# Patient Record
Sex: FEMALE | Race: BLACK OR AFRICAN AMERICAN | NOT HISPANIC OR LATINO | Employment: OTHER | URBAN - METROPOLITAN AREA
[De-identification: names, ages, dates, MRNs, and addresses within clinical notes are randomized per-mention and may not be internally consistent; named-entity substitution may affect disease eponyms.]

---

## 2017-01-23 ENCOUNTER — OFFICE VISIT (OUTPATIENT)
Dept: FAMILY MEDICINE | Facility: CLINIC | Age: 72
End: 2017-01-23
Payer: MEDICARE

## 2017-01-23 VITALS
HEIGHT: 66 IN | BODY MASS INDEX: 28.33 KG/M2 | TEMPERATURE: 98 F | RESPIRATION RATE: 19 BRPM | HEART RATE: 85 BPM | WEIGHT: 176.25 LBS | SYSTOLIC BLOOD PRESSURE: 142 MMHG | DIASTOLIC BLOOD PRESSURE: 80 MMHG | OXYGEN SATURATION: 97 %

## 2017-01-23 DIAGNOSIS — M54.50 CHRONIC MIDLINE LOW BACK PAIN WITHOUT SCIATICA: ICD-10-CM

## 2017-01-23 DIAGNOSIS — E78.5 HYPERLIPIDEMIA, UNSPECIFIED HYPERLIPIDEMIA TYPE: ICD-10-CM

## 2017-01-23 DIAGNOSIS — G89.29 CHRONIC MIDLINE LOW BACK PAIN WITHOUT SCIATICA: ICD-10-CM

## 2017-01-23 DIAGNOSIS — Z00.00 ENCOUNTER FOR PREVENTIVE HEALTH EXAMINATION: Primary | ICD-10-CM

## 2017-01-23 DIAGNOSIS — M47.812 SPONDYLOSIS OF CERVICAL REGION WITHOUT MYELOPATHY OR RADICULOPATHY: ICD-10-CM

## 2017-01-23 DIAGNOSIS — M48.02 CERVICAL STENOSIS OF SPINAL CANAL: ICD-10-CM

## 2017-01-23 DIAGNOSIS — I10 ESSENTIAL HYPERTENSION: ICD-10-CM

## 2017-01-23 DIAGNOSIS — J84.10 CALCIFIED GRANULOMA OF LUNG: ICD-10-CM

## 2017-01-23 PROCEDURE — 99499 UNLISTED E&M SERVICE: CPT | Mod: S$GLB,,, | Performed by: NURSE PRACTITIONER

## 2017-01-23 PROCEDURE — 3077F SYST BP >= 140 MM HG: CPT | Mod: S$GLB,,, | Performed by: NURSE PRACTITIONER

## 2017-01-23 PROCEDURE — G0439 PPPS, SUBSEQ VISIT: HCPCS | Mod: S$GLB,,, | Performed by: NURSE PRACTITIONER

## 2017-01-23 PROCEDURE — 3079F DIAST BP 80-89 MM HG: CPT | Mod: S$GLB,,, | Performed by: NURSE PRACTITIONER

## 2017-01-23 PROCEDURE — 99999 PR PBB SHADOW E&M-EST. PATIENT-LVL IV: CPT | Mod: PBBFAC,,, | Performed by: NURSE PRACTITIONER

## 2017-01-23 NOTE — PATIENT INSTRUCTIONS
Counseling and Referral of Other Preventative  (Italic type indicates deductible and co-insurance are waived)    Patient Name: Nuha Bernstein  Today's Date: 1/23/2017      SERVICE LIMITATIONS RECOMMENDATION    Vaccines    · Pneumococcal (once after 65)    · Influenza (annually)    · Hepatitis B (if medium/high risk)    · Prevnar 13      Hepatitis B medium/high risk factors:       - End-stage renal disease       - Hemophiliacs who received Factor VII or         IX concentrates       - Clients of institutions for the mentally             retarded       - Persons who live in the same house as          a HepB carrier       - Homosexual men       - Illicit injectable drug abusers     Pneumococcal: Recommended to patient, declined     Influenza: Recommended to patient, declined     Hepatitis B: N/A no risk factors      Prevnar 13: Recommended to patient, declined    Mammogram (biennial age 50-74)  Annually (age 40 or over)  Last done April 2016, recommend to repeat every 2  years    Pap (up to age 70 and after 70 if unknown history or abnormal study last 10 years)    N/A patient is 70 y/o     patient is 70 y/o    Colorectal cancer screening (to age 75)    · Fecal occult blood test (annual)  · Flexible sigmoidoscopy (5y)  · Screening colonoscopy (10y)  · Barium enema   Last done Sept 2015, recommend to repeat every 10  years    Diabetes self-management training (no USPSTF recommendations)  Requires referral by treating physician for patient with diabetes or renal disease. 10 hours of initial DSMT sessions of no less than 30 minutes each in a continuous 12-month period. 2 hours of follow-up DSMT in subsequent years.  N/A non-diabetic patient     Bone mass measurements (age 65 & older, biennial)  Requires diagnosis related to osteoporosis or estrogen deficiency. Biennial benefit unless patient has history of long-term glucocorticoid  Last done August 2016, recommend to repeat every 5  years    Glaucoma screening (no USPSTF  recommendation)  Diabetes mellitus, family history   , age 50 or over    American, age 65 or over  Done this year, repeat every year    Medical nutrition therapy for diabetes or renal disease (no recommended schedule)  Requires referral by treating physician for patient with diabetes or renal disease or kidney transplant within the past 3 years.  Can be provided in same year as diabetes self-management training (DSMT), and CMS recommends medical nutrition therapy take place after DSMT. Up to 3 hours for initial year and 2 hours in subsequent years.  N/A     Cardiovascular screening blood tests (every 5 years)  · Fasting lipid panel  Order as a panel if possible  Last done July 2016, recommend to repeat every 1  years    Diabetes screening tests (at least every 3 years, Medicare covers annually or at 6-month intervals for prediabetic patients)  · Fasting blood sugar (FBS) or glucose tolerance test (GTT)  Patient must be diagnosed with one of the following:       - Hypertension       - Dyslipidemia       - Obesity (BMI 30kg/m2)       - Previous elevated impaired FBS or GTT       ... or any two of the following:       - Overweight (BMI 25 but <30)       - Family history of diabetes       - Age 65 or older       - History of gestational diabetes or birth of baby weighing more than 9 pounds  Last done July 2016, recommend to repeat every per MD discretion       Abdominal aortic aneurysm screening (once)  · Sonogram   Limited to patients who meet one of the following criteria:       - Men who are 65-75 years old and have smoked more than 100 cigarette in their lifetime       - Anyone with a family history of abdominal aortic aneurysm       - Anyone recommended for screening by the USPSTF      HIV screening (annually for increased risk patients)  · HIV-1 and HIV-2 by EIA, or CHERYL, rapid antibody test or oral mucosa transudate  Patients must be at increased risk for HIV infection per USPSTF  guidelines or pregnant. Tests covered annually for patient at increased risk or as requested by the patient. Pregnant patients may receive up to 3 tests during pregnancy.  Risks discussed, screening is not recommended    Smoking cessation counseling (up to 8 sessions per year)  Patients must be asymptomatic of tobacco-related conditions to receive as a preventative service.  N/A    Subsequent annual wellness visit  At least 12 months since last AWV  Return in one year     The following information is provided to all patients.  This information is to help you find resources for any of the problems found today that may be affecting your health:                Living healthy guide: www.[facility].[place].[facility]      Understanding Diabetes: www.diabetes.org      Eating healthy: www.cdc.gov/healthyweight      CDC home safety checklist: www.cdc.gov/steadi/patient.html      Agency on Aging: www.goea.[place].[facility]      Alcoholics anonymous (AA): www.aa.org      Physical Activity: www.berkley.nih.gov/qk3xntu      Tobacco use: www.quitwithusla.org     Counseling and Referral of Other Preventative  (Italic type indicates deductible and co-insurance are waived)    Patient Name: Nuha Bernstein  Today's Date: 1/24/2017      SERVICE LIMITATIONS RECOMMENDATION    Vaccines    · Pneumococcal (once after 65)    · Influenza (annually)    · Hepatitis B (if medium/high risk)    · Prevnar 13      Hepatitis B medium/high risk factors:       - End-stage renal disease       - Hemophiliacs who received Factor VII or         IX concentrates       - Clients of institutions for the mentally             retarded       - Persons who live in the same house as          a HepB carrier       - Homosexual men       - Illicit injectable drug abusers     Pneumococcal: Recommended to patient, declined     Influenza: Recommended to patient, declined     Hepatitis B: N/A N/A no risk factors      Prevnar 13: Recommended to patient, declined    Mammogram (biennial age  50-74)  Annually (age 40 or over)  Last done April 2016, recommend to repeat every 2  years    Pap (up to age 70 and after 70 if unknown history or abnormal study last 10 years)    N/A Patient has no risk factors      N/A no risk factors     Colorectal cancer screening (to age 75)    · Fecal occult blood test (annual)  · Flexible sigmoidoscopy (5y)  · Screening colonoscopy (10y)  · Barium enema   Last done Sept 2015, recommend to repeat every 10  years    Diabetes self-management training (no USPSTF recommendations)  Requires referral by treating physician for patient with diabetes or renal disease. 10 hours of initial DSMT sessions of no less than 30 minutes each in a continuous 12-month period. 2 hours of follow-up DSMT in subsequent years.  N/A non-diabetic     Bone mass measurements (age 65 & older, biennial)  Requires diagnosis related to osteoporosis or estrogen deficiency. Biennial benefit unless patient has history of long-term glucocorticoid  Last done August 2016, recommend to repeat every 5  years    Glaucoma screening (no USPSTF recommendation)  Diabetes mellitus, family history   , age 50 or over    American, age 65 or over  N/A completed at [facility] 2012, has personal optometrist     Medical nutrition therapy for diabetes or renal disease (no recommended schedule)  Requires referral by treating physician for patient with diabetes or renal disease or kidney transplant within the past 3 years.  Can be provided in same year as diabetes self-management training (DSMT), and CMS recommends medical nutrition therapy take place after DSMT. Up to 3 hours for initial year and 2 hours in subsequent years.  N/A non-diabetic     Cardiovascular screening blood tests (every 5 years)  · Fasting lipid panel  Order as a panel if possible  Last done July 2016, recommend to repeat every 1  years    Diabetes screening tests (at least every 3 years, Medicare covers annually or at 6-month intervals for  prediabetic patients)  · Fasting blood sugar (FBS) or glucose tolerance test (GTT)  Patient must be diagnosed with one of the following:       - Hypertension       - Dyslipidemia       - Obesity (BMI 30kg/m2)       - Previous elevated impaired FBS or GTT       ... or any two of the following:       - Overweight (BMI 25 but <30)       - Family history of diabetes       - Age 65 or older       - History of gestational diabetes or birth of baby weighing more than 9 pounds  Last done July 2016 - Ha1c 6.2, recommend to repeat every at physician's   discretion     Abdominal aortic aneurysm screening (once)  · Sonogram   Limited to patients who meet one of the following criteria:       - Men who are 65-75 years old and have smoked more than 100 cigarette in their lifetime       - Anyone with a family history of abdominal aortic aneurysm       - Anyone recommended for screening by the USPSTF  N/A N/A    HIV screening (annually for increased risk patients)  · HIV-1 and HIV-2 by EIA, or CHERYL, rapid antibody test or oral mucosa transudate  Patients must be at increased risk for HIV infection per USPSTF guidelines or pregnant. Tests covered annually for patient at increased risk or as requested by the patient. Pregnant patients may receive up to 3 tests during pregnancy.  Risks discussed, screening is not recommended    Smoking cessation counseling (up to 8 sessions per year)  Patients must be asymptomatic of tobacco-related conditions to receive as a preventative service.  NOT A TOBACCO USER     Subsequent annual wellness visit  At least 12 months since last AWV  Return in one year     The following information is provided to all patients.  This information is to help you find resources for any of the problems found today that may be affecting your health:                Living healthy guide: www.[facility].[place].gov      Understanding Diabetes: www.diabetes.org      Eating healthy: www.cdc.gov/healthyweight      [facility] home safety  checklist: www.cdc.gov/steadi/patient.html      Agency on Aging: www.goea.[place].gov      Alcoholics anonymous (AA): www.aa.org      Physical Activity: www.berkley.nih.gov/vx0uear      Tobacco use: www.quitwithusla.org

## 2017-01-24 NOTE — PROGRESS NOTES
"Nuha Bernstein presented for a  Medicare AWV and comprehensive Health Risk Assessment today. The following components were reviewed and updated:    · Medical history  · Family History  · Social history  · Allergies and Current Medications  · Health Risk Assessment  · Health Maintenance  · Care Team     ** See Completed Assessments for Annual Wellness Visit within the encounter summary.**       The following assessments were completed:  · Living Situation  · CAGE  · Depression Screening  · Timed Get Up and Go  · Whisper Test  · Cognitive Function Screening  · Nutrition Screening  · ADL Screening  · PAQ Screening    Vitals:    01/23/17 1513   BP: (!) 142/80   BP Location: Left arm   Patient Position: Sitting   BP Method: Manual   Pulse: 85   Resp: 19   Temp: 98.3 °F (36.8 °C)   TempSrc: Oral   SpO2: 97%   Weight: 79.9 kg (176 lb 4.1 oz)   Height: 5' 6" (1.676 m)     Body mass index is 28.45 kg/(m^2).  Physical Exam   Constitutional: She is oriented to person, place, and time.   Cardiovascular: Normal rate, regular rhythm and normal heart sounds.    Pulmonary/Chest: Effort normal and breath sounds normal.   Musculoskeletal: Normal range of motion.   Neurological: She is alert and oriented to person, place, and time.   Skin: Skin is warm.   Psychiatric: She has a normal mood and affect. Her behavior is normal. Thought content normal.   Vitals reviewed.        Diagnoses and health risks identified today and associated recommendations/orders:    1. Encounter for preventive health examination  Education provided about prevent health examinations and procedures; discussed patient's health concerns, holistically addressed patient's health plan.     2. Calcified granuloma of lung  Stable, asymptomatic; monitor    3. Essential hypertension  The current medical regimen is effective;  continue present plan and medications.  Reviewed medications, educated about diet, activities, and ways to avoid sedentary lifestyle.     4. " Chronic midline low back pain without sciatica  Stable, followed by [facility] physical rehabilitation, taking tramadol as directed, denies worsening symptoms; continue as directed.     5. Cervical stenosis of spinal canal  Stable, followed by [facility] physical rehabilitation, taking tramadol as directed, denies worsening symptoms; continue as directed.     6. Spondylosis of cervical region without myelopathy or radiculopathy  Stable, followed by [facility] physical rehabilitation, taking tramadol as directed, denies worsening symptoms; continue as directed.     7. Hyperlipidemia, unspecified hyperlipidemia type  The current medical regimen is effective;  continue present plan and medications.  Reminded patient of [employer]'s Silver Sneakers benefits with access to fitness centers and classes.     Reviewed health maintenance with patient, educated about recommended examinations, procedures (labs & images), and immunizations.  Patient elected to deferred vaccinations.       No Follow-up on file.    Chico Berg Jr, NP

## 2017-02-22 DIAGNOSIS — G89.29 CHRONIC NECK PAIN: ICD-10-CM

## 2017-02-22 DIAGNOSIS — M54.50 CHRONIC LOW BACK PAIN: ICD-10-CM

## 2017-02-22 DIAGNOSIS — M54.2 CHRONIC NECK PAIN: ICD-10-CM

## 2017-02-22 DIAGNOSIS — G89.29 CHRONIC LOW BACK PAIN: ICD-10-CM

## 2017-02-23 RX ORDER — MELOXICAM 15 MG/1
TABLET ORAL
Qty: 90 TABLET | Refills: 1 | Status: SHIPPED | OUTPATIENT
Start: 2017-02-23 | End: 2017-07-24 | Stop reason: SDUPTHER

## 2017-05-04 ENCOUNTER — TELEPHONE (OUTPATIENT)
Dept: FAMILY MEDICINE | Facility: CLINIC | Age: 72
End: 2017-05-04

## 2017-05-04 DIAGNOSIS — Z12.31 ENCOUNTER FOR SCREENING MAMMOGRAM FOR BREAST CANCER: Primary | ICD-10-CM

## 2017-05-04 NOTE — TELEPHONE ENCOUNTER
----- Message from [facility] BERNADETTE Vann sent at 5/4/2017 12:50 PM CDT -----  Contact: self   Request mammogram order. Please contact the pt at [phone]. Thanks!

## 2017-05-15 ENCOUNTER — HOSPITAL ENCOUNTER (OUTPATIENT)
Dept: RADIOLOGY | Facility: HOSPITAL | Age: 72
Discharge: HOME | End: 2017-05-15
Attending: FAMILY MEDICINE
Payer: MEDICARE

## 2017-05-15 DIAGNOSIS — Z12.31 ENCOUNTER FOR SCREENING MAMMOGRAM FOR BREAST CANCER: ICD-10-CM

## 2017-05-15 PROCEDURE — 77067 SCR MAMMO BI INCL CAD: CPT | Mod: 26,,, | Performed by: RADIOLOGY

## 2017-05-15 PROCEDURE — 77067 SCR MAMMO BI INCL CAD: CPT | Mod: TC

## 2017-05-15 PROCEDURE — 77063 BREAST TOMOSYNTHESIS BI: CPT | Mod: 26,,, | Performed by: RADIOLOGY

## 2017-07-11 DIAGNOSIS — E78.5 HYPERLIPIDEMIA, UNSPECIFIED HYPERLIPIDEMIA TYPE: ICD-10-CM

## 2017-07-11 DIAGNOSIS — I10 ESSENTIAL HYPERTENSION: ICD-10-CM

## 2017-07-11 RX ORDER — LISINOPRIL 40 MG/1
TABLET ORAL
Qty: 30 TABLET | Refills: 0 | Status: SHIPPED | OUTPATIENT
Start: 2017-07-11 | End: 2017-11-02 | Stop reason: SDUPTHER

## 2017-07-11 RX ORDER — TRIAMTERENE AND HYDROCHLOROTHIAZIDE 37.5; 25 MG/1; MG/1
1 CAPSULE ORAL DAILY
Qty: 30 CAPSULE | Refills: 0 | Status: SHIPPED | OUTPATIENT
Start: 2017-07-11 | End: 2018-02-02 | Stop reason: SDUPTHER

## 2017-07-11 RX ORDER — ATORVASTATIN CALCIUM 20 MG/1
TABLET, FILM COATED ORAL
Qty: 30 TABLET | Refills: 0 | Status: SHIPPED | OUTPATIENT
Start: 2017-07-11 | End: 2017-09-13 | Stop reason: SDUPTHER

## 2017-07-11 RX ORDER — ATORVASTATIN CALCIUM 20 MG/1
TABLET, FILM COATED ORAL
Qty: 90 TABLET | Refills: 2 | OUTPATIENT
Start: 2017-07-11

## 2017-07-11 RX ORDER — LISINOPRIL 40 MG/1
TABLET ORAL
Qty: 90 TABLET | Refills: 2 | OUTPATIENT
Start: 2017-07-11

## 2017-07-24 DIAGNOSIS — G89.29 CHRONIC NECK PAIN: ICD-10-CM

## 2017-07-24 DIAGNOSIS — G89.29 CHRONIC LOW BACK PAIN: ICD-10-CM

## 2017-07-24 DIAGNOSIS — M54.2 CHRONIC NECK PAIN: ICD-10-CM

## 2017-07-24 DIAGNOSIS — M54.50 CHRONIC LOW BACK PAIN: ICD-10-CM

## 2017-07-24 RX ORDER — MELOXICAM 15 MG/1
TABLET ORAL
Qty: 90 TABLET | Refills: 1 | Status: SHIPPED | OUTPATIENT
Start: 2017-07-24 | End: 2018-01-16 | Stop reason: SDUPTHER

## 2017-08-12 DIAGNOSIS — E78.5 HYPERLIPIDEMIA, UNSPECIFIED HYPERLIPIDEMIA TYPE: ICD-10-CM

## 2017-08-12 DIAGNOSIS — I10 ESSENTIAL HYPERTENSION: ICD-10-CM

## 2017-08-14 RX ORDER — TRIAMTERENE AND HYDROCHLOROTHIAZIDE 37.5; 25 MG/1; MG/1
CAPSULE ORAL
Qty: 30 CAPSULE | Refills: 0 | OUTPATIENT
Start: 2017-08-14

## 2017-08-14 RX ORDER — LISINOPRIL 40 MG/1
TABLET ORAL
Qty: 30 TABLET | Refills: 0 | OUTPATIENT
Start: 2017-08-14

## 2017-08-14 RX ORDER — ATORVASTATIN CALCIUM 20 MG/1
TABLET, FILM COATED ORAL
Qty: 30 TABLET | Refills: 0 | OUTPATIENT
Start: 2017-08-14

## 2017-09-01 ENCOUNTER — OFFICE VISIT (OUTPATIENT)
Dept: FAMILY MEDICINE | Facility: CLINIC | Age: 72
End: 2017-09-01
Payer: MEDICARE

## 2017-09-01 VITALS
HEIGHT: 66 IN | WEIGHT: 181 LBS | TEMPERATURE: 98 F | DIASTOLIC BLOOD PRESSURE: 80 MMHG | OXYGEN SATURATION: 97 % | HEART RATE: 79 BPM | BODY MASS INDEX: 29.09 KG/M2 | SYSTOLIC BLOOD PRESSURE: 132 MMHG

## 2017-09-01 DIAGNOSIS — M54.2 CHRONIC NECK PAIN: ICD-10-CM

## 2017-09-01 DIAGNOSIS — J84.10 CALCIFIED GRANULOMA OF LUNG: ICD-10-CM

## 2017-09-01 DIAGNOSIS — G89.29 CHRONIC NECK PAIN: ICD-10-CM

## 2017-09-01 DIAGNOSIS — E78.00 PURE HYPERCHOLESTEROLEMIA: Primary | ICD-10-CM

## 2017-09-01 DIAGNOSIS — R73.03 PREDIABETES: ICD-10-CM

## 2017-09-01 DIAGNOSIS — I10 ESSENTIAL HYPERTENSION: ICD-10-CM

## 2017-09-01 PROCEDURE — 1159F MED LIST DOCD IN RCRD: CPT | Mod: S$GLB,,, | Performed by: FAMILY MEDICINE

## 2017-09-01 PROCEDURE — 3008F BODY MASS INDEX DOCD: CPT | Mod: S$GLB,,, | Performed by: FAMILY MEDICINE

## 2017-09-01 PROCEDURE — 99499 UNLISTED E&M SERVICE: CPT | Mod: S$GLB,,, | Performed by: FAMILY MEDICINE

## 2017-09-01 PROCEDURE — 1157F ADVNC CARE PLAN IN RCRD: CPT | Mod: S$GLB,,, | Performed by: FAMILY MEDICINE

## 2017-09-01 PROCEDURE — 3075F SYST BP GE 130 - 139MM HG: CPT | Mod: S$GLB,,, | Performed by: FAMILY MEDICINE

## 2017-09-01 PROCEDURE — 99214 OFFICE O/P EST MOD 30 MIN: CPT | Mod: S$GLB,,, | Performed by: FAMILY MEDICINE

## 2017-09-01 PROCEDURE — 99999 PR PBB SHADOW E&M-EST. PATIENT-LVL III: CPT | Mod: PBBFAC,,, | Performed by: FAMILY MEDICINE

## 2017-09-01 PROCEDURE — 3079F DIAST BP 80-89 MM HG: CPT | Mod: S$GLB,,, | Performed by: FAMILY MEDICINE

## 2017-09-01 PROCEDURE — 1126F AMNT PAIN NOTED NONE PRSNT: CPT | Mod: S$GLB,,, | Performed by: FAMILY MEDICINE

## 2017-09-01 RX ORDER — TRAMADOL HYDROCHLORIDE 50 MG/1
50 TABLET ORAL 3 TIMES DAILY PRN
Qty: 270 TABLET | Refills: 0 | Status: SHIPPED | OUTPATIENT
Start: 2017-09-01 | End: 2017-09-01 | Stop reason: SDUPTHER

## 2017-09-01 RX ORDER — TRAMADOL HYDROCHLORIDE 50 MG/1
50 TABLET ORAL 3 TIMES DAILY PRN
Qty: 270 TABLET | Refills: 0 | Status: CANCELLED | OUTPATIENT
Start: 2017-09-01 | End: 2017-11-30

## 2017-09-01 NOTE — PROGRESS NOTES
Office Visit    Patient Name: Nuha Bernstein    : 1945  MRN: [id number]    Subjective:  Nuha is a 72 y.o. female who presents today for:    Medication Refill and Hypertension      This patient has multiple medical diagnoses as noted below.  This patient is known to me and to this clinic. She does report that she does have increased cramping at night in her hips. It will occur when she lays down.  Patient denies any new symptoms including chest pain, SOB, blurry vision, N/V, diarrhea.  She does require refills on her medications.  WE discussed her last mammogram.   She reports uncomfortable pain in her chest that feels like indigestion.  She has used Tums to address this concerns.      Patient Active Problem List   Diagnosis    Hyperlipidemia    Cervical stenosis of spinal canal    Osteoarthritis of both hips (moderate)    DJD (degenerative joint disease) of cervical spine    Chronic low back pain    Chronic neck pain    Arthropathy of cervical facet joint    Essential hypertension    Calcified granuloma of lung    Prediabetes       Past Surgical History:   Procedure Laterality Date    Right Thumb         Family History   Problem Relation Age of Onset    Hypertension Mother     Cataracts Mother     Heart disease Mother     Hypertension Father     Cataracts Father     Amblyopia Neg Hx     Blindness Neg Hx     Cancer Neg Hx     Diabetes Neg Hx     Glaucoma Neg Hx     Macular degeneration Neg Hx     Retinal detachment Neg Hx     Strabismus Neg Hx     Stroke Neg Hx     Thyroid disease Neg Hx        Social History     Social History    Marital status:      Spouse name: N/A    Number of children: N/A    Years of education: N/A     Occupational History    Not on file.     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     Drug use: No    Sexual activity: No     Other  Topics Concern    Not on file     Social History Narrative    No narrative on file       Current Medications  Medications reviewed and updated.     Allergies   Review of patient's allergies indicates:  No Known Allergies      Labs  Lab Results   Component Value Date    HGBA1C 6.2 07/07/2016     Lab Results   Component Value Date     07/07/2016    K 3.9 07/07/2016     07/07/2016    CO2 24 07/07/2016    BUN 27 (H) 07/07/2016    CREATININE 1.1 07/07/2016    CALCIUM 9.6 07/07/2016    ANIONGAP 12 07/07/2016    ESTGFRAFRICA 58.4 (A) 07/07/2016    EGFRNONAA 50.6 (A) 07/07/2016     Lab Results   Component Value Date    CHOL 179 07/07/2016    CHOL 182 05/15/2015    CHOL 234 (H) 09/27/2014    CHOL 234 (H) 09/27/2014     Lab Results   Component Value Date    HDL 49 07/07/2016    HDL 46 05/15/2015    HDL 53 09/27/2014    HDL 53 09/27/2014     Lab Results   Component Value Date    LDLCALC 108.8 07/07/2016    LDLCALC 113.8 05/15/2015    LDLCALC 162.4 (H) 09/27/2014    LDLCALC 162.4 (H) 09/27/2014     Lab Results   Component Value Date    TRIG 106 07/07/2016    TRIG 111 05/15/2015    TRIG 93 09/27/2014    TRIG 93 09/27/2014     Lab Results   Component Value Date    CHOLHDL 27.4 07/07/2016    CHOLHDL 25.3 05/15/2015    CHOLHDL 22.6 09/27/2014    CHOLHDL 22.6 09/27/2014     Last set of blood work has been reviewed as noted above.    Review of Systems   Constitutional: Negative for activity change, appetite change, fatigue, fever and unexpected weight change.   HENT: Negative.  Negative for ear discharge, ear pain, rhinorrhea and sore throat.    Eyes: Negative.    Respiratory: Negative for apnea, cough, chest tightness, shortness of breath and wheezing.    Cardiovascular: Negative for chest pain, palpitations and leg swelling.   Gastrointestinal: Negative for abdominal distention, abdominal pain, constipation, diarrhea and vomiting.   Endocrine: Negative for cold intolerance, heat intolerance, polydipsia and polyuria.  "  Genitourinary: Negative for decreased urine volume, menstrual problem, urgency, vaginal bleeding, vaginal discharge and vaginal pain.   Musculoskeletal: Negative.    Skin: Negative for rash.   Neurological: Negative for dizziness and headaches.   Hematological: Does not bruise/bleed easily.   Psychiatric/Behavioral: Negative for agitation, sleep disturbance and suicidal ideas.       /80 (BP Location: Left arm, Patient Position: Sitting, BP Method: Large (Manual))   Pulse 79   Temp 98 °F (36.7 °C) (Oral)   Ht 5' 6" (1.676 m)   Wt 82.1 kg (181 lb)   SpO2 97%   BMI 29.21 kg/m²      Physical Exam   Constitutional: She is oriented to person, place, and time. She appears well-developed and well-nourished.   HENT:   Head: Normocephalic.   Right Ear: External ear normal.   Left Ear: External ear normal.   Nose: Nose normal.   Mouth/Throat: Oropharynx is clear and moist.   Eyes: Conjunctivae and EOM are normal. Pupils are equal, round, and reactive to light.   Cardiovascular: Normal rate, regular rhythm and normal heart sounds.    Pulmonary/Chest: Effort normal and breath sounds normal.   Neurological: She is alert and oriented to person, place, and time.   Skin: Skin is warm and dry.   Vitals reviewed.      Health Maintenance  Health Maintenance       Date Due Completion Date    Pneumococcal (65+) (2 of 2 - PCV13) 05/03/2011 5/3/2010    Lipid Panel 07/07/2017 7/7/2016    Influenza Vaccine 08/01/2017 1/23/2017 (Declined)    Override on 1/23/2017: Declined    Override on 11/17/2013: Done (pt had it done at [facility])    Override on 10/29/2012: Done    Mammogram 05/15/2019 5/15/2017    DEXA SCAN 08/11/2019 8/11/2016    TETANUS VACCINE 04/27/2023 4/27/2013    Colonoscopy 09/14/2025 9/14/2015          Assessment/Plan:  Nuha Bernstein is a 72 y.o. female who presents today for :    1. Pure hypercholesterolemia    2. Chronic neck pain    3. Essential hypertension    4. Calcified granuloma of lung    5. Prediabetes  " "      Problem List Items Addressed This Visit        Unprioritized    Calcified granuloma of lung    Overview     "Heart size is normal.  Few calcified granulomas noted.  The right hemidiaphragm is elevated and subsegmental atelectatic changes seen at the right lung base.  Otherwise the lungs are clear" - Xray Chest 2-    Patient is stable.  Assess and addressed all modifiable risk factors.  Continue with appropriate management to prevent complications.           Chronic neck pain    Relevant Medications    tramadol (ULTRAM) 50 mg tablet  -   One time fill       Essential hypertension    Relevant Orders    CBC auto differential    Comprehensive metabolic panel    Lipid panel    Hemoglobin A1c    TSH  -  Pt is currently stable on medication regimen.  Continue current therapy as scheduled.  Contact office with any questions about adjustments on medications.       Hyperlipidemia - Primary    Relevant Orders    CBC auto differential    Comprehensive metabolic panel    Lipid panel    Hemoglobin A1c    TSH  -  Pt is currently stable on medication regimen.  Continue current therapy as scheduled.  Contact office with any questions about adjustments on medications.       Prediabetes    Relevant Orders    Lipid panel    Hemoglobin A1c    TSH  -  Pt is currently stable on medication regimen.  Continue current therapy as scheduled.  Contact office with any questions about adjustments on medications.         Other Visit Diagnoses    None.         No Follow-up on file.       "

## 2017-09-06 ENCOUNTER — LAB VISIT (OUTPATIENT)
Dept: LAB | Facility: HOSPITAL | Age: 72
End: 2017-09-06
Attending: FAMILY MEDICINE
Payer: MEDICARE

## 2017-09-06 DIAGNOSIS — R73.03 PREDIABETES: ICD-10-CM

## 2017-09-06 DIAGNOSIS — E78.00 PURE HYPERCHOLESTEROLEMIA: ICD-10-CM

## 2017-09-06 DIAGNOSIS — I10 ESSENTIAL HYPERTENSION: ICD-10-CM

## 2017-09-06 LAB
ALBUMIN SERPL BCP-MCNC: 3.6 G/DL
ALP SERPL-CCNC: 75 U/L
ALT SERPL W/O P-5'-P-CCNC: 31 U/L
ANION GAP SERPL CALC-SCNC: 10 MMOL/L
AST SERPL-CCNC: 31 U/L
BASOPHILS # BLD AUTO: 0.02 K/UL
BASOPHILS NFR BLD: 0.4 %
BILIRUB SERPL-MCNC: 0.5 MG/DL
BUN SERPL-MCNC: 32 MG/DL
CALCIUM SERPL-MCNC: 9.7 MG/DL
CHLORIDE SERPL-SCNC: 106 MMOL/L
CHOLEST SERPL-MCNC: 218 MG/DL
CHOLEST/HDLC SERPL: 4.1 {RATIO}
CO2 SERPL-SCNC: 25 MMOL/L
CREAT SERPL-MCNC: 1.3 MG/DL
DIFFERENTIAL METHOD: ABNORMAL
EOSINOPHIL # BLD AUTO: 0.4 K/UL
EOSINOPHIL NFR BLD: 7.1 %
ERYTHROCYTE [DISTWIDTH] IN BLOOD BY AUTOMATED COUNT: 14.5 %
EST. GFR  (AFRICAN AMERICAN): 47.4 ML/MIN/1.73 M^2
EST. GFR  (NON AFRICAN AMERICAN): 41.1 ML/MIN/1.73 M^2
GLUCOSE SERPL-MCNC: 100 MG/DL
HCT VFR BLD AUTO: 35.2 %
HDLC SERPL-MCNC: 53 MG/DL
HDLC SERPL: 24.3 %
HGB BLD-MCNC: 11.6 G/DL
LDLC SERPL CALC-MCNC: 144.6 MG/DL
LYMPHOCYTES # BLD AUTO: 2 K/UL
LYMPHOCYTES NFR BLD: 37.7 %
MCH RBC QN AUTO: 28.8 PG
MCHC RBC AUTO-ENTMCNC: 33 G/DL
MCV RBC AUTO: 87 FL
MONOCYTES # BLD AUTO: 0.5 K/UL
MONOCYTES NFR BLD: 9.3 %
NEUTROPHILS # BLD AUTO: 2.5 K/UL
NEUTROPHILS NFR BLD: 45.3 %
NONHDLC SERPL-MCNC: 165 MG/DL
PLATELET # BLD AUTO: 260 K/UL
PMV BLD AUTO: 10.8 FL
POTASSIUM SERPL-SCNC: 4.5 MMOL/L
PROT SERPL-MCNC: 7.4 G/DL
RBC # BLD AUTO: 4.03 M/UL
SODIUM SERPL-SCNC: 141 MMOL/L
TRIGL SERPL-MCNC: 102 MG/DL
TSH SERPL DL<=0.005 MIU/L-ACNC: 1.19 UIU/ML
WBC # BLD AUTO: 5.39 K/UL

## 2017-09-06 PROCEDURE — 84443 ASSAY THYROID STIM HORMONE: CPT

## 2017-09-06 PROCEDURE — 80061 LIPID PANEL: CPT

## 2017-09-06 PROCEDURE — 80053 COMPREHEN METABOLIC PANEL: CPT

## 2017-09-06 PROCEDURE — 85025 COMPLETE CBC W/AUTO DIFF WBC: CPT

## 2017-09-06 PROCEDURE — 36415 COLL VENOUS BLD VENIPUNCTURE: CPT | Mod: PO

## 2017-09-06 PROCEDURE — 83036 HEMOGLOBIN GLYCOSYLATED A1C: CPT

## 2017-09-07 LAB
ESTIMATED AVG GLUCOSE: 123 MG/DL
HBA1C MFR BLD HPLC: 5.9 %

## 2017-09-13 ENCOUNTER — TELEPHONE (OUTPATIENT)
Dept: FAMILY MEDICINE | Facility: CLINIC | Age: 72
End: 2017-09-13

## 2017-09-13 DIAGNOSIS — E78.5 HYPERLIPIDEMIA, UNSPECIFIED HYPERLIPIDEMIA TYPE: ICD-10-CM

## 2017-09-13 RX ORDER — ATORVASTATIN CALCIUM 40 MG/1
TABLET, FILM COATED ORAL
Qty: 90 TABLET | Refills: 0 | Status: SHIPPED | OUTPATIENT
Start: 2017-09-13 | End: 2018-07-05

## 2017-09-13 NOTE — TELEPHONE ENCOUNTER
----- Message from Maryjo Gaffney MD sent at 9/13/2017  1:48 PM CDT -----  All of your blood work is stable.  We may have to increase your cholesterol medication.  I have sent in a higher dosage to the pharmacy.

## 2017-10-31 ENCOUNTER — OFFICE VISIT (OUTPATIENT)
Dept: OPTOMETRY | Facility: CLINIC | Age: 72
End: 2017-10-31
Payer: MEDICARE

## 2017-10-31 DIAGNOSIS — H25.13 NUCLEAR SCLEROSIS OF BOTH EYES: Primary | ICD-10-CM

## 2017-10-31 DIAGNOSIS — H52.7 REFRACTIVE ERROR: ICD-10-CM

## 2017-10-31 DIAGNOSIS — Z13.5 SCREENING FOR GLAUCOMA: ICD-10-CM

## 2017-10-31 DIAGNOSIS — I10 ESSENTIAL HYPERTENSION: ICD-10-CM

## 2017-10-31 PROCEDURE — 92004 COMPRE OPH EXAM NEW PT 1/>: CPT | Mod: S$GLB,,, | Performed by: OPTOMETRIST

## 2017-10-31 PROCEDURE — 99999 PR PBB SHADOW E&M-EST. PATIENT-LVL II: CPT | Mod: PBBFAC,,, | Performed by: OPTOMETRIST

## 2017-10-31 PROCEDURE — 92015 DETERMINE REFRACTIVE STATE: CPT | Mod: S$GLB,,, | Performed by: OPTOMETRIST

## 2017-10-31 PROCEDURE — 99499 UNLISTED E&M SERVICE: CPT | Mod: S$GLB,,, | Performed by: OPTOMETRIST

## 2017-10-31 NOTE — PROGRESS NOTES
"Subjective:       Patient ID: Nuha Bernstein is a 72 y.o. female      Chief Complaint   Patient presents with    Concerns About Ocular Health     History of Present Illness  Dls: x 2 yrs ?      Pt c/o blurry vision at near ou. Pt wears bifocal glasses. Pt states no   tearing no itching no burning no pain no ha's no floaters.     Eye meds:   None     Assessment/Plan:     1. Nuclear sclerosis of both eyes  Educated patient on the presence of cataracts and effects on vision, including clouded, blurred or dim vision, increasing difficulty with vision at night, sensitivity to light and glare, need for brighter light for reading and other activities, and seeing "halos" around lights. No surgery at this time. Recheck in one year.    2. Essential hypertension  No hypertensive retinopathy. Continue BP control. RTC 1 year for DFE.    3. Screening for glaucoma  No changes related to glaucoma. Monitor yearly.    4. Refractive error  Educated patient on refractive error and discussed lens options. Dispensed updated spectacle Rx. Educated about adaptation period to new specs.    Eyeglass Final Rx     Eyeglass Final Rx       Sphere Cylinder Axis Add    Right +1.50 +2.50 180 +2.75    Left +0.75 +1.75 180 +2.75    Type:  Bifocal    Expiration Date:  11/1/2018                  Return in about 1 year (around 10/31/2018) for Annual eye exam.       "

## 2017-11-02 DIAGNOSIS — E78.5 HYPERLIPIDEMIA, UNSPECIFIED HYPERLIPIDEMIA TYPE: ICD-10-CM

## 2017-11-02 DIAGNOSIS — I10 ESSENTIAL HYPERTENSION: ICD-10-CM

## 2017-11-03 RX ORDER — ATORVASTATIN CALCIUM 20 MG/1
TABLET, FILM COATED ORAL
Qty: 90 TABLET | Refills: 0 | Status: SHIPPED | OUTPATIENT
Start: 2017-11-03 | End: 2018-01-16 | Stop reason: SDUPTHER

## 2017-11-03 RX ORDER — LISINOPRIL 40 MG/1
TABLET ORAL
Qty: 90 TABLET | Refills: 0 | Status: SHIPPED | OUTPATIENT
Start: 2017-11-03 | End: 2018-01-16 | Stop reason: SDUPTHER

## 2018-01-03 RX ORDER — TRAMADOL HYDROCHLORIDE 50 MG/1
TABLET ORAL
Qty: 270 TABLET | Refills: 0 | Status: SHIPPED | OUTPATIENT
Start: 2018-01-03 | End: 2018-06-09 | Stop reason: SDUPTHER

## 2018-01-16 DIAGNOSIS — E78.5 HYPERLIPIDEMIA, UNSPECIFIED HYPERLIPIDEMIA TYPE: ICD-10-CM

## 2018-01-16 DIAGNOSIS — I10 ESSENTIAL HYPERTENSION: ICD-10-CM

## 2018-01-16 DIAGNOSIS — G89.29 CHRONIC LOW BACK PAIN: ICD-10-CM

## 2018-01-16 DIAGNOSIS — M54.50 CHRONIC LOW BACK PAIN: ICD-10-CM

## 2018-01-16 DIAGNOSIS — M54.2 CHRONIC NECK PAIN: ICD-10-CM

## 2018-01-16 DIAGNOSIS — G89.29 CHRONIC NECK PAIN: ICD-10-CM

## 2018-01-17 RX ORDER — LISINOPRIL 40 MG/1
TABLET ORAL
Qty: 90 TABLET | Refills: 0 | Status: SHIPPED | OUTPATIENT
Start: 2018-01-17 | End: 2018-06-12 | Stop reason: SDUPTHER

## 2018-01-17 RX ORDER — ATORVASTATIN CALCIUM 20 MG/1
TABLET, FILM COATED ORAL
Qty: 90 TABLET | Refills: 0 | Status: SHIPPED | OUTPATIENT
Start: 2018-01-17 | End: 2018-07-05

## 2018-01-17 RX ORDER — MELOXICAM 15 MG/1
TABLET ORAL
Qty: 90 TABLET | Refills: 1 | Status: SHIPPED | OUTPATIENT
Start: 2018-01-17 | End: 2019-06-18 | Stop reason: SDUPTHER

## 2018-01-18 ENCOUNTER — PES CALL (OUTPATIENT)
Dept: ADMINISTRATIVE | Facility: CLINIC | Age: 73
End: 2018-01-18

## 2018-02-02 DIAGNOSIS — I10 ESSENTIAL HYPERTENSION: ICD-10-CM

## 2018-02-05 DIAGNOSIS — I10 ESSENTIAL HYPERTENSION: ICD-10-CM

## 2018-02-05 RX ORDER — TRIAMTERENE AND HYDROCHLOROTHIAZIDE 37.5; 25 MG/1; MG/1
1 CAPSULE ORAL DAILY
Qty: 90 CAPSULE | Refills: 3 | Status: SHIPPED | OUTPATIENT
Start: 2018-02-05 | End: 2019-04-24 | Stop reason: SDUPTHER

## 2018-02-05 RX ORDER — TRIAMTERENE AND HYDROCHLOROTHIAZIDE 37.5; 25 MG/1; MG/1
CAPSULE ORAL
Qty: 30 CAPSULE | Refills: 1 | Status: SHIPPED | OUTPATIENT
Start: 2018-02-05 | End: 2018-02-05 | Stop reason: SDUPTHER

## 2018-02-05 NOTE — TELEPHONE ENCOUNTER
----- Message from Ana M Mejia sent at 2/5/2018 10:43 AM CST -----  Contact: self  Refill : triamterene-hydrochlorothiazide 37.5-25 mg (DYAZIDE) 37.5-25 mg per capsule        Pharmacy     [facility] - [facility] [address]

## 2018-04-16 ENCOUNTER — OFFICE VISIT (OUTPATIENT)
Dept: FAMILY MEDICINE | Facility: CLINIC | Age: 73
End: 2018-04-16
Payer: MEDICARE

## 2018-04-16 VITALS
BODY MASS INDEX: 27.81 KG/M2 | TEMPERATURE: 98 F | WEIGHT: 173.06 LBS | HEART RATE: 79 BPM | OXYGEN SATURATION: 97 % | HEIGHT: 66 IN | SYSTOLIC BLOOD PRESSURE: 116 MMHG | DIASTOLIC BLOOD PRESSURE: 70 MMHG

## 2018-04-16 DIAGNOSIS — M47.812 ARTHROPATHY OF CERVICAL FACET JOINT: ICD-10-CM

## 2018-04-16 DIAGNOSIS — Z00.00 ENCOUNTER FOR PREVENTIVE HEALTH EXAMINATION: Primary | ICD-10-CM

## 2018-04-16 DIAGNOSIS — I10 ESSENTIAL HYPERTENSION: ICD-10-CM

## 2018-04-16 DIAGNOSIS — J84.10 CALCIFIED GRANULOMA OF LUNG: ICD-10-CM

## 2018-04-16 PROCEDURE — G0439 PPPS, SUBSEQ VISIT: HCPCS | Mod: S$GLB,,, | Performed by: NURSE PRACTITIONER

## 2018-04-16 PROCEDURE — 3078F DIAST BP <80 MM HG: CPT | Mod: CPTII,S$GLB,, | Performed by: NURSE PRACTITIONER

## 2018-04-16 PROCEDURE — 99999 PR PBB SHADOW E&M-EST. PATIENT-LVL IV: CPT | Mod: PBBFAC,,, | Performed by: NURSE PRACTITIONER

## 2018-04-16 PROCEDURE — 99499 UNLISTED E&M SERVICE: CPT | Mod: S$PBB,,, | Performed by: NURSE PRACTITIONER

## 2018-04-16 PROCEDURE — 3074F SYST BP LT 130 MM HG: CPT | Mod: CPTII,S$GLB,, | Performed by: NURSE PRACTITIONER

## 2018-04-16 NOTE — PROGRESS NOTES
"Nuha Bernstein presented for a  Medicare AWV and comprehensive Health Risk Assessment today. The following components were reviewed and updated:    · Medical history  · Family History  · Social history  · Allergies and Current Medications  · Health Risk Assessment  · Health Maintenance  · Care Team     ** See Completed Assessments for Annual Wellness Visit within the encounter summary.**       The following assessments were completed:  · Living Situation  · CAGE  · Depression Screening  · Timed Get Up and Go  · Whisper Test  · Cognitive Function Screening  · Nutrition Screening  · ADL Screening  · PAQ Screening    Vitals:    04/16/18 1511   BP: 116/70   BP Location: Right arm   Patient Position: Sitting   BP Method: Medium (Manual)   Pulse: 79   Temp: 98.3 °F (36.8 °C)   TempSrc: Oral   SpO2: 97%   Weight: 78.5 kg (173 lb 1 oz)   Height: 5' 6" (1.676 m)     Body mass index is 27.93 kg/m².  Physical Exam   Constitutional: She is oriented to person, place, and time. She appears well-developed and well-nourished.   HENT:   Head: Normocephalic and atraumatic.   Cardiovascular: Normal rate, regular rhythm and normal heart sounds.    Pulmonary/Chest: Effort normal and breath sounds normal. No respiratory distress. She has no decreased breath sounds. She has no wheezes. She has no rhonchi. She has no rales.   Neurological: She is alert and oriented to person, place, and time.   Skin: She is not diaphoretic. No pallor.   Psychiatric: She has a normal mood and affect. Her speech is normal and behavior is normal.             Diagnoses and health risks identified today and associated recommendations/orders:    1. Encounter for preventive health examination    2. Essential hypertension    3. Calcified granuloma of lung    4. Arthropathy of cervical facet joint      Problem List Items Addressed This Visit     Essential hypertension    Current Assessment & Plan     Stable and controlled. Continue current treatment plan as " "previously prescribed with your PCP.   The patient is asked to make an attempt to improve diet and exercise patterns to aid in medical management of this problem.             Calcified granuloma of lung    Overview     "Heart size is normal.  Few calcified granulomas noted.  The right hemidiaphragm is elevated and subsegmental atelectatic changes seen at the right lung base.  Otherwise the lungs are clear" - Xray Chest 2-         Current Assessment & Plan     Stable. Continue to monitor         Arthropathy of cervical facet joint    Current Assessment & Plan     Stable. Continue to monitor.           Other Visit Diagnoses     Encounter for preventive health examination    -  Primary            Provided Nuha with a 5-10 year written screening schedule and personal prevention plan. Recommendations were developed using the USPSTF age appropriate recommendations. Education, counseling, and referrals were provided as needed. After Visit Summary printed and given to patient which includes a list of additional screenings\tests needed.    Follow-up in about 1 year (around 4/16/2019).    Kike Vega, FNP-C    I offered to discuss end of life issues, including information on how to make advance directives that the patient could use to name someone who would make medical decisions on their behalf if they became too ill to make themselves.    ___Patient declined  _X_Patient is interested, I provided paper work and offered to discuss.  "

## 2018-04-16 NOTE — PATIENT INSTRUCTIONS
Counseling and Referral of Other Preventative  (Italic type indicates deductible and co-insurance are waived)    Patient Name: Nuha Bernstein  Today's Date: 4/16/2018    Health Maintenance       Date Due Completion Date    Pneumococcal (65+) (2 of 2 - PCV13) 05/03/2011 5/3/2010    Lipid Panel 09/06/2018 9/6/2017    Mammogram 05/15/2019 5/15/2017    DEXA SCAN 08/11/2019 8/11/2016    TETANUS VACCINE 04/27/2023 4/27/2013    Colonoscopy 09/14/2025 9/14/2015        No orders of the defined types were placed in this encounter.    The following information is provided to all patients.  This information is to help you find resources for any of the problems found today that may be affecting your health:                Living healthy guide: www.[facility].[place].gov      Understanding Diabetes: www.diabetes.org      Eating healthy: www.cdc.gov/healthyweight      CDC home safety checklist: www.cdc.gov/steadi/patient.html      Agency on Aging: www.goea.[place].[facility]      Alcoholics anonymous (AA): www.aa.org      Physical Activity: www.berkley.nih.gov/cc4xzul      Tobacco use: www.quitwithusla.org

## 2018-04-17 ENCOUNTER — TELEPHONE (OUTPATIENT)
Dept: FAMILY MEDICINE | Facility: CLINIC | Age: 73
End: 2018-04-17

## 2018-04-17 NOTE — TELEPHONE ENCOUNTER
Please inform patient she is suppose to be taking Lipitor 60 mg.  would like her to take the 40 mg along with the 20 mg.

## 2018-06-11 ENCOUNTER — TELEPHONE (OUTPATIENT)
Dept: FAMILY MEDICINE | Facility: CLINIC | Age: 73
End: 2018-06-11

## 2018-06-11 DIAGNOSIS — Z12.31 ENCOUNTER FOR SCREENING MAMMOGRAM FOR BREAST CANCER: Primary | ICD-10-CM

## 2018-06-11 RX ORDER — TRAMADOL HYDROCHLORIDE 50 MG/1
TABLET ORAL
Qty: 270 TABLET | Refills: 0 | Status: SHIPPED | OUTPATIENT
Start: 2018-06-11 | End: 2018-10-23 | Stop reason: SDUPTHER

## 2018-06-12 DIAGNOSIS — I10 ESSENTIAL HYPERTENSION: ICD-10-CM

## 2018-06-12 RX ORDER — LISINOPRIL 40 MG/1
40 TABLET ORAL DAILY
Qty: 90 TABLET | Refills: 0 | Status: SHIPPED | OUTPATIENT
Start: 2018-06-12 | End: 2018-06-22 | Stop reason: SDUPTHER

## 2018-06-20 ENCOUNTER — TELEPHONE (OUTPATIENT)
Dept: FAMILY MEDICINE | Facility: CLINIC | Age: 73
End: 2018-06-20

## 2018-06-20 NOTE — TELEPHONE ENCOUNTER
----- Message from Sujey Segovia sent at 6/20/2018 10:56 AM CDT -----  Contact: Self  Pt is calling to speak with Staff regarding a prescription refill of lisinopril (PRINIVIL,ZESTRIL) 40 MG tablet.    She can be reached at [phone].    Thank you.

## 2018-06-20 NOTE — TELEPHONE ENCOUNTER
Prescription called in to [facility] @ [facility].  Marsha @ [employer] contacted to cancel but states that the prescription has already been deactivated.

## 2018-06-22 ENCOUNTER — HOSPITAL ENCOUNTER (OUTPATIENT)
Dept: RADIOLOGY | Facility: HOSPITAL | Age: 73
Discharge: HOME | End: 2018-06-22
Attending: FAMILY MEDICINE
Payer: MEDICARE

## 2018-06-22 DIAGNOSIS — I10 ESSENTIAL HYPERTENSION: ICD-10-CM

## 2018-06-22 DIAGNOSIS — Z12.31 ENCOUNTER FOR SCREENING MAMMOGRAM FOR BREAST CANCER: ICD-10-CM

## 2018-06-22 PROCEDURE — 77067 SCR MAMMO BI INCL CAD: CPT | Mod: TC,PO

## 2018-06-22 PROCEDURE — 77063 BREAST TOMOSYNTHESIS BI: CPT | Mod: 26,,, | Performed by: RADIOLOGY

## 2018-06-22 PROCEDURE — 77067 SCR MAMMO BI INCL CAD: CPT | Mod: 26,,, | Performed by: RADIOLOGY

## 2018-06-22 RX ORDER — LISINOPRIL 40 MG/1
40 TABLET ORAL DAILY
Qty: 90 TABLET | Refills: 0 | Status: SHIPPED | OUTPATIENT
Start: 2018-06-22 | End: 2018-08-03

## 2018-06-22 NOTE — TELEPHONE ENCOUNTER
----- Message from Mckay Tobias sent at 6/22/2018  1:53 PM CDT -----  Contact: Jessie   REFILL: lisinopril (PRINIVIL,ZESTRIL) 40 MG tablet    PHARMACY: [facility]

## 2018-07-05 ENCOUNTER — OFFICE VISIT (OUTPATIENT)
Dept: FAMILY MEDICINE | Facility: CLINIC | Age: 73
End: 2018-07-05
Payer: MEDICARE

## 2018-07-05 VITALS
SYSTOLIC BLOOD PRESSURE: 134 MMHG | TEMPERATURE: 98 F | DIASTOLIC BLOOD PRESSURE: 80 MMHG | WEIGHT: 174.94 LBS | BODY MASS INDEX: 28.11 KG/M2 | HEIGHT: 66 IN | HEART RATE: 82 BPM | OXYGEN SATURATION: 97 %

## 2018-07-05 DIAGNOSIS — T23.221A PARTIAL THICKNESS BURN OF FINGER OF RIGHT HAND, INITIAL ENCOUNTER: Primary | ICD-10-CM

## 2018-07-05 DIAGNOSIS — I10 ESSENTIAL HYPERTENSION: ICD-10-CM

## 2018-07-05 DIAGNOSIS — E78.00 PURE HYPERCHOLESTEROLEMIA: ICD-10-CM

## 2018-07-05 PROCEDURE — 3075F SYST BP GE 130 - 139MM HG: CPT | Mod: CPTII,S$GLB,, | Performed by: NURSE PRACTITIONER

## 2018-07-05 PROCEDURE — 99214 OFFICE O/P EST MOD 30 MIN: CPT | Mod: S$GLB,,, | Performed by: NURSE PRACTITIONER

## 2018-07-05 PROCEDURE — 3079F DIAST BP 80-89 MM HG: CPT | Mod: CPTII,S$GLB,, | Performed by: NURSE PRACTITIONER

## 2018-07-05 PROCEDURE — 99999 PR PBB SHADOW E&M-EST. PATIENT-LVL IV: CPT | Mod: PBBFAC,,, | Performed by: NURSE PRACTITIONER

## 2018-07-05 RX ORDER — SILVER SULFADIAZINE 10 G/1000G
CREAM TOPICAL DAILY
Qty: 25 G | Refills: 0 | Status: SHIPPED | OUTPATIENT
Start: 2018-07-05 | End: 2019-06-18

## 2018-07-05 NOTE — PROGRESS NOTES
Subjective:       Patient ID: Nuha Bernstein is a 73 y.o. female.    Chief Complaint: Burn (Right index  5 days)    73 year old female presents to the clinic with complaint of a burn to right index finger. She says she burnt it on steam while cooking 5 days ago and it does not seem to be healing. She denies any other complaints. She is UTD on her tetanus shot.       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 She has never used smokeless tobacco. She reports that she does not drink alcohol or use drugs.  Review of Systems   Respiratory: Negative for cough, shortness of breath and wheezing.    Cardiovascular: Negative for chest pain, palpitations and leg swelling.   Gastrointestinal: Negative for abdominal pain, blood in stool, constipation, diarrhea, nausea and vomiting.   Musculoskeletal: Negative for gait problem.   Skin: Negative for rash.        Burn right index finger    Neurological: Negative for dizziness, light-headedness and headaches.       Objective:      Physical Exam   Constitutional: She is oriented to person, place, and time. She appears well-developed and well-nourished. No distress.   Eyes: Conjunctivae and EOM are normal. Pupils are equal, round, and reactive to light. Right eye exhibits no discharge. Left eye exhibits no discharge. No scleral icterus.   Neck: Normal range of motion. Neck supple. No JVD present.   Cardiovascular: Normal rate, regular rhythm and normal heart sounds.    No murmur heard.  Pulmonary/Chest: Effort normal and breath sounds normal. No respiratory distress. She has no wheezes. She has no rales.   Abdominal: Soft. Bowel sounds are normal. There is no tenderness.   Musculoskeletal: Normal range of motion. She exhibits no edema.    Neurological: She is alert and oriented to person, place, and time.   Skin: Skin is warm and dry. She is not diaphoretic.   Burn noted to right index finger see photo on drainage noted no surrounding redness    Psychiatric: She has a normal mood and affect.           Assessment:       1. Partial thickness burn of finger of right hand, initial encounter    2. Essential hypertension    3. Pure hypercholesterolemia        Plan:         Partial thickness burn of finger of right hand, initial encounter  -     silver sulfADIAZINE 1% (SILVADENE) 1 % cream; Apply topically once daily.  Dispense: 25 g; Refill: 0    Essential hypertension  - The current medical regimen is effective;  continue present plan and medications.    Pure hypercholesterolemia  - The current medical regimen is effective;  continue present plan and medications.

## 2018-08-03 ENCOUNTER — OFFICE VISIT (OUTPATIENT)
Dept: FAMILY MEDICINE | Facility: CLINIC | Age: 73
End: 2018-08-03
Payer: MEDICARE

## 2018-08-03 VITALS
SYSTOLIC BLOOD PRESSURE: 136 MMHG | DIASTOLIC BLOOD PRESSURE: 84 MMHG | WEIGHT: 172.75 LBS | OXYGEN SATURATION: 95 % | TEMPERATURE: 99 F | BODY MASS INDEX: 27.76 KG/M2 | HEART RATE: 75 BPM | HEIGHT: 66 IN

## 2018-08-03 DIAGNOSIS — T78.3XXA ANGIOEDEMA OF LIPS, INITIAL ENCOUNTER: Primary | ICD-10-CM

## 2018-08-03 DIAGNOSIS — I10 ESSENTIAL HYPERTENSION: ICD-10-CM

## 2018-08-03 PROCEDURE — 99999 PR PBB SHADOW E&M-EST. PATIENT-LVL IV: CPT | Mod: PBBFAC,,, | Performed by: NURSE PRACTITIONER

## 2018-08-03 PROCEDURE — 96372 THER/PROPH/DIAG INJ SC/IM: CPT | Mod: S$GLB,,, | Performed by: NURSE PRACTITIONER

## 2018-08-03 PROCEDURE — 99214 OFFICE O/P EST MOD 30 MIN: CPT | Mod: 25,S$GLB,, | Performed by: NURSE PRACTITIONER

## 2018-08-03 PROCEDURE — 3075F SYST BP GE 130 - 139MM HG: CPT | Mod: CPTII,S$GLB,, | Performed by: NURSE PRACTITIONER

## 2018-08-03 PROCEDURE — 3079F DIAST BP 80-89 MM HG: CPT | Mod: CPTII,S$GLB,, | Performed by: NURSE PRACTITIONER

## 2018-08-03 RX ORDER — LOSARTAN POTASSIUM 100 MG/1
100 TABLET ORAL DAILY
Qty: 90 TABLET | Refills: 0 | Status: SHIPPED | OUTPATIENT
Start: 2018-08-03 | End: 2018-08-07

## 2018-08-03 RX ORDER — DEXAMETHASONE SODIUM PHOSPHATE 4 MG/ML
8 INJECTION, SOLUTION INTRA-ARTICULAR; INTRALESIONAL; INTRAMUSCULAR; INTRAVENOUS; SOFT TISSUE
Status: COMPLETED | OUTPATIENT
Start: 2018-08-03 | End: 2018-08-03

## 2018-08-03 RX ADMIN — DEXAMETHASONE SODIUM PHOSPHATE 8 MG: 4 INJECTION, SOLUTION INTRA-ARTICULAR; INTRALESIONAL; INTRAMUSCULAR; INTRAVENOUS; SOFT TISSUE at 12:08

## 2018-08-03 NOTE — PROGRESS NOTES
"Subjective:       Patient ID: Nuha Bernstein is a 73 y.o. female.    Chief Complaint: Oral Swelling    Oral Pain    This is a new problem. The current episode started in the past 7 days (two days ago). The problem occurs constantly. The problem has been gradually worsening. The patient is experiencing no pain. Pertinent negatives include no difficulty swallowing, facial pain or oral bleeding. Treatments tried: salt and water. The treatment provided mild relief.       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     Drug use: No    Sexual activity: No     Other Topics Concern    Not on file     Social History Narrative    No narrative on file       Past Surgical History:   Procedure Laterality Date    Right Thumb         Review of Systems   Constitutional: Negative for appetite change.   HENT: Positive for mouth sores.        Objective:   /84 (BP Location: Left arm, Patient Position: Sitting, BP Method: Medium (Manual))   Pulse 75   Temp 98.5 °F (36.9 °C) (Oral)   Ht 5' 6" (1.676 m)   Wt 78.4 kg (172 lb 11.7 oz)   SpO2 95%   BMI 27.88 kg/m²      Physical Exam   Constitutional: She is oriented to person, place, and time. She appears well-developed and well-nourished.   HENT:   Head: Normocephalic and atraumatic.   Mouth/Throat: Uvula is midline, oropharynx is clear and moist and mucous membranes are normal.       Neck: Full passive range of motion without pain.   Cardiovascular: Normal rate, regular rhythm, normal heart sounds and " normal pulses.    Pulmonary/Chest: Effort normal and breath sounds normal. No respiratory distress. She has no decreased breath sounds. She has no wheezes. She has no rhonchi. She has no rales.   Musculoskeletal: Normal range of motion.   Neurological: She is alert and oriented to person, place, and time. She has normal strength.   Skin: She is not diaphoretic. No pallor.   Psychiatric: She has a normal mood and affect. Her speech is normal and behavior is normal.             Assessment:       1. Angioedema of lips, initial encounter    2. Essential hypertension        Plan:       Nuha was seen today for oral swelling.    Diagnoses and all orders for this visit:    Angioedema of lips, initial encounter  -     dexamethasone injection 8 mg; Inject 2 mLs (8 mg total) into the muscle one time.  Angioedema can be triggered by exposure to certain substances. Medical conditions involving the immune systems and certain infections may cause it. In rare cases, angioedema can be hereditary. Sometimes the cause may be very clear. However, it is often hard to find a cause. The most common causes include:  · Foods, such as shrimp, shellfish, peanuts, milk products, gluten, and eggs; also colorings, flavorings, and additives  · Insect bites or stings, from bees, mosquitos, fleas, or ticks  · Medicines, such as ACE inhibitors, penicillin, sulfa drugs, amoxicillin, aspirin, and ibuprofen  · Latex, which may be in gloves, clothes, toys, balloons, and some kinds of tape. People who are allergic to latex may have problems with foods such as bananas, avocados, kiwi, papaya, or chestnuts.  · Stress  · Heat, cold, or sunlight  · Try to find out what cause your reaction. Make sure to remove the allergen. Future reactions may be worse.   · If you have a serious allergy, wear a medical alert bracelet that notes this allergy.  · If the healthcare provider prescribed an epinephrine auto injector kit, keep it with you at all times.   · Tell  all care providers about your allergy. Ask them h ow to use any prescribed medicines.  · Keep a record of allergies and symptoms, and when they occurred. This will help your provider treat you over time.     Problem List Items Addressed This Visit     Essential hypertension    Current Assessment & Plan     Will switch to Losartan due to angioedema  She will get steroid injection in clinic today and is to start benadryl as well.            Other Visit Diagnoses     Angioedema of lips, initial encounter    -  Primary          Follow-up if symptoms worsen or fail to improve.

## 2018-08-03 NOTE — ASSESSMENT & PLAN NOTE
Will switch to Losartan due to angioedema  She will get steroid injection in clinic today and is to start benadryl as well.

## 2018-08-03 NOTE — PATIENT INSTRUCTIONS
Angioedema  Angioedema (CU-qyn-lr-eh-RAMA-muh) is a sudden appearance of swollen patches (edema) on the skin or mucous membranes. It most often involves the face, lips, mouth, tongue, back of throat, or vocal cords. It may also occur in other places, such as the arms or legs. A rash may also appear during the first 4 days of this illness.  There are different types of angioedema. Your symptoms will depend on what type of angioedema you have. Swelling and redness may be the main symptoms. Like allergic reactions, angioedema may include:  · Rash, hives, redness, welts, blisters  · Itching, burning, stinging, pain  · Dry, flaky, cracking, or scaly skin  · Swelling of the face, lips, tongue, or other parts of the body  More severe symptoms may include:  · Trouble swallowing, or feeling like your throat is closing  · Trouble breathing or wheezing  · Hoarse voice or trouble speaking  · Nausea, vomiting, diarrhea, or stomach cramps  · Feeling faint or lightheaded, rapid heart rate, or low blood pressure  Angioedema can be triggered by exposure to certain substances. Medical conditions involving the immune systems and certain infections may cause it. In rare cases, angioedema can be hereditary. Sometimes the cause may be very clear. However, it is often hard to find a cause. The most common causes include:  · Foods, such as shrimp, shellfish, peanuts, milk products, gluten, and eggs; also colorings, flavorings, and additives  · Insect bites or stings, from bees, mosquitos, fleas, or ticks  · Medicines, such as ACE inhibitors, penicillin, sulfa drugs, amoxicillin, aspirin, and ibuprofen  · Latex, which may be in gloves, clothes, toys, balloons, and some kinds of tape. People who are allergic to latex may have problems with foods such as bananas, avocados, kiwi, papaya, or chestnuts.  · Stress  · Heat, cold, or sunlight  The most common cause of angioedema is a reaction to a class of medicines called ACE inhibitors. These  are used to treat high blood pressure. ACE inhibitors include captopril, enalapril, and lisinopril. Angiodema can happen even after you have been taking the medicine for some time. Tell your doctor if you have angioedema symptoms and are taking any of these medicines. Angioedema may recur. It is important to watch for the earliest signs of this condition (see the list below). Contact your healthcare provider right away if swelling involves the face, mouth, or throat.  Home care  Rest quietly today. Avoid vigorous physical activity.  Medicines: The healthcare provider may prescribe medicines for itching, swelling, or pain. Follow the healthcare providers instructions when taking these medicines.  · Oral diphenhydramine is an antihistamine available without a prescription. Unless a prescription antihistamine was given, diphenhydramine may be used to reduce widespread itching. It may make you sleepy, so be careful using it when going to school, working, or driving. (Note: Do not use diphenhydramine if you have glaucoma or if you are a man who has trouble urinating due to an enlarged prostate.) Loratadine is an antihistamine that may cause less drowsiness.  · Do not use diphenhydramine cream on your skin. Some people can have an allergic reaction to this.  · Calamine lotion or oatmeal baths sometimes help with itching.  · You may use acetaminophen or ibuprofen for pain, unless another pain medicine was prescribed.  · If you were told that your angioedema was caused by a medicine you are taking, you must stop taking it. Ask your healthcare provider for a different one. In the future, advise medical staff that you are allergic to this medicine.  · If medicine was prescribed, such as steroids or antihistamines, be sure you understand what the medicine is and how to take it.   General care  · Make sure you do not scratch areas of the body that had a reaction. This will help prevent infection.   · Stay away from air  pollution, tobacco, and wood smoke. Also stay away from cold temperatures. These things can make allergy symptoms worse.  · Try to find out what cause your reaction. Make sure to remove the allergen. Future reactions may be worse.   · If you have a serious allergy, wear a medical alert bracelet that notes this allergy.  · If the healthcare provider prescribed an epinephrine auto injector kit, keep it with you at all times.   · Tell all care providers about your allergy. Ask them h ow to use any prescribed medicines.  · Keep a record of allergies and symptoms, and when they occurred. This will help your provider treat you over time.   Follow-up care  Follow up with your healthcare provider, or as advised. You may need to see an allergist. An allergist can help find the cause of an allergic reaction and give recommendations on how to prevent future reactions.  Call 911  Contact emergency services right away if any of these occur:  · Trouble breathing or swallowing, or wheezing  · Hoarse voice or trouble speaking, or drooling  · Chest pain or tightness  · Confusion, lightheadedness, or dizziness  · Extreme drowsiness or trouble awakening  · Fainting or loss of consciousness  · Rapid heart rate  · Vomiting blood, or large amounts of blood in stool  · Seizure  · Nausea, vomiting, diarrhea, abdominal pain, or stomach cramps  When to seek medical attention  Call your healthcare provider right away if any of the following occur:  · Symptoms don't go away  · Symptoms come back  · Symptoms get worse or new symptoms develop  · Hives feel uncomfortable  · Fever of 100.4°F (38°C), or as directed by your healthcare professional  Date Last Reviewed: 5/1/2017  © 9120-1282 [employer]. [address] [place] [address], [place] [address]. All rights reserved. This information is not intended as a substitute for professional medical care. Always follow your healthcare professional's instructions.

## 2018-08-06 ENCOUNTER — TELEPHONE (OUTPATIENT)
Dept: FAMILY MEDICINE | Facility: CLINIC | Age: 73
End: 2018-08-06

## 2018-08-06 NOTE — TELEPHONE ENCOUNTER
----- Message from Airam Parekh sent at 8/6/2018 11:17 AM CDT -----  Contact: self  Pt has an darian tomorrow and is requesting blood work to be done,please call at [phone]

## 2018-08-06 NOTE — TELEPHONE ENCOUNTER
Patient notified lab has to be done 3 days previously to her appt.  Patient verbalized her understanding.

## 2018-08-07 ENCOUNTER — OFFICE VISIT (OUTPATIENT)
Dept: FAMILY MEDICINE | Facility: CLINIC | Age: 73
End: 2018-08-07
Payer: MEDICARE

## 2018-08-07 VITALS
BODY MASS INDEX: 27.42 KG/M2 | HEIGHT: 66 IN | WEIGHT: 170.63 LBS | TEMPERATURE: 99 F | SYSTOLIC BLOOD PRESSURE: 120 MMHG | OXYGEN SATURATION: 99 % | DIASTOLIC BLOOD PRESSURE: 80 MMHG | HEART RATE: 95 BPM

## 2018-08-07 DIAGNOSIS — R73.03 PREDIABETES: ICD-10-CM

## 2018-08-07 DIAGNOSIS — I10 ESSENTIAL HYPERTENSION: Primary | ICD-10-CM

## 2018-08-07 DIAGNOSIS — E78.00 PURE HYPERCHOLESTEROLEMIA: ICD-10-CM

## 2018-08-07 PROCEDURE — 99214 OFFICE O/P EST MOD 30 MIN: CPT | Mod: S$GLB,,, | Performed by: FAMILY MEDICINE

## 2018-08-07 PROCEDURE — 3074F SYST BP LT 130 MM HG: CPT | Mod: CPTII,S$GLB,, | Performed by: FAMILY MEDICINE

## 2018-08-07 PROCEDURE — 99999 PR PBB SHADOW E&M-EST. PATIENT-LVL III: CPT | Mod: PBBFAC,,, | Performed by: FAMILY MEDICINE

## 2018-08-07 PROCEDURE — 3079F DIAST BP 80-89 MM HG: CPT | Mod: CPTII,S$GLB,, | Performed by: FAMILY MEDICINE

## 2018-08-07 RX ORDER — LISINOPRIL 40 MG/1
40 TABLET ORAL DAILY
Qty: 90 TABLET | Refills: 3 | Status: SHIPPED | OUTPATIENT
Start: 2018-08-07 | End: 2019-12-17 | Stop reason: SDUPTHER

## 2018-08-07 RX ORDER — DOCOSANOL 100 MG/G
CREAM TOPICAL
COMMUNITY
End: 2019-06-18

## 2018-08-07 NOTE — ASSESSMENT & PLAN NOTE
She has continued with lisinopril.  She was stung by a wasp which usually causes swelling in her lips

## 2018-08-07 NOTE — PROGRESS NOTES
Assessment & Plan  Problem List Items Addressed This Visit        Unprioritized    Essential hypertension - Primary    Current Assessment & Plan     She has continued with lisinopril.  She was stung by a wasp which usually causes swelling in her lips          Relevant Medications    lisinopril (PRINIVIL,ZESTRIL) 40 MG tablet    Other Relevant Orders    CBC auto differential    Comprehensive metabolic panel    Lipid panel    Hemoglobin A1c    TSH    Hyperlipidemia    Relevant Orders    CBC auto differential    Comprehensive metabolic panel    Lipid panel    Hemoglobin A1c    TSH    Prediabetes    Relevant Orders    CBC auto differential    Comprehensive metabolic panel    Lipid panel    Hemoglobin A1c    TSH            Health Maintenance reviewed    Follow-up: Follow-up in about 3 months (around 11/7/2018).    ______________________________________________________________________    Chief Complaint  Chief Complaint   Patient presents with    recheck mouth    discuss BP medication       HPI  Nuha Bernstein is a 73 y.o. female with multiple medical diagnoses as listed in the medical history and problem list that presents for angioedema.  Pt is known to me with last appointment 9/1/2017.      Patient denies any new symptoms including chest pain, SOB, blurry vision, N/V, diarrhea.  Patient reports that she was previous stung by a wasp.  She did not mention this to NP when she was evaluated.  She recalls that the swelling in her mouth occurred after this wasp sting.  She continued with her lisinopril and did not have any occurrence of swelling in her lip.  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     Drug use: No    Sexual activity: No     Other Topics Concern    Not on file     Social History Narrative    No narrative on file       FAMILY HISTORY:  Family History   Problem Relation Age of Onset    Hypertension Mother     Cataracts Mother     Heart disease Mother     Hypertension Father     Cataracts Father     No Known Problems Sister     No Known Problems Brother     No Known Problems Brother     No Known Problems Maternal Aunt     No Known Problems Maternal Uncle     No Known Problems Paternal Aunt     No Known Problems Paternal Uncle     No Known Problems Maternal Grandmother     No Known Problems Maternal Grandfather     No Known Problems Paternal Grandmother     No Known Problems Paternal Grandfather     Amblyopia Neg Hx     Blindness Neg Hx     Cancer Neg Hx     Diabetes Neg Hx     Glaucoma Neg Hx     Macular degeneration Neg Hx     Retinal detachment Neg Hx     Strabismus Neg Hx     Stroke Neg Hx     Thyroid disease Neg Hx        ALLERGIES AND MEDICATIONS: updated and reviewed.  Review of patient's allergies indicates:  No Known Allergies  Current Outpatient Prescriptions   Medication Sig Dispense Refill    aspirin 81 mg Tab Take by mouth as needed. 1 Tablet Oral Every day      docosanol (ABREVA) 10 % Crea Apply topically.      meloxicam (MOBIC) 15 MG tablet TAKE 1 TABLET EVERY DAY 90 tablet 1    MULTIVITAMIN W-MINERALS/LUTEIN (CENTRUM SILVER ORAL) Take by mouth.      OMEGA 3,6,9 COMBINATION NO.7 (OMEGA DHA ORAL) Take 830 mg by mouth once daily.      POLICOSANOL ORAL Take by mouth.      silver  "sulfADIAZINE 1% (SILVADENE) 1 % cream Apply topically once daily. 25 g 0    traMADol (ULTRAM) 50 mg tablet TAKE 1 TABLET BY MOUTH 3 TIMES A DAY AS NEEDED FOR PAIN 270 tablet 0    lisinopril (PRINIVIL,ZESTRIL) 40 MG tablet Take 1 tablet (40 mg total) by mouth once daily. 90 tablet 3    triamterene-hydrochlorothiazide 37.5-25 mg (DYAZIDE) 37.5-25 mg per capsule Take 1 capsule by mouth once daily. 90 capsule 3     No current facility-administered medications for this visit.          ROS  Review of Systems   Constitutional: Negative for activity change, appetite change, fatigue, fever and unexpected weight change.   HENT: Negative.  Negative for ear discharge, ear pain, rhinorrhea and sore throat.    Eyes: Negative.    Respiratory: Negative for apnea, cough, chest tightness, shortness of breath and wheezing.    Cardiovascular: Negative for chest pain, palpitations and leg swelling.   Gastrointestinal: Negative for abdominal distention, abdominal pain, constipation, diarrhea and vomiting.   Endocrine: Negative for cold intolerance, heat intolerance, polydipsia and polyuria.   Genitourinary: Negative for decreased urine volume, menstrual problem, urgency, vaginal bleeding, vaginal discharge and vaginal pain.   Musculoskeletal: Negative.    Skin: Negative for rash.   Neurological: Negative for dizziness and headaches.   Hematological: Does not bruise/bleed easily.   Psychiatric/Behavioral: Negative for agitation, sleep disturbance and suicidal ideas.           Physical Exam  Vitals:    08/07/18 1109   BP: 120/80   BP Location: Left arm   Patient Position: Sitting   BP Method: Large (Manual)   Pulse: 95   Temp: 98.6 °F (37 °C)   TempSrc: Oral   SpO2: 99%   Weight: 77.4 kg (170 lb 10.2 oz)   Height: 5' 6" (1.676 m)    Body mass index is 27.54 kg/m².  Weight: 77.4 kg (170 lb 10.2 oz)   Height: 5' 6" (167.6 cm)   Physical Exam   Constitutional: She is oriented to person, place, and time. She appears well-developed and " well-nourished.   HENT:   Head: Normocephalic.       Right Ear: External ear normal.   Left Ear: External ear normal.   Nose: Nose normal.   Mouth/Throat: Oropharynx is clear and moist.   Eyes: Conjunctivae and EOM are normal. Pupils are equal, round, and reactive to light.   Cardiovascular: Normal rate, regular rhythm and normal heart sounds.    Pulmonary/Chest: Effort normal and breath sounds normal.   Neurological: She is alert and oriented to person, place, and time.   Skin: Skin is warm and dry.   Vitals reviewed.      Health Maintenance       Date Due Completion Date    Pneumococcal (65+) (2 of 2 - PCV13) 05/03/2011 5/3/2010    Influenza Vaccine 08/01/2018 1/31/2018    Override on 1/23/2017: Declined    Override on 11/17/2013: Done (pt had it done at [facility])    Override on 10/29/2012: Done    Lipid Panel 09/06/2018 9/6/2017    DEXA SCAN 08/11/2019 8/11/2016    Mammogram 06/22/2020 6/22/2018    TETANUS VACCINE 04/27/2023 4/27/2013    Colonoscopy 09/14/2025 9/14/2015

## 2018-08-13 ENCOUNTER — LAB VISIT (OUTPATIENT)
Dept: LAB | Facility: HOSPITAL | Age: 73
End: 2018-08-13
Attending: FAMILY MEDICINE
Payer: MEDICARE

## 2018-08-13 DIAGNOSIS — E78.00 PURE HYPERCHOLESTEROLEMIA: ICD-10-CM

## 2018-08-13 DIAGNOSIS — R73.03 PREDIABETES: ICD-10-CM

## 2018-08-13 DIAGNOSIS — I10 ESSENTIAL HYPERTENSION: ICD-10-CM

## 2018-08-13 LAB
ALBUMIN SERPL BCP-MCNC: 3.6 G/DL
ALP SERPL-CCNC: 72 U/L
ALT SERPL W/O P-5'-P-CCNC: 25 U/L
ANION GAP SERPL CALC-SCNC: 7 MMOL/L
AST SERPL-CCNC: 27 U/L
BASOPHILS # BLD AUTO: 0.03 K/UL
BASOPHILS NFR BLD: 0.4 %
BILIRUB SERPL-MCNC: 0.4 MG/DL
BUN SERPL-MCNC: 31 MG/DL
CALCIUM SERPL-MCNC: 9.7 MG/DL
CHLORIDE SERPL-SCNC: 105 MMOL/L
CHOLEST SERPL-MCNC: 305 MG/DL
CHOLEST/HDLC SERPL: 5.4 {RATIO}
CO2 SERPL-SCNC: 29 MMOL/L
CREAT SERPL-MCNC: 1.2 MG/DL
DIFFERENTIAL METHOD: ABNORMAL
EOSINOPHIL # BLD AUTO: 0.3 K/UL
EOSINOPHIL NFR BLD: 4.8 %
ERYTHROCYTE [DISTWIDTH] IN BLOOD BY AUTOMATED COUNT: 14.1 %
EST. GFR  (AFRICAN AMERICAN): 51.8 ML/MIN/1.73 M^2
EST. GFR  (NON AFRICAN AMERICAN): 44.9 ML/MIN/1.73 M^2
ESTIMATED AVG GLUCOSE: 128 MG/DL
GLUCOSE SERPL-MCNC: 104 MG/DL
HBA1C MFR BLD HPLC: 6.1 %
HCT VFR BLD AUTO: 36.2 %
HDLC SERPL-MCNC: 56 MG/DL
HDLC SERPL: 18.4 %
HGB BLD-MCNC: 11.8 G/DL
IMM GRANULOCYTES # BLD AUTO: 0.02 K/UL
IMM GRANULOCYTES NFR BLD AUTO: 0.3 %
LDLC SERPL CALC-MCNC: 228.6 MG/DL
LYMPHOCYTES # BLD AUTO: 2.3 K/UL
LYMPHOCYTES NFR BLD: 34.9 %
MCH RBC QN AUTO: 29.2 PG
MCHC RBC AUTO-ENTMCNC: 32.6 G/DL
MCV RBC AUTO: 90 FL
MONOCYTES # BLD AUTO: 0.8 K/UL
MONOCYTES NFR BLD: 12.1 %
NEUTROPHILS # BLD AUTO: 3.2 K/UL
NEUTROPHILS NFR BLD: 47.5 %
NONHDLC SERPL-MCNC: 249 MG/DL
NRBC BLD-RTO: 0 /100 WBC
PLATELET # BLD AUTO: 334 K/UL
PMV BLD AUTO: 10.9 FL
POTASSIUM SERPL-SCNC: 4.5 MMOL/L
PROT SERPL-MCNC: 7.4 G/DL
RBC # BLD AUTO: 4.04 M/UL
SODIUM SERPL-SCNC: 141 MMOL/L
TRIGL SERPL-MCNC: 102 MG/DL
TSH SERPL DL<=0.005 MIU/L-ACNC: 0.97 UIU/ML
WBC # BLD AUTO: 6.71 K/UL

## 2018-08-13 PROCEDURE — 80053 COMPREHEN METABOLIC PANEL: CPT

## 2018-08-13 PROCEDURE — 83036 HEMOGLOBIN GLYCOSYLATED A1C: CPT

## 2018-08-13 PROCEDURE — 36415 COLL VENOUS BLD VENIPUNCTURE: CPT | Mod: PO

## 2018-08-13 PROCEDURE — 84443 ASSAY THYROID STIM HORMONE: CPT

## 2018-08-13 PROCEDURE — 80061 LIPID PANEL: CPT

## 2018-08-13 PROCEDURE — 85025 COMPLETE CBC W/AUTO DIFF WBC: CPT

## 2018-08-15 NOTE — PROGRESS NOTES
Your cholesterol is elevated.  We will need to recheck this blood work in about 8 weeks. Your cholesterol was elevated. I recommend that you follow a low fat diet and exercise regularly.

## 2018-10-01 ENCOUNTER — OFFICE VISIT (OUTPATIENT)
Dept: FAMILY MEDICINE | Facility: CLINIC | Age: 73
End: 2018-10-01
Payer: MEDICARE

## 2018-10-01 VITALS
DIASTOLIC BLOOD PRESSURE: 82 MMHG | BODY MASS INDEX: 27.7 KG/M2 | HEIGHT: 66 IN | WEIGHT: 172.38 LBS | TEMPERATURE: 98 F | HEART RATE: 83 BPM | SYSTOLIC BLOOD PRESSURE: 138 MMHG | OXYGEN SATURATION: 95 %

## 2018-10-01 DIAGNOSIS — M79.605 LEFT LEG PAIN: ICD-10-CM

## 2018-10-01 DIAGNOSIS — M25.552 LEFT HIP PAIN: Primary | ICD-10-CM

## 2018-10-01 PROCEDURE — 99214 OFFICE O/P EST MOD 30 MIN: CPT | Mod: PBBFAC,PO | Performed by: NURSE PRACTITIONER

## 2018-10-01 PROCEDURE — 99999 PR PBB SHADOW E&M-EST. PATIENT-LVL IV: CPT | Mod: PBBFAC,,, | Performed by: NURSE PRACTITIONER

## 2018-10-01 PROCEDURE — 3079F DIAST BP 80-89 MM HG: CPT | Mod: CPTII,,, | Performed by: NURSE PRACTITIONER

## 2018-10-01 PROCEDURE — 1101F PT FALLS ASSESS-DOCD LE1/YR: CPT | Mod: CPTII,,, | Performed by: NURSE PRACTITIONER

## 2018-10-01 PROCEDURE — 3075F SYST BP GE 130 - 139MM HG: CPT | Mod: CPTII,,, | Performed by: NURSE PRACTITIONER

## 2018-10-01 PROCEDURE — 99214 OFFICE O/P EST MOD 30 MIN: CPT | Mod: S$PBB,,, | Performed by: NURSE PRACTITIONER

## 2018-10-01 RX ORDER — METHYLPREDNISOLONE 4 MG/1
TABLET ORAL
Qty: 1 PACKAGE | Refills: 0 | Status: SHIPPED | OUTPATIENT
Start: 2018-10-01 | End: 2019-06-18 | Stop reason: ALTCHOICE

## 2018-10-01 NOTE — PATIENT INSTRUCTIONS
Arthralgia    Arthralgia is the term for pain in or around the joint. It is a symptom, not a disease. This pain may involve one or more joints. In some cases, the pain moves from joint to joint.  There are many causes for joint pain. These include:  · Injury  · Osteoarthritis (wearing out of the joint surface)  · Gout (inflammation of the joint due to crystals in the joint fluid)  · Infection inside the joint    · Bursitis (inflammation of the fluid-filled sacs around the joint)  · Autoimmune disorders such as rheumatoid arthritis or lupus  · Tendonitis (inflammation of chords that attach muscle to bone)  Home care  · Rest the involved joint(s) until your symptoms improve.   · You may be prescribed pain medicine. If none is prescribed, you may use acetaminophen or ibuprofen to control pain and inflammation.  Follow-up care  Follow up with your healthcare provider or as advised.  When to seek medical advice  Contact your healthcare provider right away if any of the following occurs:  · Pain, swelling, or redness of joint increases  · Pain worsens or recurs after a period of improvement  · Pain moves to other joints  · You cannot bear weight on the affected joint   · You cannot move the affected joint  · Joint appears deformed  · New rash appears  · Fever of 100.4ºF (38ºC) or higher, or as directed by your healthcare provider  Date Last Reviewed: 3/1/2017  © 5336-4330 [employer]. [address] [place], [place] [address]. All rights reserved. This information is not intended as a substitute for professional medical care. Always follow your healthcare professional's instructions.        How Your Hip Works  The hip joint is one of the bodys largest weight-bearing joints. Its a ball-and-socket joint. This helps the hip remain stable even during twisting and extreme ranges of motion. A healthy hip joint allows you to walk, squat, and turn without pain.     Side view of the right hip   A healthy hip  The  hip joint is formed where the rounded head of the thighbone (femur) joins the pelvic bone. The joint is covered with tissue and powered by large muscles. When all of the parts listed below are healthy, a hip should move easily.     Front view of the right hip   · Cartilage is a layer of smooth tissue. It covers the ball of the thighbone, and lines the socket of the pelvic bone. Healthy cartilage absorbs stress and allows the ball to glide easily in the socket.  · Muscles power the hip and leg for movement.  · Tendons attach the muscles to the bones.  Date Last Reviewed: 8/28/2015  © 3613-7729 [employer]. [address] [place] [address], [place] [address]. All rights reserved. This information is not intended as a substitute for professional medical care. Always follow your healthcare professional's instructions.

## 2018-10-01 NOTE — PROGRESS NOTES
Subjective:       Patient ID: Nuha Bernstein is a 73 y.o. female.    Chief Complaint: Hip Pain and Leg Pain    Hip Pain    Incident onset: about a year, but getting worse. There was no injury mechanism. The pain is present in the left leg and left hip. The pain is at a severity of 8/10. The pain is mild. The pain has been fluctuating since onset. Pertinent negatives include no inability to bear weight, numbness or tingling. Treatments tried: tramadol and vicks  The treatment provided mild relief.   Leg Pain    Pertinent negatives include no inability to bear weight, numbness or tingling.       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       Social History     Socioeconomic History    Marital status:      Spouse name: Not on file    Number of children: Not on file    Years of education: Not on file    Highest education level: Not on file   Social Needs    Financial resource strain: Not on file    Food insecurity - worry: Not on file    Food insecurity - inability: Not on file    Transportation needs - medical: Not on file    Transportation needs - non-medical: Not on file   Occupational History    Not on file   Tobacco Use    Smoking status: Never Smoker    Smokeless tobacco: Never Used   Substance and Sexual Activity    Alcohol use: No     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     Drug use: No    Sexual activity: No   Other Topics Concern    Not on file   Social History Narrative    Not on file       Past Surgical History:   Procedure Laterality Date    COLONOSCOPY N/A 9/14/2015    Performed by Alexy Ansari MD at [facility] ([facility])    Right Thumb         Review of Systems   Musculoskeletal: Positive for arthralgias and gait problem (pain after walking a distance). Negative for joint swelling and myalgias.   Neurological: Negative for tingling, weakness  "and numbness.       Objective:   /82 (BP Location: Right arm, Patient Position: Sitting, BP Method: Medium (Manual))   Pulse 83   Temp 98.4 °F (36.9 °C) (Oral)   Ht 5' 6" (1.676 m)   Wt 78.2 kg (172 lb 6.4 oz)   SpO2 95%   BMI 27.83 kg/m²      Physical Exam   Constitutional: She is oriented to person, place, and time. She appears well-developed and well-nourished.   HENT:   Head: Normocephalic and atraumatic.   Cardiovascular: Normal rate, regular rhythm and normal heart sounds.   Pulmonary/Chest: Effort normal and breath sounds normal. No stridor. No respiratory distress. She has no decreased breath sounds. She has no wheezes. She has no rhonchi. She has no rales.   Musculoskeletal:        Left hip: She exhibits decreased range of motion. She exhibits normal strength, no swelling and no crepitus.        Left lower leg: She exhibits no tenderness and no swelling.   Neurological: She is alert and oriented to person, place, and time.   Skin: She is not diaphoretic. No pallor.   Psychiatric: She has a normal mood and affect. Her speech is normal and behavior is normal.       Assessment:       1. Left hip pain    2. Left leg pain        Plan:       Nuha was seen today for hip pain and leg pain.    Diagnoses and all orders for this visit:    Left hip pain  -     methylPREDNISolone (MEDROL DOSEPACK) 4 mg tablet; use as directed    Left leg pain  -     methylPREDNISolone (MEDROL DOSEPACK) 4 mg tablet; use as directed    She will continue with tramadol and Mobic. Will send a steroid pack.   Follow-up if symptoms worsen or fail to improve.    "

## 2018-10-23 RX ORDER — TRAMADOL HYDROCHLORIDE 50 MG/1
TABLET ORAL
Qty: 270 TABLET | Refills: 0 | Status: SHIPPED | OUTPATIENT
Start: 2018-10-23 | End: 2019-03-12 | Stop reason: SDUPTHER

## 2018-11-01 ENCOUNTER — TELEPHONE (OUTPATIENT)
Dept: FAMILY MEDICINE | Facility: CLINIC | Age: 73
End: 2018-11-01

## 2018-11-01 NOTE — TELEPHONE ENCOUNTER
----- Message from Lul Zamudio sent at 11/1/2018  9:31 AM CDT -----  Contact: self  Refill: triamterene-hydrochlorothiazide 37.5-25 mg (DYAZIDE) 37.5-25 mg per capsule. [facility] [phone]. Pt [phone]

## 2019-02-12 ENCOUNTER — PES CALL (OUTPATIENT)
Dept: ADMINISTRATIVE | Facility: CLINIC | Age: 74
End: 2019-02-12

## 2019-03-12 RX ORDER — TRAMADOL HYDROCHLORIDE 50 MG/1
TABLET ORAL
Qty: 270 TABLET | Refills: 0 | Status: SHIPPED | OUTPATIENT
Start: 2019-03-12 | End: 2019-07-22 | Stop reason: SDUPTHER

## 2019-03-13 ENCOUNTER — TELEPHONE (OUTPATIENT)
Dept: SURGERY | Facility: CLINIC | Age: 74
End: 2019-03-13

## 2019-03-13 NOTE — TELEPHONE ENCOUNTER
----- Message from Dulce Woodruff sent at 3/13/2019  3:02 PM CDT -----  Contact: Self/  [phone]  Refill:  traMADol (ULTRAM) 50 mg tablet  [id number] [address]  Thank you

## 2019-04-24 DIAGNOSIS — I10 ESSENTIAL HYPERTENSION: ICD-10-CM

## 2019-04-24 RX ORDER — TRIAMTERENE AND HYDROCHLOROTHIAZIDE 37.5; 25 MG/1; MG/1
CAPSULE ORAL
Qty: 90 CAPSULE | Refills: 1 | Status: SHIPPED | OUTPATIENT
Start: 2019-04-24 | End: 2019-10-22 | Stop reason: SDUPTHER

## 2019-05-22 ENCOUNTER — OFFICE VISIT (OUTPATIENT)
Dept: FAMILY MEDICINE | Facility: CLINIC | Age: 74
End: 2019-05-22
Payer: MEDICARE

## 2019-05-22 VITALS
SYSTOLIC BLOOD PRESSURE: 150 MMHG | BODY MASS INDEX: 28.09 KG/M2 | HEIGHT: 66 IN | DIASTOLIC BLOOD PRESSURE: 86 MMHG | HEART RATE: 74 BPM | WEIGHT: 174.81 LBS | OXYGEN SATURATION: 97 %

## 2019-05-22 DIAGNOSIS — J84.10 CALCIFIED GRANULOMA OF LUNG: ICD-10-CM

## 2019-05-22 DIAGNOSIS — M16.0 OSTEOARTHRITIS OF BOTH HIPS, UNSPECIFIED OSTEOARTHRITIS TYPE: ICD-10-CM

## 2019-05-22 DIAGNOSIS — Z00.00 ENCOUNTER FOR PREVENTIVE HEALTH EXAMINATION: Primary | ICD-10-CM

## 2019-05-22 DIAGNOSIS — F41.9 MODERATE ANXIETY: ICD-10-CM

## 2019-05-22 DIAGNOSIS — I10 ESSENTIAL HYPERTENSION: ICD-10-CM

## 2019-05-22 PROCEDURE — G0439 PR MEDICARE ANNUAL WELLNESS SUBSEQUENT VISIT: ICD-10-PCS | Mod: HCNC,S$GLB,, | Performed by: NURSE PRACTITIONER

## 2019-05-22 PROCEDURE — 3077F PR MOST RECENT SYSTOLIC BLOOD PRESSURE >= 140 MM HG: ICD-10-PCS | Mod: HCNC,CPTII,S$GLB, | Performed by: NURSE PRACTITIONER

## 2019-05-22 PROCEDURE — 99999 PR PBB SHADOW E&M-EST. PATIENT-LVL IV: CPT | Mod: PBBFAC,HCNC,, | Performed by: NURSE PRACTITIONER

## 2019-05-22 PROCEDURE — 3079F DIAST BP 80-89 MM HG: CPT | Mod: HCNC,CPTII,S$GLB, | Performed by: NURSE PRACTITIONER

## 2019-05-22 PROCEDURE — 3077F SYST BP >= 140 MM HG: CPT | Mod: HCNC,CPTII,S$GLB, | Performed by: NURSE PRACTITIONER

## 2019-05-22 PROCEDURE — G0439 PPPS, SUBSEQ VISIT: HCPCS | Mod: HCNC,S$GLB,, | Performed by: NURSE PRACTITIONER

## 2019-05-22 PROCEDURE — 3079F PR MOST RECENT DIASTOLIC BLOOD PRESSURE 80-89 MM HG: ICD-10-PCS | Mod: HCNC,CPTII,S$GLB, | Performed by: NURSE PRACTITIONER

## 2019-05-22 PROCEDURE — 99999 PR PBB SHADOW E&M-EST. PATIENT-LVL IV: ICD-10-PCS | Mod: PBBFAC,HCNC,, | Performed by: NURSE PRACTITIONER

## 2019-05-22 NOTE — PATIENT INSTRUCTIONS
Counseling and Referral of Other Preventative  (Italic type indicates deductible and co-insurance are waived)    Patient Name: Nuha Bernstein  Today's Date: 5/22/2019    Health Maintenance       Date Due Completion Date    Shingles Vaccine (1 of 2) 02/17/1995 ---    Pneumococcal Vaccine (65+ Low/Medium Risk) (2 of 2 - PCV13) 05/03/2011 5/3/2010    Influenza Vaccine 08/01/2019 1/31/2018    Override on 1/23/2017: Declined    Override on 11/17/2013: Done (pt had it done at [facility])    Override on 10/29/2012: Done    DEXA SCAN 08/11/2019 8/11/2016    Lipid Panel 08/13/2019 8/13/2018    Mammogram 06/22/2020 6/22/2018    TETANUS VACCINE 04/27/2023 4/27/2013    Colonoscopy 09/14/2025 9/14/2015        No orders of the defined types were placed in this encounter.    The following information is provided to all patients.  This information is to help you find resources for any of the problems found today that may be affecting your health:                Living healthy guide: www.[facility].[place].gov      Understanding Diabetes: www.diabetes.org      Eating healthy: www.cdc.gov/healthyweight      CDC home safety checklist: www.cdc.gov/steadi/patient.html      Agency on Aging: www.goea.[place].gov      Alcoholics anonymous (AA): www.aa.org      Physical Activity: www.berkley.nih.gov/zk4yymu      Tobacco use: www.quitwithusla.org

## 2019-05-25 RX ORDER — HYDROXYZINE HYDROCHLORIDE 25 MG/1
25 TABLET, FILM COATED ORAL EVERY 12 HOURS PRN
Qty: 60 TABLET | Refills: 0 | Status: SHIPPED | OUTPATIENT
Start: 2019-05-25 | End: 2019-07-30

## 2019-05-25 NOTE — PROGRESS NOTES
"Nuha Bernstein presented for a  Medicare AWV and comprehensive Health Risk Assessment today. The following components were reviewed and updated:    · Medical history  · Family History  · Social history  · Allergies and Current Medications  · Health Risk Assessment  · Health Maintenance  · Care Team     ** See Completed Assessments for Annual Wellness Visit within the encounter summary.**       The following assessments were completed:  · Living Situation  · CAGE  · Depression Screening  · Timed Get Up and Go  · Whisper Test  · Cognitive Function Screening  ·   ·   · Nutrition Screening  · ADL Screening  · PAQ Screening    Vitals:    05/22/19 1337   BP: (!) 150/86   BP Location: Right arm   Patient Position: Sitting   BP Method: Large (Manual)   Pulse: 74   SpO2: 97%   Weight: 79.3 kg (174 lb 13.2 oz)   Height: 5' 6" (1.676 m)     Body mass index is 28.22 kg/m².  Physical Exam   Constitutional: She is oriented to person, place, and time.   Cardiovascular: Normal rate.   Pulmonary/Chest: Effort normal.   Musculoskeletal: Normal range of motion.   Neurological: She is alert and oriented to person, place, and time.   Skin: Skin is warm. Capillary refill takes less than 2 seconds.   Psychiatric: She has a normal mood and affect. Her behavior is normal. Judgment and thought content normal.   Vitals reviewed.        Diagnoses and health risks identified today and associated recommendations/orders:    1. Encounter for preventive health examination  Education provided about preventive health examinations and procedures; addressed and discussed patient's health concerns. Additionally, reviewed medical record for risk factors and documented the results during this encounter.    2. Calcified granuloma of lung  Stable and monitored; continue current treatment plan as previously prescribed.     3. Essential hypertension  Presently not at goal. We discussed health risks associated with this issue.  Patient aware of dietary and " lifestyle modifications and medicinal interventions.   Requested patient to follow up with PCP for evaluation and also return for nurse blood pressure measurement.  Patient verbalized an understanding stating she will follow up with PCP.  She believes the increase in blood pressure is influenced by the caring for her ailing spouse who has progressing dementia.      4. Osteoarthritis of both hips, unspecified osteoarthritis type  Reminded patient to review medical insurance benefits which may include access to fitness centers and health classes.   We discussed diet and exercise for weight loss.  Educated about diet, activities, and ways to avoid sedentary lifestyle.   We discussed aquatics, low impact exercising for stability, stamina, and strength.     5. Moderate anxiety   Denies homicidal or suicidal ideation, we discussed family and social dynamics, stress relieving activities. Reports increasing anxiety with irritability due to ailing spouse.  We discussed resources to assist with care; family is now involved.   - hydrOXYzine HCl (ATARAX) 25 MG tablet; Take 1 tablet (25 mg total) by mouth every 12 (twelve) hours as needed for Anxiety.  Dispense: 60 tablet; Refill: 0    Patient aware of recommendation for pneumococcal and shingles vaccinations.     Provided Nuha with a 5-10 year written screening schedule and personal prevention plan. Recommendations were developed using the USPSTF age appropriate recommendations. Education, counseling, and referrals were provided as needed. After Visit Summary printed and given to patient which includes a list of additional screenings\tests needed.    Follow up in about 1 year (around 5/22/2020) for assessment .    Chico Berg Jr, NP

## 2019-06-17 ENCOUNTER — TELEPHONE (OUTPATIENT)
Dept: FAMILY MEDICINE | Facility: CLINIC | Age: 74
End: 2019-06-17

## 2019-06-17 NOTE — TELEPHONE ENCOUNTER
Spoke with patient she is scheduled to see NP tomorrow. Aware that Dr Gaffney was not in clinic today

## 2019-06-17 NOTE — TELEPHONE ENCOUNTER
----- Message from Cynthia Valencia sent at 6/17/2019 12:48 PM CDT -----  Contact: Patient  Type:  Same Day Appointment Request    Caller is requesting a same day appointment.  Caller declined first available appointment listed below.      Name of Caller:  Patient  When is the first available appointment?  6/18  Symptoms:  Problem with urine  Best Call Back Number:  [phone] (home)    Additional Information:   na

## 2019-06-18 ENCOUNTER — OFFICE VISIT (OUTPATIENT)
Dept: FAMILY MEDICINE | Facility: CLINIC | Age: 74
End: 2019-06-18
Payer: MEDICARE

## 2019-06-18 VITALS
DIASTOLIC BLOOD PRESSURE: 84 MMHG | HEART RATE: 64 BPM | TEMPERATURE: 99 F | HEIGHT: 66 IN | BODY MASS INDEX: 28.03 KG/M2 | WEIGHT: 174.38 LBS | SYSTOLIC BLOOD PRESSURE: 138 MMHG | OXYGEN SATURATION: 95 %

## 2019-06-18 DIAGNOSIS — N81.10 BLADDER PROLAPSE, FEMALE, ACQUIRED: Primary | ICD-10-CM

## 2019-06-18 DIAGNOSIS — I10 ESSENTIAL HYPERTENSION: ICD-10-CM

## 2019-06-18 DIAGNOSIS — G89.29 CHRONIC MIDLINE LOW BACK PAIN WITHOUT SCIATICA: ICD-10-CM

## 2019-06-18 DIAGNOSIS — G89.29 CHRONIC NECK PAIN: ICD-10-CM

## 2019-06-18 DIAGNOSIS — M54.2 CHRONIC NECK PAIN: ICD-10-CM

## 2019-06-18 DIAGNOSIS — E78.00 PURE HYPERCHOLESTEROLEMIA: ICD-10-CM

## 2019-06-18 DIAGNOSIS — M54.50 CHRONIC MIDLINE LOW BACK PAIN WITHOUT SCIATICA: ICD-10-CM

## 2019-06-18 DIAGNOSIS — M46.92 INFLAMMATORY SPONDYLOPATHY OF CERVICAL REGION: ICD-10-CM

## 2019-06-18 DIAGNOSIS — J84.10 CALCIFIED GRANULOMA OF LUNG: ICD-10-CM

## 2019-06-18 DIAGNOSIS — M54.50 CHRONIC LOW BACK PAIN: ICD-10-CM

## 2019-06-18 DIAGNOSIS — R33.9 URINE RETENTION: ICD-10-CM

## 2019-06-18 DIAGNOSIS — G89.29 CHRONIC LOW BACK PAIN: ICD-10-CM

## 2019-06-18 PROCEDURE — 99214 PR OFFICE/OUTPT VISIT, EST, LEVL IV, 30-39 MIN: ICD-10-PCS | Mod: HCNC,S$GLB,, | Performed by: NURSE PRACTITIONER

## 2019-06-18 PROCEDURE — 99214 OFFICE O/P EST MOD 30 MIN: CPT | Mod: HCNC,S$GLB,, | Performed by: NURSE PRACTITIONER

## 2019-06-18 PROCEDURE — 3075F SYST BP GE 130 - 139MM HG: CPT | Mod: HCNC,CPTII,S$GLB, | Performed by: NURSE PRACTITIONER

## 2019-06-18 PROCEDURE — 1101F PT FALLS ASSESS-DOCD LE1/YR: CPT | Mod: HCNC,CPTII,S$GLB, | Performed by: NURSE PRACTITIONER

## 2019-06-18 PROCEDURE — 3079F PR MOST RECENT DIASTOLIC BLOOD PRESSURE 80-89 MM HG: ICD-10-PCS | Mod: HCNC,CPTII,S$GLB, | Performed by: NURSE PRACTITIONER

## 2019-06-18 PROCEDURE — 99999 PR PBB SHADOW E&M-EST. PATIENT-LVL IV: ICD-10-PCS | Mod: PBBFAC,HCNC,, | Performed by: NURSE PRACTITIONER

## 2019-06-18 PROCEDURE — 99999 PR PBB SHADOW E&M-EST. PATIENT-LVL IV: CPT | Mod: PBBFAC,HCNC,, | Performed by: NURSE PRACTITIONER

## 2019-06-18 PROCEDURE — 1101F PR PT FALLS ASSESS DOC 0-1 FALLS W/OUT INJ PAST YR: ICD-10-PCS | Mod: HCNC,CPTII,S$GLB, | Performed by: NURSE PRACTITIONER

## 2019-06-18 PROCEDURE — 3075F PR MOST RECENT SYSTOLIC BLOOD PRESS GE 130-139MM HG: ICD-10-PCS | Mod: HCNC,CPTII,S$GLB, | Performed by: NURSE PRACTITIONER

## 2019-06-18 PROCEDURE — 3079F DIAST BP 80-89 MM HG: CPT | Mod: HCNC,CPTII,S$GLB, | Performed by: NURSE PRACTITIONER

## 2019-06-18 RX ORDER — MELOXICAM 15 MG/1
15 TABLET ORAL DAILY
Qty: 90 TABLET | Refills: 1 | Status: SHIPPED | OUTPATIENT
Start: 2019-06-18 | End: 2019-12-09 | Stop reason: SDUPTHER

## 2019-06-18 NOTE — PROGRESS NOTES
Subjective:       Patient ID: Nuha Bernstein is a 74 y.o. female.    Chief Complaint: Urinary Retention (4 months) and Vaginal Prolapse (2 days)    74-year-old female presents to the clinic today with trouble emptying her bladder completely for the past 4 months.  She states she either has a bladder or uterus prolapse that she noticed 2 that she is days ago.  She states she was able to push it back in.  She is unsure whether it is the bladder or her uterus.  She did have 4 children.  She denies any urinary frequency, dysuria, hematuria, or flank pain. She denies any cardiac chest pain, heart palpitations, shortness breath, or swelling to lower extremities.  She denies any headaches, dizziness, or blurred vision.    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 She has never used smokeless tobacco. She reports that she does not drink alcohol or use drugs.  Review of Systems   Constitutional: Negative for activity change.   Respiratory: Negative for cough, chest tightness, shortness of breath and wheezing.    Cardiovascular: Negative for chest pain, palpitations and leg swelling.   Gastrointestinal: Negative for abdominal pain, constipation, diarrhea, nausea and vomiting.   Genitourinary: Positive for difficulty urinating. Negative for dysuria, flank pain, frequency and hematuria.        Bladder or uterus prolapse    Musculoskeletal: Negative for gait problem.   Skin: Negative for color change.   Neurological: Negative for dizziness, syncope and light-headedness.       Objective:      Physical Exam   Constitutional: She is oriented to person, place, and time. She appears well-developed and  well-nourished. No distress.   Eyes: Pupils are equal, round, and reactive to light. Conjunctivae and EOM are normal. Right eye exhibits no discharge. Left eye exhibits no discharge. No scleral icterus.   Neck: Normal range of motion. Neck supple. No JVD present.   Cardiovascular: Normal rate, regular rhythm and normal heart sounds.   No murmur heard.  Pulmonary/Chest: Effort normal and breath sounds normal. No respiratory distress. She has no wheezes. She has no rales.   Abdominal: Soft. Bowel sounds are normal. There is no tenderness.   Genitourinary:   Genitourinary Comments: Bimanual no evidence or prolapsed bladder or uterus at this time    Musculoskeletal: Normal range of motion. She exhibits no edema.   Neurological: She is alert and oriented to person, place, and time.   Skin: Skin is warm and dry. She is not diaphoretic.   Psychiatric: She has a normal mood and affect.       Assessment:       1. Bladder prolapse, female, acquired    2. Inflammatory spondylopathy of cervical region    3. Calcified granuloma of lung    4. Essential hypertension    5. Pure hypercholesterolemia    6. Chronic midline low back pain without sciatica    7. Chronic neck pain    8. Chronic low back pain    9. Urine retention        Plan:         Bladder prolapse, female, acquired  -     Ambulatory referral to Urogynecology    Inflammatory spondylopathy of cervical region  - The current medical regimen is effective;  continue present plan and medications.    Calcified granuloma of lung  - noted on chest x-ray 2/17/2014    Essential hypertension  - The current medical regimen is effective;  continue present plan and medications.    Pure hypercholesterolemia  - on Omega DHA oral     Chronic midline low back pain without sciatica  - The current medical regimen is effective;  continue present plan and medications.    Chronic neck pain  -     meloxicam (MOBIC) 15 MG tablet; Take 1 tablet (15 mg total) by mouth once daily.  Dispense: 90  tablet; Refill: 1  - The current medical regimen is effective;  continue present plan and medications.    Chronic low back pain  -     meloxicam (MOBIC) 15 MG tablet; Take 1 tablet (15 mg total) by mouth once daily.  Dispense: 90 tablet; Refill: 1  - The current medical regimen is effective;  continue present plan and medications.    Urine retention  -     POCT urinalysis, dipstick or tablet reag    - UA normal

## 2019-06-20 ENCOUNTER — TELEPHONE (OUTPATIENT)
Dept: UROGYNECOLOGY | Facility: CLINIC | Age: 74
End: 2019-06-20

## 2019-06-20 NOTE — TELEPHONE ENCOUNTER
Te Funez,    Can you please contact pt and help her schedule consult appointment with Sherrill Albarado or Renee for Bladder prolapse.    Thank you,  Wander

## 2019-06-20 NOTE — TELEPHONE ENCOUNTER
Te Viramontes,  Appointment scheduled with Dr. Mccartney at [facility] for 7/30/19 at 11:00am.      Thank you,  Dee Dee

## 2019-06-20 NOTE — TELEPHONE ENCOUNTER
----- Message from Wander Damon MA sent at 6/19/2019  2:52 PM CDT -----  Pt has a referral to urogynecology. Please call to schedule. Thanks.

## 2019-06-25 NOTE — ASSESSMENT & PLAN NOTE
Stable and controlled. Continue current treatment plan as previously prescribed with your PCP.   The patient is asked to make an attempt to improve diet and exercise patterns to aid in medical management of this problem.      
Stable. Continue to monitor  
Stable. Continue to monitor.  
Class 3 severe obesity due to excess calories with serious comorbidity and body mass index (BMI) of 40.0 to 44.9 in adult

## 2019-07-23 ENCOUNTER — PATIENT MESSAGE (OUTPATIENT)
Dept: FAMILY MEDICINE | Facility: CLINIC | Age: 74
End: 2019-07-23

## 2019-07-23 RX ORDER — TRAMADOL HYDROCHLORIDE 50 MG/1
TABLET ORAL
Qty: 90 TABLET | Refills: 2 | OUTPATIENT
Start: 2019-07-23

## 2019-07-23 RX ORDER — TRAMADOL HYDROCHLORIDE 50 MG/1
TABLET ORAL
Qty: 90 TABLET | Refills: 2 | Status: SHIPPED | OUTPATIENT
Start: 2019-07-23 | End: 2019-12-10 | Stop reason: SDUPTHER

## 2019-07-25 ENCOUNTER — TELEPHONE (OUTPATIENT)
Dept: FAMILY MEDICINE | Facility: CLINIC | Age: 74
End: 2019-07-25

## 2019-07-25 NOTE — TELEPHONE ENCOUNTER
Notified patient Rx was sent to the pharmacy on 7/23, pt verbalized understanding. Says the pharmacy told her they will not fill it because of her blood pressure and to check with her provider.       Thanks,  Patria

## 2019-07-25 NOTE — TELEPHONE ENCOUNTER
She has to bring in the paper script.  This cannot be faxed nor sent in.  She needs to bring in the paper script to the pharmacy. I spoke with the pharmacist this morning.  She has to  paper script and bring to pharmacy

## 2019-07-25 NOTE — TELEPHONE ENCOUNTER
----- Message from Dulce Woodruff sent at 7/25/2019  9:25 AM CDT -----  Contact: Self/  [phone]  Type: RX Refill Request    Who Called:   Patient    Refill or New Rx:  Refill    RX Name and Strength:  traMADol (ULTRAM) 50 mg tablet    How is the patient currently taking it? (ex. 1XDay): 3X Day    Is this a 30 day or 90 day RX:  90 day    Preferred Pharmacy with phone number:  [facility]/PH[facility]ACY #[id number], [place] - [address]    Local or Mail Order:  Local    Ordering Provider:  Gulshan    Would the patient rather a call back or a response via [facility]?   Call back    Best Call Back Number: [phone]

## 2019-07-30 ENCOUNTER — OFFICE VISIT (OUTPATIENT)
Dept: UROGYNECOLOGY | Facility: CLINIC | Age: 74
End: 2019-07-30
Payer: MEDICARE

## 2019-07-30 VITALS — BODY MASS INDEX: 27.64 KG/M2 | HEIGHT: 66 IN | WEIGHT: 172 LBS

## 2019-07-30 DIAGNOSIS — R33.9 INCOMPLETE BLADDER EMPTYING: Primary | ICD-10-CM

## 2019-07-30 DIAGNOSIS — N81.6 RECTOCELE, FEMALE: ICD-10-CM

## 2019-07-30 DIAGNOSIS — N81.11 CYSTOCELE, MIDLINE: ICD-10-CM

## 2019-07-30 DIAGNOSIS — N81.4 UTEROVAGINAL PROLAPSE: ICD-10-CM

## 2019-07-30 DIAGNOSIS — Z12.31 ENCOUNTER FOR SCREENING MAMMOGRAM FOR BREAST CANCER: ICD-10-CM

## 2019-07-30 PROCEDURE — 87086 URINE CULTURE/COLONY COUNT: CPT | Mod: HCNC

## 2019-07-30 PROCEDURE — 99999 PR PBB SHADOW E&M-EST. PATIENT-LVL III: CPT | Mod: PBBFAC,HCNC,, | Performed by: OBSTETRICS & GYNECOLOGY

## 2019-07-30 PROCEDURE — 99204 PR OFFICE/OUTPT VISIT, NEW, LEVL IV, 45-59 MIN: ICD-10-PCS | Mod: HCNC,25,S$GLB, | Performed by: OBSTETRICS & GYNECOLOGY

## 2019-07-30 PROCEDURE — 99204 OFFICE O/P NEW MOD 45 MIN: CPT | Mod: HCNC,25,S$GLB, | Performed by: OBSTETRICS & GYNECOLOGY

## 2019-07-30 PROCEDURE — 99999 PR PBB SHADOW E&M-EST. PATIENT-LVL III: ICD-10-PCS | Mod: PBBFAC,HCNC,, | Performed by: OBSTETRICS & GYNECOLOGY

## 2019-07-30 PROCEDURE — 1101F PT FALLS ASSESS-DOCD LE1/YR: CPT | Mod: HCNC,CPTII,S$GLB, | Performed by: OBSTETRICS & GYNECOLOGY

## 2019-07-30 PROCEDURE — 57160 PR FIT/INSERT INTRAVAG SUPPORT DEVICE: ICD-10-PCS | Mod: HCNC,S$GLB,, | Performed by: OBSTETRICS & GYNECOLOGY

## 2019-07-30 PROCEDURE — 57160 INSERT PESSARY/OTHER DEVICE: CPT | Mod: HCNC,S$GLB,, | Performed by: OBSTETRICS & GYNECOLOGY

## 2019-07-30 PROCEDURE — 1101F PR PT FALLS ASSESS DOC 0-1 FALLS W/OUT INJ PAST YR: ICD-10-PCS | Mod: HCNC,CPTII,S$GLB, | Performed by: OBSTETRICS & GYNECOLOGY

## 2019-07-30 NOTE — LETTER
August 3, 2019      Ti Watkins, FNP-C  [address]  [place] [address]           [facility] Gynecology  [address]  [place] [address]  Phone: [phone]          Patient: Nuha Bernstein   MR Number: [id number]   YOB: 1945   Date of Visit: 7/30/2019       Dear Ti Watkins:    Thank you for referring Nuha Bernstein to me for evaluation. Attached you will find relevant portions of my assessment and plan of care.    If you have questions, please do not hesitate to call me. I look forward to following Nuha Bernstein along with you.    Sincerely,    Bernadine Mccartney MD    Enclosure  CC:  No Recipients    If you would like to receive this communication electronically, please contact [email or web address] or [phone] to request more information on [facility] Link access.    For providers and/or their staff who would like to refer a patient to [facility], please contact us through our one-stop-shop provider referral line, [facility] , at [phone].    If you feel you have received this communication in error or would no longer like to receive these types of communications, please e-mail [email or web address]

## 2019-07-30 NOTE — PROGRESS NOTES
[facility] - GYNECOLOGY  [address]  [place] [address]    Nuha Bernstein  [id number]  1945  August 3, 2019    Consulting Physician: Ti Watkins FN*   GYN: none  Primary M.D.: Maryjo Gaffney MD    Chief Complaint   Patient presents with    Consult     cystocele       HPI:     1)  UI:  (--) TIFFANIE.  (--) UUI .  No h/o UI. Daytime frequency: Q1- 2 hours, worsening.  Nocturia: Yes: 1/night.   (--) dysuria,  (--) hematuria,  (--) frequent UTIs.  (+) complete bladder emptying.    2)  POP:  Present. To introitus.  Not sure--noticed when trying to douche.  Symptoms:(+)  Discomfort--urinary urgency/pressure.  (--) vaginal bleeding. (--) vaginal discharge. (+) sexually active.   has dementia so not much.  (--) dyspareunia. (--)  Vaginal dryness.  (--) vaginal estrogen use.     3)  BM:  (--) constipation/straining. Has BM QAM.  (--) chronic diarrhea. (--) hematochezia.  (--) fecal incontinence.  (--) fecal smearing/urgency.  (+) complete evacuation.     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      Past Surgical History  Past Surgical History:   Procedure Laterality Date    COLONOSCOPY N/A 2015    Performed by Alexy Ansari MD at [facility] (4TH FLR)    Right Thumb        Hysterectomy: No    Past Ob History     x 4.  C/s x 0.    Largest infant weight: 7#.  unknown FAVD. yes episiotomy.      Gynecologic History  LMP: No LMP recorded. Patient is postmenopausal.  Age of menarche: 13 yo  Age of menopause: late 50s  Menstrual history: h/o normal  Pap test: , normal (legacy).  History of abnormal paps: No.  History of STIs:  No  Mammogram: Date of last: .  Result: Normal  Colonoscopy: Date of last: 9/15.  Result:  Polyp, benign.  Repeat due:  Per PCP.    DEXA:  Date of last: .  Elevated BMD of the lumbar spine.  No  significant change since prior study. Elevated BMI associated with elevated BMD.  Recommendations:  1) Adequate calcium and Vitamin D therapy  2) Appropriate exercise  3) Consider repeat BMD in 4 years (2020).      Family History  Family History   Problem Relation Age of Onset    Hypertension Mother     Cataracts Mother     Heart disease Mother     Hypertension Father     Cataracts Father     No Known Problems Sister     No Known Problems Brother     No Known Problems Brother     No Known Problems Maternal Aunt     No Known Problems Maternal Uncle     No Known Problems Paternal Aunt     No Known Problems Paternal Uncle     No Known Problems Maternal Grandmother     No Known Problems Maternal Grandfather     No Known Problems Paternal Grandmother     No Known Problems Paternal Grandfather     Amblyopia Neg Hx     Blindness Neg Hx     Cancer Neg Hx     Diabetes Neg Hx     Glaucoma Neg Hx     Macular degeneration Neg Hx     Retinal detachment Neg Hx     Strabismus Neg Hx     Stroke Neg Hx     Thyroid disease Neg Hx       Colon CA: No  Breast CA: No  GYN CA: No   CA: No    Social History  Social History     Tobacco Use   Smoking Status Never Smoker   Smokeless Tobacco Never Used     Social History     Substance and Sexual Activity   Alcohol Use No    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    .    Social History     Substance and Sexual Activity   Drug Use No     The patient is .  Resides in [place] [address].  Employment status: retired + childcare.     Allergies  Review of patient's allergies indicates:  No Known Allergies    Medications  Current Outpatient Medications on File Prior to Visit   Medication Sig Dispense Refill    aspirin 81 mg Tab Take by mouth as needed. 2-3 times weekly      lisinopril (PRINIVIL,ZESTRIL) 40 MG tablet Take 1 tablet (40 mg total) by mouth once daily. 90 tablet 3    meloxicam (MOBIC) 15 MG tablet Take 1 tablet (15 mg total) by mouth once daily. 90  "tablet 1    MULTIVITAMIN W-MINERALS/LUTEIN (CENTRUM SILVER ORAL) Take by mouth.      OMEGA 3,6,9 COMBINATION NO.7 (OMEGA DHA ORAL) Take 830 mg by mouth once daily.      POLICOSANOL ORAL Take 1 tablet by mouth once daily.      traMADol (ULTRAM) 50 mg tablet TAKE 1 TABLET BY MOUTH 3 TIMES A DAY AS NEEDED FOR PAIN 90 tablet 2    triamterene-hydrochlorothiazide 37.5-25 mg (DYAZIDE) 37.5-25 mg per capsule TAKE ONE CAPSULE BY MOUTH EVERY DAY 90 capsule 1     No current facility-administered medications on file prior to visit.        Review of Systems A 14 point ROS was reviewed with pertinent positives as noted above in the history of present illness.      Constitutional: negative  Eyes: negative  Endocrine: negative  Gastrointestinal: negative  Cardiovascular: negative  Respiratory: negative  Allergic/Immunologic: negative  Integumentary: negative  Psychiatric: negative  Musculoskeletal: negative   Ear/Nose/Throat: negative  Neurologic: negative  Genitourinary: SEE HPI  Hematologic/Lymphatic: negative   Breast: negative    Urogynecologic Exam  Ht 5' 6" (1.676 m)   Wt 78 kg (172 lb)   BMI 27.76 kg/m²     GENERAL APPEARANCE:  The patient is well-developed, well-nourished.  Neck:  Supple with no thyromegaly, no carotid bruits.  Heart:  Regular rate and rhythm, no murmurs, rubs or gallops.  Lungs:  Clear.  No CVA tenderness.  Abdomen:  Soft, nontender, nondistended, no hepatosplenomegaly.  Incisions:  none    PELVIC:    External genitalia:  Normal Bartholins, Skenes and labia bilaterally.    Urethra:  No caruncle, diverticulum or masses.  (+) hypermobility.    Vagina:  Atrophy (+) mild , no bladder masses or tender, no discharge.    Cervix:  normal appearance  Uterus: normal size, contour, position, consistency, mobility, non-tender  Adnexa: Not palpable.    POP-Q:  Aa +3; Ba +5; C -1; Ap 0; Bp 0; D -5.  Genital hiatus 3, perineal body 2, total vaginal length 10-11.    NEUROLOGIC:  Cranial nerves 2 through 12 intact.  " Strength 5/5.  DTRs 2+ lower extremities.  S2 through 4 normal.  Sacral reflexes intact.    EXT: LIPSCOMB, 2+ pulses bilaterally, no C/C/E    COUGH STRESS TEST:  negative  KEGEL: 1 /5    RECTAL:    External:  Normal, (--) hemorrhoids, (--) dovetailing.   Internal:  deferred    PVR: 80 mL    The patient was fit with #5 ring + support pessary.  She was able to tolerate the device comfortabley with bending, squatting, valsalva.  She was not able to demonstrate independent removal and placement.  She tolerated the procedure well.      Impression    1. Incomplete bladder emptying    2. Encounter for screening mammogram for breast cancer    3. Cystocele, midline    4. Uterovaginal prolapse    5. Rectocele, female        Initial Plan  The patient was counseled regarding these issues. The patient was given a summary sheet containing each of these issues with possible options for evaluation and management. When appropriate, we also reviewed computer-generated diagrams specific to their diagnoses..  All questions were addressed to the patient's satisfaction.    1)  Stage 3 cystocele, stage 2 uterine prolapse/rectocele:  --discussed pathophysiology  --observation/PT vs pessary vs surgery (?TVH/USS--has large cystocele though)  --trial of pessary: ordered, will call you when arrives so can RTC for fitting/teaching of independent use with NP     2)  Schedule mammogram. Overdue.     3)  Elevated PVR:  --urine C&S  --recheck on RTC with pessary in place  --consider further testing if PVR still elevated    4)  Will call you once pessary received so can schedule visit for placement/teaching with NP.  Recheck PVR with pessary at that visit.     Patient's daughter was present for entire discussion.   Approximately 60 min were spent in consult, 75 % in discussion.     Thank you for requesting consultation of your patient.  I look forward to participating in their care.    Bernadine Mccartney  Female Pelvic Medicine and Reconstructive  Surgery  [facility] [place], [place]

## 2019-07-31 LAB — BACTERIA UR CULT: NO GROWTH

## 2019-08-03 PROBLEM — N81.6 RECTOCELE, FEMALE: Status: ACTIVE | Noted: 2019-08-03

## 2019-08-03 PROBLEM — R33.9 INCOMPLETE BLADDER EMPTYING: Status: ACTIVE | Noted: 2019-08-03

## 2019-08-03 PROBLEM — N81.11 CYSTOCELE, MIDLINE: Status: ACTIVE | Noted: 2019-08-03

## 2019-08-03 PROBLEM — N81.4 UTEROVAGINAL PROLAPSE: Status: ACTIVE | Noted: 2019-08-03

## 2019-08-05 ENCOUNTER — TELEPHONE (OUTPATIENT)
Dept: UROGYNECOLOGY | Facility: CLINIC | Age: 74
End: 2019-08-05

## 2019-08-05 NOTE — TELEPHONE ENCOUNTER
----- Message from Bernadine Mccartney MD sent at 8/3/2019  1:35 PM CDT -----  Regarding: can you please call patient once pessary received to make appt with yarely?  Fit patient with #5 ring + support pessary on 7/30/19 visit at [facility].  She was supposed to have ordered it, then we were supposed to call her to put it in/reheck PVR once it arrives.     Can you please call patient or her daughter and make sure they ordered pessary?  Then, let them know that we will call them to set up appt once we have received it?     Thanks!

## 2019-08-05 NOTE — TELEPHONE ENCOUNTER
Pessary order form was faxed 7/30/2019. It was not received in the office. Once received, pt will be notified and scheduled.

## 2019-08-09 ENCOUNTER — HOSPITAL ENCOUNTER (OUTPATIENT)
Dept: RADIOLOGY | Facility: HOSPITAL | Age: 74
Discharge: HOME | End: 2019-08-09
Attending: OBSTETRICS & GYNECOLOGY
Payer: MEDICARE

## 2019-08-09 VITALS — WEIGHT: 170 LBS | BODY MASS INDEX: 27.32 KG/M2 | HEIGHT: 66 IN

## 2019-08-09 DIAGNOSIS — Z12.31 ENCOUNTER FOR SCREENING MAMMOGRAM FOR BREAST CANCER: ICD-10-CM

## 2019-08-09 PROCEDURE — 77067 MAMMO DIGITAL SCREENING BILAT WITH TOMOSYNTHESIS_CAD: ICD-10-PCS | Mod: 26,HCNC,, | Performed by: RADIOLOGY

## 2019-08-09 PROCEDURE — 77063 BREAST TOMOSYNTHESIS BI: CPT | Mod: 26,HCNC,, | Performed by: RADIOLOGY

## 2019-08-09 PROCEDURE — 77067 SCR MAMMO BI INCL CAD: CPT | Mod: 26,HCNC,, | Performed by: RADIOLOGY

## 2019-08-09 PROCEDURE — 77067 SCR MAMMO BI INCL CAD: CPT | Mod: TC,HCNC,PO

## 2019-08-09 PROCEDURE — 77063 MAMMO DIGITAL SCREENING BILAT WITH TOMOSYNTHESIS_CAD: ICD-10-PCS | Mod: 26,HCNC,, | Performed by: RADIOLOGY

## 2019-08-14 ENCOUNTER — TELEPHONE (OUTPATIENT)
Dept: UROGYNECOLOGY | Facility: CLINIC | Age: 74
End: 2019-08-14

## 2019-08-14 NOTE — TELEPHONE ENCOUNTER
Called pt no answer, Left voice message for pt to give the office a call back at [phone] to confirm 1 pm apt instead of 4:30 pm per NP Marchand.

## 2019-08-20 ENCOUNTER — PATIENT OUTREACH (OUTPATIENT)
Dept: ADMINISTRATIVE | Facility: OTHER | Age: 74
End: 2019-08-20

## 2019-08-21 ENCOUNTER — OFFICE VISIT (OUTPATIENT)
Dept: OPTOMETRY | Facility: CLINIC | Age: 74
End: 2019-08-21
Payer: MEDICARE

## 2019-08-21 DIAGNOSIS — H25.13 NUCLEAR SCLEROSIS OF BOTH EYES: ICD-10-CM

## 2019-08-21 DIAGNOSIS — Z01.00 ROUTINE EYE EXAM: Primary | ICD-10-CM

## 2019-08-21 DIAGNOSIS — H02.823 CYST OF RIGHT EYELID: ICD-10-CM

## 2019-08-21 DIAGNOSIS — H52.7 REFRACTIVE ERROR: ICD-10-CM

## 2019-08-21 PROCEDURE — 92015 DETERMINE REFRACTIVE STATE: CPT | Mod: HCNC,S$GLB,, | Performed by: OPTOMETRIST

## 2019-08-21 PROCEDURE — 92014 COMPRE OPH EXAM EST PT 1/>: CPT | Mod: HCNC,S$GLB,, | Performed by: OPTOMETRIST

## 2019-08-21 PROCEDURE — 92014 PR EYE EXAM, EST PATIENT,COMPREHESV: ICD-10-PCS | Mod: HCNC,S$GLB,, | Performed by: OPTOMETRIST

## 2019-08-21 PROCEDURE — 92015 PR REFRACTION: ICD-10-PCS | Mod: HCNC,S$GLB,, | Performed by: OPTOMETRIST

## 2019-08-21 PROCEDURE — 99999 PR PBB SHADOW E&M-EST. PATIENT-LVL I: CPT | Mod: PBBFAC,HCNC,, | Performed by: OPTOMETRIST

## 2019-08-21 PROCEDURE — 99999 PR PBB SHADOW E&M-EST. PATIENT-LVL I: ICD-10-PCS | Mod: PBBFAC,HCNC,, | Performed by: OPTOMETRIST

## 2019-08-21 NOTE — PROGRESS NOTES
Subjective:       Patient ID: Nuha Bernstein is a 74 y.o. female      Chief Complaint   Patient presents with    Concerns About Ocular Health     History of Present Illness  Dls: 10/31/17 Dr. Levya     73 y/o female presents today with c/o blurry vision at near ou. Pt wears bifocal glasses.   Pt c/o bump nasal corner od x 3 months.     No tearing  No itching  No burning  No pain  No ha's  No floaters  No flashes     eye meds  None      Assessment/Plan:     1. Routine eye exam  Eyemed vision    2. Nuclear sclerosis of both eyes  Educated pt on presence of cataracts and effects on vision. No surgery at this time. Recheck in one year.    3. Cyst of right eyelid  Pt does not want removal at this time. Monitor.    4. Refractive error  Educated patient on refractive error and discussed lens options. Dispensed updated spectacle Rx. Educated about adaptation period to new specs.    Eyeglass Final Rx     Eyeglass Final Rx       Sphere Cylinder Axis Add    Right +1.25 +2.50 175 +2.75    Left +1.00 +1.75 180 +2.75    Expiration Date:  8/21/2020                  Follow up in about 1 year (around 8/21/2020) for Annual eye exam.

## 2019-08-26 ENCOUNTER — TELEPHONE (OUTPATIENT)
Dept: UROGYNECOLOGY | Facility: CLINIC | Age: 74
End: 2019-08-26

## 2019-08-26 NOTE — TELEPHONE ENCOUNTER
----- Message from Celio Singh sent at 8/26/2019 12:52 PM CDT -----  Contact: pt  Name of Who is Calling: pt    What is the request in detail: pt states she is confirming her 1pm appointment scheduled for 08/27/19. Pt states she is returning a call toTinisha. Pt is asking for a call back to confirm that she can come in for 1pm. Please contact to further discuss and advise      Can the clinic reply by [facility]: no    What Number to Call Back [facility] not [facility]: [phone]

## 2019-08-26 NOTE — TELEPHONE ENCOUNTER
Spoke with pt and informed her the 1 pm apt wasn't no longer available but I was able to reschedule her same day at 10 am. Pt voiced understanding and call ended.

## 2019-08-27 ENCOUNTER — OFFICE VISIT (OUTPATIENT)
Dept: UROGYNECOLOGY | Facility: CLINIC | Age: 74
End: 2019-08-27
Payer: MEDICARE

## 2019-08-27 VITALS
WEIGHT: 174.63 LBS | BODY MASS INDEX: 28.06 KG/M2 | HEIGHT: 66 IN | SYSTOLIC BLOOD PRESSURE: 122 MMHG | DIASTOLIC BLOOD PRESSURE: 80 MMHG

## 2019-08-27 DIAGNOSIS — R33.9 INCOMPLETE BLADDER EMPTYING: Primary | ICD-10-CM

## 2019-08-27 DIAGNOSIS — N81.11 CYSTOCELE, MIDLINE: ICD-10-CM

## 2019-08-27 DIAGNOSIS — N81.6 RECTOCELE, FEMALE: ICD-10-CM

## 2019-08-27 DIAGNOSIS — N81.4 UTEROVAGINAL PROLAPSE: ICD-10-CM

## 2019-08-27 PROCEDURE — 99499 UNLISTED E&M SERVICE: CPT | Mod: HCNC,S$GLB,, | Performed by: NURSE PRACTITIONER

## 2019-08-27 PROCEDURE — 99999 PR PBB SHADOW E&M-EST. PATIENT-LVL III: ICD-10-PCS | Mod: PBBFAC,HCNC,, | Performed by: NURSE PRACTITIONER

## 2019-08-27 PROCEDURE — 99999 PR PBB SHADOW E&M-EST. PATIENT-LVL III: CPT | Mod: PBBFAC,HCNC,, | Performed by: NURSE PRACTITIONER

## 2019-08-27 PROCEDURE — 99499 NO LOS: ICD-10-PCS | Mod: HCNC,S$GLB,, | Performed by: NURSE PRACTITIONER

## 2019-08-27 NOTE — PROGRESS NOTES
Urogyn follow up  08/27/2019  .  [facility]  [address]  [facility] [address]    Nuha Bernstein  [id number]  1945      Nuha Bernstein is a 74 y.o.  here for a urogyn pessary fitting.    Last HPI from 07/30/2019  1)  UI:  (--) TIFFANIE.  (--) UUI .  No h/o UI. Daytime frequency: Q1- 2 hours, worsening.  Nocturia: Yes: 1/night.   (--) dysuria,  (--) hematuria,  (--) frequent UTIs.  (+) complete bladder emptying.     2)  POP:  Present. To introitus.  Not sure--noticed when trying to douche.  Symptoms:(+)  Discomfort--urinary urgency/pressure.  (--) vaginal bleeding. (--) vaginal discharge. (+) sexually active.   has dementia so not much.  (--) dyspareunia. (--)  Vaginal dryness.  (--) vaginal estrogen use.      3)  BM:  (--) constipation/straining. Has BM QAM.  (--) chronic diarrhea. (--) hematochezia.  (--) fecal incontinence.  (--) fecal smearing/urgency.  (+) complete evacuation.           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       Past Surgical History:   Procedure Laterality Date    COLONOSCOPY N/A 9/14/2015    Performed by [facility] at [facility] ([facility])    Right Thumb         Current Outpatient Medications   Medication Sig    aspirin 81 mg Tab Take by mouth as needed. 2-3 times weekly    lisinopril (PRINIVIL,ZESTRIL) 40 MG tablet Take 1 tablet (40 mg total) by mouth once daily.    meloxicam (MOBIC) 15 MG tablet Take 1 tablet (15 mg total) by mouth once daily.    MULTIVITAMIN W-MINERALS/LUTEIN (CENTRUM SILVER ORAL) Take by mouth.    OMEGA 3,6,9 COMBINATION NO.7 (OMEGA DHA ORAL) Take 830 mg by mouth once daily.    POLICOSANOL ORAL Take 1 tablet by mouth once daily.    traMADol (ULTRAM) 50 mg tablet TAKE 1 TABLET BY MOUTH 3 TIMES A DAY AS NEEDED FOR PAIN    triamterene-hydrochlorothiazide 37.5-25 mg (DYAZIDE) 37.5-25 mg per capsule  "TAKE ONE CAPSULE BY MOUTH EVERY DAY     No current facility-administered medications for this visit.        Well woman:  Pap test: 2012, normal (legacy).  History of abnormal paps: No.  History of STIs:  No  Mammogram: Date of last:08/2019.  Result: Normal  Colonoscopy: Date of last: 9/15.  Result:  Polyp, benign.  Repeat due:  Per PCP.    DEXA:  Date of last: 8/16.  Elevated BMD of the lumbar spine.  No significant change since prior study. Elevated BMI associated with elevated BMD.  Recommendations:  1) Adequate calcium and Vitamin D therapy  2) Appropriate exercise  3) Consider repeat BMD in 4 years (2020).      ROS:  As per HPI.      Exam  /80 (BP Location: Right arm, Patient Position: Sitting, BP Method: Medium (Manual))   Ht 5' 6" (1.676 m)   Wt 79.2 kg (174 lb 9.7 oz)   BMI 28.18 kg/m²   General: alert and oriented, no acute distress  Respiratory: normal respiratory effort  Abd: soft, non-tender, non-distended    Pelvic  Ext. Genitalia: normal external genitalia. Normal bartholin's and skeens glands  Vagina: + atrophy. Normal vaginal mucosa without lesions. No discharge noted.   Non-tender bladder base without palpable mass.  Cervix: no lesions  Uterus:  uterus is normal size, shape, consistency and nontender   Urethra: no masses or tenderness  Urethral meatus: no lesions, caruncle or prolapse.    The patient was fit with #5 ring with support pessary.  She was able to tolerate the device comfortabley with bending, squatting, valsalva.  She was able to demonstrate independent removal and placement.  She tolerated the procedure well.      Impression  1. Incomplete bladder emptying     2. Cystocele, midline     3. Uterovaginal prolapse     4. Rectocele, female       We reviewed the above issues and discussed options for short-term versus long-term management of her problems.   Plan:   1)  Stage 3 cystocele, stage 2 uterine prolapse/rectocele:  --discussed pathophysiology  --observation/PT vs pessary vs " surgery (?TVH/USS--has large cystocele though)  --trial of pessary:#5 ring with support pessary      2)  Elevated PVR:  --recheck on RTC with pessary in place  --consider further testing if PVR still elevated     3)Vaginal atrophy (dryness):   Use REPLENS or REFRESH OTC: 1/2 applicator full in vagina twice a week.      4)  RTC 2 weeks for pc and pvr     30 minutes were spent in face to face time with this patient  90 % of this time was spent in counseling and/or coordination of care      KRISTIE Lemon-BC [facility]  Division of Female Pelvic Medicine and Reconstructive Surgery  Department of Obstetrics & Gynecology

## 2019-08-27 NOTE — PATIENT INSTRUCTIONS
1)  Stage 3 cystocele, stage 2 uterine prolapse/rectocele:  --discussed pathophysiology  --observation/PT vs pessary vs surgery (?TVH/USS--has large cystocele though)  --trial of pessary:#5 ring with support pessary      2)  Elevated PVR:  --recheck on RTC with pessary in place  --consider further testing if PVR still elevated     3)Vaginal atrophy (dryness) Use REPLENS or REFRESH OTC: 1/2 applicator full in vagina twice a week.      4)  RTC 2 weeks for pc and pvr

## 2019-09-04 ENCOUNTER — OFFICE VISIT (OUTPATIENT)
Dept: FAMILY MEDICINE | Facility: CLINIC | Age: 74
End: 2019-09-04
Payer: MEDICARE

## 2019-09-04 VITALS
HEART RATE: 85 BPM | HEIGHT: 66 IN | BODY MASS INDEX: 28.23 KG/M2 | OXYGEN SATURATION: 97 % | TEMPERATURE: 99 F | WEIGHT: 175.69 LBS | DIASTOLIC BLOOD PRESSURE: 80 MMHG | SYSTOLIC BLOOD PRESSURE: 120 MMHG

## 2019-09-04 DIAGNOSIS — Z00.00 ANNUAL PHYSICAL EXAM: Primary | ICD-10-CM

## 2019-09-04 DIAGNOSIS — M94.9 DISORDER OF BONE AND CARTILAGE: ICD-10-CM

## 2019-09-04 DIAGNOSIS — E78.00 PURE HYPERCHOLESTEROLEMIA: ICD-10-CM

## 2019-09-04 DIAGNOSIS — M89.9 DISORDER OF BONE AND CARTILAGE: ICD-10-CM

## 2019-09-04 DIAGNOSIS — R73.03 PREDIABETES: ICD-10-CM

## 2019-09-04 DIAGNOSIS — I10 ESSENTIAL HYPERTENSION: ICD-10-CM

## 2019-09-04 PROCEDURE — 3074F SYST BP LT 130 MM HG: CPT | Mod: HCNC,CPTII,S$GLB, | Performed by: FAMILY MEDICINE

## 2019-09-04 PROCEDURE — 99397 PR PREVENTIVE VISIT,EST,65 & OVER: ICD-10-PCS | Mod: HCNC,S$GLB,, | Performed by: FAMILY MEDICINE

## 2019-09-04 PROCEDURE — 3079F DIAST BP 80-89 MM HG: CPT | Mod: HCNC,CPTII,S$GLB, | Performed by: FAMILY MEDICINE

## 2019-09-04 PROCEDURE — 99397 PER PM REEVAL EST PAT 65+ YR: CPT | Mod: HCNC,S$GLB,, | Performed by: FAMILY MEDICINE

## 2019-09-04 PROCEDURE — 3079F PR MOST RECENT DIASTOLIC BLOOD PRESSURE 80-89 MM HG: ICD-10-PCS | Mod: HCNC,CPTII,S$GLB, | Performed by: FAMILY MEDICINE

## 2019-09-04 PROCEDURE — 99999 PR PBB SHADOW E&M-EST. PATIENT-LVL III: ICD-10-PCS | Mod: PBBFAC,HCNC,, | Performed by: FAMILY MEDICINE

## 2019-09-04 PROCEDURE — 3074F PR MOST RECENT SYSTOLIC BLOOD PRESSURE < 130 MM HG: ICD-10-PCS | Mod: HCNC,CPTII,S$GLB, | Performed by: FAMILY MEDICINE

## 2019-09-04 PROCEDURE — 99999 PR PBB SHADOW E&M-EST. PATIENT-LVL III: CPT | Mod: PBBFAC,HCNC,, | Performed by: FAMILY MEDICINE

## 2019-09-04 NOTE — PROGRESS NOTES
Assessment & Plan  Problem List Items Addressed This Visit        Cardiac/Vascular    Hyperlipidemia    Relevant Orders    Comprehensive metabolic panel    CBC auto differential    Hemoglobin A1c    Lipid panel    TSH    Essential hypertension    Relevant Orders    Comprehensive metabolic panel    CBC auto differential    Hemoglobin A1c    Lipid panel    TSH       Endocrine    Prediabetes    Relevant Orders    Comprehensive metabolic panel    CBC auto differential    Hemoglobin A1c    Lipid panel    TSH      Other Visit Diagnoses     Annual physical exam    -  Primary    Relevant Orders    Comprehensive metabolic panel    CBC auto differential    Hemoglobin A1c    Lipid panel    TSH    Disorder of bone and cartilage        Relevant Orders    DXA Bone Density Spine And Hip        I addressed all major concerns as it related to health maintenance.  All were ordered and scheduled based on the patients wishes.  Any additional health maintenance will be readdressed at the next physical if declined or deferred by the patient.      Health Maintenance reviewed    Follow-up: Follow up in about 1 year (around 9/4/2020) for annual exam.    ______________________________________________________________________    Chief Complaint  Chief Complaint   Patient presents with    Annual Exam       HPI  Nuha Bernstein is a 74 y.o. female with multiple medical diagnoses as listed in the medical history and problem list that presents for annual exam.  Pt is known to me with last appointment 8/7/2018.    She describes a stinging pain that can occur in the legs and the hips.  She has noted with sitting as well as walking.        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    Prolapse of uterus        PAST SURGICAL HISTORY:  Past Surgical History:   Procedure Laterality Date     COLONOSCOPY N/A 9/14/2015    Performed by Alexy Ansari MD at [facility] (4TH FLR)    Right Thumb      vaginal dilator         SOCIAL HISTORY:  Social History     Socioeconomic History    Marital status:      Spouse name: Not on file    Number of children: Not on file    Years of education: Not on file    Highest education level: Not on file   Occupational History    Not on file   Social Needs    Financial resource strain: Not on file    Food insecurity:     Worry: Not on file     Inability: Not on file    Transportation needs:     Medical: Not on file     Non-medical: Not on file   Tobacco Use    Smoking status: Never Smoker    Smokeless tobacco: Never Used   Substance and Sexual Activity    Alcohol use: No     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     Drug use: No    Sexual activity: Not Currently   Lifestyle    Physical activity:     Days per week: Not on file     Minutes per session: Not on file    Stress: Not on file   Relationships    Social connections:     Talks on phone: Not on file     Gets together: Not on file     Attends Mosque service: Not on file     Active member of club or organization: Not on file     Attends meetings of clubs or organizations: Not on file     Relationship status: Not on file   Other Topics Concern    Not on file   Social History Narrative    Not on file       FAMILY HISTORY:  Family History   Problem Relation Age of Onset    Hypertension Mother     Cataracts Mother     Heart disease Mother     Hypertension Father     Cataracts Father     No Known Problems Sister     No Known Problems Brother     No Known Problems Brother     No Known Problems Maternal Aunt     No Known Problems Maternal Uncle     No Known Problems Paternal Aunt     No Known Problems Paternal Uncle     No Known Problems Maternal Grandmother     No Known Problems Maternal Grandfather     No Known Problems Paternal Grandmother     No Known Problems Paternal  Grandfather     Amblyopia Neg Hx     Blindness Neg Hx     Cancer Neg Hx     Diabetes Neg Hx     Glaucoma Neg Hx     Macular degeneration Neg Hx     Retinal detachment Neg Hx     Strabismus Neg Hx     Stroke Neg Hx     Thyroid disease Neg Hx        ALLERGIES AND MEDICATIONS: updated and reviewed.  Review of patient's allergies indicates:  No Known Allergies  Current Outpatient Medications   Medication Sig Dispense Refill    aspirin 81 mg Tab Take by mouth as needed. 2-3 times weekly      lisinopril (PRINIVIL,ZESTRIL) 40 MG tablet Take 1 tablet (40 mg total) by mouth once daily. 90 tablet 3    meloxicam (MOBIC) 15 MG tablet Take 1 tablet (15 mg total) by mouth once daily. 90 tablet 1    OMEGA 3,6,9 COMBINATION NO.7 (OMEGA DHA ORAL) Take 830 mg by mouth once daily.      POLICOSANOL ORAL Take 1 tablet by mouth once daily.      traMADol (ULTRAM) 50 mg tablet TAKE 1 TABLET BY MOUTH 3 TIMES A DAY AS NEEDED FOR PAIN 90 tablet 2    triamterene-hydrochlorothiazide 37.5-25 mg (DYAZIDE) 37.5-25 mg per capsule TAKE ONE CAPSULE BY MOUTH EVERY DAY 90 capsule 1     No current facility-administered medications for this visit.          ROS  Review of Systems   Constitutional: Negative for activity change, appetite change, fatigue, fever and unexpected weight change.   HENT: Negative.  Negative for ear discharge, ear pain, rhinorrhea and sore throat.    Eyes: Negative.    Respiratory: Negative for apnea, cough, chest tightness, shortness of breath and wheezing.    Cardiovascular: Negative for chest pain, palpitations and leg swelling.   Gastrointestinal: Negative for abdominal distention, abdominal pain, constipation, diarrhea and vomiting.   Endocrine: Negative for cold intolerance, heat intolerance, polydipsia and polyuria.   Genitourinary: Negative for decreased urine volume and urgency.   Musculoskeletal: Negative.    Skin: Negative for rash.   Neurological: Negative for dizziness and headaches.   Hematological:  "Does not bruise/bleed easily.   Psychiatric/Behavioral: Negative for agitation, sleep disturbance and suicidal ideas.         Physical Exam  Vitals:    09/04/19 1331   BP: 120/80   BP Location: Left arm   Patient Position: Sitting   BP Method: Large (Manual)   Pulse: 85   Temp: 98.6 °F (37 °C)   TempSrc: Oral   SpO2: 97%   Weight: 79.7 kg (175 lb 11.3 oz)   Height: 5' 6" (1.676 m)    Body mass index is 28.36 kg/m².  Weight: 79.7 kg (175 lb 11.3 oz)   Height: 5' 6" (167.6 cm)   Physical Exam   Constitutional: She is oriented to person, place, and time. She appears well-developed and well-nourished.   HENT:   Head: Normocephalic and atraumatic.   Right Ear: External ear normal.   Left Ear: External ear normal.   Nose: Nose normal.   Mouth/Throat: Oropharynx is clear and moist.   Eyes: Pupils are equal, round, and reactive to light. Conjunctivae and EOM are normal.   Neck: Normal range of motion. No JVD present. No thyromegaly present.   Cardiovascular: Normal rate, regular rhythm and normal heart sounds.   Pulmonary/Chest: Effort normal and breath sounds normal. She has no wheezes.   Abdominal: Soft. Bowel sounds are normal. She exhibits no distension. There is no tenderness.   Musculoskeletal: Normal range of motion.   Lymphadenopathy:     She has no cervical adenopathy.   Neurological: She is alert and oriented to person, place, and time. She has normal reflexes.   Skin: Skin is warm and dry.   Psychiatric: She has a normal mood and affect. Her behavior is normal. Judgment and thought content normal.   Vitals reviewed.        Health Maintenance       Date Due Completion Date    Shingles Vaccine (1 of 2) 02/17/1995 ---    Pneumococcal Vaccine (65+ Low/Medium Risk) (2 of 2 - PCV13) 05/03/2011 5/3/2010    Lipid Panel 08/13/2019 8/13/2018    Influenza Vaccine (1) 09/01/2019 10/17/2018    DEXA SCAN 08/11/2019 8/11/2016    Mammogram 08/09/2021 8/9/2019    TETANUS VACCINE 04/27/2023 4/27/2013    Colonoscopy 09/14/2025 " 9/14/2015              Patient note was created using [employer].  Any errors in syntax or even information may not have been identified and edited on initial review prior to signing this note.

## 2019-09-09 ENCOUNTER — LAB VISIT (OUTPATIENT)
Dept: LAB | Facility: HOSPITAL | Age: 74
End: 2019-09-09
Attending: FAMILY MEDICINE
Payer: MEDICARE

## 2019-09-09 DIAGNOSIS — R73.03 PREDIABETES: ICD-10-CM

## 2019-09-09 DIAGNOSIS — E78.00 PURE HYPERCHOLESTEROLEMIA: ICD-10-CM

## 2019-09-09 DIAGNOSIS — Z00.00 ANNUAL PHYSICAL EXAM: ICD-10-CM

## 2019-09-09 DIAGNOSIS — I10 ESSENTIAL HYPERTENSION: ICD-10-CM

## 2019-09-09 LAB
ALBUMIN SERPL BCP-MCNC: 3.8 G/DL (ref 3.5–5.2)
ALP SERPL-CCNC: 70 U/L (ref 55–135)
ALT SERPL W/O P-5'-P-CCNC: 23 U/L (ref 10–44)
ANION GAP SERPL CALC-SCNC: 11 MMOL/L (ref 8–16)
AST SERPL-CCNC: 28 U/L (ref 10–40)
BASOPHILS # BLD AUTO: 0.04 K/UL (ref 0–0.2)
BASOPHILS NFR BLD: 0.7 % (ref 0–1.9)
BILIRUB SERPL-MCNC: 0.4 MG/DL (ref 0.1–1)
BUN SERPL-MCNC: 32 MG/DL (ref 8–23)
CALCIUM SERPL-MCNC: 9.6 MG/DL (ref 8.7–10.5)
CHLORIDE SERPL-SCNC: 106 MMOL/L (ref 95–110)
CHOLEST SERPL-MCNC: 313 MG/DL (ref 120–199)
CHOLEST/HDLC SERPL: 5.2 {RATIO} (ref 2–5)
CO2 SERPL-SCNC: 26 MMOL/L (ref 23–29)
CREAT SERPL-MCNC: 1.3 MG/DL (ref 0.5–1.4)
DIFFERENTIAL METHOD: ABNORMAL
EOSINOPHIL # BLD AUTO: 0.4 K/UL (ref 0–0.5)
EOSINOPHIL NFR BLD: 7 % (ref 0–8)
ERYTHROCYTE [DISTWIDTH] IN BLOOD BY AUTOMATED COUNT: 14.6 % (ref 11.5–14.5)
EST. GFR  (AFRICAN AMERICAN): 46.7 ML/MIN/1.73 M^2
EST. GFR  (NON AFRICAN AMERICAN): 40.5 ML/MIN/1.73 M^2
ESTIMATED AVG GLUCOSE: 131 MG/DL (ref 68–131)
GLUCOSE SERPL-MCNC: 96 MG/DL (ref 70–110)
HBA1C MFR BLD HPLC: 6.2 % (ref 4–5.6)
HCT VFR BLD AUTO: 36.9 % (ref 37–48.5)
HDLC SERPL-MCNC: 60 MG/DL (ref 40–75)
HDLC SERPL: 19.2 % (ref 20–50)
HGB BLD-MCNC: 11.4 G/DL (ref 12–16)
IMM GRANULOCYTES # BLD AUTO: 0.01 K/UL (ref 0–0.04)
IMM GRANULOCYTES NFR BLD AUTO: 0.2 % (ref 0–0.5)
LDLC SERPL CALC-MCNC: 229.8 MG/DL (ref 63–159)
LYMPHOCYTES # BLD AUTO: 1.8 K/UL (ref 1–4.8)
LYMPHOCYTES NFR BLD: 30.8 % (ref 18–48)
MCH RBC QN AUTO: 28.4 PG (ref 27–31)
MCHC RBC AUTO-ENTMCNC: 30.9 G/DL (ref 32–36)
MCV RBC AUTO: 92 FL (ref 82–98)
MONOCYTES # BLD AUTO: 0.6 K/UL (ref 0.3–1)
MONOCYTES NFR BLD: 9.9 % (ref 4–15)
NEUTROPHILS # BLD AUTO: 3 K/UL (ref 1.8–7.7)
NEUTROPHILS NFR BLD: 51.4 % (ref 38–73)
NONHDLC SERPL-MCNC: 253 MG/DL
NRBC BLD-RTO: 0 /100 WBC
PLATELET # BLD AUTO: 259 K/UL (ref 150–350)
PMV BLD AUTO: 11 FL (ref 9.2–12.9)
POTASSIUM SERPL-SCNC: 4.3 MMOL/L (ref 3.5–5.1)
PROT SERPL-MCNC: 7.3 G/DL (ref 6–8.4)
RBC # BLD AUTO: 4.02 M/UL (ref 4–5.4)
SODIUM SERPL-SCNC: 143 MMOL/L (ref 136–145)
TRIGL SERPL-MCNC: 116 MG/DL (ref 30–150)
TSH SERPL DL<=0.005 MIU/L-ACNC: 1.13 UIU/ML (ref 0.4–4)
WBC # BLD AUTO: 5.85 K/UL (ref 3.9–12.7)

## 2019-09-09 PROCEDURE — 80053 COMPREHEN METABOLIC PANEL: CPT | Mod: HCNC

## 2019-09-09 PROCEDURE — 84443 ASSAY THYROID STIM HORMONE: CPT | Mod: HCNC

## 2019-09-09 PROCEDURE — 80061 LIPID PANEL: CPT | Mod: HCNC

## 2019-09-09 PROCEDURE — 36415 COLL VENOUS BLD VENIPUNCTURE: CPT | Mod: HCNC,PO

## 2019-09-09 PROCEDURE — 85025 COMPLETE CBC W/AUTO DIFF WBC: CPT | Mod: HCNC

## 2019-09-09 PROCEDURE — 83036 HEMOGLOBIN GLYCOSYLATED A1C: CPT | Mod: HCNC

## 2019-09-13 ENCOUNTER — TELEPHONE (OUTPATIENT)
Dept: FAMILY MEDICINE | Facility: CLINIC | Age: 74
End: 2019-09-13

## 2019-09-13 ENCOUNTER — PATIENT MESSAGE (OUTPATIENT)
Dept: FAMILY MEDICINE | Facility: CLINIC | Age: 74
End: 2019-09-13

## 2019-09-13 DIAGNOSIS — E78.00 PURE HYPERCHOLESTEROLEMIA: Primary | ICD-10-CM

## 2019-09-13 RX ORDER — ATORVASTATIN CALCIUM 40 MG/1
40 TABLET, FILM COATED ORAL DAILY
Qty: 90 TABLET | Refills: 3 | Status: SHIPPED | OUTPATIENT
Start: 2019-09-13 | End: 2019-09-17 | Stop reason: SDUPTHER

## 2019-09-13 NOTE — PROGRESS NOTES
Please contact the patient.  Please notify her that she needs to restart her Lipitor.  Her cholesterol is dangerously high.  She is at high risk of having a heart attack as well as stroke.  She needs to begin her cholesterol medication again.  Please notify her that I have sent the medication into the pharmacy.  She needs to begin this medication immediately.

## 2019-09-13 NOTE — TELEPHONE ENCOUNTER
----- Message from Maryjo Gaffney MD sent at 9/13/2019  7:12 AM CDT -----  Please contact the patient.  Please notify her that she needs to restart her Lipitor.  Her cholesterol is dangerously high.  She is at high risk of having a heart attack as well as stroke.  She needs to begin her cholesterol medication again.  Please notify her that I have sent the medication into the pharmacy.  She needs to begin this medication immediately.

## 2019-09-16 ENCOUNTER — PATIENT MESSAGE (OUTPATIENT)
Dept: FAMILY MEDICINE | Facility: CLINIC | Age: 74
End: 2019-09-16

## 2019-09-16 DIAGNOSIS — E78.00 PURE HYPERCHOLESTEROLEMIA: ICD-10-CM

## 2019-09-17 RX ORDER — ATORVASTATIN CALCIUM 40 MG/1
40 TABLET, FILM COATED ORAL DAILY
Qty: 90 TABLET | Refills: 3 | Status: SHIPPED | OUTPATIENT
Start: 2019-09-17 | End: 2020-12-22

## 2019-09-23 ENCOUNTER — HOSPITAL ENCOUNTER (OUTPATIENT)
Dept: RADIOLOGY | Facility: CLINIC | Age: 74
Discharge: HOME | End: 2019-09-23
Attending: FAMILY MEDICINE
Payer: MEDICARE

## 2019-09-23 DIAGNOSIS — M94.9 DISORDER OF BONE AND CARTILAGE: ICD-10-CM

## 2019-09-23 DIAGNOSIS — M89.9 DISORDER OF BONE AND CARTILAGE: ICD-10-CM

## 2019-09-23 PROCEDURE — 77080 DXA BONE DENSITY AXIAL: CPT | Mod: 26,HCNC,, | Performed by: INTERNAL MEDICINE

## 2019-09-23 PROCEDURE — 77080 DEXA BONE DENSITY SPINE HIP: ICD-10-PCS | Mod: 26,HCNC,, | Performed by: INTERNAL MEDICINE

## 2019-09-23 PROCEDURE — 77080 DXA BONE DENSITY AXIAL: CPT | Mod: TC,HCNC,PO

## 2019-09-30 ENCOUNTER — TELEPHONE (OUTPATIENT)
Dept: UROGYNECOLOGY | Facility: CLINIC | Age: 74
End: 2019-09-30

## 2019-09-30 NOTE — TELEPHONE ENCOUNTER
Spoke to pt, appointment for PC and PVR scheduled 10/29/2019 at  location. Pt is aware and verbalizes understanding.

## 2019-09-30 NOTE — TELEPHONE ENCOUNTER
----- Message from Ximena Cabezas sent at 9/30/2019  4:05 PM CDT -----  Contact: IMTIAZ VACA [[id number]]  Name of Who is Calling: IMTIAZ VACA [[id number]]      What is the request in detail: Patient is calling to schedule an appointment for her Pessary follow up .      Can the clinic reply by [facility]:N      What Number to Call Back if not in [facility]: [phone]

## 2019-10-22 DIAGNOSIS — I10 ESSENTIAL HYPERTENSION: ICD-10-CM

## 2019-10-22 RX ORDER — TRIAMTERENE AND HYDROCHLOROTHIAZIDE 37.5; 25 MG/1; MG/1
CAPSULE ORAL
Qty: 90 CAPSULE | Refills: 1 | Status: SHIPPED | OUTPATIENT
Start: 2019-10-22 | End: 2019-12-17 | Stop reason: SDUPTHER

## 2019-10-26 ENCOUNTER — PATIENT OUTREACH (OUTPATIENT)
Dept: ADMINISTRATIVE | Facility: OTHER | Age: 74
End: 2019-10-26

## 2019-10-29 ENCOUNTER — OFFICE VISIT (OUTPATIENT)
Dept: UROGYNECOLOGY | Facility: CLINIC | Age: 74
End: 2019-10-29
Payer: MEDICARE

## 2019-10-29 VITALS
DIASTOLIC BLOOD PRESSURE: 80 MMHG | WEIGHT: 175.25 LBS | SYSTOLIC BLOOD PRESSURE: 140 MMHG | HEIGHT: 66 IN | BODY MASS INDEX: 28.16 KG/M2

## 2019-10-29 DIAGNOSIS — N95.2 VAGINAL ATROPHY: ICD-10-CM

## 2019-10-29 DIAGNOSIS — Z46.89 PESSARY MAINTENANCE: ICD-10-CM

## 2019-10-29 DIAGNOSIS — N81.6 RECTOCELE, FEMALE: ICD-10-CM

## 2019-10-29 DIAGNOSIS — R33.9 INCOMPLETE BLADDER EMPTYING: ICD-10-CM

## 2019-10-29 DIAGNOSIS — N81.4 UTEROVAGINAL PROLAPSE: ICD-10-CM

## 2019-10-29 DIAGNOSIS — N81.11 CYSTOCELE, MIDLINE: Primary | ICD-10-CM

## 2019-10-29 PROCEDURE — 99213 PR OFFICE/OUTPT VISIT, EST, LEVL III, 20-29 MIN: ICD-10-PCS | Mod: HCNC,S$GLB,, | Performed by: NURSE PRACTITIONER

## 2019-10-29 PROCEDURE — 3077F PR MOST RECENT SYSTOLIC BLOOD PRESSURE >= 140 MM HG: ICD-10-PCS | Mod: HCNC,CPTII,S$GLB, | Performed by: NURSE PRACTITIONER

## 2019-10-29 PROCEDURE — 3079F PR MOST RECENT DIASTOLIC BLOOD PRESSURE 80-89 MM HG: ICD-10-PCS | Mod: HCNC,CPTII,S$GLB, | Performed by: NURSE PRACTITIONER

## 2019-10-29 PROCEDURE — 99999 PR PBB SHADOW E&M-EST. PATIENT-LVL III: CPT | Mod: PBBFAC,HCNC,, | Performed by: NURSE PRACTITIONER

## 2019-10-29 PROCEDURE — 3077F SYST BP >= 140 MM HG: CPT | Mod: HCNC,CPTII,S$GLB, | Performed by: NURSE PRACTITIONER

## 2019-10-29 PROCEDURE — 1101F PR PT FALLS ASSESS DOC 0-1 FALLS W/OUT INJ PAST YR: ICD-10-PCS | Mod: HCNC,CPTII,S$GLB, | Performed by: NURSE PRACTITIONER

## 2019-10-29 PROCEDURE — 1101F PT FALLS ASSESS-DOCD LE1/YR: CPT | Mod: HCNC,CPTII,S$GLB, | Performed by: NURSE PRACTITIONER

## 2019-10-29 PROCEDURE — 99213 OFFICE O/P EST LOW 20 MIN: CPT | Mod: HCNC,S$GLB,, | Performed by: NURSE PRACTITIONER

## 2019-10-29 PROCEDURE — 99999 PR PBB SHADOW E&M-EST. PATIENT-LVL III: ICD-10-PCS | Mod: PBBFAC,HCNC,, | Performed by: NURSE PRACTITIONER

## 2019-10-29 PROCEDURE — 3079F DIAST BP 80-89 MM HG: CPT | Mod: HCNC,CPTII,S$GLB, | Performed by: NURSE PRACTITIONER

## 2019-12-06 ENCOUNTER — OFFICE VISIT (OUTPATIENT)
Dept: FAMILY MEDICINE | Facility: CLINIC | Age: 74
End: 2019-12-06
Payer: MEDICARE

## 2019-12-06 VITALS
HEIGHT: 66 IN | HEART RATE: 92 BPM | DIASTOLIC BLOOD PRESSURE: 76 MMHG | BODY MASS INDEX: 28.34 KG/M2 | TEMPERATURE: 99 F | WEIGHT: 176.38 LBS | OXYGEN SATURATION: 96 % | SYSTOLIC BLOOD PRESSURE: 138 MMHG

## 2019-12-06 DIAGNOSIS — H02.823 CYST OF RIGHT EYELID, UNSPECIFIED EYELID: ICD-10-CM

## 2019-12-06 DIAGNOSIS — L84 CORNS AND CALLUS: Primary | ICD-10-CM

## 2019-12-06 PROCEDURE — 3075F PR MOST RECENT SYSTOLIC BLOOD PRESS GE 130-139MM HG: ICD-10-PCS | Mod: HCNC,CPTII,S$GLB, | Performed by: NURSE PRACTITIONER

## 2019-12-06 PROCEDURE — 1159F PR MEDICATION LIST DOCUMENTED IN MEDICAL RECORD: ICD-10-PCS | Mod: HCNC,S$GLB,, | Performed by: NURSE PRACTITIONER

## 2019-12-06 PROCEDURE — 1101F PT FALLS ASSESS-DOCD LE1/YR: CPT | Mod: HCNC,CPTII,S$GLB, | Performed by: NURSE PRACTITIONER

## 2019-12-06 PROCEDURE — 99999 PR PBB SHADOW E&M-EST. PATIENT-LVL V: CPT | Mod: PBBFAC,HCNC,, | Performed by: NURSE PRACTITIONER

## 2019-12-06 PROCEDURE — 1125F PR PAIN SEVERITY QUANTIFIED, PAIN PRESENT: ICD-10-PCS | Mod: HCNC,S$GLB,, | Performed by: NURSE PRACTITIONER

## 2019-12-06 PROCEDURE — 3078F PR MOST RECENT DIASTOLIC BLOOD PRESSURE < 80 MM HG: ICD-10-PCS | Mod: HCNC,CPTII,S$GLB, | Performed by: NURSE PRACTITIONER

## 2019-12-06 PROCEDURE — 3075F SYST BP GE 130 - 139MM HG: CPT | Mod: HCNC,CPTII,S$GLB, | Performed by: NURSE PRACTITIONER

## 2019-12-06 PROCEDURE — 1159F MED LIST DOCD IN RCRD: CPT | Mod: HCNC,S$GLB,, | Performed by: NURSE PRACTITIONER

## 2019-12-06 PROCEDURE — 99214 PR OFFICE/OUTPT VISIT, EST, LEVL IV, 30-39 MIN: ICD-10-PCS | Mod: HCNC,S$GLB,, | Performed by: NURSE PRACTITIONER

## 2019-12-06 PROCEDURE — 99999 PR PBB SHADOW E&M-EST. PATIENT-LVL V: ICD-10-PCS | Mod: PBBFAC,HCNC,, | Performed by: NURSE PRACTITIONER

## 2019-12-06 PROCEDURE — 99214 OFFICE O/P EST MOD 30 MIN: CPT | Mod: HCNC,S$GLB,, | Performed by: NURSE PRACTITIONER

## 2019-12-06 PROCEDURE — 3078F DIAST BP <80 MM HG: CPT | Mod: HCNC,CPTII,S$GLB, | Performed by: NURSE PRACTITIONER

## 2019-12-06 PROCEDURE — 1101F PR PT FALLS ASSESS DOC 0-1 FALLS W/OUT INJ PAST YR: ICD-10-PCS | Mod: HCNC,CPTII,S$GLB, | Performed by: NURSE PRACTITIONER

## 2019-12-06 PROCEDURE — 1125F AMNT PAIN NOTED PAIN PRSNT: CPT | Mod: HCNC,S$GLB,, | Performed by: NURSE PRACTITIONER

## 2019-12-06 NOTE — PROGRESS NOTES
Subjective:       Patient ID: Nuha Bernstein is a 74 y.o. female.    Chief Complaint: Eye Pain (left eye pain ) and Foot Pain (patient states left leg and foot pain )    Eye Pain    The right eye is affected. This is a new problem. The current episode started more than 1 month ago. The problem has been gradually worsening (cyst getting bigger). There was no injury mechanism. The patient is experiencing no pain. Pertinent negatives include no blurred vision, eye discharge, double vision or tingling.   Toe Pain    There was no injury mechanism. The pain is present in the right toes (corn on the pinky toe). The pain has been constant since onset. Pertinent negatives include no numbness or tingling. She reports no foreign bodies present. The symptoms are aggravated by movement (and depending on the shoes).       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    Prolapse of uterus        Social History     Socioeconomic History    Marital status:      Spouse name: Not on file    Number of children: Not on file    Years of education: Not on file    Highest education level: Not on file   Occupational History    Not on file   Social Needs    Financial resource strain: Not on file    Food insecurity:     Worry: Not on file     Inability: Not on file    Transportation needs:     Medical: Not on file     Non-medical: Not on file   Tobacco Use    Smoking status: Never Smoker    Smokeless tobacco: Never Used   Substance and Sexual Activity    Alcohol use: No     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     Drug use: No    Sexual activity: Not Currently   Lifestyle    Physical activity:     Days per week: Not on file     Minutes per session: Not on file    Stress: Not on file   Relationships    Social connections:     Talks on phone: Not on file     Gets together: Not on file      "Attends Sabianist service: Not on file     Active member of club or organization: Not on file     Attends meetings of clubs or organizations: Not on file     Relationship status: Not on file   Other Topics Concern    Not on file   Social History Narrative    Not on file       Past Surgical History:   Procedure Laterality Date    Right Thumb      vaginal dilator         Review of Systems   Eyes: Positive for pain. Negative for blurred vision, double vision and discharge.   Neurological: Negative for tingling and numbness.       Objective:   /76 (BP Location: Left arm, Patient Position: Sitting, BP Method: Large (Manual))   Pulse 92   Temp 99.2 °F (37.3 °C) (Oral)   Ht 5' 6" (1.676 m)   Wt 80 kg (176 lb 5.9 oz)   SpO2 96%   BMI 28.47 kg/m²      Physical Exam   Constitutional: She appears well-developed and well-nourished.   HENT:   Head: Normocephalic and atraumatic.   Eyes:   +cyst noted to right eyelid   Feet:   Right Foot:   Skin Integrity: Positive for callus (corn noted to the outer aspect of the right 5th toe).                 Assessment:       1. Corns and callus    2. Cyst of right eyelid, unspecified eyelid        Plan:       Nuha was seen today for eye pain and foot pain.    Diagnoses and all orders for this visit:    Corns and callus  -     Ambulatory referral to Podiatry    Cyst of right eyelid, unspecified eyelid  -     Ambulatory referral to Optometry    She was seen by Optometry for cyst. She will follow up with them. Will refer to podiatry for corn and callus.  Follow up if symptoms worsen or fail to improve.    "

## 2019-12-09 DIAGNOSIS — M54.2 CHRONIC NECK PAIN: ICD-10-CM

## 2019-12-09 DIAGNOSIS — M54.50 CHRONIC LOW BACK PAIN: ICD-10-CM

## 2019-12-09 DIAGNOSIS — G89.29 CHRONIC LOW BACK PAIN: ICD-10-CM

## 2019-12-09 DIAGNOSIS — G89.29 CHRONIC NECK PAIN: ICD-10-CM

## 2019-12-09 RX ORDER — MELOXICAM 15 MG/1
TABLET ORAL
Qty: 90 TABLET | Refills: 0 | Status: SHIPPED | OUTPATIENT
Start: 2019-12-09 | End: 2020-06-23 | Stop reason: SDUPTHER

## 2019-12-09 RX ORDER — TRAMADOL HYDROCHLORIDE 50 MG/1
TABLET ORAL
Qty: 90 TABLET | Refills: 2 | OUTPATIENT
Start: 2019-12-09

## 2019-12-09 RX ORDER — TRAMADOL HYDROCHLORIDE 50 MG/1
50 TABLET ORAL 3 TIMES DAILY
Qty: 90 TABLET | Refills: 2 | OUTPATIENT
Start: 2019-12-09

## 2019-12-09 NOTE — TELEPHONE ENCOUNTER
----- Message from Petrona Chaudhry sent at 12/9/2019  3:44 PM CST -----  Contact: Self  Type: RX Refill Request    Who Called: self    Have you contacted your pharmacy:no     Refill or New Rx: refill     RX Name and Strength: traMADol (ULTRAM) 50 mg tablet      Preferred Pharmacy with phone number: [facility]/pharmacy #[id number] [address] [phone] (Phone)  [phone] (Fax)        Local or Mail Order: local    Ordering Provider: Gulshan    Would the patient rather a call back or a response via [facility]? Call   Best Call Back Number: [phone]

## 2019-12-10 ENCOUNTER — PATIENT MESSAGE (OUTPATIENT)
Dept: FAMILY MEDICINE | Facility: CLINIC | Age: 74
End: 2019-12-10

## 2019-12-10 ENCOUNTER — TELEPHONE (OUTPATIENT)
Dept: FAMILY MEDICINE | Facility: CLINIC | Age: 74
End: 2019-12-10

## 2019-12-10 RX ORDER — TRAMADOL HYDROCHLORIDE 50 MG/1
TABLET ORAL
Qty: 90 TABLET | Refills: 2 | OUTPATIENT
Start: 2019-12-10

## 2019-12-10 RX ORDER — TRAMADOL HYDROCHLORIDE 50 MG/1
50 TABLET ORAL 3 TIMES DAILY
Qty: 18 TABLET | Refills: 0 | Status: SHIPPED | OUTPATIENT
Start: 2019-12-10 | End: 2019-12-17 | Stop reason: SDUPTHER

## 2019-12-10 NOTE — TELEPHONE ENCOUNTER
She is taking the medication every day now.  She has to sign a pain contract.  She is taking a narcotic and this is required by the state and federal drug enforcement agencies.  Okay to schedule in an urgent spot.

## 2019-12-10 NOTE — TELEPHONE ENCOUNTER
Spoke with patient and she stated that she just saw you a couple of months a ago, I told her that she needed to see you every three months for her refill, she said she saw your NP, and would like a refill. Please advise. Your next available is 1/07/20.

## 2019-12-10 NOTE — TELEPHONE ENCOUNTER
----- Message from Leanne Marshall sent at 12/10/2019  1:19 PM CST -----  Contact: Self  Type: Patient Call Back    Who called:Nuha    What is the request in detail:Patient called to ask if tramadol Rx was ready for . Please call to advise    Can the clinic reply by [facility]?    Would the patient rather a call back or a response via My [facility]? Call    Best call back number:[phone]

## 2019-12-10 NOTE — TELEPHONE ENCOUNTER
Patient informed of message below.  Verbalized understanding,patient requesting medication until her appointment on the 17 of December please advise

## 2019-12-11 ENCOUNTER — PATIENT MESSAGE (OUTPATIENT)
Dept: OPTOMETRY | Facility: CLINIC | Age: 74
End: 2019-12-11

## 2019-12-12 ENCOUNTER — TELEPHONE (OUTPATIENT)
Dept: OPHTHALMOLOGY | Facility: CLINIC | Age: 74
End: 2019-12-12

## 2019-12-12 NOTE — TELEPHONE ENCOUNTER
----- Message from DARION Hyman sent at 12/12/2019  4:56 PM CST -----  Contact: Shayy Crowder afternoon, Dr. Montez will like for pt to see Dr. Bledsoe for Cyst removal of right eyelid.     Thank you   Shayy

## 2019-12-13 ENCOUNTER — PATIENT MESSAGE (OUTPATIENT)
Dept: FAMILY MEDICINE | Facility: CLINIC | Age: 74
End: 2019-12-13

## 2019-12-17 ENCOUNTER — OFFICE VISIT (OUTPATIENT)
Dept: FAMILY MEDICINE | Facility: CLINIC | Age: 74
End: 2019-12-17
Payer: MEDICARE

## 2019-12-17 VITALS
BODY MASS INDEX: 28.27 KG/M2 | HEIGHT: 66 IN | TEMPERATURE: 99 F | SYSTOLIC BLOOD PRESSURE: 122 MMHG | DIASTOLIC BLOOD PRESSURE: 76 MMHG | WEIGHT: 175.94 LBS | OXYGEN SATURATION: 95 % | HEART RATE: 86 BPM

## 2019-12-17 DIAGNOSIS — E78.49 OTHER HYPERLIPIDEMIA: ICD-10-CM

## 2019-12-17 DIAGNOSIS — G89.29 CHRONIC NECK PAIN: ICD-10-CM

## 2019-12-17 DIAGNOSIS — I10 ESSENTIAL HYPERTENSION: ICD-10-CM

## 2019-12-17 DIAGNOSIS — G89.29 CHRONIC MIDLINE LOW BACK PAIN WITHOUT SCIATICA: Primary | ICD-10-CM

## 2019-12-17 DIAGNOSIS — M54.50 CHRONIC MIDLINE LOW BACK PAIN WITHOUT SCIATICA: Primary | ICD-10-CM

## 2019-12-17 DIAGNOSIS — M54.2 CHRONIC NECK PAIN: ICD-10-CM

## 2019-12-17 PROCEDURE — 99215 OFFICE O/P EST HI 40 MIN: CPT | Mod: HCNC,S$GLB,, | Performed by: FAMILY MEDICINE

## 2019-12-17 PROCEDURE — 99999 PR PBB SHADOW E&M-EST. PATIENT-LVL III: ICD-10-PCS | Mod: PBBFAC,HCNC,, | Performed by: FAMILY MEDICINE

## 2019-12-17 PROCEDURE — 99215 PR OFFICE/OUTPT VISIT, EST, LEVL V, 40-54 MIN: ICD-10-PCS | Mod: HCNC,S$GLB,, | Performed by: FAMILY MEDICINE

## 2019-12-17 PROCEDURE — 99999 PR PBB SHADOW E&M-EST. PATIENT-LVL III: CPT | Mod: PBBFAC,HCNC,, | Performed by: FAMILY MEDICINE

## 2019-12-17 RX ORDER — TRIAMTERENE AND HYDROCHLOROTHIAZIDE 37.5; 25 MG/1; MG/1
1 CAPSULE ORAL DAILY
Qty: 90 CAPSULE | Refills: 2 | Status: SHIPPED | OUTPATIENT
Start: 2019-12-17 | End: 2020-03-17 | Stop reason: SDUPTHER

## 2019-12-17 RX ORDER — LISINOPRIL 40 MG/1
40 TABLET ORAL DAILY
Qty: 90 TABLET | Refills: 3 | Status: SHIPPED | OUTPATIENT
Start: 2019-12-17 | End: 2020-12-21

## 2019-12-17 RX ORDER — TRAMADOL HYDROCHLORIDE 50 MG/1
50 TABLET ORAL 3 TIMES DAILY
Qty: 90 TABLET | Refills: 0 | Status: SHIPPED | OUTPATIENT
Start: 2020-02-15 | End: 2020-03-17 | Stop reason: SDUPTHER

## 2019-12-17 RX ORDER — TRAMADOL HYDROCHLORIDE 50 MG/1
50 TABLET ORAL 3 TIMES DAILY
Qty: 90 TABLET | Refills: 0 | Status: SHIPPED | OUTPATIENT
Start: 2020-01-16 | End: 2020-02-15

## 2019-12-17 RX ORDER — TRAMADOL HYDROCHLORIDE 50 MG/1
50 TABLET ORAL 3 TIMES DAILY
Qty: 90 TABLET | Refills: 0 | Status: SHIPPED | OUTPATIENT
Start: 2019-12-19 | End: 2020-01-18

## 2019-12-17 NOTE — PROGRESS NOTES
Assessment & Plan  Problem List Items Addressed This Visit        Cardiac/Vascular    Hyperlipidemia    Relevant Orders    Lipid panel    Essential hypertension    Relevant Medications    lisinopril (PRINIVIL,ZESTRIL) 40 MG tablet    triamterene-hydrochlorothiazide 37.5-25 mg (DYAZIDE) 37.5-25 mg per capsule       Orthopedic    Chronic neck pain    Relevant Medications    traMADol (ULTRAM) 50 mg tablet (Start on 12/19/2019)    traMADol (ULTRAM) 50 mg tablet (Start on 1/16/2020)    traMADol (ULTRAM) 50 mg tablet (Start on 2/15/2020)    Chronic low back pain - Primary    Relevant Medications    traMADol (ULTRAM) 50 mg tablet (Start on 12/19/2019)    traMADol (ULTRAM) 50 mg tablet (Start on 1/16/2020)    traMADol (ULTRAM) 50 mg tablet (Start on 2/15/2020)        Greater than 45 minutes was spent with this patient with greater than 50% spent with face-to-face counseling on above listed conditions.      Health Maintenance reviewed    Follow-up: Follow up in about 3 months (around 3/17/2020).    ______________________________________________________________________    Chief Complaint  Chief Complaint   Patient presents with    Medication Refill       HPI  Nuha Bernstein is a 74 y.o. female with multiple medical diagnoses as listed in the medical history and problem list that presents for medication.  Pt is known to me with last appointment 9/4/2019.    Patient reports that she has been taking her pain medication on a daily basis.  Patient reports that this pain does help with her increasing pain.  Patient does have a history of chronic back and neck pain. I notified the patient today in the office that she needs to be seen every 3 months in order to continue with this medication.  She was in agreement with this.  She was notified that she needs to fill this medication at the same Pharmacy.    Hyperlipidemia:  Patient has been taking her cholesterol medication.  We discussed rechecking this blood work within the next  few days.  Patient is in agreement.  She has been monitoring her diet.      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    Prolapse of uterus        PAST SURGICAL HISTORY:  Past Surgical History:   Procedure Laterality Date    Right Thumb      vaginal dilator         SOCIAL HISTORY:  Social History     Socioeconomic History    Marital status:      Spouse name: Not on file    Number of children: Not on file    Years of education: Not on file    Highest education level: Not on file   Occupational History    Not on file   Social Needs    Financial resource strain: Not on file    Food insecurity:     Worry: Not on file     Inability: Not on file    Transportation needs:     Medical: Not on file     Non-medical: Not on file   Tobacco Use    Smoking status: Never Smoker    Smokeless tobacco: Never Used   Substance and Sexual Activity    Alcohol use: No     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     Drug use: No    Sexual activity: Not Currently   Lifestyle    Physical activity:     Days per week: Not on file     Minutes per session: Not on file    Stress: Not on file   Relationships    Social connections:     Talks on phone: Not on file     Gets together: Not on file     Attends Baptism service: Not on file     Active member of club or organization: Not on file     Attends meetings of clubs or organizations: Not on file     Relationship status: Not on file   Other Topics Concern    Not on file   Social History Narrative    Not on file       FAMILY HISTORY:  Family History   Problem Relation Age of Onset    Hypertension Mother     Cataracts Mother     Heart disease Mother     Hypertension Father     Cataracts Father     No Known Problems Sister     No Known Problems Brother     No Known Problems Brother     No Known Problems Maternal  Aunt     No Known Problems Maternal Uncle     No Known Problems Paternal Aunt     No Known Problems Paternal Uncle     No Known Problems Maternal Grandmother     No Known Problems Maternal Grandfather     No Known Problems Paternal Grandmother     No Known Problems Paternal Grandfather     Amblyopia Neg Hx     Blindness Neg Hx     Cancer Neg Hx     Diabetes Neg Hx     Glaucoma Neg Hx     Macular degeneration Neg Hx     Retinal detachment Neg Hx     Strabismus Neg Hx     Stroke Neg Hx     Thyroid disease Neg Hx        ALLERGIES AND MEDICATIONS: updated and reviewed.  Review of patient's allergies indicates:  No Known Allergies  Current Outpatient Medications   Medication Sig Dispense Refill    aspirin 81 mg Tab Take by mouth as needed. 2-3 times weekly      atorvastatin (LIPITOR) 40 MG tablet Take 1 tablet (40 mg total) by mouth once daily. 90 tablet 3    meloxicam (MOBIC) 15 MG tablet TAKE 1 TABLET BY MOUTH EVERY DAY 90 tablet 0    OMEGA 3,6,9 COMBINATION NO.7 (OMEGA DHA ORAL) Take 830 mg by mouth once daily.      POLICOSANOL ORAL Take 1 tablet by mouth once daily.      triamterene-hydrochlorothiazide 37.5-25 mg (DYAZIDE) 37.5-25 mg per capsule Take 1 capsule by mouth once daily. 90 capsule 2    lisinopril (PRINIVIL,ZESTRIL) 40 MG tablet Take 1 tablet (40 mg total) by mouth once daily. 90 tablet 3    [START ON 12/19/2019] traMADol (ULTRAM) 50 mg tablet Take 1 tablet (50 mg total) by mouth 3 (three) times daily. 90 tablet 0    [START ON 1/16/2020] traMADol (ULTRAM) 50 mg tablet Take 1 tablet (50 mg total) by mouth 3 (three) times daily. 90 tablet 0    [START ON 2/15/2020] traMADol (ULTRAM) 50 mg tablet Take 1 tablet (50 mg total) by mouth 3 (three) times daily. 90 tablet 0     No current facility-administered medications for this visit.          ROS  Review of Systems   Constitutional: Negative for activity change, appetite change, fatigue, fever and unexpected weight change.   HENT:  "Negative.  Negative for ear discharge, ear pain, rhinorrhea and sore throat.    Eyes: Negative.    Respiratory: Negative for apnea, cough, chest tightness, shortness of breath and wheezing.    Cardiovascular: Negative for chest pain, palpitations and leg swelling.   Gastrointestinal: Negative for abdominal distention, abdominal pain, constipation, diarrhea and vomiting.   Endocrine: Negative for cold intolerance, heat intolerance, polydipsia and polyuria.   Genitourinary: Negative for decreased urine volume and urgency.   Musculoskeletal: Negative.    Skin: Negative for rash.   Neurological: Negative for dizziness and headaches.   Hematological: Does not bruise/bleed easily.   Psychiatric/Behavioral: Negative for agitation, sleep disturbance and suicidal ideas.           Physical Exam  Vitals:    12/17/19 1525 12/17/19 1641   BP: (!) 140/80 122/76   BP Location: Left arm    Patient Position: Sitting    BP Method: Large (Manual)    Pulse: 86    Temp: 98.7 °F (37.1 °C)    TempSrc: Oral    SpO2: 95%    Weight: 79.8 kg (175 lb 14.8 oz)    Height: 5' 6" (1.676 m)     Body mass index is 28.4 kg/m².  Weight: 79.8 kg (175 lb 14.8 oz)   Height: 5' 6" (167.6 cm)   Physical Exam   Constitutional: She is oriented to person, place, and time. She appears well-developed and well-nourished.   HENT:   Head: Normocephalic and atraumatic.   Right Ear: External ear normal.   Left Ear: External ear normal.   Nose: Nose normal.   Mouth/Throat: Oropharynx is clear and moist.   Eyes: Pupils are equal, round, and reactive to light. Conjunctivae and EOM are normal.   Cardiovascular: Normal rate, regular rhythm and normal heart sounds.   Pulmonary/Chest: Effort normal and breath sounds normal.   Neurological: She is alert and oriented to person, place, and time.   Skin: Skin is warm and dry.   Vitals reviewed.      Health Maintenance       Date Due Completion Date    Shingles Vaccine (1 of 2) 02/17/1995 ---    Pneumococcal Vaccine (65+ " Low/Medium Risk) (2 of 2 - PCV13) 05/03/2011 5/3/2010    Lipid Panel 09/09/2020 9/9/2019    Mammogram 08/09/2021 8/9/2019    DEXA SCAN 09/23/2022 9/23/2019    TETANUS VACCINE 04/27/2023 4/27/2013    Colonoscopy 09/14/2025 9/14/2015              Patient note was created using [employer].  Any errors in syntax or even information may not have been identified and edited on initial review prior to signing this note.

## 2019-12-23 ENCOUNTER — LAB VISIT (OUTPATIENT)
Dept: LAB | Facility: HOSPITAL | Age: 74
End: 2019-12-23
Attending: FAMILY MEDICINE
Payer: MEDICARE

## 2019-12-23 ENCOUNTER — PATIENT OUTREACH (OUTPATIENT)
Dept: ADMINISTRATIVE | Facility: OTHER | Age: 74
End: 2019-12-23

## 2019-12-23 DIAGNOSIS — E78.49 OTHER HYPERLIPIDEMIA: ICD-10-CM

## 2019-12-23 LAB
CHOLEST SERPL-MCNC: 159 MG/DL (ref 120–199)
CHOLEST/HDLC SERPL: 2.9 {RATIO} (ref 2–5)
HDLC SERPL-MCNC: 54 MG/DL (ref 40–75)
HDLC SERPL: 34 % (ref 20–50)
LDLC SERPL CALC-MCNC: 89.4 MG/DL (ref 63–159)
NONHDLC SERPL-MCNC: 105 MG/DL
TRIGL SERPL-MCNC: 78 MG/DL (ref 30–150)

## 2019-12-23 PROCEDURE — 80061 LIPID PANEL: CPT | Mod: HCNC

## 2019-12-23 PROCEDURE — 36415 COLL VENOUS BLD VENIPUNCTURE: CPT | Mod: HCNC,PO

## 2019-12-24 ENCOUNTER — OFFICE VISIT (OUTPATIENT)
Dept: PODIATRY | Facility: CLINIC | Age: 74
End: 2019-12-24
Payer: MEDICARE

## 2019-12-24 VITALS
BODY MASS INDEX: 28.27 KG/M2 | SYSTOLIC BLOOD PRESSURE: 158 MMHG | WEIGHT: 175.94 LBS | HEIGHT: 66 IN | HEART RATE: 72 BPM | DIASTOLIC BLOOD PRESSURE: 81 MMHG

## 2019-12-24 DIAGNOSIS — M79.671 RIGHT FOOT PAIN: Primary | ICD-10-CM

## 2019-12-24 DIAGNOSIS — L84 CORN OR CALLUS: ICD-10-CM

## 2019-12-24 PROCEDURE — 1159F PR MEDICATION LIST DOCUMENTED IN MEDICAL RECORD: ICD-10-PCS | Mod: HCNC,S$GLB,, | Performed by: PODIATRIST

## 2019-12-24 PROCEDURE — 99203 OFFICE O/P NEW LOW 30 MIN: CPT | Mod: HCNC,S$GLB,, | Performed by: PODIATRIST

## 2019-12-24 PROCEDURE — 3079F DIAST BP 80-89 MM HG: CPT | Mod: HCNC,CPTII,S$GLB, | Performed by: PODIATRIST

## 2019-12-24 PROCEDURE — 99999 PR PBB SHADOW E&M-EST. PATIENT-LVL III: ICD-10-PCS | Mod: PBBFAC,HCNC,, | Performed by: PODIATRIST

## 2019-12-24 PROCEDURE — 3079F PR MOST RECENT DIASTOLIC BLOOD PRESSURE 80-89 MM HG: ICD-10-PCS | Mod: HCNC,CPTII,S$GLB, | Performed by: PODIATRIST

## 2019-12-24 PROCEDURE — 3077F SYST BP >= 140 MM HG: CPT | Mod: HCNC,CPTII,S$GLB, | Performed by: PODIATRIST

## 2019-12-24 PROCEDURE — 1125F AMNT PAIN NOTED PAIN PRSNT: CPT | Mod: HCNC,S$GLB,, | Performed by: PODIATRIST

## 2019-12-24 PROCEDURE — 1101F PR PT FALLS ASSESS DOC 0-1 FALLS W/OUT INJ PAST YR: ICD-10-PCS | Mod: HCNC,CPTII,S$GLB, | Performed by: PODIATRIST

## 2019-12-24 PROCEDURE — 1159F MED LIST DOCD IN RCRD: CPT | Mod: HCNC,S$GLB,, | Performed by: PODIATRIST

## 2019-12-24 PROCEDURE — 1101F PT FALLS ASSESS-DOCD LE1/YR: CPT | Mod: HCNC,CPTII,S$GLB, | Performed by: PODIATRIST

## 2019-12-24 PROCEDURE — 99999 PR PBB SHADOW E&M-EST. PATIENT-LVL III: CPT | Mod: PBBFAC,HCNC,, | Performed by: PODIATRIST

## 2019-12-24 PROCEDURE — 3077F PR MOST RECENT SYSTOLIC BLOOD PRESSURE >= 140 MM HG: ICD-10-PCS | Mod: HCNC,CPTII,S$GLB, | Performed by: PODIATRIST

## 2019-12-24 PROCEDURE — 1125F PR PAIN SEVERITY QUANTIFIED, PAIN PRESENT: ICD-10-PCS | Mod: HCNC,S$GLB,, | Performed by: PODIATRIST

## 2019-12-24 PROCEDURE — 99203 PR OFFICE/OUTPT VISIT, NEW, LEVL III, 30-44 MIN: ICD-10-PCS | Mod: HCNC,S$GLB,, | Performed by: PODIATRIST

## 2019-12-24 NOTE — LETTER
December 29, 2019      Kike Vega, FNP-C  [address]  [place] [address]           [facility]  [address]  [place] [address]  Phone: [phone]          Patient: Nuha Bernstein   MR Number: [id number]   YOB: 1945   Date of Visit: 12/24/2019       Dear Kike Vega:    Thank you for referring Nuha Bernstein to me for evaluation. Attached you will find relevant portions of my assessment and plan of care.    If you have questions, please do not hesitate to call me. I look forward to following Nuha Bernstein along with you.    Sincerely,    Vangie Mckeon, DPJOSELIN    Enclosure  CC:  No Recipients    If you would like to receive this communication electronically, please contact [email or web address] or [phone] to request more information on [employer] access.    For providers and/or their staff who would like to refer a patient to [facility], please contact us through our one-stop-shop provider referral line, [facility] , at [phone].    If you feel you have received this communication in error or would no longer like to receive these types of communications, please e-mail [email or web address]

## 2019-12-30 NOTE — PROGRESS NOTES
Subjective:      Patient ID: Nuha Bernstein is a 74 y.o. female.    Chief Complaint: Foot Pain (ov 12/17/19 [facility] pcp- pain to right pinky toe when wearing shoes) and Callouses (right pinky toe)    Nuha is a 74 y.o. female who presents to the podiatry clinic  with complaint of  right foot pain. Onset of the symptoms was several weeks ago. Precipitating event: none known. Current symptoms include: ability to bear weight, but with some pain. Aggravating factors: any weight bearing. Symptoms have progressed to a point and plateaued. Patient has had no prior foot problems. Evaluation to date: none. Treatment to date: none. Patients rates pain 4/10 on pain scale. Reports painful callus right 5th toe.         Review of Systems   Constitution: Negative for chills, diaphoresis and fever.   Cardiovascular: Negative for claudication, cyanosis, leg swelling and syncope.   Respiratory: Negative for cough and shortness of breath.    Skin: Positive for color change, nail changes and suspicious lesions.   Musculoskeletal: Negative for falls, joint pain, muscle cramps and muscle weakness.   Gastrointestinal: Negative for diarrhea, nausea and vomiting.   Neurological: Negative for disturbances in coordination, numbness, paresthesias, sensory change, tremors and weakness.   Psychiatric/Behavioral: Negative for altered mental status.           Objective:      Physical Exam   Constitutional: She appears well-developed. She is cooperative.   Oriented to time, place, and person.   Cardiovascular:   DP and PT pulses are palpable bilaterally. 3 sec capillary refill time and toes and feet are warm to touch proximally .  There is  hair growth on the feet and toes b/l. There is no edema b/l. No spider veins or varicosities present b/l.      Musculoskeletal:   Equinus noted b/l ankles with < 10 deg DF noted. MMT 5/5 in DF/PF/Inv/Ev resistance with no reproduction of pain in any direction. Passive range of motion of ankle and pedal joints  is painless b/l.     Feet:   Right Foot:   Skin Integrity: Negative for callus or dry skin.   Left Foot:   Skin Integrity: Negative for callus or dry skin.   Lymphadenopathy:   Negative lymphadenopathy bilateral popliteal fossa and tarsal tunnel.   Neurological: She is alert.   Light touch, proprioception, and sharp/dull sensation are all intact bilaterally. Protective threshold with the [place]-Wienstein monofilament is intact bilaterally.    Skin:   No open lesions, lacerations or wounds noted.Interdigital spaces clean, dry and intact b/l. No erythema noted to b/l foot.    Focal hyperkeratotic lesion consisting entirely of hyperkeratotic tissue without open skin, drainage, pus, fluctuance, malodor, or signs of infection: right 5th toe.        Psychiatric: She has a normal mood and affect.             Assessment:       Encounter Diagnoses   Name Primary?    Right foot pain Yes    Corn or callus          Plan:       Nuha was seen today for foot pain and callouses.    Diagnoses and all orders for this visit:    Right foot pain  -     CV Ankle Brachial Indices WO Stress W Toe Tracings; Future    Corn or callus      I counseled the patient on her conditions, their implications and medical management.    INÉS ordered    Discussed conservative treatment with shoes of adequate dimensions, material, and style to alleviate symptoms and delay or prevent surgical intervention.    With the patient's verbal consent a sterile #15 scalpel was used to trim the hyperkeratotic lesion described above. She tolerated the procedure well without complication.     RTC PRN

## 2020-03-03 ENCOUNTER — PATIENT OUTREACH (OUTPATIENT)
Dept: ADMINISTRATIVE | Facility: HOSPITAL | Age: 75
End: 2020-03-03

## 2020-03-16 ENCOUNTER — TELEPHONE (OUTPATIENT)
Dept: FAMILY MEDICINE | Facility: CLINIC | Age: 75
End: 2020-03-16

## 2020-03-16 NOTE — TELEPHONE ENCOUNTER
----- Message from Nelida Shannon sent at 3/16/2020 10:12 AM CDT -----  Contact: IMTIAZ VACA [[id number]]   Name of Who is Calling:     What is the request in detail:  Patient request call back in reference to want to know if can refill medication traMADol (ULTRAM) 50 mg tablet without her coming in  And reschedule appointment    Please contact to further discuss and advise      Can the clinic reply by [facility]: no     What Number to [facility] Back [facility]:  [phone]///[phone]

## 2020-03-17 ENCOUNTER — OFFICE VISIT (OUTPATIENT)
Dept: FAMILY MEDICINE | Facility: CLINIC | Age: 75
End: 2020-03-17
Payer: MEDICARE

## 2020-03-17 VITALS
SYSTOLIC BLOOD PRESSURE: 120 MMHG | OXYGEN SATURATION: 96 % | HEART RATE: 85 BPM | TEMPERATURE: 98 F | DIASTOLIC BLOOD PRESSURE: 80 MMHG | HEIGHT: 66 IN | WEIGHT: 175.06 LBS | BODY MASS INDEX: 28.14 KG/M2

## 2020-03-17 DIAGNOSIS — M54.2 CHRONIC NECK PAIN: ICD-10-CM

## 2020-03-17 DIAGNOSIS — G89.29 CHRONIC NECK PAIN: ICD-10-CM

## 2020-03-17 DIAGNOSIS — I10 ESSENTIAL HYPERTENSION: Primary | ICD-10-CM

## 2020-03-17 DIAGNOSIS — J84.10 CALCIFIED GRANULOMA OF LUNG: ICD-10-CM

## 2020-03-17 DIAGNOSIS — G89.29 CHRONIC MIDLINE LOW BACK PAIN WITHOUT SCIATICA: ICD-10-CM

## 2020-03-17 DIAGNOSIS — M47.812 SPONDYLOSIS OF CERVICAL REGION WITHOUT MYELOPATHY OR RADICULOPATHY: ICD-10-CM

## 2020-03-17 DIAGNOSIS — M54.50 CHRONIC MIDLINE LOW BACK PAIN WITHOUT SCIATICA: ICD-10-CM

## 2020-03-17 DIAGNOSIS — R73.03 PREDIABETES: ICD-10-CM

## 2020-03-17 PROBLEM — M46.92 INFLAMMATORY SPONDYLOPATHY OF CERVICAL REGION: Status: RESOLVED | Noted: 2019-06-18 | Resolved: 2020-03-17

## 2020-03-17 PROCEDURE — 99499 UNLISTED E&M SERVICE: CPT | Mod: HCNC,S$GLB,, | Performed by: FAMILY MEDICINE

## 2020-03-17 PROCEDURE — 3074F SYST BP LT 130 MM HG: CPT | Mod: HCNC,CPTII,S$GLB, | Performed by: FAMILY MEDICINE

## 2020-03-17 PROCEDURE — 99999 PR PBB SHADOW E&M-EST. PATIENT-LVL III: CPT | Mod: PBBFAC,HCNC,, | Performed by: FAMILY MEDICINE

## 2020-03-17 PROCEDURE — 1159F MED LIST DOCD IN RCRD: CPT | Mod: HCNC,S$GLB,, | Performed by: FAMILY MEDICINE

## 2020-03-17 PROCEDURE — 99499 RISK ADDL DX/OHS AUDIT: ICD-10-PCS | Mod: HCNC,S$GLB,, | Performed by: FAMILY MEDICINE

## 2020-03-17 PROCEDURE — 1101F PR PT FALLS ASSESS DOC 0-1 FALLS W/OUT INJ PAST YR: ICD-10-PCS | Mod: HCNC,CPTII,S$GLB, | Performed by: FAMILY MEDICINE

## 2020-03-17 PROCEDURE — 99214 OFFICE O/P EST MOD 30 MIN: CPT | Mod: HCNC,S$GLB,, | Performed by: FAMILY MEDICINE

## 2020-03-17 PROCEDURE — 1101F PT FALLS ASSESS-DOCD LE1/YR: CPT | Mod: HCNC,CPTII,S$GLB, | Performed by: FAMILY MEDICINE

## 2020-03-17 PROCEDURE — 3074F PR MOST RECENT SYSTOLIC BLOOD PRESSURE < 130 MM HG: ICD-10-PCS | Mod: HCNC,CPTII,S$GLB, | Performed by: FAMILY MEDICINE

## 2020-03-17 PROCEDURE — 3079F DIAST BP 80-89 MM HG: CPT | Mod: HCNC,CPTII,S$GLB, | Performed by: FAMILY MEDICINE

## 2020-03-17 PROCEDURE — 99214 PR OFFICE/OUTPT VISIT, EST, LEVL IV, 30-39 MIN: ICD-10-PCS | Mod: HCNC,S$GLB,, | Performed by: FAMILY MEDICINE

## 2020-03-17 PROCEDURE — 1126F AMNT PAIN NOTED NONE PRSNT: CPT | Mod: HCNC,S$GLB,, | Performed by: FAMILY MEDICINE

## 2020-03-17 PROCEDURE — 3079F PR MOST RECENT DIASTOLIC BLOOD PRESSURE 80-89 MM HG: ICD-10-PCS | Mod: HCNC,CPTII,S$GLB, | Performed by: FAMILY MEDICINE

## 2020-03-17 PROCEDURE — 1126F PR PAIN SEVERITY QUANTIFIED, NO PAIN PRESENT: ICD-10-PCS | Mod: HCNC,S$GLB,, | Performed by: FAMILY MEDICINE

## 2020-03-17 PROCEDURE — 1159F PR MEDICATION LIST DOCUMENTED IN MEDICAL RECORD: ICD-10-PCS | Mod: HCNC,S$GLB,, | Performed by: FAMILY MEDICINE

## 2020-03-17 PROCEDURE — 99999 PR PBB SHADOW E&M-EST. PATIENT-LVL III: ICD-10-PCS | Mod: PBBFAC,HCNC,, | Performed by: FAMILY MEDICINE

## 2020-03-17 RX ORDER — TRAMADOL HYDROCHLORIDE 50 MG/1
50 TABLET ORAL 3 TIMES DAILY
Qty: 90 TABLET | Refills: 0 | Status: SHIPPED | OUTPATIENT
Start: 2020-03-17 | End: 2020-04-16

## 2020-03-17 RX ORDER — TRAMADOL HYDROCHLORIDE 50 MG/1
50 TABLET ORAL 3 TIMES DAILY
Qty: 90 TABLET | Refills: 0 | Status: SHIPPED | OUTPATIENT
Start: 2020-05-16 | End: 2020-06-23 | Stop reason: SDUPTHER

## 2020-03-17 RX ORDER — TRIAMTERENE AND HYDROCHLOROTHIAZIDE 37.5; 25 MG/1; MG/1
1 CAPSULE ORAL DAILY
Qty: 90 CAPSULE | Refills: 2 | Status: SHIPPED | OUTPATIENT
Start: 2020-03-17 | End: 2020-11-16

## 2020-03-17 RX ORDER — TRAMADOL HYDROCHLORIDE 50 MG/1
50 TABLET ORAL 3 TIMES DAILY
Qty: 90 TABLET | Refills: 0 | Status: SHIPPED | OUTPATIENT
Start: 2020-04-16 | End: 2020-05-16

## 2020-03-17 NOTE — PROGRESS NOTES
"  Assessment & Plan  Problem List Items Addressed This Visit        Neuro    DJD (degenerative joint disease) of cervical spine       Pulmonary    Calcified granuloma of lung    Overview     "Heart size is normal.  Few calcified granulomas noted.  The right hemidiaphragm is elevated and subsegmental atelectatic changes seen at the right lung base.  Otherwise the lungs are clear" - Xray Chest 2-            Cardiac/Vascular    Essential hypertension - Primary    Relevant Medications    triamterene-hydrochlorothiazide 37.5-25 mg (DYAZIDE) 37.5-25 mg per capsule       Endocrine    Prediabetes    Current Assessment & Plan     The patient is asked to make an attempt to improve diet and exercise patterns to aid in medical management of this problem.              Orthopedic    Chronic neck pain    Relevant Medications    traMADoL (ULTRAM) 50 mg tablet    traMADoL (ULTRAM) 50 mg tablet (Start on 4/16/2020)    traMADoL (ULTRAM) 50 mg tablet (Start on 5/16/2020)    Chronic low back pain    Relevant Medications    traMADoL (ULTRAM) 50 mg tablet    traMADoL (ULTRAM) 50 mg tablet (Start on 4/16/2020)    traMADoL (ULTRAM) 50 mg tablet (Start on 5/16/2020)      Pt is currently stable on medication regimen.  Continue current therapy as scheduled.  Contact office with any questions about adjustments on medications.   No recent changes in her pain medication.      Health Maintenance reviewed.    Follow-up: Follow up in about 3 months (around 6/17/2020) for chronic pain management.    ______________________________________________________________________    Chief Complaint  Chief Complaint   Patient presents with    Medication Refill       HPI  Nuha Bernstein is a 75 y.o. female with multiple medical diagnoses as listed in the medical history and problem list that presents for medication refill.  Pt is known to me with last appointment 12/17/2019.    Patient denies any new symptoms including chest pain, SOB, blurry vision, " N/V, diarrhea.    Patient is doing well.  She presents in the office today in order to get refills on her pain medication.  She continues to have back pain.  This does interfere with her ambulation.  She is currently caring for her .  She reports that her 's dementia is worsening.  Patient reports that she is coping at this time.  She does have a good support system with her daughter who is currently assisting her.    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    Prolapse of uterus        PAST SURGICAL HISTORY:  Past Surgical History:   Procedure Laterality Date    Right Thumb      vaginal dilator         SOCIAL HISTORY:  Social History     Socioeconomic History    Marital status:      Spouse name: Not on file    Number of children: Not on file    Years of education: Not on file    Highest education level: Not on file   Occupational History    Not on file   Social Needs    Financial resource strain: Not on file    Food insecurity:     Worry: Not on file     Inability: Not on file    Transportation needs:     Medical: Not on file     Non-medical: Not on file   Tobacco Use    Smoking status: Never Smoker    Smokeless tobacco: Never Used   Substance and Sexual Activity    Alcohol use: No     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     Drug use: No    Sexual activity: Not Currently   Lifestyle    Physical activity:     Days per week: Not on file     Minutes per session: Not on file    Stress: Not on file   Relationships    Social connections:     Talks on phone: Not on file     Gets together: Not on file     Attends Roman Catholic service: Not on file     Active member of club or organization: Not on file     Attends meetings of clubs or organizations: Not on file     Relationship status: Not on file   Other Topics Concern    Not on  file   Social History Narrative    Not on file       FAMILY HISTORY:  Family History   Problem Relation Age of Onset    Hypertension Mother     Cataracts Mother     Heart disease Mother     Hypertension Father     Cataracts Father     No Known Problems Sister     No Known Problems Brother     No Known Problems Brother     No Known Problems Maternal Aunt     No Known Problems Maternal Uncle     No Known Problems Paternal Aunt     No Known Problems Paternal Uncle     No Known Problems Maternal Grandmother     No Known Problems Maternal Grandfather     No Known Problems Paternal Grandmother     No Known Problems Paternal Grandfather     Amblyopia Neg Hx     Blindness Neg Hx     Cancer Neg Hx     Diabetes Neg Hx     Glaucoma Neg Hx     Macular degeneration Neg Hx     Retinal detachment Neg Hx     Strabismus Neg Hx     Stroke Neg Hx     Thyroid disease Neg Hx        ALLERGIES AND MEDICATIONS: updated and reviewed.  Review of patient's allergies indicates:  No Known Allergies  Current Outpatient Medications   Medication Sig Dispense Refill    aspirin 81 mg Tab Take by mouth as needed. 2-3 times weekly      atorvastatin (LIPITOR) 40 MG tablet Take 1 tablet (40 mg total) by mouth once daily. 90 tablet 3    lisinopril (PRINIVIL,ZESTRIL) 40 MG tablet Take 1 tablet (40 mg total) by mouth once daily. 90 tablet 3    meloxicam (MOBIC) 15 MG tablet TAKE 1 TABLET BY MOUTH EVERY DAY 90 tablet 0    OMEGA 3,6,9 COMBINATION NO.7 (OMEGA DHA ORAL) Take 830 mg by mouth once daily.      traMADoL (ULTRAM) 50 mg tablet Take 1 tablet (50 mg total) by mouth 3 (three) times daily. 90 tablet 0    [START ON 4/16/2020] traMADoL (ULTRAM) 50 mg tablet Take 1 tablet (50 mg total) by mouth 3 (three) times daily. 90 tablet 0    [START ON 5/16/2020] traMADoL (ULTRAM) 50 mg tablet Take 1 tablet (50 mg total) by mouth 3 (three) times daily. 90 tablet 0    triamterene-hydrochlorothiazide 37.5-25 mg (DYAZIDE) 37.5-25 mg  "per capsule Take 1 capsule by mouth once daily. 90 capsule 2     No current facility-administered medications for this visit.          ROS  Review of Systems   Constitutional: Negative for activity change, appetite change, fatigue, fever and unexpected weight change.   HENT: Negative.  Negative for ear discharge, ear pain, rhinorrhea and sore throat.    Eyes: Negative.    Respiratory: Negative for apnea, cough, chest tightness, shortness of breath and wheezing.    Cardiovascular: Negative for chest pain, palpitations and leg swelling.   Gastrointestinal: Negative for abdominal distention, abdominal pain, constipation, diarrhea and vomiting.   Endocrine: Negative for cold intolerance, heat intolerance, polydipsia and polyuria.   Genitourinary: Negative for decreased urine volume and urgency.   Musculoskeletal: Positive for arthralgias and back pain.   Skin: Negative for rash.   Neurological: Negative for dizziness and headaches.   Hematological: Does not bruise/bleed easily.   Psychiatric/Behavioral: Negative for agitation, sleep disturbance and suicidal ideas.           Physical Exam  Vitals:    03/17/20 0835   BP: 120/80   BP Location: Left arm   Patient Position: Sitting   BP Method: Large (Manual)   Pulse: 85   Temp: 98.4 °F (36.9 °C)   TempSrc: Oral   SpO2: 96%   Weight: 79.4 kg (175 lb 0.7 oz)   Height: 5' 6" (1.676 m)    Body mass index is 28.25 kg/m².  Weight: 79.4 kg (175 lb 0.7 oz)   Height: 5' 6" (167.6 cm)   Physical Exam   Constitutional: She is oriented to person, place, and time. She appears well-developed and well-nourished.   HENT:   Head: Normocephalic and atraumatic.   Right Ear: External ear normal.   Left Ear: External ear normal.   Nose: Nose normal.   Mouth/Throat: Oropharynx is clear and moist.   Eyes: Pupils are equal, round, and reactive to light. Conjunctivae and EOM are normal.   Cardiovascular: Normal rate, regular rhythm and normal heart sounds.   Pulmonary/Chest: Effort normal and " breath sounds normal.   Neurological: She is alert and oriented to person, place, and time.   Skin: Skin is warm and dry.   Vitals reviewed.        Health Maintenance       Date Due Completion Date    Sign Pain Contract 02/17/1963 ---    Shingles Vaccine (1 of 2) 02/17/1995 ---    Pneumococcal Vaccine (65+ Low/Medium Risk) (2 of 2 - PCV13) 05/03/2011 5/3/2010    Colonoscopy 09/14/2019 9/14/2015    Lipid Panel 12/23/2020 12/23/2019    DEXA SCAN 09/23/2022 9/23/2019    TETANUS VACCINE 04/27/2023 4/27/2013              Patient note was created using [employer].  Any errors in syntax or even information may not have been identified and edited on initial review prior to signing this note.

## 2020-03-20 ENCOUNTER — OFFICE VISIT (OUTPATIENT)
Dept: FAMILY MEDICINE | Facility: CLINIC | Age: 75
End: 2020-03-20
Payer: MEDICARE

## 2020-03-20 ENCOUNTER — TELEPHONE (OUTPATIENT)
Dept: FAMILY MEDICINE | Facility: CLINIC | Age: 75
End: 2020-03-20

## 2020-03-20 VITALS
HEART RATE: 88 BPM | BODY MASS INDEX: 27.85 KG/M2 | TEMPERATURE: 99 F | DIASTOLIC BLOOD PRESSURE: 64 MMHG | WEIGHT: 173.31 LBS | OXYGEN SATURATION: 96 % | SYSTOLIC BLOOD PRESSURE: 110 MMHG | HEIGHT: 66 IN

## 2020-03-20 DIAGNOSIS — A08.4 VIRAL ENTERITIS: Primary | ICD-10-CM

## 2020-03-20 PROCEDURE — 1101F PR PT FALLS ASSESS DOC 0-1 FALLS W/OUT INJ PAST YR: ICD-10-PCS | Mod: HCNC,CPTII,S$GLB, | Performed by: PHYSICIAN ASSISTANT

## 2020-03-20 PROCEDURE — 1101F PT FALLS ASSESS-DOCD LE1/YR: CPT | Mod: HCNC,CPTII,S$GLB, | Performed by: PHYSICIAN ASSISTANT

## 2020-03-20 PROCEDURE — 3078F DIAST BP <80 MM HG: CPT | Mod: HCNC,CPTII,S$GLB, | Performed by: PHYSICIAN ASSISTANT

## 2020-03-20 PROCEDURE — 3074F SYST BP LT 130 MM HG: CPT | Mod: HCNC,CPTII,S$GLB, | Performed by: PHYSICIAN ASSISTANT

## 2020-03-20 PROCEDURE — 99214 OFFICE O/P EST MOD 30 MIN: CPT | Mod: HCNC,S$GLB,, | Performed by: PHYSICIAN ASSISTANT

## 2020-03-20 PROCEDURE — 99999 PR PBB SHADOW E&M-EST. PATIENT-LVL IV: ICD-10-PCS | Mod: PBBFAC,HCNC,, | Performed by: PHYSICIAN ASSISTANT

## 2020-03-20 PROCEDURE — 3074F PR MOST RECENT SYSTOLIC BLOOD PRESSURE < 130 MM HG: ICD-10-PCS | Mod: HCNC,CPTII,S$GLB, | Performed by: PHYSICIAN ASSISTANT

## 2020-03-20 PROCEDURE — 1126F AMNT PAIN NOTED NONE PRSNT: CPT | Mod: HCNC,S$GLB,, | Performed by: PHYSICIAN ASSISTANT

## 2020-03-20 PROCEDURE — 1126F PR PAIN SEVERITY QUANTIFIED, NO PAIN PRESENT: ICD-10-PCS | Mod: HCNC,S$GLB,, | Performed by: PHYSICIAN ASSISTANT

## 2020-03-20 PROCEDURE — 3078F PR MOST RECENT DIASTOLIC BLOOD PRESSURE < 80 MM HG: ICD-10-PCS | Mod: HCNC,CPTII,S$GLB, | Performed by: PHYSICIAN ASSISTANT

## 2020-03-20 PROCEDURE — 99999 PR PBB SHADOW E&M-EST. PATIENT-LVL IV: CPT | Mod: PBBFAC,HCNC,, | Performed by: PHYSICIAN ASSISTANT

## 2020-03-20 PROCEDURE — 1159F MED LIST DOCD IN RCRD: CPT | Mod: HCNC,S$GLB,, | Performed by: PHYSICIAN ASSISTANT

## 2020-03-20 PROCEDURE — 99214 PR OFFICE/OUTPT VISIT, EST, LEVL IV, 30-39 MIN: ICD-10-PCS | Mod: HCNC,S$GLB,, | Performed by: PHYSICIAN ASSISTANT

## 2020-03-20 PROCEDURE — 1159F PR MEDICATION LIST DOCUMENTED IN MEDICAL RECORD: ICD-10-PCS | Mod: HCNC,S$GLB,, | Performed by: PHYSICIAN ASSISTANT

## 2020-03-20 NOTE — TELEPHONE ENCOUNTER
----- Message from Manasa Green sent at 3/20/2020  3:29 PM CDT -----  Contact: Self [phone]  Type: Patient Call Back    Who called:Self    What is the request in detail: pt is calling because she has been running a fever since yesterday and have some diarrhea and she is wondering what should she do    Can the clinic reply by [facility]? Call back    Would the patient rather a call back or a response via [facility]? Call back    Best call back number:[phone]

## 2020-03-20 NOTE — PROGRESS NOTES
Patient Name: Nuha Bernstein    : 1945  MRN: [id number]    Subjective:  Nuha is a 75 y.o. female who presents today for:    Chief Complaint   Patient presents with    Diarrhea    Fever    Abdominal Pain       HPI  Patient has multiple medical diagnoses as listed below in the history. Patient is new to me, but known to the clinic. She complains of abdominal pain and loose stools for 2 days. She denies bloody or watery stools. Pain is generalized and more associated to bloating. She denies nausea or vomiting. She has not eaten out or does not recall eating anything unusual. No known sick contacts. No new medications. She reports low grade fever of 99.1 this morning. She denies respiratory symptoms. She reports mild body aches and fatigue. She has been eating chicken soap only. She has not taken anything for relief. She reports feeling a little better this evening.       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    Prolapse of uterus        Past Surgical History  Past Surgical History:   Procedure Laterality Date    Right Thumb      vaginal dilator         Family History  Family History   Problem Relation Age of Onset    Hypertension Mother     Cataracts Mother     Heart disease Mother     Hypertension Father     Cataracts Father     No Known Problems Sister     No Known Problems Brother     No Known Problems Brother     No Known Problems Maternal Aunt     No Known Problems Maternal Uncle     No Known Problems Paternal Aunt     No Known Problems Paternal Uncle     No Known Problems Maternal Grandmother     No Known Problems Maternal Grandfather     No Known Problems Paternal Grandmother     No Known Problems Paternal Grandfather     Amblyopia Neg Hx     Blindness Neg Hx     Cancer Neg Hx     Diabetes Neg Hx     Glaucoma Neg Hx      Macular degeneration Neg Hx     Retinal detachment Neg Hx     Strabismus Neg Hx     Stroke Neg Hx     Thyroid disease Neg Hx        Social History  Social History     Socioeconomic History    Marital status:      Spouse name: Not on file    Number of children: Not on file    Years of education: Not on file    Highest education level: Not on file   Occupational History    Not on file   Social Needs    Financial resource strain: Not on file    Food insecurity:     Worry: Not on file     Inability: Not on file    Transportation needs:     Medical: Not on file     Non-medical: Not on file   Tobacco Use    Smoking status: Never Smoker    Smokeless tobacco: Never Used   Substance and Sexual Activity    Alcohol use: No     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     Drug use: No    Sexual activity: Not Currently   Lifestyle    Physical activity:     Days per week: Not on file     Minutes per session: Not on file    Stress: Not on file   Relationships    Social connections:     Talks on phone: Not on file     Gets together: Not on file     Attends Anglican service: Not on file     Active member of club or organization: Not on file     Attends meetings of clubs or organizations: Not on file     Relationship status: Not on file   Other Topics Concern    Not on file   Social History Narrative    Not on file       Current Medications  Current Outpatient Medications on File Prior to Visit   Medication Sig Dispense Refill    aspirin 81 mg Tab Take by mouth as needed. 2-3 times weekly      atorvastatin (LIPITOR) 40 MG tablet Take 1 tablet (40 mg total) by mouth once daily. 90 tablet 3    lisinopril (PRINIVIL,ZESTRIL) 40 MG tablet Take 1 tablet (40 mg total) by mouth once daily. 90 tablet 3    meloxicam (MOBIC) 15 MG tablet TAKE 1 TABLET BY MOUTH EVERY DAY 90 tablet 0    OMEGA 3,6,9 COMBINATION NO.7 (OMEGA DHA ORAL) Take 830 mg by mouth once daily.      traMADoL (ULTRAM) 50 mg tablet Take 1  "tablet (50 mg total) by mouth 3 (three) times daily. 90 tablet 0    [START ON 4/16/2020] traMADoL (ULTRAM) 50 mg tablet Take 1 tablet (50 mg total) by mouth 3 (three) times daily. 90 tablet 0    [START ON 5/16/2020] traMADoL (ULTRAM) 50 mg tablet Take 1 tablet (50 mg total) by mouth 3 (three) times daily. 90 tablet 0    triamterene-hydrochlorothiazide 37.5-25 mg (DYAZIDE) 37.5-25 mg per capsule Take 1 capsule by mouth once daily. 90 capsule 2     No current facility-administered medications on file prior to visit.        Allergies   Review of patient's allergies indicates:  No Known Allergies      ROS  Review of Systems   Constitutional: Positive for fever. Negative for appetite change, fatigue and unexpected weight change.   Respiratory: Negative for cough and shortness of breath.    Cardiovascular: Negative for chest pain, palpitations and leg swelling.   Gastrointestinal: Positive for abdominal pain and diarrhea. Negative for abdominal distention, blood in stool, constipation, nausea and vomiting.   Genitourinary: Negative for dysuria, flank pain, frequency, hematuria and urgency.   Musculoskeletal: Positive for myalgias. Negative for back pain.   Skin: Negative for rash.   Neurological: Negative for light-headedness and headaches.   Hematological: Negative for adenopathy.   Psychiatric/Behavioral: Negative for sleep disturbance. The patient is not nervous/anxious.          Objective:    /64   Pulse 88   Temp 99.3 °F (37.4 °C) (Oral)   Ht 5' 6" (1.676 m)   Wt 78.6 kg (173 lb 4.5 oz)   SpO2 96%   BMI 27.97 kg/m²     Physical Exam   Constitutional: Vital signs are normal.   HENT:   Head: Normocephalic.   Eyes: Pupils are equal, round, and reactive to light. EOM are normal.   Neck: Carotid bruit is not present.   Cardiovascular: Normal rate, regular rhythm, S1 normal and S2 normal.   Pulmonary/Chest: Effort normal and breath sounds normal. She has no wheezes.   Abdominal: Soft. Normal appearance and " bowel sounds are normal. There is no hepatosplenomegaly. There is no tenderness. There is no rigidity, no guarding, no CVA tenderness, no tenderness at McBurney's point and negative [place]'s sign.   Lymphadenopathy:     She has no cervical adenopathy.        Right: No supraclavicular adenopathy present.        Left: No supraclavicular adenopathy present.   Neurological: She is alert.   Skin: Skin is warm, dry and intact. No rash noted.   Psychiatric: She has a normal mood and affect. Judgment normal.       Assessment/Plan:  Nuha Bernstein is a 75 y.o. female who presents today for :    Nuha was seen today for diarrhea and fever.    Diagnoses and all orders for this visit:    Viral enteritis  No acute abdomen on exam. Patient is tolerating PO fluids. No red flags. Discussed diet modification and importance of increased fluids/water.   Pepto bismol or imodium PRN for no more than 2-3 days. Acetaminophen 500-1000 mg three times daily maximum for pain and fever.     Counseled patient on the clinical course of condition including symptomatology, treatment and prevention.  Counseled regarding risks, benefits, and limitations of medications.  Advised patient to seek urgent/emergent care if symptoms intensify.  Sent patient with informational material about diagnosis.  Patient gave verbal understanding and agreement of plan.          I spent >50% of this 25 minute encounter counseling the patient on diagnoses, risk factors, and treatments.         Encouraged to call/return to clinic if symptoms persist or worsen    Gayathri Mendez PA-C  [facility]

## 2020-03-20 NOTE — TELEPHONE ENCOUNTER
Spoke with patient she stated that she has a fever on and off, the last temp was 100, she's been taking tylenol, she also has diarrhea.  No body aches, no shortness of breath, no cough and she did not come in contact with anyone who has the covid 19 and she did not travel out of town.  Please advise.

## 2020-03-20 NOTE — PATIENT INSTRUCTIONS

## 2020-03-23 ENCOUNTER — PATIENT MESSAGE (OUTPATIENT)
Dept: FAMILY MEDICINE | Facility: CLINIC | Age: 75
End: 2020-03-23

## 2020-03-23 ENCOUNTER — TELEPHONE (OUTPATIENT)
Dept: FAMILY MEDICINE | Facility: CLINIC | Age: 75
End: 2020-03-23

## 2020-03-23 NOTE — TELEPHONE ENCOUNTER
Please advise patient to stay well hydrated. Use tylenol for the fever. If her diarrhea is persistent she is at risk for dehydration. She should go to the ED for severe symptoms or watery diarrhea for more than 7 days.    She should follow the CDC guidelines for home treatment/isolation for COVID-19, while waiting for results.     If you develop emergency warning signs for COVID-19 get medical attention immediately. Emergency warning signs include*:    -Trouble breathing  -Persistent pain or pressure in the chest  -New confusion or inability to arouse  -Bluish lips or face        Thank you!

## 2020-03-23 NOTE — TELEPHONE ENCOUNTER
spoke with pt states she is still having fever, and diarrhea, has taken pepto bismol twice today and also went to [facility] and was tested for covid-19

## 2020-03-23 NOTE — TELEPHONE ENCOUNTER
----- Message from Awilda Huang sent at 3/23/2020 10:46 AM CDT -----  Contact: Nuha [phone]  Type: Patient Call Back    Who called:Nuha     What is the request in detail: The patient is requesting a call back from the staff. The patient stated that she came in on Friday for diarrhea. However there has been no improvement. She would like advice on what she should do.    Can the clinic reply by [facility]?no    Would the patient rather a call back or a response via [facility]? Call back     Best call back number:[phone]

## 2020-03-23 NOTE — TELEPHONE ENCOUNTER
Please inform patient that I have replied to her [employer] message if she would like to continue communicating through the portal. Otherwise, please ask her more information about her symptoms.        Are you having fever or any new symptoms? Is your stool still loose are is it now watery? Nausea or vomiting?      Have you been taking Pepto Bismol or imodium?     Thank you!

## 2020-03-30 ENCOUNTER — PATIENT MESSAGE (OUTPATIENT)
Dept: FAMILY MEDICINE | Facility: CLINIC | Age: 75
End: 2020-03-30

## 2020-04-20 ENCOUNTER — PATIENT OUTREACH (OUTPATIENT)
Dept: ADMINISTRATIVE | Facility: OTHER | Age: 75
End: 2020-04-20

## 2020-04-21 ENCOUNTER — OFFICE VISIT (OUTPATIENT)
Dept: OPHTHALMOLOGY | Facility: CLINIC | Age: 75
End: 2020-04-21
Payer: MEDICARE

## 2020-04-21 DIAGNOSIS — H02.9 LESION OF RIGHT EYELID: Primary | ICD-10-CM

## 2020-04-21 PROCEDURE — 99211 PR OFFICE/OUTPT VISIT, EST, LEVL I: ICD-10-PCS | Mod: HCNC,95,, | Performed by: OPHTHALMOLOGY

## 2020-04-21 PROCEDURE — 99211 OFF/OP EST MAY X REQ PHY/QHP: CPT | Mod: HCNC,95,, | Performed by: OPHTHALMOLOGY

## 2020-04-21 NOTE — LETTER
May 9, 2020      Olivier Montez MD  [address]  [place] [address]           [facility]  [address]  [place] [address]  Phone: [phone]  Fax: [phone]          Patient: Nuha Bernstein   MR Number: [id number]   YOB: 1945   Date of Visit: 4/21/2020       Dear Dr. Olivier Montez:    Thank you for referring Nuha Bernstein to me for evaluation. Attached you will find relevant portions of my assessment and plan of care.    If you have questions, please do not hesitate to call me. I look forward to following Nuha Bernstein along with you.    Sincerely,    Marianne Bledsoe MD    Enclosure  CC:  No Recipients    If you would like to receive this communication electronically, please contact [email or web address] or [phone] to request more information on [employer] Link access.    For providers and/or their staff who would like to refer a patient to [facility], please contact us through our one-stop-shop provider referral line, [facility], at [phone].    If you feel you have received this communication in error or would no longer like to receive these types of communications, please e-mail [email or web address]

## 2020-04-21 NOTE — Clinical Note
Te Santoyo, Please contact this patient to set up eyelid excision removal, [id number], next available. Thanks

## 2020-04-21 NOTE — PROGRESS NOTES
Consult Start Time: 04/21/2020 10:24  Consult End Time: 04/21/2020 10:30        The patient location is: home  The chief complaint leading to consultation is: eyelid lesion (right)  Visit type: audiovisual  Total time spent with patient: 6 min  Each patient to whom he or she provides medical services by telemedicine is:  (1) informed of the relationship between the physician and patient and the respective role of any other health care provider with respect to management of the patient; and (2) notified that he or she may decline to receive medical services by telemedicine and may withdraw from such care at any time.    Notes:   3-4 months of lesion on the right sided medial canthal area  No symptoms  Usually a white color with no changes noted by patient  Recent change in size noted by patient    Assessment /Plan     For exam results, see Encounter Report.    Lesion of right eyelid      Right medial canthal lesion, likely sebaceous cyst or oncocytoma, difficult to visualize on video  Counseled to RTC in 2-3m on procedure day for non-urgent removal  Call back if symptoms/lesion worsens  Discussed procedure for removal with patient  Hold anticoagulants prior to removal

## 2020-04-30 DIAGNOSIS — G89.29 CHRONIC NECK PAIN: ICD-10-CM

## 2020-04-30 DIAGNOSIS — M54.2 CHRONIC NECK PAIN: ICD-10-CM

## 2020-04-30 DIAGNOSIS — M54.50 CHRONIC MIDLINE LOW BACK PAIN WITHOUT SCIATICA: ICD-10-CM

## 2020-04-30 DIAGNOSIS — G89.29 CHRONIC MIDLINE LOW BACK PAIN WITHOUT SCIATICA: ICD-10-CM

## 2020-05-01 RX ORDER — TRAMADOL HYDROCHLORIDE 50 MG/1
TABLET ORAL
Qty: 90 TABLET | Refills: 0 | OUTPATIENT
Start: 2020-05-01

## 2020-05-12 ENCOUNTER — TELEPHONE (OUTPATIENT)
Dept: OPHTHALMOLOGY | Facility: CLINIC | Age: 75
End: 2020-05-12

## 2020-06-23 ENCOUNTER — OFFICE VISIT (OUTPATIENT)
Dept: FAMILY MEDICINE | Facility: CLINIC | Age: 75
End: 2020-06-23
Payer: MEDICARE

## 2020-06-23 VITALS
HEIGHT: 66 IN | WEIGHT: 178.38 LBS | SYSTOLIC BLOOD PRESSURE: 110 MMHG | BODY MASS INDEX: 28.67 KG/M2 | TEMPERATURE: 97 F | OXYGEN SATURATION: 97 % | DIASTOLIC BLOOD PRESSURE: 70 MMHG | HEART RATE: 87 BPM

## 2020-06-23 DIAGNOSIS — M54.2 CHRONIC NECK PAIN: ICD-10-CM

## 2020-06-23 DIAGNOSIS — N18.30 STAGE 3 CHRONIC KIDNEY DISEASE: ICD-10-CM

## 2020-06-23 DIAGNOSIS — L60.8 PINCER NAIL DEFORMITY: ICD-10-CM

## 2020-06-23 DIAGNOSIS — G89.29 CHRONIC LOW BACK PAIN: ICD-10-CM

## 2020-06-23 DIAGNOSIS — M54.50 CHRONIC LOW BACK PAIN: ICD-10-CM

## 2020-06-23 DIAGNOSIS — G89.29 CHRONIC MIDLINE LOW BACK PAIN WITHOUT SCIATICA: Primary | ICD-10-CM

## 2020-06-23 DIAGNOSIS — M54.50 CHRONIC MIDLINE LOW BACK PAIN WITHOUT SCIATICA: Primary | ICD-10-CM

## 2020-06-23 DIAGNOSIS — G89.29 CHRONIC NECK PAIN: ICD-10-CM

## 2020-06-23 PROBLEM — N18.31 CKD STAGE G3A/A3, GFR 45-59 AND ALBUMIN CREATININE RATIO >300 MG/G: Status: ACTIVE | Noted: 2020-06-23

## 2020-06-23 PROCEDURE — 99999 PR PBB SHADOW E&M-EST. PATIENT-LVL III: ICD-10-PCS | Mod: PBBFAC,HCNC,, | Performed by: FAMILY MEDICINE

## 2020-06-23 PROCEDURE — 1159F PR MEDICATION LIST DOCUMENTED IN MEDICAL RECORD: ICD-10-PCS | Mod: HCNC,S$GLB,, | Performed by: FAMILY MEDICINE

## 2020-06-23 PROCEDURE — 1126F PR PAIN SEVERITY QUANTIFIED, NO PAIN PRESENT: ICD-10-PCS | Mod: HCNC,S$GLB,, | Performed by: FAMILY MEDICINE

## 2020-06-23 PROCEDURE — 3074F PR MOST RECENT SYSTOLIC BLOOD PRESSURE < 130 MM HG: ICD-10-PCS | Mod: HCNC,CPTII,S$GLB, | Performed by: FAMILY MEDICINE

## 2020-06-23 PROCEDURE — 99215 OFFICE O/P EST HI 40 MIN: CPT | Mod: HCNC,S$GLB,, | Performed by: FAMILY MEDICINE

## 2020-06-23 PROCEDURE — 1159F MED LIST DOCD IN RCRD: CPT | Mod: HCNC,S$GLB,, | Performed by: FAMILY MEDICINE

## 2020-06-23 PROCEDURE — 99499 RISK ADDL DX/OHS AUDIT: ICD-10-PCS | Mod: HCNC,S$GLB,, | Performed by: FAMILY MEDICINE

## 2020-06-23 PROCEDURE — 1126F AMNT PAIN NOTED NONE PRSNT: CPT | Mod: HCNC,S$GLB,, | Performed by: FAMILY MEDICINE

## 2020-06-23 PROCEDURE — 1101F PT FALLS ASSESS-DOCD LE1/YR: CPT | Mod: HCNC,CPTII,S$GLB, | Performed by: FAMILY MEDICINE

## 2020-06-23 PROCEDURE — 3074F SYST BP LT 130 MM HG: CPT | Mod: HCNC,CPTII,S$GLB, | Performed by: FAMILY MEDICINE

## 2020-06-23 PROCEDURE — 99499 UNLISTED E&M SERVICE: CPT | Mod: HCNC,S$GLB,, | Performed by: FAMILY MEDICINE

## 2020-06-23 PROCEDURE — 99999 PR PBB SHADOW E&M-EST. PATIENT-LVL III: CPT | Mod: PBBFAC,HCNC,, | Performed by: FAMILY MEDICINE

## 2020-06-23 PROCEDURE — 3078F DIAST BP <80 MM HG: CPT | Mod: HCNC,CPTII,S$GLB, | Performed by: FAMILY MEDICINE

## 2020-06-23 PROCEDURE — 99215 PR OFFICE/OUTPT VISIT, EST, LEVL V, 40-54 MIN: ICD-10-PCS | Mod: HCNC,S$GLB,, | Performed by: FAMILY MEDICINE

## 2020-06-23 PROCEDURE — 1101F PR PT FALLS ASSESS DOC 0-1 FALLS W/OUT INJ PAST YR: ICD-10-PCS | Mod: HCNC,CPTII,S$GLB, | Performed by: FAMILY MEDICINE

## 2020-06-23 PROCEDURE — 3078F PR MOST RECENT DIASTOLIC BLOOD PRESSURE < 80 MM HG: ICD-10-PCS | Mod: HCNC,CPTII,S$GLB, | Performed by: FAMILY MEDICINE

## 2020-06-23 RX ORDER — TRAMADOL HYDROCHLORIDE 50 MG/1
50 TABLET ORAL 3 TIMES DAILY
Qty: 90 TABLET | Refills: 0 | Status: SHIPPED | OUTPATIENT
Start: 2020-08-10 | End: 2020-09-09

## 2020-06-23 RX ORDER — MELOXICAM 15 MG/1
15 TABLET ORAL DAILY
Qty: 90 TABLET | Refills: 0 | Status: SHIPPED | OUTPATIENT
Start: 2020-06-23 | End: 2021-01-04

## 2020-06-23 RX ORDER — TRAMADOL HYDROCHLORIDE 50 MG/1
50 TABLET ORAL 3 TIMES DAILY
Qty: 90 TABLET | Refills: 0 | Status: SHIPPED | OUTPATIENT
Start: 2020-07-11 | End: 2020-08-10

## 2020-06-23 RX ORDER — MELOXICAM 15 MG/1
15 TABLET ORAL DAILY
Qty: 90 TABLET | Refills: 0 | Status: SHIPPED | OUTPATIENT
Start: 2020-06-23 | End: 2020-06-23 | Stop reason: SDUPTHER

## 2020-06-23 RX ORDER — TRAMADOL HYDROCHLORIDE 50 MG/1
50 TABLET ORAL 3 TIMES DAILY
Qty: 90 TABLET | Refills: 0 | Status: SHIPPED | OUTPATIENT
Start: 2020-09-09 | End: 2020-09-23 | Stop reason: SDUPTHER

## 2020-06-23 NOTE — ASSESSMENT & PLAN NOTE
Noted and stable.     Patient is stable.  Assess and addressed all modifiable risk factors.  Continue with appropriate management to prevent complications.

## 2020-07-16 ENCOUNTER — OFFICE VISIT (OUTPATIENT)
Dept: PODIATRY | Facility: CLINIC | Age: 75
End: 2020-07-16
Payer: MEDICARE

## 2020-07-16 VITALS
HEART RATE: 92 BPM | BODY MASS INDEX: 28.67 KG/M2 | DIASTOLIC BLOOD PRESSURE: 76 MMHG | SYSTOLIC BLOOD PRESSURE: 147 MMHG | WEIGHT: 178.38 LBS | HEIGHT: 66 IN

## 2020-07-16 DIAGNOSIS — L60.8 PINCER NAIL DEFORMITY: ICD-10-CM

## 2020-07-16 DIAGNOSIS — B35.1 ONYCHOMYCOSIS DUE TO DERMATOPHYTE: Primary | ICD-10-CM

## 2020-07-16 PROCEDURE — 3077F SYST BP >= 140 MM HG: CPT | Mod: HCNC,CPTII,S$GLB, | Performed by: PODIATRIST

## 2020-07-16 PROCEDURE — 99213 OFFICE O/P EST LOW 20 MIN: CPT | Mod: HCNC,S$GLB,, | Performed by: PODIATRIST

## 2020-07-16 PROCEDURE — 1126F PR PAIN SEVERITY QUANTIFIED, NO PAIN PRESENT: ICD-10-PCS | Mod: HCNC,S$GLB,, | Performed by: PODIATRIST

## 2020-07-16 PROCEDURE — 3077F PR MOST RECENT SYSTOLIC BLOOD PRESSURE >= 140 MM HG: ICD-10-PCS | Mod: HCNC,CPTII,S$GLB, | Performed by: PODIATRIST

## 2020-07-16 PROCEDURE — 1101F PT FALLS ASSESS-DOCD LE1/YR: CPT | Mod: HCNC,CPTII,S$GLB, | Performed by: PODIATRIST

## 2020-07-16 PROCEDURE — 1101F PR PT FALLS ASSESS DOC 0-1 FALLS W/OUT INJ PAST YR: ICD-10-PCS | Mod: HCNC,CPTII,S$GLB, | Performed by: PODIATRIST

## 2020-07-16 PROCEDURE — 99999 PR PBB SHADOW E&M-EST. PATIENT-LVL IV: CPT | Mod: PBBFAC,HCNC,, | Performed by: PODIATRIST

## 2020-07-16 PROCEDURE — 3078F PR MOST RECENT DIASTOLIC BLOOD PRESSURE < 80 MM HG: ICD-10-PCS | Mod: HCNC,CPTII,S$GLB, | Performed by: PODIATRIST

## 2020-07-16 PROCEDURE — 1159F MED LIST DOCD IN RCRD: CPT | Mod: HCNC,S$GLB,, | Performed by: PODIATRIST

## 2020-07-16 PROCEDURE — 99999 PR PBB SHADOW E&M-EST. PATIENT-LVL IV: ICD-10-PCS | Mod: PBBFAC,HCNC,, | Performed by: PODIATRIST

## 2020-07-16 PROCEDURE — 99213 PR OFFICE/OUTPT VISIT, EST, LEVL III, 20-29 MIN: ICD-10-PCS | Mod: HCNC,S$GLB,, | Performed by: PODIATRIST

## 2020-07-16 PROCEDURE — 3078F DIAST BP <80 MM HG: CPT | Mod: HCNC,CPTII,S$GLB, | Performed by: PODIATRIST

## 2020-07-16 PROCEDURE — 1159F PR MEDICATION LIST DOCUMENTED IN MEDICAL RECORD: ICD-10-PCS | Mod: HCNC,S$GLB,, | Performed by: PODIATRIST

## 2020-07-16 PROCEDURE — 1126F AMNT PAIN NOTED NONE PRSNT: CPT | Mod: HCNC,S$GLB,, | Performed by: PODIATRIST

## 2020-07-16 NOTE — LETTER
July 22, 2020      Maryjo Gaffney MD  [address]  [place] [address]           [facility]  [address]  [place] [address]  Phone: [phone]          Patient: Nuha Bernstein   MR Number: [id number]   YOB: 1945   Date of Visit: 7/16/2020       Dear Dr. Maryjo Gaffney:    Thank you for referring Nuha Bernstein to me for evaluation. Attached you will find relevant portions of my assessment and plan of care.    If you have questions, please do not hesitate to call me. I look forward to following Nuha Bernstein along with you.    Sincerely,    Vangie Mckeon, DPJOSELIN    Enclosure  CC:  No Recipients    If you would like to receive this communication electronically, please contact [email or web address] or [phone] to request more information on [employer] Link access.    For providers and/or their staff who would like to refer a patient to [facility], please contact us through our one-stop-shop provider referral line, [facility] , at [phone].    If you feel you have received this communication in error or would no longer like to receive these types of communications, please e-mail [email or web address]

## 2020-07-16 NOTE — PATIENT INSTRUCTIONS
Shoe recommendations: (try 6pm.com, zappos.com , nordstromrack.com, or shoes.com for discounted prices) you can visit DSW shoes in [place] as well    Asics (GT 1000 or gel foundations),  Can be found at [employer] [facility] balance, david (stabil c3),  Adonis (transcend), lizeth, ozzie, Vonnie (One)  (tennis shoe)    soft brand, clarks, crocs, naot, aerosoles, naturalizers, SAS, ecco, yadi, davina moreau, rockports (dress shoes)    Vionic, volitiles, burkenstocks, fitflops, naot, propet (sandals)    Nike comfort thong sandals, crocs,dr comfort  (house shoes)

## 2020-07-22 NOTE — PROGRESS NOTES
Subjective:      Patient ID: Nuha Bernstein is a 75 y.o. female.    Chief Complaint: Nail Problem (toenail discolored ov 6/23/20 Dr Gaffney PCP)    Nuha is a 75 y.o. female who presents to the podiatry clinic  with complaint of  right foot pain. Onset of the symptoms was several weeks ago. Precipitating event: none known. Current symptoms include: ability to bear weight, but with some pain. Aggravating factors: any weight bearing. Symptoms have progressed to a point and plateaued. Patient has had no prior foot problems. Evaluation to date: none. Treatment to date: none. Patients rates pain 4/10 on pain scale. Reports painful callus right 5th toe.     7/16/20: Reports discolored toenails 1-5 B/L.     Review of Systems   Constitution: Negative for chills, diaphoresis and fever.   Cardiovascular: Negative for claudication, cyanosis, leg swelling and syncope.   Respiratory: Negative for cough and shortness of breath.    Skin: Positive for color change, nail changes and suspicious lesions.   Musculoskeletal: Negative for falls, joint pain, muscle cramps and muscle weakness.   Gastrointestinal: Negative for diarrhea, nausea and vomiting.   Neurological: Negative for disturbances in coordination, numbness, paresthesias, sensory change, tremors and weakness.   Psychiatric/Behavioral: Negative for altered mental status.           Objective:      Physical Exam  Constitutional:       Appearance: She is well-developed.      Comments: Oriented to time, place, and person.   Cardiovascular:      Comments: DP and PT pulses are palpable bilaterally. 3 sec capillary refill time and toes and feet are warm to touch proximally .  There is  hair growth on the feet and toes b/l. There is no edema b/l. No spider veins or varicosities present b/l.     Musculoskeletal:      Comments: Equinus noted b/l ankles with < 10 deg DF noted. MMT 5/5 in DF/PF/Inv/Ev resistance with no reproduction of pain in any direction. Passive range of motion  of ankle and pedal joints is painless b/l.     Feet:      Right foot:      Skin integrity: No callus or dry skin.      Left foot:      Skin integrity: No callus or dry skin.   Lymphadenopathy:      Comments: Negative lymphadenopathy bilateral popliteal fossa and tarsal tunnel.   Skin:     Comments: No open lesions, lacerations or wounds noted.Interdigital spaces clean, dry and intact b/l. No erythema noted to b/l foot.    Toenails 1-5 bilaterally are  discolored/yellowed, dystrophic, brittle with subungual debris.       Neurological:      Mental Status: She is alert.      Comments: Light touch, proprioception, and sharp/dull sensation are all intact bilaterally. Protective threshold with the Tunbridge-Wienstein monofilament is intact bilaterally.    Psychiatric:         Behavior: Behavior is cooperative.               Assessment:       Encounter Diagnoses   Name Primary?    Pincer nail deformity     Onychomycosis due to dermatophyte Yes         Plan:       Nuha was seen today for nail problem.    Diagnoses and all orders for this visit:    Onychomycosis due to dermatophyte    Pincer nail deformity  -     Ambulatory referral/consult to Podiatry      I counseled the patient on her conditions, their implications and medical management.    Instructed patient on the importance of keeping feet dry. Patient instructed to use absorbent cotton socks and change them if they become sweaty; or wear an open-toe shoe or sandal. Wash the feet at least once a day with soap and water. Patient instructed to use lysol or over-the-counter antifungal powders or sprays to shoes daily and allow them to air dry, switching shoes from every other day would be optimal. Patient is to avoid barefoot walking in high-risk environments (public showers, gyms and locker rooms) may prevent future infections.     Discussed treatment options for fungal toenails to include topical vs oral vs laser vs combined therapy in detail.    RTC PRN

## 2020-07-31 ENCOUNTER — TELEPHONE (OUTPATIENT)
Dept: PODIATRY | Facility: CLINIC | Age: 75
End: 2020-07-31

## 2020-07-31 RX ORDER — CICLOPIROX 80 MG/ML
SOLUTION TOPICAL NIGHTLY
Qty: 1 BOTTLE | Refills: 3 | Status: SHIPPED | OUTPATIENT
Start: 2020-07-31 | End: 2022-10-03

## 2020-09-01 ENCOUNTER — TELEPHONE (OUTPATIENT)
Dept: FAMILY MEDICINE | Facility: CLINIC | Age: 75
End: 2020-09-01

## 2020-09-01 DIAGNOSIS — Z12.31 ENCOUNTER FOR SCREENING MAMMOGRAM FOR BREAST CANCER: Primary | ICD-10-CM

## 2020-09-01 NOTE — TELEPHONE ENCOUNTER
----- Message from Nelida Shannon sent at 9/1/2020  3:47 PM CDT -----   Name of Who is Calling:     What is the request in detail: patient request call back in reference to mammogram order Please contact to further discuss and advise      Can the clinic reply by [facility]: no    What Number to Call Back if not in [facility]:  [phone]

## 2020-09-08 ENCOUNTER — HOSPITAL ENCOUNTER (OUTPATIENT)
Dept: RADIOLOGY | Facility: HOSPITAL | Age: 75
Discharge: HOME | End: 2020-09-08
Attending: FAMILY MEDICINE
Payer: MEDICARE

## 2020-09-08 VITALS — WEIGHT: 178 LBS | BODY MASS INDEX: 28.61 KG/M2 | HEIGHT: 66 IN

## 2020-09-08 DIAGNOSIS — Z12.31 ENCOUNTER FOR SCREENING MAMMOGRAM FOR BREAST CANCER: ICD-10-CM

## 2020-09-08 PROCEDURE — 77067 SCR MAMMO BI INCL CAD: CPT | Mod: 26,HCNC,, | Performed by: RADIOLOGY

## 2020-09-08 PROCEDURE — 77067 SCR MAMMO BI INCL CAD: CPT | Mod: TC,HCNC,PO

## 2020-09-08 PROCEDURE — 77063 MAMMO DIGITAL SCREENING BILAT WITH TOMOSYNTHESIS_CAD: ICD-10-PCS | Mod: 26,HCNC,, | Performed by: RADIOLOGY

## 2020-09-08 PROCEDURE — 77063 BREAST TOMOSYNTHESIS BI: CPT | Mod: 26,HCNC,, | Performed by: RADIOLOGY

## 2020-09-08 PROCEDURE — 77067 MAMMO DIGITAL SCREENING BILAT WITH TOMOSYNTHESIS_CAD: ICD-10-PCS | Mod: 26,HCNC,, | Performed by: RADIOLOGY

## 2020-09-11 DIAGNOSIS — I10 ESSENTIAL HYPERTENSION: ICD-10-CM

## 2020-09-23 ENCOUNTER — OFFICE VISIT (OUTPATIENT)
Dept: FAMILY MEDICINE | Facility: CLINIC | Age: 75
End: 2020-09-23
Payer: MEDICARE

## 2020-09-23 VITALS
HEART RATE: 84 BPM | BODY MASS INDEX: 29.48 KG/M2 | OXYGEN SATURATION: 96 % | TEMPERATURE: 98 F | HEIGHT: 66 IN | WEIGHT: 183.44 LBS | SYSTOLIC BLOOD PRESSURE: 132 MMHG | DIASTOLIC BLOOD PRESSURE: 80 MMHG

## 2020-09-23 DIAGNOSIS — M54.50 CHRONIC MIDLINE LOW BACK PAIN WITHOUT SCIATICA: ICD-10-CM

## 2020-09-23 DIAGNOSIS — I10 ESSENTIAL HYPERTENSION: Primary | ICD-10-CM

## 2020-09-23 DIAGNOSIS — Z12.11 COLON CANCER SCREENING: ICD-10-CM

## 2020-09-23 DIAGNOSIS — G89.29 CHRONIC MIDLINE LOW BACK PAIN WITHOUT SCIATICA: ICD-10-CM

## 2020-09-23 DIAGNOSIS — M54.2 CHRONIC NECK PAIN: ICD-10-CM

## 2020-09-23 DIAGNOSIS — G89.29 CHRONIC NECK PAIN: ICD-10-CM

## 2020-09-23 DIAGNOSIS — M47.812 SPONDYLOSIS OF CERVICAL REGION WITHOUT MYELOPATHY OR RADICULOPATHY: ICD-10-CM

## 2020-09-23 PROCEDURE — 3079F PR MOST RECENT DIASTOLIC BLOOD PRESSURE 80-89 MM HG: ICD-10-PCS | Mod: HCNC,CPTII,S$GLB, | Performed by: FAMILY MEDICINE

## 2020-09-23 PROCEDURE — 1101F PT FALLS ASSESS-DOCD LE1/YR: CPT | Mod: HCNC,CPTII,S$GLB, | Performed by: FAMILY MEDICINE

## 2020-09-23 PROCEDURE — 3079F DIAST BP 80-89 MM HG: CPT | Mod: HCNC,CPTII,S$GLB, | Performed by: FAMILY MEDICINE

## 2020-09-23 PROCEDURE — 99999 PR PBB SHADOW E&M-EST. PATIENT-LVL III: CPT | Mod: PBBFAC,HCNC,, | Performed by: FAMILY MEDICINE

## 2020-09-23 PROCEDURE — 99999 PR PBB SHADOW E&M-EST. PATIENT-LVL III: ICD-10-PCS | Mod: PBBFAC,HCNC,, | Performed by: FAMILY MEDICINE

## 2020-09-23 PROCEDURE — 3075F PR MOST RECENT SYSTOLIC BLOOD PRESS GE 130-139MM HG: ICD-10-PCS | Mod: HCNC,CPTII,S$GLB, | Performed by: FAMILY MEDICINE

## 2020-09-23 PROCEDURE — 99215 OFFICE O/P EST HI 40 MIN: CPT | Mod: HCNC,S$GLB,, | Performed by: FAMILY MEDICINE

## 2020-09-23 PROCEDURE — 99215 PR OFFICE/OUTPT VISIT, EST, LEVL V, 40-54 MIN: ICD-10-PCS | Mod: HCNC,S$GLB,, | Performed by: FAMILY MEDICINE

## 2020-09-23 PROCEDURE — 1159F PR MEDICATION LIST DOCUMENTED IN MEDICAL RECORD: ICD-10-PCS | Mod: HCNC,S$GLB,, | Performed by: FAMILY MEDICINE

## 2020-09-23 PROCEDURE — 1159F MED LIST DOCD IN RCRD: CPT | Mod: HCNC,S$GLB,, | Performed by: FAMILY MEDICINE

## 2020-09-23 PROCEDURE — 1101F PR PT FALLS ASSESS DOC 0-1 FALLS W/OUT INJ PAST YR: ICD-10-PCS | Mod: HCNC,CPTII,S$GLB, | Performed by: FAMILY MEDICINE

## 2020-09-23 PROCEDURE — 3075F SYST BP GE 130 - 139MM HG: CPT | Mod: HCNC,CPTII,S$GLB, | Performed by: FAMILY MEDICINE

## 2020-09-23 RX ORDER — TRAMADOL HYDROCHLORIDE 50 MG/1
50 TABLET ORAL 3 TIMES DAILY
Qty: 90 TABLET | Refills: 0 | Status: SHIPPED | OUTPATIENT
Start: 2020-10-08 | End: 2020-11-07

## 2020-09-23 RX ORDER — TRAMADOL HYDROCHLORIDE 50 MG/1
50 TABLET ORAL 3 TIMES DAILY
Qty: 90 TABLET | Refills: 0 | Status: SHIPPED | OUTPATIENT
Start: 2020-12-07 | End: 2020-12-29 | Stop reason: SDUPTHER

## 2020-09-23 RX ORDER — TRAMADOL HYDROCHLORIDE 50 MG/1
50 TABLET ORAL 3 TIMES DAILY
Qty: 90 TABLET | Refills: 0 | Status: SHIPPED | OUTPATIENT
Start: 2020-11-07 | End: 2020-12-07

## 2020-09-23 NOTE — PROGRESS NOTES
Assessment & Plan  Problem List Items Addressed This Visit        Cardiac/Vascular    Essential hypertension - Primary    Current Assessment & Plan     Pt is currently stable on medication regimen.  Continue current therapy as scheduled.  Contact office with any questions about adjustments on medications.               Orthopedic    Chronic neck pain    Relevant Medications    traMADoL (ULTRAM) 50 mg tablet (Start on 10/8/2020)    traMADoL (ULTRAM) 50 mg tablet (Start on 11/7/2020)    traMADoL (ULTRAM) 50 mg tablet (Start on 12/7/2020)    Chronic low back pain    Relevant Medications    traMADoL (ULTRAM) 50 mg tablet (Start on 10/8/2020)    traMADoL (ULTRAM) 50 mg tablet (Start on 11/7/2020)    traMADoL (ULTRAM) 50 mg tablet (Start on 12/7/2020)      Other Visit Diagnoses     Colon cancer screening             I have discussed the common side effects of this medication with the patient and answered all of the questions they had at the time of this visit regarding this medication.      Health Maintenance reviewed.    Follow-up: No follow-ups on file.    ______________________________________________________________________    Chief Complaint  Chief Complaint   Patient presents with    Hypertension       HPI  Nuha Bernstein is a 75 y.o. female with multiple medical diagnoses as listed in the medical history and problem list that presents for hypertension  Pt is known to me with last appointment 6/23/2020.    Patient denies any new symptoms including chest pain, SOB, blurry vision, N/V, diarrhea.  Patient is doing well with her blood pressure control.  Patient takes her medication on a daily basis.  Patient checks her blood pressure on a regular basis.  Patient takes all of her medication as prescribed.  Patient does monitor her diet.  She did have a 5 lb weight gain since last office visit.  We discussed decreasing her intake of sweets including cake.    Patient is on chronic pain medication for her back in her  neck.  Patient reports that her back and neck pain of not worsen.  She does take her pain medication as instructed.  She does not share this medication.    PAST MEDICAL HISTORY:  Past Medical History:   Diagnosis Date    Arthritis     Back pain     CKD stage G3a/A3, GFR 45-59 and albumin creatinine ratio >300 mg/g 6/23/2020    Hyperglycemia     Hyperlipidemia     Hypertension     Nuclear sclerosis of both eyes 10/31/2017    Prolapse of uterus        PAST SURGICAL HISTORY:  Past Surgical History:   Procedure Laterality Date    Right Thumb      vaginal dilator         SOCIAL HISTORY:  Social History     Socioeconomic History    Marital status:      Spouse name: Not on file    Number of children: Not on file    Years of education: Not on file    Highest education level: Not on file   Occupational History    Not on file   Social Needs    Financial resource strain: Not on file    Food insecurity     Worry: Not on file     Inability: Not on file    Transportation needs     Medical: Not on file     Non-medical: Not on file   Tobacco Use    Smoking status: Never Smoker    Smokeless tobacco: Never Used   Substance and Sexual Activity    Alcohol use: No     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     Drug use: No    Sexual activity: Not Currently   Lifestyle    Physical activity     Days per week: Not on file     Minutes per session: Not on file    Stress: Not on file   Relationships    Social connections     Talks on phone: Not on file     Gets together: Not on file     Attends Yazidism service: Not on file     Active member of club or organization: Not on file     Attends meetings of clubs or organizations: Not on file     Relationship status: Not on file   Other Topics Concern    Not on file   Social History Narrative    Not on file       FAMILY HISTORY:  Family History   Problem Relation Age of Onset    Hypertension Mother     Cataracts Mother     Heart disease Mother      Hypertension Father     Cataracts Father     No Known Problems Sister     No Known Problems Brother     No Known Problems Brother     No Known Problems Maternal Aunt     No Known Problems Maternal Uncle     No Known Problems Paternal Aunt     No Known Problems Paternal Uncle     No Known Problems Maternal Grandmother     No Known Problems Maternal Grandfather     No Known Problems Paternal Grandmother     No Known Problems Paternal Grandfather     Amblyopia Neg Hx     Blindness Neg Hx     Cancer Neg Hx     Diabetes Neg Hx     Glaucoma Neg Hx     Macular degeneration Neg Hx     Retinal detachment Neg Hx     Strabismus Neg Hx     Stroke Neg Hx     Thyroid disease Neg Hx        ALLERGIES AND MEDICATIONS: updated and reviewed.  Review of patient's allergies indicates:  No Known Allergies  Current Outpatient Medications   Medication Sig Dispense Refill    aspirin 81 mg Tab Take by mouth as needed. 2-3 times weekly      atorvastatin (LIPITOR) 40 MG tablet Take 1 tablet (40 mg total) by mouth once daily. 90 tablet 3    ciclopirox (PENLAC) 8 % Soln Apply topically nightly. 1 Bottle 3    lisinopril (PRINIVIL,ZESTRIL) 40 MG tablet Take 1 tablet (40 mg total) by mouth once daily. 90 tablet 3    meloxicam (MOBIC) 15 MG tablet Take 1 tablet (15 mg total) by mouth once daily. 90 tablet 0    OMEGA 3,6,9 COMBINATION NO.7 (OMEGA DHA ORAL) Take 830 mg by mouth once daily.      [START ON 10/8/2020] traMADoL (ULTRAM) 50 mg tablet Take 1 tablet (50 mg total) by mouth 3 (three) times daily. 90 tablet 0    [START ON 11/7/2020] traMADoL (ULTRAM) 50 mg tablet Take 1 tablet (50 mg total) by mouth 3 (three) times daily. 90 tablet 0    [START ON 12/7/2020] traMADoL (ULTRAM) 50 mg tablet Take 1 tablet (50 mg total) by mouth 3 (three) times daily. 90 tablet 0    triamterene-hydrochlorothiazide 37.5-25 mg (DYAZIDE) 37.5-25 mg per capsule Take 1 capsule by mouth once daily. 90 capsule 2     No current  "facility-administered medications for this visit.          ROS  Review of Systems   Constitutional: Negative for activity change, appetite change, fatigue, fever and unexpected weight change.   HENT: Negative.  Negative for ear discharge, ear pain, rhinorrhea and sore throat.    Eyes: Negative.    Respiratory: Negative for apnea, cough, chest tightness, shortness of breath and wheezing.    Cardiovascular: Negative for chest pain, palpitations and leg swelling.   Gastrointestinal: Negative for abdominal distention, abdominal pain, constipation, diarrhea and vomiting.   Endocrine: Negative for cold intolerance, heat intolerance, polydipsia and polyuria.   Genitourinary: Negative for decreased urine volume and urgency.   Musculoskeletal: Positive for back pain and neck pain.   Skin: Negative for rash.   Neurological: Negative for dizziness and headaches.   Hematological: Does not bruise/bleed easily.   Psychiatric/Behavioral: Negative for agitation, sleep disturbance and suicidal ideas.           Physical Exam  Vitals:    09/23/20 1359   BP: 132/80   BP Location: Left arm   Patient Position: Sitting   BP Method: Large (Manual)   Pulse: 84   Temp: 98.2 °F (36.8 °C)   TempSrc: Oral   SpO2: 96%   Weight: 83.2 kg (183 lb 6.8 oz)   Height: 5' 6" (1.676 m)    Body mass index is 29.61 kg/m².  Weight: 83.2 kg (183 lb 6.8 oz)   Height: 5' 6" (167.6 cm)   Physical Exam  Vitals signs reviewed.   Constitutional:       Appearance: She is well-developed.   HENT:      Head: Normocephalic and atraumatic.      Right Ear: External ear normal.      Left Ear: External ear normal.      Nose: Nose normal.   Eyes:      Conjunctiva/sclera: Conjunctivae normal.      Pupils: Pupils are equal, round, and reactive to light.   Cardiovascular:      Rate and Rhythm: Normal rate and regular rhythm.      Heart sounds: Normal heart sounds.   Pulmonary:      Effort: Pulmonary effort is normal.      Breath sounds: Normal breath sounds.   Skin:     " General: Skin is warm and dry.   Neurological:      Mental Status: She is alert and oriented to person, place, and time.           Health Maintenance       Date Due Completion Date    Sign Pain Contract 02/17/1963 ---    Shingles Vaccine (1 of 2) 02/17/1995 ---    Pneumococcal Vaccine (65+ Low/Medium Risk) (2 of 2 - PCV13) 05/03/2011 5/3/2010    Colorectal Cancer Screening 09/14/2019 9/14/2015    Influenza Vaccine (1) 08/01/2020 11/13/2019    Lipid Panel 12/23/2020 12/23/2019    DEXA SCAN 09/23/2022 9/23/2019    TETANUS VACCINE 04/27/2023 4/27/2013              Patient note was created using [employer]`.  Any errors in syntax or even information may not have been identified and edited on initial review prior to signing this note.

## 2020-10-05 ENCOUNTER — TELEPHONE (OUTPATIENT)
Dept: FAMILY MEDICINE | Facility: CLINIC | Age: 75
End: 2020-10-05

## 2020-10-05 DIAGNOSIS — Z12.11 COLON CANCER SCREENING: Primary | ICD-10-CM

## 2020-10-05 NOTE — TELEPHONE ENCOUNTER
----- Message from Cordelia Royal sent at 10/5/2020 11:29 AM CDT -----  Regarding: self [phone]  .Type: Patient Call Back    Who called self     What is the request in detail: Pt is requesting to pawan colonoscopy     Can the clinic reply by [facility]? Call back     Would the patient rather a call back or a response via [facility]? Call back     Best call back number: [phone]

## 2020-10-15 DIAGNOSIS — Z12.11 SPECIAL SCREENING FOR MALIGNANT NEOPLASMS, COLON: Primary | ICD-10-CM

## 2020-10-15 DIAGNOSIS — Z01.812 PRE-PROCEDURE LAB EXAM: ICD-10-CM

## 2020-10-15 RX ORDER — POLYETHYLENE GLYCOL 3350, SODIUM SULFATE ANHYDROUS, SODIUM BICARBONATE, SODIUM CHLORIDE, POTASSIUM CHLORIDE 236; 22.74; 6.74; 5.86; 2.97 G/4L; G/4L; G/4L; G/4L; G/4L
4 POWDER, FOR SOLUTION ORAL ONCE
Qty: 4000 ML | Refills: 0 | Status: SHIPPED | OUTPATIENT
Start: 2020-10-15 | End: 2020-10-15

## 2020-11-06 ENCOUNTER — TELEPHONE (OUTPATIENT)
Dept: SURGERY | Facility: CLINIC | Age: 75
End: 2020-11-06

## 2020-11-06 NOTE — TELEPHONE ENCOUNTER
----- Message from Elizabeth Chaney sent at 11/6/2020 11:56 AM CST -----  Contact: [facility]  Pharmacy is looking to change prescription:    [facility] pharmacy is trying to change script to [facility]. Please call back.      Contact Info

## 2020-11-10 ENCOUNTER — LAB VISIT (OUTPATIENT)
Dept: URGENT CARE | Facility: CLINIC | Age: 75
End: 2020-11-10
Payer: MEDICARE

## 2020-11-10 DIAGNOSIS — Z01.812 PRE-PROCEDURE LAB EXAM: ICD-10-CM

## 2020-11-10 PROCEDURE — U0003 INFECTIOUS AGENT DETECTION BY NUCLEIC ACID (DNA OR RNA); SEVERE ACUTE RESPIRATORY SYNDROME CORONAVIRUS 2 (SARS-COV-2) (CORONAVIRUS DISEASE [COVID-19]), AMPLIFIED PROBE TECHNIQUE, MAKING USE OF HIGH THROUGHPUT TECHNOLOGIES AS DESCRIBED BY CMS-2020-01-R: HCPCS | Mod: HCNC

## 2020-11-10 PROCEDURE — 99211 OFF/OP EST MAY X REQ PHY/QHP: CPT | Mod: S$GLB,,, | Performed by: PHYSICIAN ASSISTANT

## 2020-11-10 PROCEDURE — 99211 PR OFFICE/OUTPT VISIT, EST, LEVL I: ICD-10-PCS | Mod: S$GLB,,, | Performed by: PHYSICIAN ASSISTANT

## 2020-11-11 LAB — SARS-COV-2 RNA RESP QL NAA+PROBE: NOT DETECTED

## 2020-11-19 ENCOUNTER — TELEPHONE (OUTPATIENT)
Dept: FAMILY MEDICINE | Facility: CLINIC | Age: 75
End: 2020-11-19

## 2020-11-19 DIAGNOSIS — Z01.818 PRE-OP TESTING: ICD-10-CM

## 2020-11-19 NOTE — TELEPHONE ENCOUNTER
----- Message from Simi Briggs sent at 11/19/2020  1:41 PM CST -----  Type: Patient Call Back    Who called: Self     What is the request in detail: calling in regards to speaking Nurse staff about colonoscopy.     Can the clinic reply by [facility]? Call back     Would the patient rather a call back or a response via [facility]? Call back     Best call back number: [phone] or [phone]

## 2020-12-12 ENCOUNTER — LAB VISIT (OUTPATIENT)
Dept: INTERNAL MEDICINE | Facility: CLINIC | Age: 75
End: 2020-12-12
Payer: MEDICARE

## 2020-12-12 DIAGNOSIS — Z01.818 PRE-OP TESTING: ICD-10-CM

## 2020-12-12 PROCEDURE — U0003 INFECTIOUS AGENT DETECTION BY NUCLEIC ACID (DNA OR RNA); SEVERE ACUTE RESPIRATORY SYNDROME CORONAVIRUS 2 (SARS-COV-2) (CORONAVIRUS DISEASE [COVID-19]), AMPLIFIED PROBE TECHNIQUE, MAKING USE OF HIGH THROUGHPUT TECHNOLOGIES AS DESCRIBED BY CMS-2020-01-R: HCPCS | Mod: HCNC

## 2020-12-13 LAB — SARS-COV-2 RNA RESP QL NAA+PROBE: NOT DETECTED

## 2020-12-15 ENCOUNTER — ANESTHESIA (OUTPATIENT)
Dept: ENDOSCOPY | Facility: HOSPITAL | Age: 75
End: 2020-12-15
Payer: MEDICARE

## 2020-12-15 ENCOUNTER — HOSPITAL ENCOUNTER (OUTPATIENT)
Facility: HOSPITAL | Age: 75
Discharge: HOME | End: 2020-12-15
Attending: COLON & RECTAL SURGERY | Admitting: COLON & RECTAL SURGERY
Payer: MEDICARE

## 2020-12-15 ENCOUNTER — ANESTHESIA EVENT (OUTPATIENT)
Dept: ENDOSCOPY | Facility: HOSPITAL | Age: 75
End: 2020-12-15
Payer: MEDICARE

## 2020-12-15 VITALS
OXYGEN SATURATION: 95 % | SYSTOLIC BLOOD PRESSURE: 158 MMHG | HEIGHT: 66 IN | RESPIRATION RATE: 15 BRPM | HEART RATE: 70 BPM | BODY MASS INDEX: 27.8 KG/M2 | WEIGHT: 173 LBS | DIASTOLIC BLOOD PRESSURE: 75 MMHG | TEMPERATURE: 98 F

## 2020-12-15 DIAGNOSIS — Z12.11 SCREENING FOR COLON CANCER: ICD-10-CM

## 2020-12-15 PROCEDURE — E9220 PRA ENDO ANESTHESIA: HCPCS | Mod: HCNC,,, | Performed by: NURSE ANESTHETIST, CERTIFIED REGISTERED

## 2020-12-15 PROCEDURE — 25000003 PHARM REV CODE 250: Mod: HCNC | Performed by: COLON & RECTAL SURGERY

## 2020-12-15 PROCEDURE — 63600175 PHARM REV CODE 636 W HCPCS: Mod: HCNC | Performed by: NURSE ANESTHETIST, CERTIFIED REGISTERED

## 2020-12-15 PROCEDURE — G0105 COLORECTAL SCRN; HI RISK IND: ICD-10-PCS | Mod: HCNC,,, | Performed by: COLON & RECTAL SURGERY

## 2020-12-15 PROCEDURE — E9220 PRA ENDO ANESTHESIA: ICD-10-PCS | Mod: HCNC,,, | Performed by: NURSE ANESTHETIST, CERTIFIED REGISTERED

## 2020-12-15 PROCEDURE — 37000009 HC ANESTHESIA EA ADD 15 MINS: Mod: HCNC | Performed by: COLON & RECTAL SURGERY

## 2020-12-15 PROCEDURE — 25000003 PHARM REV CODE 250: Mod: HCNC | Performed by: NURSE ANESTHETIST, CERTIFIED REGISTERED

## 2020-12-15 PROCEDURE — 37000008 HC ANESTHESIA 1ST 15 MINUTES: Mod: HCNC | Performed by: COLON & RECTAL SURGERY

## 2020-12-15 PROCEDURE — G0105 COLORECTAL SCRN; HI RISK IND: HCPCS | Mod: HCNC | Performed by: COLON & RECTAL SURGERY

## 2020-12-15 PROCEDURE — G0105 COLORECTAL SCRN; HI RISK IND: HCPCS | Mod: HCNC,,, | Performed by: COLON & RECTAL SURGERY

## 2020-12-15 RX ORDER — SODIUM CHLORIDE 9 MG/ML
INJECTION, SOLUTION INTRAVENOUS CONTINUOUS
Status: DISCONTINUED | OUTPATIENT
Start: 2020-12-15 | End: 2020-12-15 | Stop reason: HOSPADM

## 2020-12-15 RX ORDER — PROPOFOL 10 MG/ML
VIAL (ML) INTRAVENOUS
Status: DISCONTINUED | OUTPATIENT
Start: 2020-12-15 | End: 2020-12-15

## 2020-12-15 RX ORDER — LIDOCAINE HYDROCHLORIDE 20 MG/ML
INJECTION INTRAVENOUS
Status: DISCONTINUED | OUTPATIENT
Start: 2020-12-15 | End: 2020-12-15

## 2020-12-15 RX ORDER — PROPOFOL 10 MG/ML
VIAL (ML) INTRAVENOUS CONTINUOUS PRN
Status: DISCONTINUED | OUTPATIENT
Start: 2020-12-15 | End: 2020-12-15

## 2020-12-15 RX ORDER — PHENYLEPHRINE HYDROCHLORIDE 10 MG/ML
INJECTION INTRAVENOUS
Status: DISCONTINUED | OUTPATIENT
Start: 2020-12-15 | End: 2020-12-15

## 2020-12-15 RX ADMIN — PHENYLEPHRINE HYDROCHLORIDE 200 MCG: 10 INJECTION INTRAVENOUS at 09:12

## 2020-12-15 RX ADMIN — SODIUM CHLORIDE: 0.9 INJECTION, SOLUTION INTRAVENOUS at 09:12

## 2020-12-15 RX ADMIN — PROPOFOL 200 MCG/KG/MIN: 10 INJECTION, EMULSION INTRAVENOUS at 09:12

## 2020-12-15 RX ADMIN — PROPOFOL 70 MG: 10 INJECTION, EMULSION INTRAVENOUS at 09:12

## 2020-12-15 RX ADMIN — LIDOCAINE HYDROCHLORIDE 75 MG: 20 INJECTION, SOLUTION INTRAVENOUS at 09:12

## 2020-12-15 NOTE — DISCHARGE INSTRUCTIONS
Colonoscopy     A camera attached to a flexible tube with a viewing lens is used to take video pictures.     Colonoscopy is a test to view the inside of your lower digestive tract (colon and rectum). Sometimes it can show the last part of the small intestine (ileum). During the test, small pieces of tissue may be removed for testing. This is called a biopsy. Small growths, such as polyps, may also be removed.   Why is colonoscopy done?  The test is done to help look for colon cancer. And it can help find the source of abdominal pain, bleeding, and changes in bowel habits. It may be needed once a year, depending on factors such as your:  · Age  · Health history  · Family health history  · Symptoms  · Results from any prior colonoscopy  Risks and possible complications  These include:  · Bleeding               · A puncture or tear in the colon   · Risks of anesthesia  · A cancer lesion not being seen  Getting ready   To prepare for the test:  · Talk with your healthcare provider about the risks of the test (see below). Also ask your healthcare provider about alternatives to the test.  · Tell your healthcare provider about any medicines you take. Also tell him or her about any health conditions you may have.  · Make sure your rectum and colon are empty for the test. Follow the diet and bowel prep instructions exactly. If you dont, the test may need to be rescheduled.  · Plan for a friend or family member to drive you home after the test.     Colonoscopy provides an inside view of the entire colon.     You may discuss the results with your doctor right away or at a future visit.  During the test   The test is usually done in the hospital on an outpatient basis. This means you go home the same day. The procedure takes about 30 minutes. During that time:  · You are given relaxing (sedating) medicine through an IV line. You may be drowsy, or fully asleep.  · The healthcare provider will first give you a physical exam to  check for anal and rectal problems.  · Then the anus is lubricated and the scope inserted.  · If you are awake, you may have a feeling similar to needing to have a bowel movement. You may also feel pressure as air is pumped into the colon. Its OK to pass gas during the procedure.  · Biopsy, polyp removal, or other treatments may be done during the test.  After the test   You may have gas right after the test. It can help to try to pass it to help prevent later bloating. Your healthcare provider may discuss the results with you right away. Or you may need to schedule a follow-up visit to talk about the results. After the test, you can go back to your normal eating and other activities. You may be tired from the sedation and need to rest for a few hours.  Date Last Reviewed: 11/1/2016 © 2000-2017 [employer], [employer]. [address] [place] [address], [place] [address]. All rights reserved. This information is not intended as a substitute for professional medical care. Always follow your healthcare professional's instructions.

## 2020-12-15 NOTE — TRANSFER OF CARE
"Anesthesia Transfer of Care Note    Patient: Nuha Bernstein    Procedure(s) Performed: Procedure(s) (LRB):  COLONOSCOPY (N/A)    Patient location: OPS    Anesthesia Type: general    Transport from OR: Transported from OR on room air with adequate spontaneous ventilation    Post pain: adequate analgesia    Post assessment: no apparent anesthetic complications and tolerated procedure well    Post vital signs: stable    Level of consciousness: awake, alert and oriented    Nausea/Vomiting: no nausea/vomiting    Complications: none    Transfer of care protocol was followed      Last vitals:   Visit Vitals  BP (!) 112/56 (BP Location: Left arm, Patient Position: Lying)   Pulse 70   Temp 36.6 °C (97.9 °F) (Temporal)   Resp 16   Ht 5' 6" (1.676 m)   Wt 78.5 kg (173 lb)   SpO2 95%   BMI 27.92 kg/m²     "

## 2020-12-15 NOTE — H&P
COLONOSCOPY HISTORY AND PHYSICAL EXAM    Procedure : Colonoscopy      INDICATIONS: asymptomatic screening exam and personal history of colon polyps    Family Hx of CRC: Denies    Last Colonoscopy:  2015  Findings: Normal       Past Medical History:   Diagnosis Date    Arthritis     Back pain     CKD stage G3a/A3, GFR 45-59 and albumin creatinine ratio >300 mg/g 6/23/2020    Hyperglycemia     Hyperlipidemia     Hypertension     Nuclear sclerosis of both eyes 10/31/2017    Prolapse of uterus      Sedation Problems: NO  Family History   Problem Relation Age of Onset    Hypertension Mother     Cataracts Mother     Heart disease Mother     Hypertension Father     Cataracts Father     No Known Problems Sister     No Known Problems Brother     No Known Problems Brother     No Known Problems Maternal Aunt     No Known Problems Maternal Uncle     No Known Problems Paternal Aunt     No Known Problems Paternal Uncle     No Known Problems Maternal Grandmother     No Known Problems Maternal Grandfather     No Known Problems Paternal Grandmother     No Known Problems Paternal Grandfather     Amblyopia Neg Hx     Blindness Neg Hx     Cancer Neg Hx     Diabetes Neg Hx     Glaucoma Neg Hx     Macular degeneration Neg Hx     Retinal detachment Neg Hx     Strabismus Neg Hx     Stroke Neg Hx     Thyroid disease Neg Hx      Fam Hx of Sedation Problems: NO  Social History     Socioeconomic History    Marital status:      Spouse name: Not on file    Number of children: Not on file    Years of education: Not on file    Highest education level: Not on file   Occupational History    Not on file   Social Needs    Financial resource strain: Not on file    Food insecurity     Worry: Not on file     Inability: Not on file    Transportation needs     Medical: Not on file     Non-medical: Not on file   Tobacco Use    Smoking status: Never Smoker    Smokeless tobacco: Never Used   Substance and  Sexual Activity    Alcohol use: No     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     Drug use: No    Sexual activity: Not Currently   Lifestyle    Physical activity     Days per week: Not on file     Minutes per session: Not on file    Stress: Not on file   Relationships    Social connections     Talks on phone: Not on file     Gets together: Not on file     Attends Evangelical service: Not on file     Active member of club or organization: Not on file     Attends meetings of clubs or organizations: Not on file     Relationship status: Not on file   Other Topics Concern    Not on file   Social History Narrative    Not on file       Review of Systems - Negative except   Respiratory ROS: no dyspnea  Cardiovascular ROS: no exertional chest pain  Gastrointestinal ROS: NO abdominal discomfort,  NO rectal bleeding  Musculoskeletal ROS: no muscular pain  Neurological ROS: no recent stroke    Physical Exam:  There were no vitals taken for this visit.  General: no distress  Head: normocephalic  Mallampati Score   Neck: supple, symmetrical, trachea midline  Lungs:  clear to auscultation bilaterally and normal respiratory effort  Heart: regular rate and rhythm and no murmur  Abdomen: soft, non-tender non-distented; bowel sounds normal; no masses,  no organomegaly  Extremities: no cyanosis or edema, or clubbing    ASA:  III    PLAN  COLONOSCOPY.    SedationPlan :MAC    The details of the procedure, the possible need for biopsy or polypectomy and the potential risks including bleeding, perforation, missed polyps were discussed in detail.

## 2020-12-15 NOTE — PLAN OF CARE
Plan of care discussed with patient. All questions and concerns addressed and answered. Free of pain or distress. PIV removed without complications. VS stable. Procedure report and discharge instructions gone over and patient aware of restrictions and when to resume medications. Patient in agreement.    Dr. Maya at bedside

## 2020-12-15 NOTE — ANESTHESIA PREPROCEDURE EVALUATION
12/15/2020  Nuha Bernstein is a 75 y.o., female.  Past Medical History:   Diagnosis Date    Arthritis     Back pain     CKD stage G3a/A3, GFR 45-59 and albumin creatinine ratio >300 mg/g 6/23/2020    Hyperglycemia     Hyperlipidemia     Hypertension     Nuclear sclerosis of both eyes 10/31/2017    Prolapse of uterus      Past Surgical History:   Procedure Laterality Date    Right Thumb      vaginal dilator           Anesthesia Evaluation    I have reviewed the Patient Summary Reports.      I have reviewed the Medications.     Review of Systems  Anesthesia Hx:  No problems with previous Anesthesia  Denies Family Hx of Anesthesia complications.   Denies Personal Hx of Anesthesia complications.   Hematology/Oncology:  Hematology Normal   Oncology Normal     EENT/Dental:EENT/Dental Normal   Cardiovascular:   Hypertension    Pulmonary:  Pulmonary Normal    Renal/:   Chronic Renal Disease    Hepatic/GI:  Hepatic/GI Normal    Musculoskeletal:   Arthritis     Neurological:  Neurology Normal    Endocrine:  Endocrine Normal    Dermatological:  Skin Normal    Psych:  Psychiatric Normal           Physical Exam  General:  Well nourished, Obesity    Airway/Jaw/Neck:  Airway Findings: Mouth Opening: Normal Tongue: Normal  General Airway Assessment: Adult  Mallampati: II  Jaw/Neck Findings:     Eyes/Ears/Nose:  EYES/EARS/NOSE FINDINGS: Normal   Dental:  Dental Findings: In tact   Chest/Lungs:  Chest/Lungs Findings: Clear to auscultation     Heart/Vascular:  Heart Findings: Rate: Normal  Heart murmur: negative Vascular Findings: Normal    Abdomen:  Abdomen Findings: Normal    Musculoskeletal:  Musculoskeletal Findings: Normal   Skin:  Skin Findings: Normal    Mental Status:  Mental Status Findings:  Alert and Oriented, Cooperative         Anesthesia Plan  Type of Anesthesia, risks & benefits  discussed:  Anesthesia Type:  general  Patient's Preference: general  Intra-op Monitoring Plan: standard ASA monitors  Intra-op Monitoring Plan Comments:   Post Op Pain Control Plan:   Post Op Pain Control Plan Comments:   Induction:   IV  Beta Blocker:  Patient is not currently on a Beta-Blocker (No further documentation required).       Informed Consent: Patient understands risks and agrees with Anesthesia plan.  Questions answered. Anesthesia consent signed with patient.  ASA Score: 2     Day of Surgery Review of History & Physical:  There are no significant changes.  H&P update referred to the provider.         Ready For Surgery From Anesthesia Perspective.

## 2020-12-15 NOTE — [employer] PATIENT INSTRUCTIONS
Discharge Summary/Instructions after an Endoscopic Procedure  Patient Name: Nuha Bernstein  Patient MRN: [id number]  Patient YOB: 1945  Tuesday, December 15, 2020  Antonio Maya MD  RESTRICTIONS:  During your procedure today, you received medications for sedation.  These   medications may affect your judgment, balance and coordination.  Therefore,   for 24 hours, you have the following restrictions:   - DO NOT drive a car, operate machinery, make legal/financial decisions,   sign important papers or drink alcohol.    ACTIVITY:  Today: no heavy lifting, straining or running due to procedural   sedation/anesthesia.  The following day: return to full activity including work.  DIET:  Eat and drink normally unless instructed otherwise.     TREATMENT FOR COMMON SIDE EFFECTS:  - Mild abdominal pain, nausea, belching, bloating or excessive gas:  rest,   eat lightly and use a heating pad.  - Sore Throat: treat with throat lozenges and/or gargle with warm salt   water.  - Because air was used during the procedure, expelling large amounts of air   from your rectum or belching is normal.  - If a bowel prep was taken, you may not have a bowel movement for 1-3 days.    This is normal.  SYMPTOMS TO WATCH FOR AND REPORT TO YOUR PHYSICIAN:  1. Abdominal pain or bloating, other than gas cramps.  2. Chest pain.  3. Back pain.  4. Signs of infection such as: chills or fever occurring within 24 hours   after the procedure.  5. Rectal bleeding, which would show as bright red, maroon, or black stools.   (A tablespoon of blood from the rectum is not serious, especially if   hemorrhoids are present.)  6. Vomiting.  7. Weakness or dizziness.  GO DIRECTLY TO THE NEAREST EMERGENCY ROOM IF YOU HAVE ANY OF THE FOLLOWING:      Difficulty breathing              Chills and/or fever over 101 F   Persistent vomiting and/or vomiting blood   Severe abdominal pain   Severe chest pain   Black, tarry stools   Bleeding- more than one  tablespoon   Any other symptom or condition that you feel may need urgent attention  Your doctor recommends these additional instructions:  If any biopsies were taken, your doctors clinic will contact you in 1 to 2   weeks with any results.  - Discharge patient to home.   - Resume previous diet.   - Continue present medications.   - Patient has a contact number available for emergencies.  The signs and   symptoms of potential delayed complications were discussed with the   patient.  Return to normal activities tomorrow.  Written discharge   instructions were provided to the patient.   - Repeat colonoscopy in 5 years for screening purposes.  For questions, problems or results please call your physician - Antonio Maya MD at Work:  [phone].  [facility] [place], EMERGENCY ROOM PHONE NUMBER: [phone]  IF A COMPLICATION OR EMERGENCY SITUATION ARISES AND YOU ARE UNABLE TO REACH   YOUR PHYSICIAN - GO DIRECTLY TO THE EMERGENCY ROOM.  Antonio Maya MD  12/15/2020 9:52:38 AM  This report has been verified and signed electronically.  [employer]

## 2020-12-15 NOTE — ANESTHESIA POSTPROCEDURE EVALUATION
Anesthesia Post Evaluation    Patient: Nuha Bernstein    Procedure(s) Performed: Procedure(s) (LRB):  COLONOSCOPY (N/A)    Final Anesthesia Type: general    Patient location during evaluation: PACU  Patient participation: Yes- Able to Participate  Level of consciousness: awake and alert  Post-procedure vital signs: reviewed and stable  Pain management: adequate  Airway patency: patent    PONV status at discharge: No PONV  Anesthetic complications: no      Cardiovascular status: blood pressure returned to baseline  Respiratory status: unassisted  Hydration status: euvolemic  Follow-up not needed.          Vitals Value Taken Time   /75 12/15/20 1027   Temp 36.6 °C (97.9 °F) 12/15/20 0955   Pulse 70 12/15/20 1027   Resp 15 12/15/20 1027   SpO2 95 % 12/15/20 1027         Event Time   Out of Recovery 10:37:33         Pain/Cisco Score: Cisco Score: 10 (12/15/2020 10:13 AM)

## 2020-12-16 ENCOUNTER — PATIENT MESSAGE (OUTPATIENT)
Dept: SURGERY | Facility: CLINIC | Age: 75
End: 2020-12-16

## 2020-12-29 ENCOUNTER — OFFICE VISIT (OUTPATIENT)
Dept: FAMILY MEDICINE | Facility: CLINIC | Age: 75
End: 2020-12-29
Payer: MEDICARE

## 2020-12-29 VITALS
DIASTOLIC BLOOD PRESSURE: 80 MMHG | SYSTOLIC BLOOD PRESSURE: 120 MMHG | HEART RATE: 87 BPM | HEIGHT: 66 IN | OXYGEN SATURATION: 97 % | TEMPERATURE: 98 F | WEIGHT: 182.13 LBS | BODY MASS INDEX: 29.27 KG/M2

## 2020-12-29 DIAGNOSIS — G89.29 CHRONIC MIDLINE LOW BACK PAIN WITHOUT SCIATICA: ICD-10-CM

## 2020-12-29 DIAGNOSIS — G89.29 CHRONIC NECK PAIN: ICD-10-CM

## 2020-12-29 DIAGNOSIS — R73.03 PREDIABETES: ICD-10-CM

## 2020-12-29 DIAGNOSIS — E78.49 OTHER HYPERLIPIDEMIA: ICD-10-CM

## 2020-12-29 DIAGNOSIS — M54.50 CHRONIC MIDLINE LOW BACK PAIN WITHOUT SCIATICA: ICD-10-CM

## 2020-12-29 DIAGNOSIS — M54.2 CHRONIC NECK PAIN: ICD-10-CM

## 2020-12-29 DIAGNOSIS — I10 ESSENTIAL HYPERTENSION: Primary | ICD-10-CM

## 2020-12-29 DIAGNOSIS — N18.31 STAGE 3A CHRONIC KIDNEY DISEASE: ICD-10-CM

## 2020-12-29 PROCEDURE — 1101F PR PT FALLS ASSESS DOC 0-1 FALLS W/OUT INJ PAST YR: ICD-10-PCS | Mod: CPTII,S$GLB,, | Performed by: FAMILY MEDICINE

## 2020-12-29 PROCEDURE — 99499 UNLISTED E&M SERVICE: CPT | Mod: S$GLB,,, | Performed by: FAMILY MEDICINE

## 2020-12-29 PROCEDURE — 3074F SYST BP LT 130 MM HG: CPT | Mod: CPTII,S$GLB,, | Performed by: FAMILY MEDICINE

## 2020-12-29 PROCEDURE — 3079F PR MOST RECENT DIASTOLIC BLOOD PRESSURE 80-89 MM HG: ICD-10-PCS | Mod: CPTII,S$GLB,, | Performed by: FAMILY MEDICINE

## 2020-12-29 PROCEDURE — 1159F PR MEDICATION LIST DOCUMENTED IN MEDICAL RECORD: ICD-10-PCS | Mod: S$GLB,,, | Performed by: FAMILY MEDICINE

## 2020-12-29 PROCEDURE — 1159F MED LIST DOCD IN RCRD: CPT | Mod: S$GLB,,, | Performed by: FAMILY MEDICINE

## 2020-12-29 PROCEDURE — 99999 PR PBB SHADOW E&M-EST. PATIENT-LVL IV: CPT | Mod: PBBFAC,,, | Performed by: FAMILY MEDICINE

## 2020-12-29 PROCEDURE — 99215 PR OFFICE/OUTPT VISIT, EST, LEVL V, 40-54 MIN: ICD-10-PCS | Mod: S$GLB,,, | Performed by: FAMILY MEDICINE

## 2020-12-29 PROCEDURE — 1157F PR ADVANCE CARE PLAN OR EQUIV PRESENT IN MEDICAL RECORD: ICD-10-PCS | Mod: S$GLB,,, | Performed by: FAMILY MEDICINE

## 2020-12-29 PROCEDURE — 1125F AMNT PAIN NOTED PAIN PRSNT: CPT | Mod: S$GLB,,, | Performed by: FAMILY MEDICINE

## 2020-12-29 PROCEDURE — 99999 PR PBB SHADOW E&M-EST. PATIENT-LVL IV: ICD-10-PCS | Mod: PBBFAC,,, | Performed by: FAMILY MEDICINE

## 2020-12-29 PROCEDURE — 99215 OFFICE O/P EST HI 40 MIN: CPT | Mod: S$GLB,,, | Performed by: FAMILY MEDICINE

## 2020-12-29 PROCEDURE — 3074F PR MOST RECENT SYSTOLIC BLOOD PRESSURE < 130 MM HG: ICD-10-PCS | Mod: CPTII,S$GLB,, | Performed by: FAMILY MEDICINE

## 2020-12-29 PROCEDURE — 1125F PR PAIN SEVERITY QUANTIFIED, PAIN PRESENT: ICD-10-PCS | Mod: S$GLB,,, | Performed by: FAMILY MEDICINE

## 2020-12-29 PROCEDURE — 1157F ADVNC CARE PLAN IN RCRD: CPT | Mod: S$GLB,,, | Performed by: FAMILY MEDICINE

## 2020-12-29 PROCEDURE — 3288F FALL RISK ASSESSMENT DOCD: CPT | Mod: CPTII,S$GLB,, | Performed by: FAMILY MEDICINE

## 2020-12-29 PROCEDURE — 1101F PT FALLS ASSESS-DOCD LE1/YR: CPT | Mod: CPTII,S$GLB,, | Performed by: FAMILY MEDICINE

## 2020-12-29 PROCEDURE — 3079F DIAST BP 80-89 MM HG: CPT | Mod: CPTII,S$GLB,, | Performed by: FAMILY MEDICINE

## 2020-12-29 PROCEDURE — 99499 RISK ADDL DX/OHS AUDIT: ICD-10-PCS | Mod: S$GLB,,, | Performed by: FAMILY MEDICINE

## 2020-12-29 PROCEDURE — 3288F PR FALLS RISK ASSESSMENT DOCUMENTED: ICD-10-PCS | Mod: CPTII,S$GLB,, | Performed by: FAMILY MEDICINE

## 2020-12-29 RX ORDER — TRAMADOL HYDROCHLORIDE 50 MG/1
50 TABLET ORAL 3 TIMES DAILY
Qty: 90 TABLET | Refills: 0 | Status: SHIPPED | OUTPATIENT
Start: 2020-12-31 | End: 2021-01-30

## 2020-12-29 RX ORDER — TRAMADOL HYDROCHLORIDE 50 MG/1
50 TABLET ORAL 3 TIMES DAILY
Qty: 90 TABLET | Refills: 0 | Status: SHIPPED | OUTPATIENT
Start: 2021-03-01 | End: 2021-03-30 | Stop reason: SDUPTHER

## 2020-12-29 RX ORDER — TRAMADOL HYDROCHLORIDE 50 MG/1
50 TABLET ORAL 3 TIMES DAILY
Qty: 90 TABLET | Refills: 0 | Status: SHIPPED | OUTPATIENT
Start: 2021-01-30 | End: 2021-03-01

## 2021-01-26 ENCOUNTER — LAB VISIT (OUTPATIENT)
Dept: LAB | Facility: HOSPITAL | Age: 76
End: 2021-01-26
Attending: FAMILY MEDICINE
Payer: MEDICARE

## 2021-01-26 DIAGNOSIS — R73.03 PREDIABETES: ICD-10-CM

## 2021-01-26 DIAGNOSIS — E78.49 OTHER HYPERLIPIDEMIA: ICD-10-CM

## 2021-01-26 DIAGNOSIS — N18.31 STAGE 3A CHRONIC KIDNEY DISEASE: ICD-10-CM

## 2021-01-26 DIAGNOSIS — I10 ESSENTIAL HYPERTENSION: ICD-10-CM

## 2021-01-26 LAB
ALBUMIN SERPL BCP-MCNC: 3.8 G/DL (ref 3.5–5.2)
ALP SERPL-CCNC: 91 U/L (ref 55–135)
ALT SERPL W/O P-5'-P-CCNC: 32 U/L (ref 10–44)
ANION GAP SERPL CALC-SCNC: 9 MMOL/L (ref 8–16)
AST SERPL-CCNC: 35 U/L (ref 10–40)
BASOPHILS # BLD AUTO: 0.02 K/UL (ref 0–0.2)
BASOPHILS NFR BLD: 0.3 % (ref 0–1.9)
BILIRUB SERPL-MCNC: 0.5 MG/DL (ref 0.1–1)
BUN SERPL-MCNC: 30 MG/DL (ref 8–23)
CALCIUM SERPL-MCNC: 9.5 MG/DL (ref 8.7–10.5)
CHLORIDE SERPL-SCNC: 105 MMOL/L (ref 95–110)
CHOLEST SERPL-MCNC: 186 MG/DL (ref 120–199)
CHOLEST/HDLC SERPL: 3.3 {RATIO} (ref 2–5)
CO2 SERPL-SCNC: 28 MMOL/L (ref 23–29)
CREAT SERPL-MCNC: 1.3 MG/DL (ref 0.5–1.4)
DIFFERENTIAL METHOD: ABNORMAL
EOSINOPHIL # BLD AUTO: 0.3 K/UL (ref 0–0.5)
EOSINOPHIL NFR BLD: 5.8 % (ref 0–8)
ERYTHROCYTE [DISTWIDTH] IN BLOOD BY AUTOMATED COUNT: 14.2 % (ref 11.5–14.5)
EST. GFR  (AFRICAN AMERICAN): 46.4 ML/MIN/1.73 M^2
EST. GFR  (NON AFRICAN AMERICAN): 40.2 ML/MIN/1.73 M^2
GLUCOSE SERPL-MCNC: 98 MG/DL (ref 70–110)
HCT VFR BLD AUTO: 36.7 % (ref 37–48.5)
HDLC SERPL-MCNC: 57 MG/DL (ref 40–75)
HDLC SERPL: 30.6 % (ref 20–50)
HGB BLD-MCNC: 11.2 G/DL (ref 12–16)
IMM GRANULOCYTES # BLD AUTO: 0.01 K/UL (ref 0–0.04)
IMM GRANULOCYTES NFR BLD AUTO: 0.2 % (ref 0–0.5)
LDLC SERPL CALC-MCNC: 113.4 MG/DL (ref 63–159)
LYMPHOCYTES # BLD AUTO: 1.8 K/UL (ref 1–4.8)
LYMPHOCYTES NFR BLD: 30.4 % (ref 18–48)
MCH RBC QN AUTO: 28.1 PG (ref 27–31)
MCHC RBC AUTO-ENTMCNC: 30.5 G/DL (ref 32–36)
MCV RBC AUTO: 92 FL (ref 82–98)
MONOCYTES # BLD AUTO: 0.5 K/UL (ref 0.3–1)
MONOCYTES NFR BLD: 9.1 % (ref 4–15)
NEUTROPHILS # BLD AUTO: 3.2 K/UL (ref 1.8–7.7)
NEUTROPHILS NFR BLD: 54.2 % (ref 38–73)
NONHDLC SERPL-MCNC: 129 MG/DL
NRBC BLD-RTO: 0 /100 WBC
PLATELET # BLD AUTO: 260 K/UL (ref 150–350)
PMV BLD AUTO: 11.7 FL (ref 9.2–12.9)
POTASSIUM SERPL-SCNC: 4.2 MMOL/L (ref 3.5–5.1)
PROT SERPL-MCNC: 7.4 G/DL (ref 6–8.4)
RBC # BLD AUTO: 3.99 M/UL (ref 4–5.4)
SODIUM SERPL-SCNC: 142 MMOL/L (ref 136–145)
TRIGL SERPL-MCNC: 78 MG/DL (ref 30–150)
TSH SERPL DL<=0.005 MIU/L-ACNC: 1.25 UIU/ML (ref 0.4–4)
WBC # BLD AUTO: 5.83 K/UL (ref 3.9–12.7)

## 2021-01-26 PROCEDURE — 80053 COMPREHEN METABOLIC PANEL: CPT

## 2021-01-26 PROCEDURE — 80061 LIPID PANEL: CPT

## 2021-01-26 PROCEDURE — 83036 HEMOGLOBIN GLYCOSYLATED A1C: CPT

## 2021-01-26 PROCEDURE — 84443 ASSAY THYROID STIM HORMONE: CPT

## 2021-01-26 PROCEDURE — 36415 COLL VENOUS BLD VENIPUNCTURE: CPT | Mod: PO

## 2021-01-26 PROCEDURE — 85025 COMPLETE CBC W/AUTO DIFF WBC: CPT

## 2021-01-27 LAB
ESTIMATED AVG GLUCOSE: 134 MG/DL (ref 68–131)
HBA1C MFR BLD HPLC: 6.3 % (ref 4–5.6)

## 2021-03-30 ENCOUNTER — OFFICE VISIT (OUTPATIENT)
Dept: FAMILY MEDICINE | Facility: CLINIC | Age: 76
End: 2021-03-30
Payer: MEDICARE

## 2021-03-30 VITALS
SYSTOLIC BLOOD PRESSURE: 120 MMHG | OXYGEN SATURATION: 95 % | HEART RATE: 73 BPM | DIASTOLIC BLOOD PRESSURE: 80 MMHG | TEMPERATURE: 99 F | WEIGHT: 183.88 LBS | BODY MASS INDEX: 29.55 KG/M2 | HEIGHT: 66 IN

## 2021-03-30 DIAGNOSIS — G89.29 CHRONIC MIDLINE LOW BACK PAIN WITHOUT SCIATICA: ICD-10-CM

## 2021-03-30 DIAGNOSIS — I10 ESSENTIAL HYPERTENSION: ICD-10-CM

## 2021-03-30 DIAGNOSIS — N18.31 STAGE 3A CHRONIC KIDNEY DISEASE: ICD-10-CM

## 2021-03-30 DIAGNOSIS — H25.89 OTHER AGE-RELATED CATARACT OF BOTH EYES: ICD-10-CM

## 2021-03-30 DIAGNOSIS — E78.49 OTHER HYPERLIPIDEMIA: ICD-10-CM

## 2021-03-30 DIAGNOSIS — J84.10 CALCIFIED GRANULOMA OF LUNG: ICD-10-CM

## 2021-03-30 DIAGNOSIS — M54.2 CHRONIC NECK PAIN: Primary | ICD-10-CM

## 2021-03-30 DIAGNOSIS — M54.50 CHRONIC MIDLINE LOW BACK PAIN WITHOUT SCIATICA: ICD-10-CM

## 2021-03-30 DIAGNOSIS — G89.29 CHRONIC NECK PAIN: Primary | ICD-10-CM

## 2021-03-30 PROCEDURE — 99999 PR PBB SHADOW E&M-EST. PATIENT-LVL IV: ICD-10-PCS | Mod: PBBFAC,,, | Performed by: FAMILY MEDICINE

## 2021-03-30 PROCEDURE — 1101F PT FALLS ASSESS-DOCD LE1/YR: CPT | Mod: CPTII,S$GLB,, | Performed by: FAMILY MEDICINE

## 2021-03-30 PROCEDURE — 1157F ADVNC CARE PLAN IN RCRD: CPT | Mod: S$GLB,,, | Performed by: FAMILY MEDICINE

## 2021-03-30 PROCEDURE — 99214 OFFICE O/P EST MOD 30 MIN: CPT | Mod: S$GLB,,, | Performed by: FAMILY MEDICINE

## 2021-03-30 PROCEDURE — 99499 UNLISTED E&M SERVICE: CPT | Mod: S$GLB,,, | Performed by: FAMILY MEDICINE

## 2021-03-30 PROCEDURE — 1101F PR PT FALLS ASSESS DOC 0-1 FALLS W/OUT INJ PAST YR: ICD-10-PCS | Mod: CPTII,S$GLB,, | Performed by: FAMILY MEDICINE

## 2021-03-30 PROCEDURE — 1126F AMNT PAIN NOTED NONE PRSNT: CPT | Mod: S$GLB,,, | Performed by: FAMILY MEDICINE

## 2021-03-30 PROCEDURE — 99999 PR PBB SHADOW E&M-EST. PATIENT-LVL IV: CPT | Mod: PBBFAC,,, | Performed by: FAMILY MEDICINE

## 2021-03-30 PROCEDURE — 3288F FALL RISK ASSESSMENT DOCD: CPT | Mod: CPTII,S$GLB,, | Performed by: FAMILY MEDICINE

## 2021-03-30 PROCEDURE — 99214 PR OFFICE/OUTPT VISIT, EST, LEVL IV, 30-39 MIN: ICD-10-PCS | Mod: S$GLB,,, | Performed by: FAMILY MEDICINE

## 2021-03-30 PROCEDURE — 1159F PR MEDICATION LIST DOCUMENTED IN MEDICAL RECORD: ICD-10-PCS | Mod: S$GLB,,, | Performed by: FAMILY MEDICINE

## 2021-03-30 PROCEDURE — 3288F PR FALLS RISK ASSESSMENT DOCUMENTED: ICD-10-PCS | Mod: CPTII,S$GLB,, | Performed by: FAMILY MEDICINE

## 2021-03-30 PROCEDURE — 1157F PR ADVANCE CARE PLAN OR EQUIV PRESENT IN MEDICAL RECORD: ICD-10-PCS | Mod: S$GLB,,, | Performed by: FAMILY MEDICINE

## 2021-03-30 PROCEDURE — 1159F MED LIST DOCD IN RCRD: CPT | Mod: S$GLB,,, | Performed by: FAMILY MEDICINE

## 2021-03-30 PROCEDURE — 99499 RISK ADDL DX/OHS AUDIT: ICD-10-PCS | Mod: S$GLB,,, | Performed by: FAMILY MEDICINE

## 2021-03-30 PROCEDURE — 1126F PR PAIN SEVERITY QUANTIFIED, NO PAIN PRESENT: ICD-10-PCS | Mod: S$GLB,,, | Performed by: FAMILY MEDICINE

## 2021-03-30 RX ORDER — TRAMADOL HYDROCHLORIDE 50 MG/1
50 TABLET ORAL 3 TIMES DAILY
Qty: 90 TABLET | Refills: 0 | Status: SHIPPED | OUTPATIENT
Start: 2021-03-30 | End: 2021-04-29

## 2021-03-30 RX ORDER — TRAMADOL HYDROCHLORIDE 50 MG/1
50 TABLET ORAL 3 TIMES DAILY
Qty: 90 TABLET | Refills: 0 | Status: SHIPPED | OUTPATIENT
Start: 2021-04-29 | End: 2021-05-29

## 2021-03-30 RX ORDER — TRAMADOL HYDROCHLORIDE 50 MG/1
50 TABLET ORAL 3 TIMES DAILY
Qty: 90 TABLET | Refills: 0 | Status: SHIPPED | OUTPATIENT
Start: 2021-05-29 | End: 2021-06-30 | Stop reason: SDUPTHER

## 2021-04-20 DIAGNOSIS — I10 ESSENTIAL HYPERTENSION: ICD-10-CM

## 2021-04-22 RX ORDER — LISINOPRIL 40 MG/1
40 TABLET ORAL DAILY
Qty: 90 TABLET | Refills: 3 | Status: SHIPPED | OUTPATIENT
Start: 2021-04-22 | End: 2021-11-01 | Stop reason: SDUPTHER

## 2021-05-19 RX ORDER — TRAMADOL HYDROCHLORIDE 50 MG/1
TABLET ORAL
Qty: 90 TABLET | Refills: 0 | OUTPATIENT
Start: 2021-05-19

## 2021-06-28 ENCOUNTER — OFFICE VISIT (OUTPATIENT)
Dept: OPTOMETRY | Facility: CLINIC | Age: 76
End: 2021-06-28
Payer: COMMERCIAL

## 2021-06-28 DIAGNOSIS — H25.13 NUCLEAR SCLEROSIS, BILATERAL: ICD-10-CM

## 2021-06-28 DIAGNOSIS — Z01.00 ROUTINE EYE EXAM: Primary | ICD-10-CM

## 2021-06-28 DIAGNOSIS — H52.7 REFRACTIVE ERROR: ICD-10-CM

## 2021-06-28 DIAGNOSIS — H25.011 CORTICAL SENILE CATARACT, RIGHT: ICD-10-CM

## 2021-06-28 PROCEDURE — 1157F PR ADVANCE CARE PLAN OR EQUIV PRESENT IN MEDICAL RECORD: ICD-10-PCS | Mod: S$GLB,,, | Performed by: OPTOMETRIST

## 2021-06-28 PROCEDURE — 92014 COMPRE OPH EXAM EST PT 1/>: CPT | Mod: S$GLB,,, | Performed by: OPTOMETRIST

## 2021-06-28 PROCEDURE — 1157F ADVNC CARE PLAN IN RCRD: CPT | Mod: S$GLB,,, | Performed by: OPTOMETRIST

## 2021-06-28 PROCEDURE — 99999 PR PBB SHADOW E&M-EST. PATIENT-LVL II: ICD-10-PCS | Mod: PBBFAC,,, | Performed by: OPTOMETRIST

## 2021-06-28 PROCEDURE — 99999 PR PBB SHADOW E&M-EST. PATIENT-LVL II: CPT | Mod: PBBFAC,,, | Performed by: OPTOMETRIST

## 2021-06-28 PROCEDURE — 92015 DETERMINE REFRACTIVE STATE: CPT | Mod: S$GLB,,, | Performed by: OPTOMETRIST

## 2021-06-28 PROCEDURE — 92014 PR EYE EXAM, EST PATIENT,COMPREHESV: ICD-10-PCS | Mod: S$GLB,,, | Performed by: OPTOMETRIST

## 2021-06-28 PROCEDURE — 92015 PR REFRACTION: ICD-10-PCS | Mod: S$GLB,,, | Performed by: OPTOMETRIST

## 2021-06-30 ENCOUNTER — OFFICE VISIT (OUTPATIENT)
Dept: FAMILY MEDICINE | Facility: CLINIC | Age: 76
End: 2021-06-30
Payer: MEDICARE

## 2021-06-30 VITALS
WEIGHT: 187.63 LBS | BODY MASS INDEX: 30.16 KG/M2 | DIASTOLIC BLOOD PRESSURE: 60 MMHG | OXYGEN SATURATION: 97 % | TEMPERATURE: 98 F | SYSTOLIC BLOOD PRESSURE: 114 MMHG | HEIGHT: 66 IN | HEART RATE: 97 BPM

## 2021-06-30 DIAGNOSIS — G89.29 CHRONIC MIDLINE LOW BACK PAIN WITHOUT SCIATICA: ICD-10-CM

## 2021-06-30 DIAGNOSIS — M54.50 CHRONIC MIDLINE LOW BACK PAIN WITHOUT SCIATICA: ICD-10-CM

## 2021-06-30 DIAGNOSIS — M54.2 CHRONIC NECK PAIN: ICD-10-CM

## 2021-06-30 DIAGNOSIS — G89.29 CHRONIC NECK PAIN: ICD-10-CM

## 2021-06-30 PROCEDURE — 1100F PTFALLS ASSESS-DOCD GE2>/YR: CPT | Mod: CPTII,S$GLB,, | Performed by: FAMILY MEDICINE

## 2021-06-30 PROCEDURE — 99214 PR OFFICE/OUTPT VISIT, EST, LEVL IV, 30-39 MIN: ICD-10-PCS | Mod: S$GLB,,, | Performed by: FAMILY MEDICINE

## 2021-06-30 PROCEDURE — 1125F PR PAIN SEVERITY QUANTIFIED, PAIN PRESENT: ICD-10-PCS | Mod: S$GLB,,, | Performed by: FAMILY MEDICINE

## 2021-06-30 PROCEDURE — 1157F ADVNC CARE PLAN IN RCRD: CPT | Mod: S$GLB,,, | Performed by: FAMILY MEDICINE

## 2021-06-30 PROCEDURE — 1157F PR ADVANCE CARE PLAN OR EQUIV PRESENT IN MEDICAL RECORD: ICD-10-PCS | Mod: S$GLB,,, | Performed by: FAMILY MEDICINE

## 2021-06-30 PROCEDURE — 99999 PR PBB SHADOW E&M-EST. PATIENT-LVL III: CPT | Mod: PBBFAC,,, | Performed by: FAMILY MEDICINE

## 2021-06-30 PROCEDURE — 3288F PR FALLS RISK ASSESSMENT DOCUMENTED: ICD-10-PCS | Mod: CPTII,S$GLB,, | Performed by: FAMILY MEDICINE

## 2021-06-30 PROCEDURE — 1100F PR PT FALLS ASSESS DOC 2+ FALLS/FALL W/INJURY/YR: ICD-10-PCS | Mod: CPTII,S$GLB,, | Performed by: FAMILY MEDICINE

## 2021-06-30 PROCEDURE — 1159F MED LIST DOCD IN RCRD: CPT | Mod: S$GLB,,, | Performed by: FAMILY MEDICINE

## 2021-06-30 PROCEDURE — 1125F AMNT PAIN NOTED PAIN PRSNT: CPT | Mod: S$GLB,,, | Performed by: FAMILY MEDICINE

## 2021-06-30 PROCEDURE — 99999 PR PBB SHADOW E&M-EST. PATIENT-LVL III: ICD-10-PCS | Mod: PBBFAC,,, | Performed by: FAMILY MEDICINE

## 2021-06-30 PROCEDURE — 1159F PR MEDICATION LIST DOCUMENTED IN MEDICAL RECORD: ICD-10-PCS | Mod: S$GLB,,, | Performed by: FAMILY MEDICINE

## 2021-06-30 PROCEDURE — 3288F FALL RISK ASSESSMENT DOCD: CPT | Mod: CPTII,S$GLB,, | Performed by: FAMILY MEDICINE

## 2021-06-30 PROCEDURE — 99214 OFFICE O/P EST MOD 30 MIN: CPT | Mod: S$GLB,,, | Performed by: FAMILY MEDICINE

## 2021-06-30 RX ORDER — TRAMADOL HYDROCHLORIDE 50 MG/1
50 TABLET ORAL 3 TIMES DAILY
Qty: 90 TABLET | Refills: 0 | Status: SHIPPED | OUTPATIENT
Start: 2021-07-30 | End: 2021-08-29

## 2021-06-30 RX ORDER — TRAMADOL HYDROCHLORIDE 50 MG/1
50 TABLET ORAL 3 TIMES DAILY
Qty: 90 TABLET | Refills: 0 | Status: SHIPPED | OUTPATIENT
Start: 2021-06-30 | End: 2021-07-30

## 2021-06-30 RX ORDER — TRAMADOL HYDROCHLORIDE 50 MG/1
50 TABLET ORAL 3 TIMES DAILY
Qty: 90 TABLET | Refills: 0 | Status: SHIPPED | OUTPATIENT
Start: 2021-08-29 | End: 2021-09-28

## 2021-07-19 ENCOUNTER — PES CALL (OUTPATIENT)
Dept: ADMINISTRATIVE | Facility: CLINIC | Age: 76
End: 2021-07-19

## 2021-08-23 ENCOUNTER — PES CALL (OUTPATIENT)
Dept: ADMINISTRATIVE | Facility: CLINIC | Age: 76
End: 2021-08-23

## 2021-08-24 ENCOUNTER — OFFICE VISIT (OUTPATIENT)
Dept: FAMILY MEDICINE | Facility: CLINIC | Age: 76
End: 2021-08-24
Payer: MEDICARE

## 2021-08-24 VITALS
SYSTOLIC BLOOD PRESSURE: 128 MMHG | TEMPERATURE: 98 F | HEART RATE: 85 BPM | BODY MASS INDEX: 30.41 KG/M2 | OXYGEN SATURATION: 95 % | DIASTOLIC BLOOD PRESSURE: 70 MMHG | HEIGHT: 66 IN | RESPIRATION RATE: 18 BRPM | WEIGHT: 189.25 LBS

## 2021-08-24 DIAGNOSIS — M47.812 ARTHROPATHY OF CERVICAL FACET JOINT: ICD-10-CM

## 2021-08-24 DIAGNOSIS — N81.4 UTEROVAGINAL PROLAPSE: ICD-10-CM

## 2021-08-24 DIAGNOSIS — M54.2 CHRONIC NECK PAIN: ICD-10-CM

## 2021-08-24 DIAGNOSIS — I10 ESSENTIAL HYPERTENSION: ICD-10-CM

## 2021-08-24 DIAGNOSIS — M16.0 OSTEOARTHRITIS OF BOTH HIPS, UNSPECIFIED OSTEOARTHRITIS TYPE: ICD-10-CM

## 2021-08-24 DIAGNOSIS — N81.11 CYSTOCELE, MIDLINE: ICD-10-CM

## 2021-08-24 DIAGNOSIS — M47.812 SPONDYLOSIS OF CERVICAL REGION WITHOUT MYELOPATHY OR RADICULOPATHY: ICD-10-CM

## 2021-08-24 DIAGNOSIS — R73.03 PREDIABETES: ICD-10-CM

## 2021-08-24 DIAGNOSIS — G89.29 CHRONIC MIDLINE LOW BACK PAIN WITHOUT SCIATICA: ICD-10-CM

## 2021-08-24 DIAGNOSIS — M54.50 CHRONIC MIDLINE LOW BACK PAIN WITHOUT SCIATICA: ICD-10-CM

## 2021-08-24 DIAGNOSIS — G89.29 CHRONIC NECK PAIN: ICD-10-CM

## 2021-08-24 DIAGNOSIS — Z00.00 ENCOUNTER FOR PREVENTIVE HEALTH EXAMINATION: Primary | ICD-10-CM

## 2021-08-24 DIAGNOSIS — N81.6 RECTOCELE, FEMALE: ICD-10-CM

## 2021-08-24 DIAGNOSIS — M48.02 CERVICAL STENOSIS OF SPINAL CANAL: ICD-10-CM

## 2021-08-24 DIAGNOSIS — E78.49 OTHER HYPERLIPIDEMIA: ICD-10-CM

## 2021-08-24 DIAGNOSIS — R33.9 INCOMPLETE BLADDER EMPTYING: ICD-10-CM

## 2021-08-24 DIAGNOSIS — J84.10 CALCIFIED GRANULOMA OF LUNG: ICD-10-CM

## 2021-08-24 DIAGNOSIS — N18.31 STAGE 3A CHRONIC KIDNEY DISEASE: ICD-10-CM

## 2021-08-24 PROCEDURE — G0439 PPPS, SUBSEQ VISIT: HCPCS | Mod: S$GLB,,, | Performed by: NURSE PRACTITIONER

## 2021-08-24 PROCEDURE — 1126F PR PAIN SEVERITY QUANTIFIED, NO PAIN PRESENT: ICD-10-PCS | Mod: CPTII,S$GLB,, | Performed by: NURSE PRACTITIONER

## 2021-08-24 PROCEDURE — 1157F ADVNC CARE PLAN IN RCRD: CPT | Mod: CPTII,S$GLB,, | Performed by: NURSE PRACTITIONER

## 2021-08-24 PROCEDURE — 99499 UNLISTED E&M SERVICE: CPT | Mod: HCNC,S$GLB,, | Performed by: NURSE PRACTITIONER

## 2021-08-24 PROCEDURE — G0439 PR MEDICARE ANNUAL WELLNESS SUBSEQUENT VISIT: ICD-10-PCS | Mod: S$GLB,,, | Performed by: NURSE PRACTITIONER

## 2021-08-24 PROCEDURE — 1101F PR PT FALLS ASSESS DOC 0-1 FALLS W/OUT INJ PAST YR: ICD-10-PCS | Mod: CPTII,S$GLB,, | Performed by: NURSE PRACTITIONER

## 2021-08-24 PROCEDURE — 3078F DIAST BP <80 MM HG: CPT | Mod: CPTII,S$GLB,, | Performed by: NURSE PRACTITIONER

## 2021-08-24 PROCEDURE — 99999 PR PBB SHADOW E&M-EST. PATIENT-LVL IV: CPT | Mod: PBBFAC,,, | Performed by: NURSE PRACTITIONER

## 2021-08-24 PROCEDURE — 99499 RISK ADDL DX/OHS AUDIT: ICD-10-PCS | Mod: HCNC,S$GLB,, | Performed by: NURSE PRACTITIONER

## 2021-08-24 PROCEDURE — 3074F PR MOST RECENT SYSTOLIC BLOOD PRESSURE < 130 MM HG: ICD-10-PCS | Mod: CPTII,S$GLB,, | Performed by: NURSE PRACTITIONER

## 2021-08-24 PROCEDURE — 1157F PR ADVANCE CARE PLAN OR EQUIV PRESENT IN MEDICAL RECORD: ICD-10-PCS | Mod: CPTII,S$GLB,, | Performed by: NURSE PRACTITIONER

## 2021-08-24 PROCEDURE — 1126F AMNT PAIN NOTED NONE PRSNT: CPT | Mod: CPTII,S$GLB,, | Performed by: NURSE PRACTITIONER

## 2021-08-24 PROCEDURE — 1159F MED LIST DOCD IN RCRD: CPT | Mod: CPTII,S$GLB,, | Performed by: NURSE PRACTITIONER

## 2021-08-24 PROCEDURE — 1159F PR MEDICATION LIST DOCUMENTED IN MEDICAL RECORD: ICD-10-PCS | Mod: CPTII,S$GLB,, | Performed by: NURSE PRACTITIONER

## 2021-08-24 PROCEDURE — 1160F RVW MEDS BY RX/DR IN RCRD: CPT | Mod: CPTII,S$GLB,, | Performed by: NURSE PRACTITIONER

## 2021-08-24 PROCEDURE — 3074F SYST BP LT 130 MM HG: CPT | Mod: CPTII,S$GLB,, | Performed by: NURSE PRACTITIONER

## 2021-08-24 PROCEDURE — 3288F PR FALLS RISK ASSESSMENT DOCUMENTED: ICD-10-PCS | Mod: CPTII,S$GLB,, | Performed by: NURSE PRACTITIONER

## 2021-08-24 PROCEDURE — 1101F PT FALLS ASSESS-DOCD LE1/YR: CPT | Mod: CPTII,S$GLB,, | Performed by: NURSE PRACTITIONER

## 2021-08-24 PROCEDURE — 99999 PR PBB SHADOW E&M-EST. PATIENT-LVL IV: ICD-10-PCS | Mod: PBBFAC,,, | Performed by: NURSE PRACTITIONER

## 2021-08-24 PROCEDURE — 3078F PR MOST RECENT DIASTOLIC BLOOD PRESSURE < 80 MM HG: ICD-10-PCS | Mod: CPTII,S$GLB,, | Performed by: NURSE PRACTITIONER

## 2021-08-24 PROCEDURE — 3288F FALL RISK ASSESSMENT DOCD: CPT | Mod: CPTII,S$GLB,, | Performed by: NURSE PRACTITIONER

## 2021-08-24 PROCEDURE — 1160F PR REVIEW ALL MEDS BY PRESCRIBER/CLIN PHARMACIST DOCUMENTED: ICD-10-PCS | Mod: CPTII,S$GLB,, | Performed by: NURSE PRACTITIONER

## 2021-10-11 ENCOUNTER — OFFICE VISIT (OUTPATIENT)
Dept: FAMILY MEDICINE | Facility: CLINIC | Age: 76
End: 2021-10-11
Payer: MEDICARE

## 2021-10-11 VITALS
WEIGHT: 188.5 LBS | TEMPERATURE: 98 F | HEIGHT: 66 IN | DIASTOLIC BLOOD PRESSURE: 70 MMHG | BODY MASS INDEX: 30.29 KG/M2 | SYSTOLIC BLOOD PRESSURE: 132 MMHG | OXYGEN SATURATION: 95 % | HEART RATE: 73 BPM

## 2021-10-11 DIAGNOSIS — M54.50 CHRONIC MIDLINE LOW BACK PAIN WITHOUT SCIATICA: Primary | ICD-10-CM

## 2021-10-11 DIAGNOSIS — R73.03 PREDIABETES: ICD-10-CM

## 2021-10-11 DIAGNOSIS — G89.29 CHRONIC MIDLINE LOW BACK PAIN WITHOUT SCIATICA: Primary | ICD-10-CM

## 2021-10-11 DIAGNOSIS — I10 ESSENTIAL HYPERTENSION: ICD-10-CM

## 2021-10-11 DIAGNOSIS — N18.31 STAGE 3A CHRONIC KIDNEY DISEASE: ICD-10-CM

## 2021-10-11 DIAGNOSIS — J84.10 CALCIFIED GRANULOMA OF LUNG: ICD-10-CM

## 2021-10-11 PROCEDURE — 1159F PR MEDICATION LIST DOCUMENTED IN MEDICAL RECORD: ICD-10-PCS | Mod: HCNC,CPTII,S$GLB, | Performed by: FAMILY MEDICINE

## 2021-10-11 PROCEDURE — 1157F ADVNC CARE PLAN IN RCRD: CPT | Mod: HCNC,CPTII,S$GLB, | Performed by: FAMILY MEDICINE

## 2021-10-11 PROCEDURE — 3075F SYST BP GE 130 - 139MM HG: CPT | Mod: HCNC,CPTII,S$GLB, | Performed by: FAMILY MEDICINE

## 2021-10-11 PROCEDURE — 1101F PR PT FALLS ASSESS DOC 0-1 FALLS W/OUT INJ PAST YR: ICD-10-PCS | Mod: HCNC,CPTII,S$GLB, | Performed by: FAMILY MEDICINE

## 2021-10-11 PROCEDURE — 1101F PT FALLS ASSESS-DOCD LE1/YR: CPT | Mod: HCNC,CPTII,S$GLB, | Performed by: FAMILY MEDICINE

## 2021-10-11 PROCEDURE — 1160F RVW MEDS BY RX/DR IN RCRD: CPT | Mod: HCNC,CPTII,S$GLB, | Performed by: FAMILY MEDICINE

## 2021-10-11 PROCEDURE — 99214 PR OFFICE/OUTPT VISIT, EST, LEVL IV, 30-39 MIN: ICD-10-PCS | Mod: HCNC,S$GLB,, | Performed by: FAMILY MEDICINE

## 2021-10-11 PROCEDURE — 3078F DIAST BP <80 MM HG: CPT | Mod: HCNC,CPTII,S$GLB, | Performed by: FAMILY MEDICINE

## 2021-10-11 PROCEDURE — 1159F MED LIST DOCD IN RCRD: CPT | Mod: HCNC,CPTII,S$GLB, | Performed by: FAMILY MEDICINE

## 2021-10-11 PROCEDURE — 3078F PR MOST RECENT DIASTOLIC BLOOD PRESSURE < 80 MM HG: ICD-10-PCS | Mod: HCNC,CPTII,S$GLB, | Performed by: FAMILY MEDICINE

## 2021-10-11 PROCEDURE — 1126F PR PAIN SEVERITY QUANTIFIED, NO PAIN PRESENT: ICD-10-PCS | Mod: HCNC,CPTII,S$GLB, | Performed by: FAMILY MEDICINE

## 2021-10-11 PROCEDURE — 99214 OFFICE O/P EST MOD 30 MIN: CPT | Mod: HCNC,S$GLB,, | Performed by: FAMILY MEDICINE

## 2021-10-11 PROCEDURE — 3288F PR FALLS RISK ASSESSMENT DOCUMENTED: ICD-10-PCS | Mod: HCNC,CPTII,S$GLB, | Performed by: FAMILY MEDICINE

## 2021-10-11 PROCEDURE — 1160F PR REVIEW ALL MEDS BY PRESCRIBER/CLIN PHARMACIST DOCUMENTED: ICD-10-PCS | Mod: HCNC,CPTII,S$GLB, | Performed by: FAMILY MEDICINE

## 2021-10-11 PROCEDURE — 1157F PR ADVANCE CARE PLAN OR EQUIV PRESENT IN MEDICAL RECORD: ICD-10-PCS | Mod: HCNC,CPTII,S$GLB, | Performed by: FAMILY MEDICINE

## 2021-10-11 PROCEDURE — 99999 PR PBB SHADOW E&M-EST. PATIENT-LVL IV: ICD-10-PCS | Mod: PBBFAC,HCNC,, | Performed by: FAMILY MEDICINE

## 2021-10-11 PROCEDURE — 1126F AMNT PAIN NOTED NONE PRSNT: CPT | Mod: HCNC,CPTII,S$GLB, | Performed by: FAMILY MEDICINE

## 2021-10-11 PROCEDURE — 3075F PR MOST RECENT SYSTOLIC BLOOD PRESS GE 130-139MM HG: ICD-10-PCS | Mod: HCNC,CPTII,S$GLB, | Performed by: FAMILY MEDICINE

## 2021-10-11 PROCEDURE — 3288F FALL RISK ASSESSMENT DOCD: CPT | Mod: HCNC,CPTII,S$GLB, | Performed by: FAMILY MEDICINE

## 2021-10-11 PROCEDURE — 99999 PR PBB SHADOW E&M-EST. PATIENT-LVL IV: CPT | Mod: PBBFAC,HCNC,, | Performed by: FAMILY MEDICINE

## 2021-10-11 RX ORDER — TRAMADOL HYDROCHLORIDE 50 MG/1
50 TABLET ORAL 3 TIMES DAILY
Qty: 90 TABLET | Refills: 0 | Status: SHIPPED | OUTPATIENT
Start: 2021-10-30 | End: 2021-11-28

## 2021-10-11 RX ORDER — TRAMADOL HYDROCHLORIDE 50 MG/1
50 TABLET ORAL 3 TIMES DAILY
Qty: 90 TABLET | Refills: 0 | Status: SHIPPED | OUTPATIENT
Start: 2021-11-28 | End: 2021-12-14 | Stop reason: SDUPTHER

## 2021-11-01 ENCOUNTER — OFFICE VISIT (OUTPATIENT)
Dept: FAMILY MEDICINE | Facility: CLINIC | Age: 76
End: 2021-11-01
Payer: MEDICARE

## 2021-11-01 VITALS
SYSTOLIC BLOOD PRESSURE: 126 MMHG | DIASTOLIC BLOOD PRESSURE: 80 MMHG | HEIGHT: 66 IN | BODY MASS INDEX: 30.25 KG/M2 | TEMPERATURE: 99 F | HEART RATE: 73 BPM | OXYGEN SATURATION: 96 % | WEIGHT: 188.25 LBS

## 2021-11-01 DIAGNOSIS — I10 PRIMARY HYPERTENSION: ICD-10-CM

## 2021-11-01 DIAGNOSIS — M26.622 ARTHRALGIA OF LEFT TEMPOROMANDIBULAR JOINT: Primary | ICD-10-CM

## 2021-11-01 DIAGNOSIS — G89.29 CHRONIC LOW BACK PAIN: ICD-10-CM

## 2021-11-01 DIAGNOSIS — M54.50 CHRONIC LOW BACK PAIN: ICD-10-CM

## 2021-11-01 DIAGNOSIS — M54.2 CHRONIC NECK PAIN: ICD-10-CM

## 2021-11-01 DIAGNOSIS — I10 ESSENTIAL HYPERTENSION: ICD-10-CM

## 2021-11-01 DIAGNOSIS — G89.29 CHRONIC NECK PAIN: ICD-10-CM

## 2021-11-01 PROCEDURE — 3074F SYST BP LT 130 MM HG: CPT | Mod: HCNC,CPTII,S$GLB, | Performed by: STUDENT IN AN ORGANIZED HEALTH CARE EDUCATION/TRAINING PROGRAM

## 2021-11-01 PROCEDURE — 99999 PR PBB SHADOW E&M-EST. PATIENT-LVL IV: ICD-10-PCS | Mod: PBBFAC,HCNC,, | Performed by: STUDENT IN AN ORGANIZED HEALTH CARE EDUCATION/TRAINING PROGRAM

## 2021-11-01 PROCEDURE — 3288F PR FALLS RISK ASSESSMENT DOCUMENTED: ICD-10-PCS | Mod: HCNC,CPTII,S$GLB, | Performed by: STUDENT IN AN ORGANIZED HEALTH CARE EDUCATION/TRAINING PROGRAM

## 2021-11-01 PROCEDURE — 1159F MED LIST DOCD IN RCRD: CPT | Mod: HCNC,CPTII,S$GLB, | Performed by: STUDENT IN AN ORGANIZED HEALTH CARE EDUCATION/TRAINING PROGRAM

## 2021-11-01 PROCEDURE — 3079F PR MOST RECENT DIASTOLIC BLOOD PRESSURE 80-89 MM HG: ICD-10-PCS | Mod: HCNC,CPTII,S$GLB, | Performed by: STUDENT IN AN ORGANIZED HEALTH CARE EDUCATION/TRAINING PROGRAM

## 2021-11-01 PROCEDURE — 1126F AMNT PAIN NOTED NONE PRSNT: CPT | Mod: HCNC,CPTII,S$GLB, | Performed by: STUDENT IN AN ORGANIZED HEALTH CARE EDUCATION/TRAINING PROGRAM

## 2021-11-01 PROCEDURE — 1157F PR ADVANCE CARE PLAN OR EQUIV PRESENT IN MEDICAL RECORD: ICD-10-PCS | Mod: HCNC,CPTII,S$GLB, | Performed by: STUDENT IN AN ORGANIZED HEALTH CARE EDUCATION/TRAINING PROGRAM

## 2021-11-01 PROCEDURE — 99214 OFFICE O/P EST MOD 30 MIN: CPT | Mod: HCNC,S$GLB,, | Performed by: STUDENT IN AN ORGANIZED HEALTH CARE EDUCATION/TRAINING PROGRAM

## 2021-11-01 PROCEDURE — 1157F ADVNC CARE PLAN IN RCRD: CPT | Mod: HCNC,CPTII,S$GLB, | Performed by: STUDENT IN AN ORGANIZED HEALTH CARE EDUCATION/TRAINING PROGRAM

## 2021-11-01 PROCEDURE — 1101F PT FALLS ASSESS-DOCD LE1/YR: CPT | Mod: HCNC,CPTII,S$GLB, | Performed by: STUDENT IN AN ORGANIZED HEALTH CARE EDUCATION/TRAINING PROGRAM

## 2021-11-01 PROCEDURE — 99999 PR PBB SHADOW E&M-EST. PATIENT-LVL IV: CPT | Mod: PBBFAC,HCNC,, | Performed by: STUDENT IN AN ORGANIZED HEALTH CARE EDUCATION/TRAINING PROGRAM

## 2021-11-01 PROCEDURE — 99214 PR OFFICE/OUTPT VISIT, EST, LEVL IV, 30-39 MIN: ICD-10-PCS | Mod: HCNC,S$GLB,, | Performed by: STUDENT IN AN ORGANIZED HEALTH CARE EDUCATION/TRAINING PROGRAM

## 2021-11-01 PROCEDURE — 1101F PR PT FALLS ASSESS DOC 0-1 FALLS W/OUT INJ PAST YR: ICD-10-PCS | Mod: HCNC,CPTII,S$GLB, | Performed by: STUDENT IN AN ORGANIZED HEALTH CARE EDUCATION/TRAINING PROGRAM

## 2021-11-01 PROCEDURE — 3079F DIAST BP 80-89 MM HG: CPT | Mod: HCNC,CPTII,S$GLB, | Performed by: STUDENT IN AN ORGANIZED HEALTH CARE EDUCATION/TRAINING PROGRAM

## 2021-11-01 PROCEDURE — 3074F PR MOST RECENT SYSTOLIC BLOOD PRESSURE < 130 MM HG: ICD-10-PCS | Mod: HCNC,CPTII,S$GLB, | Performed by: STUDENT IN AN ORGANIZED HEALTH CARE EDUCATION/TRAINING PROGRAM

## 2021-11-01 PROCEDURE — 1159F PR MEDICATION LIST DOCUMENTED IN MEDICAL RECORD: ICD-10-PCS | Mod: HCNC,CPTII,S$GLB, | Performed by: STUDENT IN AN ORGANIZED HEALTH CARE EDUCATION/TRAINING PROGRAM

## 2021-11-01 PROCEDURE — 1126F PR PAIN SEVERITY QUANTIFIED, NO PAIN PRESENT: ICD-10-PCS | Mod: HCNC,CPTII,S$GLB, | Performed by: STUDENT IN AN ORGANIZED HEALTH CARE EDUCATION/TRAINING PROGRAM

## 2021-11-01 PROCEDURE — 3288F FALL RISK ASSESSMENT DOCD: CPT | Mod: HCNC,CPTII,S$GLB, | Performed by: STUDENT IN AN ORGANIZED HEALTH CARE EDUCATION/TRAINING PROGRAM

## 2021-11-01 RX ORDER — LISINOPRIL 40 MG/1
40 TABLET ORAL DAILY
Qty: 90 TABLET | Refills: 1 | Status: SHIPPED | OUTPATIENT
Start: 2021-11-01 | End: 2022-07-26

## 2021-11-01 RX ORDER — MELOXICAM 15 MG/1
15 TABLET ORAL DAILY
Qty: 90 TABLET | Refills: 0 | Status: SHIPPED | OUTPATIENT
Start: 2021-11-01 | End: 2022-10-03

## 2021-12-09 ENCOUNTER — LAB VISIT (OUTPATIENT)
Dept: LAB | Facility: HOSPITAL | Age: 76
End: 2021-12-09
Attending: FAMILY MEDICINE
Payer: MEDICARE

## 2021-12-09 DIAGNOSIS — I10 ESSENTIAL HYPERTENSION: ICD-10-CM

## 2021-12-09 DIAGNOSIS — R73.03 PREDIABETES: ICD-10-CM

## 2021-12-09 LAB
ALBUMIN SERPL BCP-MCNC: 3.6 G/DL (ref 3.5–5.2)
ALP SERPL-CCNC: 93 U/L (ref 55–135)
ALT SERPL W/O P-5'-P-CCNC: 28 U/L (ref 10–44)
ANION GAP SERPL CALC-SCNC: 13 MMOL/L (ref 8–16)
AST SERPL-CCNC: 31 U/L (ref 10–40)
BASOPHILS # BLD AUTO: 0.03 K/UL (ref 0–0.2)
BASOPHILS NFR BLD: 0.4 % (ref 0–1.9)
BILIRUB SERPL-MCNC: 0.4 MG/DL (ref 0.1–1)
BUN SERPL-MCNC: 31 MG/DL (ref 8–23)
CALCIUM SERPL-MCNC: 9.9 MG/DL (ref 8.7–10.5)
CHLORIDE SERPL-SCNC: 104 MMOL/L (ref 95–110)
CHOLEST SERPL-MCNC: 210 MG/DL (ref 120–199)
CHOLEST/HDLC SERPL: 4 {RATIO} (ref 2–5)
CO2 SERPL-SCNC: 24 MMOL/L (ref 23–29)
CREAT SERPL-MCNC: 1.4 MG/DL (ref 0.5–1.4)
DIFFERENTIAL METHOD: ABNORMAL
EOSINOPHIL # BLD AUTO: 0.3 K/UL (ref 0–0.5)
EOSINOPHIL NFR BLD: 4.5 % (ref 0–8)
ERYTHROCYTE [DISTWIDTH] IN BLOOD BY AUTOMATED COUNT: 14.1 % (ref 11.5–14.5)
EST. GFR  (AFRICAN AMERICAN): 42.1 ML/MIN/1.73 M^2
EST. GFR  (NON AFRICAN AMERICAN): 36.5 ML/MIN/1.73 M^2
ESTIMATED AVG GLUCOSE: 146 MG/DL (ref 68–131)
GLUCOSE SERPL-MCNC: 122 MG/DL (ref 70–110)
HBA1C MFR BLD: 6.7 % (ref 4–5.6)
HCT VFR BLD AUTO: 34.2 % (ref 37–48.5)
HDLC SERPL-MCNC: 52 MG/DL (ref 40–75)
HDLC SERPL: 24.8 % (ref 20–50)
HGB BLD-MCNC: 11 G/DL (ref 12–16)
IMM GRANULOCYTES # BLD AUTO: 0.02 K/UL (ref 0–0.04)
IMM GRANULOCYTES NFR BLD AUTO: 0.3 % (ref 0–0.5)
LDLC SERPL CALC-MCNC: 137.6 MG/DL (ref 63–159)
LYMPHOCYTES # BLD AUTO: 1.9 K/UL (ref 1–4.8)
LYMPHOCYTES NFR BLD: 28.6 % (ref 18–48)
MCH RBC QN AUTO: 28 PG (ref 27–31)
MCHC RBC AUTO-ENTMCNC: 32.2 G/DL (ref 32–36)
MCV RBC AUTO: 87 FL (ref 82–98)
MONOCYTES # BLD AUTO: 0.6 K/UL (ref 0.3–1)
MONOCYTES NFR BLD: 9.1 % (ref 4–15)
NEUTROPHILS # BLD AUTO: 3.8 K/UL (ref 1.8–7.7)
NEUTROPHILS NFR BLD: 57.1 % (ref 38–73)
NONHDLC SERPL-MCNC: 158 MG/DL
NRBC BLD-RTO: 0 /100 WBC
PLATELET # BLD AUTO: 259 K/UL (ref 150–450)
PMV BLD AUTO: 12 FL (ref 9.2–12.9)
POTASSIUM SERPL-SCNC: 4.3 MMOL/L (ref 3.5–5.1)
PROT SERPL-MCNC: 7.2 G/DL (ref 6–8.4)
RBC # BLD AUTO: 3.93 M/UL (ref 4–5.4)
SODIUM SERPL-SCNC: 141 MMOL/L (ref 136–145)
TRIGL SERPL-MCNC: 102 MG/DL (ref 30–150)
TSH SERPL DL<=0.005 MIU/L-ACNC: 1.45 UIU/ML (ref 0.4–4)
WBC # BLD AUTO: 6.69 K/UL (ref 3.9–12.7)

## 2021-12-09 PROCEDURE — 84443 ASSAY THYROID STIM HORMONE: CPT | Mod: HCNC | Performed by: FAMILY MEDICINE

## 2021-12-09 PROCEDURE — 36415 COLL VENOUS BLD VENIPUNCTURE: CPT | Mod: HCNC,PO | Performed by: FAMILY MEDICINE

## 2021-12-09 PROCEDURE — 83036 HEMOGLOBIN GLYCOSYLATED A1C: CPT | Mod: HCNC | Performed by: FAMILY MEDICINE

## 2021-12-09 PROCEDURE — 80053 COMPREHEN METABOLIC PANEL: CPT | Mod: HCNC | Performed by: FAMILY MEDICINE

## 2021-12-09 PROCEDURE — 85025 COMPLETE CBC W/AUTO DIFF WBC: CPT | Mod: HCNC | Performed by: FAMILY MEDICINE

## 2021-12-09 PROCEDURE — 80061 LIPID PANEL: CPT | Mod: HCNC | Performed by: FAMILY MEDICINE

## 2021-12-14 ENCOUNTER — OFFICE VISIT (OUTPATIENT)
Dept: FAMILY MEDICINE | Facility: CLINIC | Age: 76
End: 2021-12-14
Payer: MEDICARE

## 2021-12-14 VITALS
DIASTOLIC BLOOD PRESSURE: 68 MMHG | TEMPERATURE: 98 F | OXYGEN SATURATION: 95 % | WEIGHT: 189.38 LBS | SYSTOLIC BLOOD PRESSURE: 118 MMHG | HEIGHT: 66 IN | BODY MASS INDEX: 30.44 KG/M2 | HEART RATE: 81 BPM

## 2021-12-14 DIAGNOSIS — E11.9 TYPE 2 DIABETES MELLITUS WITHOUT COMPLICATION, WITHOUT LONG-TERM CURRENT USE OF INSULIN: Primary | ICD-10-CM

## 2021-12-14 DIAGNOSIS — M54.50 CHRONIC MIDLINE LOW BACK PAIN WITHOUT SCIATICA: ICD-10-CM

## 2021-12-14 DIAGNOSIS — G89.29 CHRONIC MIDLINE LOW BACK PAIN WITHOUT SCIATICA: ICD-10-CM

## 2021-12-14 DIAGNOSIS — E78.49 OTHER HYPERLIPIDEMIA: ICD-10-CM

## 2021-12-14 DIAGNOSIS — Z12.31 ENCOUNTER FOR SCREENING MAMMOGRAM FOR BREAST CANCER: ICD-10-CM

## 2021-12-14 DIAGNOSIS — N18.31 STAGE 3A CHRONIC KIDNEY DISEASE: ICD-10-CM

## 2021-12-14 DIAGNOSIS — I10 ESSENTIAL HYPERTENSION: ICD-10-CM

## 2021-12-14 PROCEDURE — 99499 RISK ADDL DX/OHS AUDIT: ICD-10-PCS | Mod: HCNC,S$GLB,, | Performed by: FAMILY MEDICINE

## 2021-12-14 PROCEDURE — 1157F PR ADVANCE CARE PLAN OR EQUIV PRESENT IN MEDICAL RECORD: ICD-10-PCS | Mod: HCNC,CPTII,S$GLB, | Performed by: FAMILY MEDICINE

## 2021-12-14 PROCEDURE — 99499 UNLISTED E&M SERVICE: CPT | Mod: HCNC,S$GLB,, | Performed by: FAMILY MEDICINE

## 2021-12-14 PROCEDURE — 99999 PR PBB SHADOW E&M-EST. PATIENT-LVL IV: CPT | Mod: PBBFAC,HCNC,, | Performed by: FAMILY MEDICINE

## 2021-12-14 PROCEDURE — 99999 PR PBB SHADOW E&M-EST. PATIENT-LVL IV: ICD-10-PCS | Mod: PBBFAC,HCNC,, | Performed by: FAMILY MEDICINE

## 2021-12-14 PROCEDURE — 1157F ADVNC CARE PLAN IN RCRD: CPT | Mod: HCNC,CPTII,S$GLB, | Performed by: FAMILY MEDICINE

## 2021-12-14 PROCEDURE — 99215 OFFICE O/P EST HI 40 MIN: CPT | Mod: HCNC,S$GLB,, | Performed by: FAMILY MEDICINE

## 2021-12-14 PROCEDURE — 99215 PR OFFICE/OUTPT VISIT, EST, LEVL V, 40-54 MIN: ICD-10-PCS | Mod: HCNC,S$GLB,, | Performed by: FAMILY MEDICINE

## 2021-12-14 RX ORDER — TRAMADOL HYDROCHLORIDE 50 MG/1
50 TABLET ORAL 3 TIMES DAILY
Qty: 90 TABLET | Refills: 0 | Status: SHIPPED | OUTPATIENT
Start: 2021-12-28 | End: 2022-01-26

## 2021-12-14 RX ORDER — TRAMADOL HYDROCHLORIDE 50 MG/1
50 TABLET ORAL 3 TIMES DAILY
Qty: 90 TABLET | Refills: 0 | Status: SHIPPED | OUTPATIENT
Start: 2022-01-26 | End: 2022-02-24

## 2021-12-14 NOTE — PROGRESS NOTES
Urogyn follow up  10/29/2019  .  [facility] - GYNECOLOGY  [address]  [place] [address]    Nuha Bernstein  [id number]  1945      Nuha Bernstein is a 74 y.o.  here for a urogyn pessary care and pvr.    Last HPI from 07/30/2019  1)  UI:  (--) TIFFANIE.  (--) UUI .  No h/o UI. Daytime frequency: Q1- 2 hours, worsening.  Nocturia: Yes: 1/night.   (--) dysuria,  (--) hematuria,  (--) frequent UTIs.  (+) complete bladder emptying.  2)  POP:  Present. To introitus.  Not sure--noticed when trying to douche.  Symptoms:(+)  Discomfort--urinary urgency/pressure.  (--) vaginal bleeding. (--) vaginal discharge. (+) sexually active.   has dementia so not much.  (--) dyspareunia. (--)  Vaginal dryness.  (--) vaginal estrogen use.   3)  BM:  (--) constipation/straining. Has BM QAM.  (--) chronic diarrhea. (--) hematochezia.  (--) fecal incontinence.  (--) fecal smearing/urgency.  (+) complete evacuation.     Changes since last visit:  1) (--) TIFFANIE.  (--) UUI .  No h/o UI. Daytime frequency: Q1- 2 hours, worsening.  Nocturia: Yes: 1/night.   (--) dysuria,  (--) hematuria,  (--) frequent UTIs.  (+) complete bladder emptying.    2)  POP:  Present. To introitus.  Not sure--noticed when trying to douche.  Symptoms:(+)  Discomfort--urinary urgency/pressure.  (--) vaginal bleeding. (--) vaginal discharge. (+) sexually active.   has dementia so not much.  (--) dyspareunia. (--)  Vaginal dryness.  (--) vaginal estrogen use.     3)  BM:  (--) constipation/straining. Has BM QAM.  (--) chronic diarrhea. (--) hematochezia.  (--) fecal incontinence.  (--) fecal smearing/urgency.  (+) complete evacuation.     4)Pessary:  Denies pain, bleeding, or discharge.  Using #5 ring with support pessary independently          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    Prolapse of uterus        Past  [Time Spent: ___ minutes] : I have spent [unfilled] minutes with the patient on the telephone "Surgical History:   Procedure Laterality Date    Right Thumb      vaginal dilator         Current Outpatient Medications   Medication Sig    aspirin 81 mg Tab Take by mouth as needed. 2-3 times weekly    atorvastatin (LIPITOR) 40 MG tablet Take 1 tablet (40 mg total) by mouth once daily.    meloxicam (MOBIC) 15 MG tablet Take 1 tablet (15 mg total) by mouth once daily.    OMEGA 3,6,9 COMBINATION NO.7 (OMEGA DHA ORAL) Take 830 mg by mouth once daily.    traMADol (ULTRAM) 50 mg tablet TAKE 1 TABLET BY MOUTH 3 TIMES A DAY AS NEEDED FOR PAIN    triamterene-hydrochlorothiazide 37.5-25 mg (DYAZIDE) 37.5-25 mg per capsule TAKE ONE CAPSULE BY MOUTH EVERY DAY    lisinopril (PRINIVIL,ZESTRIL) 40 MG tablet Take 1 tablet (40 mg total) by mouth once daily.    POLICOSANOL ORAL Take 1 tablet by mouth once daily.     No current facility-administered medications for this visit.        Well woman:  Pap test: 2012, normal ([facility]).  History of abnormal paps: No.  History of STIs:  No  Mammogram: Date of last:08/2019.  Result: Normal  Colonoscopy: Date of last: 9/15.  Result:  Polyp, benign.  Repeat due:  Per PCP.    DEXA:  Date of last: 8/16.  Elevated BMD of the lumbar spine.  No significant change since prior study. Elevated BMI associated with elevated BMD.  Recommendations:  1) Adequate calcium and Vitamin D therapy  2) Appropriate exercise  3) Consider repeat BMD in 4 years (2020).      ROS:  As per HPI.      Exam  BP (!) 140/80 (BP Location: Right arm, Patient Position: Sitting, BP Method: Medium (Manual))   Ht 5' 6" (1.676 m)   Wt 79.5 kg (175 lb 4.3 oz)   BMI 28.29 kg/m²   General: alert and oriented, no acute distress  Respiratory: normal respiratory effort  Abd: soft, non-tender, non-distended    Pelvic  Ext. Genitalia: normal external genitalia. Normal bartholin's and skeens glands  Vagina: + atrophy. Normal vaginal mucosa without lesions. No discharge noted.   Non-tender bladder base without palpable mass.  #5 " ring with support pessary in place  Cervix: no lesions  Uterus:  uterus is normal size, shape, consistency and nontender   Urethra: no masses or tenderness  Urethral meatus: no lesions, caruncle or prolapse.    Pessary was removed without difficulty.  Washed with soap and water. Reinserted without difficulty.      PVR 40 ml    Impression  1. Cystocele, midline     2. Uterovaginal prolapse     3. Rectocele, female     4. Incomplete bladder emptying     5. Vaginal atrophy     6. Pessary maintenance       We reviewed the above issues and discussed options for short-term versus long-term management of her problems.   Plan:   1)  Stage 3 cystocele, stage 2 uterine prolapse/rectocele:  --discussed pathophysiology  --observation/PT vs pessary vs surgery (?TVH/USS--has large cystocele though)  --continue:#5 ring with support pessary      2)  Elevated PVR:  --normal with pessary in place     3)Vaginal atrophy (dryness):   Use REPLENS or REFRESH OTC: 1/2 applicator full in vagina twice a week.      4)  RTC 6 months for pc     30 minutes were spent in face to face time with this patient  90 % of this time was spent in counseling and/or coordination of care      KRISTIE Lemon-BC [facility]  Division of Female Pelvic Medicine and Reconstructive Surgery  Department of Obstetrics & Gynecology

## 2021-12-22 ENCOUNTER — CLINICAL SUPPORT (OUTPATIENT)
Dept: DIABETES | Facility: CLINIC | Age: 76
End: 2021-12-22
Payer: MEDICARE

## 2021-12-22 DIAGNOSIS — E11.9 TYPE 2 DIABETES MELLITUS WITHOUT COMPLICATION, WITHOUT LONG-TERM CURRENT USE OF INSULIN: ICD-10-CM

## 2021-12-22 PROCEDURE — G0108 PR DIAB MANAGE TRN  PER INDIV: ICD-10-PCS | Mod: HCNC,S$GLB,, | Performed by: DIETITIAN, REGISTERED

## 2021-12-22 PROCEDURE — 99999 PR PBB SHADOW E&M-EST. PATIENT-LVL I: CPT | Mod: PBBFAC,HCNC,, | Performed by: DIETITIAN, REGISTERED

## 2021-12-22 PROCEDURE — G0108 DIAB MANAGE TRN  PER INDIV: HCPCS | Mod: HCNC,S$GLB,, | Performed by: DIETITIAN, REGISTERED

## 2021-12-22 PROCEDURE — 99999 PR PBB SHADOW E&M-EST. PATIENT-LVL I: ICD-10-PCS | Mod: PBBFAC,HCNC,, | Performed by: DIETITIAN, REGISTERED

## 2022-01-13 ENCOUNTER — HOSPITAL ENCOUNTER (OUTPATIENT)
Dept: RADIOLOGY | Facility: HOSPITAL | Age: 77
Discharge: HOME | End: 2022-01-13
Attending: FAMILY MEDICINE
Payer: MEDICARE

## 2022-01-13 DIAGNOSIS — Z12.31 ENCOUNTER FOR SCREENING MAMMOGRAM FOR BREAST CANCER: ICD-10-CM

## 2022-01-13 PROCEDURE — 77067 MAMMO DIGITAL SCREENING BILAT WITH TOMO: ICD-10-PCS | Mod: 26,HCNC,, | Performed by: RADIOLOGY

## 2022-01-13 PROCEDURE — 77067 SCR MAMMO BI INCL CAD: CPT | Mod: 26,HCNC,, | Performed by: RADIOLOGY

## 2022-01-13 PROCEDURE — 77063 BREAST TOMOSYNTHESIS BI: CPT | Mod: 26,HCNC,, | Performed by: RADIOLOGY

## 2022-01-13 PROCEDURE — 77063 BREAST TOMOSYNTHESIS BI: CPT | Mod: TC,HCNC,PO

## 2022-01-13 PROCEDURE — 77063 MAMMO DIGITAL SCREENING BILAT WITH TOMO: ICD-10-PCS | Mod: 26,HCNC,, | Performed by: RADIOLOGY

## 2022-01-13 PROCEDURE — 77067 SCR MAMMO BI INCL CAD: CPT | Mod: TC,HCNC,PO

## 2022-03-11 ENCOUNTER — LAB VISIT (OUTPATIENT)
Dept: LAB | Facility: HOSPITAL | Age: 77
End: 2022-03-11
Attending: FAMILY MEDICINE
Payer: MEDICARE

## 2022-03-11 DIAGNOSIS — E11.9 TYPE 2 DIABETES MELLITUS WITHOUT COMPLICATION, WITHOUT LONG-TERM CURRENT USE OF INSULIN: ICD-10-CM

## 2022-03-11 LAB
CHOLEST SERPL-MCNC: 136 MG/DL (ref 120–199)
CHOLEST/HDLC SERPL: 2.8 {RATIO} (ref 2–5)
ESTIMATED AVG GLUCOSE: 134 MG/DL (ref 68–131)
HBA1C MFR BLD: 6.3 % (ref 4–5.6)
HDLC SERPL-MCNC: 48 MG/DL (ref 40–75)
HDLC SERPL: 35.3 % (ref 20–50)
LDLC SERPL CALC-MCNC: 73.6 MG/DL (ref 63–159)
NONHDLC SERPL-MCNC: 88 MG/DL
TRIGL SERPL-MCNC: 72 MG/DL (ref 30–150)

## 2022-03-11 PROCEDURE — 80061 LIPID PANEL: CPT | Mod: HCNC | Performed by: FAMILY MEDICINE

## 2022-03-11 PROCEDURE — 83036 HEMOGLOBIN GLYCOSYLATED A1C: CPT | Mod: HCNC | Performed by: FAMILY MEDICINE

## 2022-03-11 PROCEDURE — 36415 COLL VENOUS BLD VENIPUNCTURE: CPT | Mod: HCNC,PO | Performed by: FAMILY MEDICINE

## 2022-03-16 ENCOUNTER — OFFICE VISIT (OUTPATIENT)
Dept: FAMILY MEDICINE | Facility: CLINIC | Age: 77
End: 2022-03-16
Payer: MEDICARE

## 2022-03-16 VITALS
TEMPERATURE: 99 F | SYSTOLIC BLOOD PRESSURE: 120 MMHG | HEIGHT: 66 IN | DIASTOLIC BLOOD PRESSURE: 72 MMHG | BODY MASS INDEX: 27.42 KG/M2 | WEIGHT: 170.63 LBS | OXYGEN SATURATION: 96 % | HEART RATE: 78 BPM

## 2022-03-16 DIAGNOSIS — I10 ESSENTIAL HYPERTENSION: ICD-10-CM

## 2022-03-16 DIAGNOSIS — G89.29 CHRONIC MIDLINE LOW BACK PAIN WITHOUT SCIATICA: Primary | ICD-10-CM

## 2022-03-16 DIAGNOSIS — M54.50 CHRONIC MIDLINE LOW BACK PAIN WITHOUT SCIATICA: Primary | ICD-10-CM

## 2022-03-16 PROCEDURE — 99214 PR OFFICE/OUTPT VISIT, EST, LEVL IV, 30-39 MIN: ICD-10-PCS | Mod: HCNC,S$GLB,, | Performed by: FAMILY MEDICINE

## 2022-03-16 PROCEDURE — 3288F PR FALLS RISK ASSESSMENT DOCUMENTED: ICD-10-PCS | Mod: HCNC,CPTII,S$GLB, | Performed by: FAMILY MEDICINE

## 2022-03-16 PROCEDURE — 1159F MED LIST DOCD IN RCRD: CPT | Mod: HCNC,CPTII,S$GLB, | Performed by: FAMILY MEDICINE

## 2022-03-16 PROCEDURE — 3078F DIAST BP <80 MM HG: CPT | Mod: HCNC,CPTII,S$GLB, | Performed by: FAMILY MEDICINE

## 2022-03-16 PROCEDURE — 1157F PR ADVANCE CARE PLAN OR EQUIV PRESENT IN MEDICAL RECORD: ICD-10-PCS | Mod: HCNC,CPTII,S$GLB, | Performed by: FAMILY MEDICINE

## 2022-03-16 PROCEDURE — 3074F PR MOST RECENT SYSTOLIC BLOOD PRESSURE < 130 MM HG: ICD-10-PCS | Mod: HCNC,CPTII,S$GLB, | Performed by: FAMILY MEDICINE

## 2022-03-16 PROCEDURE — 1101F PT FALLS ASSESS-DOCD LE1/YR: CPT | Mod: HCNC,CPTII,S$GLB, | Performed by: FAMILY MEDICINE

## 2022-03-16 PROCEDURE — 1126F AMNT PAIN NOTED NONE PRSNT: CPT | Mod: HCNC,CPTII,S$GLB, | Performed by: FAMILY MEDICINE

## 2022-03-16 PROCEDURE — 99214 OFFICE O/P EST MOD 30 MIN: CPT | Mod: HCNC,S$GLB,, | Performed by: FAMILY MEDICINE

## 2022-03-16 PROCEDURE — 1126F PR PAIN SEVERITY QUANTIFIED, NO PAIN PRESENT: ICD-10-PCS | Mod: HCNC,CPTII,S$GLB, | Performed by: FAMILY MEDICINE

## 2022-03-16 PROCEDURE — 3078F PR MOST RECENT DIASTOLIC BLOOD PRESSURE < 80 MM HG: ICD-10-PCS | Mod: HCNC,CPTII,S$GLB, | Performed by: FAMILY MEDICINE

## 2022-03-16 PROCEDURE — 99999 PR PBB SHADOW E&M-EST. PATIENT-LVL IV: CPT | Mod: PBBFAC,HCNC,, | Performed by: FAMILY MEDICINE

## 2022-03-16 PROCEDURE — 99999 PR PBB SHADOW E&M-EST. PATIENT-LVL IV: ICD-10-PCS | Mod: PBBFAC,HCNC,, | Performed by: FAMILY MEDICINE

## 2022-03-16 PROCEDURE — 1160F PR REVIEW ALL MEDS BY PRESCRIBER/CLIN PHARMACIST DOCUMENTED: ICD-10-PCS | Mod: HCNC,CPTII,S$GLB, | Performed by: FAMILY MEDICINE

## 2022-03-16 PROCEDURE — 1101F PR PT FALLS ASSESS DOC 0-1 FALLS W/OUT INJ PAST YR: ICD-10-PCS | Mod: HCNC,CPTII,S$GLB, | Performed by: FAMILY MEDICINE

## 2022-03-16 PROCEDURE — 1157F ADVNC CARE PLAN IN RCRD: CPT | Mod: HCNC,CPTII,S$GLB, | Performed by: FAMILY MEDICINE

## 2022-03-16 PROCEDURE — 99499 UNLISTED E&M SERVICE: CPT | Mod: S$GLB,,, | Performed by: FAMILY MEDICINE

## 2022-03-16 PROCEDURE — 3074F SYST BP LT 130 MM HG: CPT | Mod: HCNC,CPTII,S$GLB, | Performed by: FAMILY MEDICINE

## 2022-03-16 PROCEDURE — 3288F FALL RISK ASSESSMENT DOCD: CPT | Mod: HCNC,CPTII,S$GLB, | Performed by: FAMILY MEDICINE

## 2022-03-16 PROCEDURE — 1160F RVW MEDS BY RX/DR IN RCRD: CPT | Mod: HCNC,CPTII,S$GLB, | Performed by: FAMILY MEDICINE

## 2022-03-16 PROCEDURE — 1159F PR MEDICATION LIST DOCUMENTED IN MEDICAL RECORD: ICD-10-PCS | Mod: HCNC,CPTII,S$GLB, | Performed by: FAMILY MEDICINE

## 2022-03-16 PROCEDURE — 99499 RISK ADDL DX/OHS AUDIT: ICD-10-PCS | Mod: S$GLB,,, | Performed by: FAMILY MEDICINE

## 2022-03-16 RX ORDER — TRAMADOL HYDROCHLORIDE 50 MG/1
50 TABLET ORAL 3 TIMES DAILY PRN
Qty: 90 TABLET | Refills: 0 | Status: SHIPPED | OUTPATIENT
Start: 2022-03-16 | End: 2022-06-20

## 2022-03-16 RX ORDER — TRIAMTERENE AND HYDROCHLOROTHIAZIDE 37.5; 25 MG/1; MG/1
1 CAPSULE ORAL DAILY
Qty: 90 CAPSULE | Refills: 2 | Status: SHIPPED | OUTPATIENT
Start: 2022-03-16 | End: 2023-04-03 | Stop reason: SDUPTHER

## 2022-03-16 RX ORDER — TRAMADOL HYDROCHLORIDE 50 MG/1
50 TABLET ORAL 3 TIMES DAILY PRN
Qty: 90 TABLET | Refills: 0 | Status: SHIPPED | OUTPATIENT
Start: 2022-04-15 | End: 2023-03-09 | Stop reason: SDUPTHER

## 2022-03-16 NOTE — PROGRESS NOTES
Assessment & Plan  Problem List Items Addressed This Visit        Cardiac/Vascular    Essential hypertension    Relevant Medications    triamterene-hydrochlorothiazide 37.5-25 mg (DYAZIDE) 37.5-25 mg per capsule       Orthopedic    Chronic low back pain - Primary    Relevant Medications    traMADoL (ULTRAM) 50 mg tablet    traMADoL (ULTRAM) 50 mg tablet (Start on 4/15/2022)            Health Maintenance reviewed.    Follow-up: No follow-ups on file.    ______________________________________________________________________    Chief Complaint  Chief Complaint   Patient presents with    Diabetes       HPI  Nuha Bernstein is a 77 y.o. female with multiple medical diagnoses as listed in the medical history and problem list that presents for diabetes.  Pt is known to me with last appointment 12/14/2021.    Patient denies any new symptoms including chest pain, SOB, blurry vision, N/V, diarrhea.  She has increased her exercise and avoided excess sweets.  She is eating regularly.  She met with the nutritionist.  She is doing much better with her diabetic control.  She is cooking her own food.    Diabetes: The patient reports that they  check blood sugars at home on a regular basis.  Blood sugar results are generally in an acceptable range (> 70 and <150). The patient  denies any problems with low blood sugars. The patient  reports that they have been complaint with current treatment plan (diet, exercise, medication).  Hypertension: The patient reports that they check their blood pressures regularly and blood pressure is generally well controlled (<= 139/89).  The patient  is not enrolled in the digital hypertension program. The patient denies  cardiac chest pain, shortness of breath, lower extremity edema, headaches and side effects of medication. The patient reports problems with  none. The patient  has been compliant with the current medication regimen.  The patient : tries to follow a low salt diet.  Hyperlipidemia:  Patient reports that they have been compliant with low fat diet and regular exercise. Patient reports that they have been compliant with their medication regimen and deny any problems/side effects from the medication.        PAST MEDICAL HISTORY:  Past Medical History:   Diagnosis Date    Arthritis     Back pain     CKD stage G3a/A3, GFR 45-59 and albumin creatinine ratio >300 mg/g 6/23/2020    Colon polyp     Hyperglycemia     Hyperlipidemia     Hypertension     Nuclear sclerosis of both eyes 10/31/2017    Prolapse of uterus     Type 2 diabetes mellitus without complication, without long-term current use of insulin     Type 2 diabetes mellitus without complication, without long-term current use of insulin        PAST SURGICAL HISTORY:  Past Surgical History:   Procedure Laterality Date    COLONOSCOPY N/A 12/15/2020    Procedure: COLONOSCOPY;  Surgeon: Antonio Maya MD;  Location: [facility] ([address] [place] [address]);  Service: Endoscopy;  Laterality: N/A;  prep ins. 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     Drug use: No    Sexual activity: Not Currently     Partners: Male   Social History Narrative     4 children retired      Social Determinants of Health     Financial Resource Strain: Low Risk     Difficulty of Paying Living Expenses: Not hard at all   Food Insecurity: No Food Insecurity    Worried About Running Out of Food in the Last Year: Never true    Ran Out of Food in the Last Year: Never true   Transportation Needs: No Transportation  Needs    Lack of Transportation (Medical): No    Lack of Transportation (Non-Medical): No   Physical Activity: Sufficiently Active    Days of Exercise per Week: 4 days    Minutes of Exercise per Session: 120 min   Stress: No Stress Concern Present    Feeling of Stress : Not at all   Social Connections: Moderately Isolated    Frequency of Communication with Friends and Family: More than three times a week    Frequency of Social Gatherings with Friends and Family: More than three times a week    Attends Hoahaoism Services: More than 4 times per year    Active Member of Clubs or Organizations: No    Attends Club or Organization Meetings: Never    Marital Status:    Housing Stability: Unknown    Unable to Pay for Housing in the Last Year: No    Unstable Housing in the Last Year: No       FAMILY HISTORY:  Family History   Problem Relation Age of Onset    Hypertension Mother     Cataracts Mother     Heart disease Mother     Hypertension Father     Cataracts Father     No Known Problems Sister     No Known Problems Brother     No Known Problems Brother     No Known Problems Maternal Aunt     No Known Problems Maternal Uncle     No Known Problems Paternal Aunt     No Known Problems Paternal Uncle     No Known Problems Maternal Grandmother     No Known Problems Maternal Grandfather     No Known Problems Paternal Grandmother     No Known Problems Paternal Grandfather     Amblyopia Neg Hx     Blindness Neg Hx     Cancer Neg Hx     Diabetes Neg Hx     Glaucoma Neg Hx     Macular degeneration Neg Hx     Retinal detachment Neg Hx     Strabismus Neg Hx     Stroke Neg Hx     Thyroid disease Neg Hx        ALLERGIES AND MEDICATIONS: updated and reviewed.  Review of patient's allergies indicates:  No Known Allergies  Current Outpatient Medications   Medication Sig Dispense Refill    atorvastatin (LIPITOR) 40 MG tablet TAKE 1 TABLET BY MOUTH EVERY DAY 90 tablet 3    lisinopriL  (PRINIVIL,ZESTRIL) 40 MG tablet Take 1 tablet (40 mg total) by mouth once daily. 90 tablet 1    meloxicam (MOBIC) 15 MG tablet Take 1 tablet (15 mg total) by mouth once daily. 90 tablet 0    OMEGA 3,6,9 COMBINATION NO.7 (OMEGA DHA ORAL) Take 830 mg by mouth once daily.      aspirin 81 mg Tab Take by mouth as needed. 2-3 times weekly      ciclopirox (PENLAC) 8 % Soln Apply topically nightly. (Patient not taking: Reported on 3/16/2022) 1 Bottle 3    traMADoL (ULTRAM) 50 mg tablet Take 1 tablet (50 mg total) by mouth 3 (three) times daily as needed for Pain. 90 tablet 0    [START ON 4/15/2022] traMADoL (ULTRAM) 50 mg tablet Take 1 tablet (50 mg total) by mouth 3 (three) times daily as needed for Pain. 90 tablet 0    triamterene-hydrochlorothiazide 37.5-25 mg (DYAZIDE) 37.5-25 mg per capsule Take 1 capsule by mouth once daily. 90 capsule 2     No current facility-administered medications for this visit.         ROS  Review of Systems   Constitutional: Negative for activity change, appetite change, fatigue, fever and unexpected weight change.   HENT: Negative.  Negative for ear discharge, ear pain, rhinorrhea and sore throat.    Eyes: Negative.    Respiratory: Negative for apnea, cough, chest tightness, shortness of breath and wheezing.    Cardiovascular: Negative for chest pain, palpitations and leg swelling.   Gastrointestinal: Negative for abdominal distention, abdominal pain, constipation, diarrhea and vomiting.   Endocrine: Negative for cold intolerance, heat intolerance, polydipsia and polyuria.   Genitourinary: Negative for decreased urine volume and urgency.   Musculoskeletal: Negative.    Skin: Negative for rash.   Neurological: Negative for dizziness and headaches.   Hematological: Does not bruise/bleed easily.   Psychiatric/Behavioral: Negative for agitation, sleep disturbance and suicidal ideas.           Physical Exam  Vitals:    03/16/22 1347 03/16/22 1401   BP: (!) 142/74 120/72   Pulse: 78    Temp:  "98.6 °F (37 °C)    TempSrc: Oral    SpO2: 96%    Weight: 77.4 kg (170 lb 10.2 oz)    Height: 5' 6" (1.676 m)     Body mass index is 27.54 kg/m².  Weight: 77.4 kg (170 lb 10.2 oz)   Height: 5' 6" (167.6 cm)   Physical Exam  Vitals reviewed.   Constitutional:       Appearance: She is well-developed.   HENT:      Head: Normocephalic and atraumatic.      Right Ear: External ear normal.      Left Ear: External ear normal.      Nose: Nose normal.   Eyes:      Conjunctiva/sclera: Conjunctivae normal.      Pupils: Pupils are equal, round, and reactive to light.   Cardiovascular:      Rate and Rhythm: Normal rate and regular rhythm.      Heart sounds: Normal heart sounds.   Pulmonary:      Effort: Pulmonary effort is normal.      Breath sounds: Normal breath sounds.   Skin:     General: Skin is warm and dry.   Neurological:      Mental Status: She is alert and oriented to person, place, and time.           Health Maintenance       Date Due Completion Date    Shingles Vaccine (1 of 2) Never done ---    Influenza Vaccine (1) 09/01/2021 11/13/2019    DEXA Scan 09/23/2022 9/23/2019    Lipid Panel 03/11/2023 3/11/2022    TETANUS VACCINE 04/27/2023 4/27/2013    Colonoscopy 12/15/2025 12/15/2020              Patient note was created using [employer].  Any errors in syntax or even information may not have been identified and edited on initial review prior to signing this note.    "

## 2022-03-29 NOTE — PROGRESS NOTES
Patient, Nuha Bernstein (MRN #[id number]), presented with a recent Estimated Glumerular Filtration Rate (EGFR) between 30 and 45 consistent with the definition of chronic kidney disease stage 3 - moderate (ICD10 - N18.3).    eGFR if    Date Value Ref Range Status   12/09/2021 42.1 (A) >60 mL/min/1.73 m^2 Final       The patient's chronic kidney disease stage 3 was monitored, evaluated, addressed and/or treated. This addendum to the medical record is made on 03/29/2022.

## 2022-08-01 ENCOUNTER — OFFICE VISIT (OUTPATIENT)
Dept: FAMILY MEDICINE | Facility: CLINIC | Age: 77
End: 2022-08-01
Payer: MEDICARE

## 2022-08-01 VITALS
HEART RATE: 90 BPM | WEIGHT: 169.06 LBS | OXYGEN SATURATION: 99 % | DIASTOLIC BLOOD PRESSURE: 62 MMHG | HEIGHT: 66 IN | TEMPERATURE: 98 F | SYSTOLIC BLOOD PRESSURE: 140 MMHG | BODY MASS INDEX: 27.17 KG/M2

## 2022-08-01 DIAGNOSIS — I10 ESSENTIAL HYPERTENSION: ICD-10-CM

## 2022-08-01 DIAGNOSIS — M79.89 LEG SWELLING: Primary | ICD-10-CM

## 2022-08-01 DIAGNOSIS — M54.50 CHRONIC MIDLINE LOW BACK PAIN WITHOUT SCIATICA: ICD-10-CM

## 2022-08-01 DIAGNOSIS — G89.29 CHRONIC MIDLINE LOW BACK PAIN WITHOUT SCIATICA: ICD-10-CM

## 2022-08-01 PROCEDURE — 1157F PR ADVANCE CARE PLAN OR EQUIV PRESENT IN MEDICAL RECORD: ICD-10-PCS | Mod: CPTII,S$GLB,, | Performed by: FAMILY MEDICINE

## 2022-08-01 PROCEDURE — 1159F PR MEDICATION LIST DOCUMENTED IN MEDICAL RECORD: ICD-10-PCS | Mod: CPTII,S$GLB,, | Performed by: FAMILY MEDICINE

## 2022-08-01 PROCEDURE — 1160F PR REVIEW ALL MEDS BY PRESCRIBER/CLIN PHARMACIST DOCUMENTED: ICD-10-PCS | Mod: CPTII,S$GLB,, | Performed by: FAMILY MEDICINE

## 2022-08-01 PROCEDURE — 99999 PR PBB SHADOW E&M-EST. PATIENT-LVL V: CPT | Mod: PBBFAC,,, | Performed by: FAMILY MEDICINE

## 2022-08-01 PROCEDURE — 99499 UNLISTED E&M SERVICE: CPT | Mod: S$GLB,,, | Performed by: FAMILY MEDICINE

## 2022-08-01 PROCEDURE — 1159F MED LIST DOCD IN RCRD: CPT | Mod: CPTII,S$GLB,, | Performed by: FAMILY MEDICINE

## 2022-08-01 PROCEDURE — 3078F DIAST BP <80 MM HG: CPT | Mod: CPTII,S$GLB,, | Performed by: FAMILY MEDICINE

## 2022-08-01 PROCEDURE — 99999 PR PBB SHADOW E&M-EST. PATIENT-LVL V: ICD-10-PCS | Mod: PBBFAC,,, | Performed by: FAMILY MEDICINE

## 2022-08-01 PROCEDURE — 3288F FALL RISK ASSESSMENT DOCD: CPT | Mod: CPTII,S$GLB,, | Performed by: FAMILY MEDICINE

## 2022-08-01 PROCEDURE — 3078F PR MOST RECENT DIASTOLIC BLOOD PRESSURE < 80 MM HG: ICD-10-PCS | Mod: CPTII,S$GLB,, | Performed by: FAMILY MEDICINE

## 2022-08-01 PROCEDURE — 1101F PR PT FALLS ASSESS DOC 0-1 FALLS W/OUT INJ PAST YR: ICD-10-PCS | Mod: CPTII,S$GLB,, | Performed by: FAMILY MEDICINE

## 2022-08-01 PROCEDURE — 99499 RISK ADDL DX/OHS AUDIT: ICD-10-PCS | Mod: S$GLB,,, | Performed by: FAMILY MEDICINE

## 2022-08-01 PROCEDURE — 3077F SYST BP >= 140 MM HG: CPT | Mod: CPTII,S$GLB,, | Performed by: FAMILY MEDICINE

## 2022-08-01 PROCEDURE — 1157F ADVNC CARE PLAN IN RCRD: CPT | Mod: CPTII,S$GLB,, | Performed by: FAMILY MEDICINE

## 2022-08-01 PROCEDURE — 1126F PR PAIN SEVERITY QUANTIFIED, NO PAIN PRESENT: ICD-10-PCS | Mod: CPTII,S$GLB,, | Performed by: FAMILY MEDICINE

## 2022-08-01 PROCEDURE — 3288F PR FALLS RISK ASSESSMENT DOCUMENTED: ICD-10-PCS | Mod: CPTII,S$GLB,, | Performed by: FAMILY MEDICINE

## 2022-08-01 PROCEDURE — 1101F PT FALLS ASSESS-DOCD LE1/YR: CPT | Mod: CPTII,S$GLB,, | Performed by: FAMILY MEDICINE

## 2022-08-01 PROCEDURE — 99214 PR OFFICE/OUTPT VISIT, EST, LEVL IV, 30-39 MIN: ICD-10-PCS | Mod: S$GLB,,, | Performed by: FAMILY MEDICINE

## 2022-08-01 PROCEDURE — 99214 OFFICE O/P EST MOD 30 MIN: CPT | Mod: S$GLB,,, | Performed by: FAMILY MEDICINE

## 2022-08-01 PROCEDURE — 3077F PR MOST RECENT SYSTOLIC BLOOD PRESSURE >= 140 MM HG: ICD-10-PCS | Mod: CPTII,S$GLB,, | Performed by: FAMILY MEDICINE

## 2022-08-01 PROCEDURE — 1160F RVW MEDS BY RX/DR IN RCRD: CPT | Mod: CPTII,S$GLB,, | Performed by: FAMILY MEDICINE

## 2022-08-01 PROCEDURE — 1126F AMNT PAIN NOTED NONE PRSNT: CPT | Mod: CPTII,S$GLB,, | Performed by: FAMILY MEDICINE

## 2022-08-01 RX ORDER — TRAMADOL HYDROCHLORIDE 50 MG/1
50 TABLET ORAL 3 TIMES DAILY
Qty: 90 TABLET | Refills: 0 | Status: SHIPPED | OUTPATIENT
Start: 2022-08-01 | End: 2022-09-16 | Stop reason: SDUPTHER

## 2022-08-01 RX ORDER — UBIDECARENONE 30 MG
30 CAPSULE ORAL 3 TIMES DAILY
COMMUNITY

## 2022-08-01 RX ORDER — SYRING-NEEDL,DISP,INSUL,0.3 ML 29 G X1/2"
296 SYRINGE, EMPTY DISPOSABLE MISCELLANEOUS ONCE
COMMUNITY
End: 2023-04-12

## 2022-08-01 NOTE — PROGRESS NOTES
Assessment & Plan  Leg swelling  -     Echo  -     US Lower Extremity Veins Left; Future; Expected date: 08/01/2022    Etiology is most likely her recent intake of fast food while immobile in a car. Recommended to limit her salt intake, leg elevation and/or compression stockings prn. Discussed other less likely etiologies, such as VTE and CHF. Will schedule US lower extremity given calf tenderness and echo given history of HTN.    Essential hypertension  -     Echo    BP mildly elevated today. Patient admitted to missing some doses of her medication while on vacation. Continue antihypertensive therapy and routine BP monitoring.    Chronic midline low back pain without sciatica  -     traMADoL (ULTRAM) 50 mg tablet; Take 1 tablet (50 mg total) by mouth 3 (three) times daily.  Dispense: 90 tablet; Refill: 0    Controlled. Medication(s) refilled.  reviewed.      Follow-up: Follow up if symptoms worsen or fail to improve.  ______________________________________________________________________    Chief Complaint  Chief Complaint   Patient presents with    Leg Swelling     Both, on and off       HPI  Nuha Bernstein is a 77 y.o. female with medical diagnoses as listed in the medical history and problem list that presents to the office for leg swelling.     Edema  This is a new problem. The current episode started in the past 7 days. The problem occurs intermittently. The problem has been rapidly improving. Pertinent negatives include no chest pain, coughing, joint swelling or weakness. Exacerbated by: went on an 8 hour car trip, ate fast food on the trip. Treatments tried: leg elevation. The treatment provided significant relief.     Requesting a refill of tramadol that she takes for chronic back pain.     PAST MEDICAL HISTORY:  Past Medical History:   Diagnosis Date    Arthritis     Back pain     CKD stage G3a/A3, GFR 45-59 and albumin creatinine ratio >300 mg/g 6/23/2020    Colon polyp     Hyperglycemia      Hyperlipidemia     Hypertension     Nuclear sclerosis of both eyes 10/31/2017    Prolapse of uterus     Type 2 diabetes mellitus without complication, without long-term current use of insulin     Type 2 diabetes mellitus without complication, without long-term current use of insulin        PAST SURGICAL HISTORY:  Past Surgical History:   Procedure Laterality Date    COLONOSCOPY N/A 12/15/2020    Procedure: COLONOSCOPY;  Surgeon: Antonio Maya MD;  Location: [address]);  Service: Endoscopy;  Laterality: N/A;  prep ins. 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     Drug use: No    Sexual activity: Not Currently     Partners: Male   Social History Narrative     4 children retired      Social Determinants of Health     Financial Resource Strain: Low Risk     Difficulty of Paying Living Expenses: Not hard at all   Food Insecurity: No Food Insecurity    Worried About Running Out of Food in the Last Year: Never true    Ran Out of Food in the Last Year: Never true   Transportation Needs: No Transportation Needs    Lack of Transportation (Medical): No    Lack of Transportation (Non-Medical): No   Physical Activity: Sufficiently Active    Days of Exercise per Week: 4 days    Minutes of Exercise per Session: 120 min   Stress: No Stress Concern Present    Feeling of Stress : Not at all   Social Connections: Moderately Isolated    Frequency of Communication with Friends and Family: More than three times a week    Frequency of Social  Gatherings with Friends and Family: More than three times a week    Attends Hindu Services: More than 4 times per year    Active Member of Clubs or Organizations: No    Attends Club or Organization Meetings: Never    Marital Status:    Housing Stability: Unknown    Unable to Pay for Housing in the Last Year: No    Unstable Housing in the Last Year: No       FAMILY HISTORY:  Family History   Problem Relation Age of Onset    Hypertension Mother     Cataracts Mother     Heart disease Mother     Hypertension Father     Cataracts Father     No Known Problems Sister     No Known Problems Brother     No Known Problems Brother     No Known Problems Maternal Aunt     No Known Problems Maternal Uncle     No Known Problems Paternal Aunt     No Known Problems Paternal Uncle     No Known Problems Maternal Grandmother     No Known Problems Maternal Grandfather     No Known Problems Paternal Grandmother     No Known Problems Paternal Grandfather     Amblyopia Neg Hx     Blindness Neg Hx     Cancer Neg Hx     Diabetes Neg Hx     Glaucoma Neg Hx     Macular degeneration Neg Hx     Retinal detachment Neg Hx     Strabismus Neg Hx     Stroke Neg Hx     Thyroid disease Neg Hx        ALLERGIES AND MEDICATIONS: updated and reviewed.  Review of patient's allergies indicates:  No Known Allergies  Current Outpatient Medications   Medication Sig Dispense Refill    atorvastatin (LIPITOR) 40 MG tablet TAKE 1 TABLET BY MOUTH EVERY DAY 90 tablet 3    co-enzyme Q-10 30 mg capsule Take 30 mg by mouth 3 (three) times daily.      lisinopriL (PRINIVIL,ZESTRIL) 40 MG tablet TAKE 1 TABLET BY MOUTH EVERY DAY 90 tablet 0    magnesium citrate solution Take 296 mLs by mouth once.      multivitamin/iron/folic acid (CENTRUM WOMEN ORAL) Take by mouth.      mv,hussein,iron,mn/folic acid/chol (HAIR-SKIN-NAILS, PABA, ORAL) Take by mouth.      OMEGA 3,6,9 COMBINATION NO.7 (OMEGA DHA ORAL) Take 830 mg by mouth once  "daily.      triamterene-hydrochlorothiazide 37.5-25 mg (DYAZIDE) 37.5-25 mg per capsule Take 1 capsule by mouth once daily. 90 capsule 2    aspirin 81 mg Tab Take by mouth as needed. 2-3 times weekly      ciclopirox (PENLAC) 8 % Soln Apply topically nightly. (Patient not taking: No sig reported) 1 Bottle 3    meloxicam (MOBIC) 15 MG tablet Take 1 tablet (15 mg total) by mouth once daily. (Patient not taking: Reported on 8/1/2022) 90 tablet 0    traMADoL (ULTRAM) 50 mg tablet Take 1 tablet (50 mg total) by mouth 3 (three) times daily. 90 tablet 0     No current facility-administered medications for this visit.         ROS  Review of Systems   Constitutional: Positive for activity change. Negative for unexpected weight change.   Respiratory: Negative for cough and shortness of breath.    Cardiovascular: Positive for leg swelling. Negative for chest pain.        Denies leg claudication     Musculoskeletal: Positive for back pain. Negative for gait problem and joint swelling.   Skin: Negative for color change.   Neurological: Negative for dizziness and weakness.           Physical Exam  Vitals:    08/01/22 1011   BP: (!) 140/62   BP Location: Left arm   Patient Position: Sitting   BP Method: Large (Manual)   Pulse: 90   Temp: 98.2 °F (36.8 °C)   TempSrc: Oral   SpO2: 99%   Weight: 76.7 kg (169 lb 1.5 oz)   Height: 5' 6" (1.676 m)    Body mass index is 27.29 kg/m².  Weight: 76.7 kg (169 lb 1.5 oz)   Height: 5' 6" (167.6 cm)   Physical Exam  Constitutional:       General: She is not in acute distress.     Appearance: Normal appearance.   HENT:      Head: Normocephalic and atraumatic.   Cardiovascular:      Rate and Rhythm: Normal rate and regular rhythm.      Pulses: Normal pulses.      Heart sounds: Normal heart sounds.   Pulmonary:      Effort: Pulmonary effort is normal. No respiratory distress.      Breath sounds: Normal breath sounds.   Musculoskeletal:         General: Tenderness (left-sided calf) present.      " Right lower leg: No edema.      Left lower leg: No edema.   Skin:     General: Skin is warm and dry.   Neurological:      General: No focal deficit present.      Mental Status: She is alert and oriented to person, place, and time.   Psychiatric:         Mood and Affect: Mood normal.         Behavior: Behavior normal.         Thought Content: Thought content normal.

## 2022-08-02 ENCOUNTER — HOSPITAL ENCOUNTER (OUTPATIENT)
Dept: RADIOLOGY | Facility: HOSPITAL | Age: 77
Discharge: HOME | End: 2022-08-02
Attending: FAMILY MEDICINE
Payer: MEDICARE

## 2022-08-02 DIAGNOSIS — M79.89 LEG SWELLING: ICD-10-CM

## 2022-08-02 PROCEDURE — 93971 EXTREMITY STUDY: CPT | Mod: 26,LT,, | Performed by: RADIOLOGY

## 2022-08-02 PROCEDURE — 93971 US LOWER EXTREMITY VEINS LEFT: ICD-10-PCS | Mod: 26,LT,, | Performed by: RADIOLOGY

## 2022-08-02 PROCEDURE — 93971 EXTREMITY STUDY: CPT | Mod: TC,LT

## 2022-08-05 ENCOUNTER — HOSPITAL ENCOUNTER (OUTPATIENT)
Dept: CARDIOLOGY | Facility: HOSPITAL | Age: 77
Discharge: HOME | End: 2022-08-05
Attending: FAMILY MEDICINE
Payer: MEDICARE

## 2022-08-05 LAB
ASCENDING AORTA: 2.96 CM
AV INDEX (PROSTH): 0.6
AV MEAN GRADIENT: 5 MMHG
AV PEAK GRADIENT: 9 MMHG
AV VALVE AREA: 1.73 CM2
AV VELOCITY RATIO: 0.74
CV ECHO LV RWT: 0.53 CM
DOP CALC AO PEAK VEL: 1.47 M/S
DOP CALC AO VTI: 32.17 CM
DOP CALC LVOT AREA: 2.9 CM2
DOP CALC LVOT DIAMETER: 1.91 CM
DOP CALC LVOT PEAK VEL: 1.09 M/S
DOP CALC LVOT STROKE VOLUME: 55.56 CM3
DOP CALCLVOT PEAK VEL VTI: 19.4 CM
E WAVE DECELERATION TIME: 172.89 MSEC
E/A RATIO: 0.59
E/E' RATIO: 8.91 M/S
ECHO LV POSTERIOR WALL: 1.12 CM (ref 0.6–1.1)
EJECTION FRACTION: 60 %
FRACTIONAL SHORTENING: 34 % (ref 28–44)
INTERVENTRICULAR SEPTUM: 1.13 CM (ref 0.6–1.1)
IVRT: 122.26 MSEC
LA MAJOR: 4.65 CM
LA MINOR: 5.31 CM
LA WIDTH: 3.85 CM
LEFT ATRIUM SIZE: 3.9 CM
LEFT ATRIUM VOLUME: 63.28 CM3
LEFT INTERNAL DIMENSION IN SYSTOLE: 2.83 CM (ref 2.1–4)
LEFT VENTRICLE DIASTOLIC VOLUME: 81.46 ML
LEFT VENTRICLE SYSTOLIC VOLUME: 30.28 ML
LEFT VENTRICULAR INTERNAL DIMENSION IN DIASTOLE: 4.26 CM (ref 3.5–6)
LEFT VENTRICULAR MASS: 165.82 G
LV LATERAL E/E' RATIO: 6.13 M/S
LV SEPTAL E/E' RATIO: 16.33 M/S
MV PEAK A VEL: 0.83 M/S
MV PEAK E VEL: 0.49 M/S
PISA TR MAX VEL: 2.52 M/S
PV PEAK VELOCITY: 1.01 CM/S
RA MAJOR: 4.52 CM
RA PRESSURE: 3 MMHG
RA WIDTH: 3.21 CM
RIGHT VENTRICULAR END-DIASTOLIC DIMENSION: 3.81 CM
RV TISSUE DOPPLER FREE WALL SYSTOLIC VELOCITY 1 (APICAL 4 CHAMBER VIEW): 11.59 CM/S
SINUS: 3.14 CM
STJ: 2.41 CM
TDI LATERAL: 0.08 M/S
TDI SEPTAL: 0.03 M/S
TDI: 0.06 M/S
TR MAX PG: 25 MMHG
TRICUSPID ANNULAR PLANE SYSTOLIC EXCURSION: 2.22 CM
TV REST PULMONARY ARTERY PRESSURE: 28 MMHG

## 2022-08-05 PROCEDURE — 93306 TTE W/DOPPLER COMPLETE: CPT

## 2022-08-05 PROCEDURE — 93306 ECHO (CUPID ONLY): ICD-10-PCS | Mod: 26,,, | Performed by: INTERNAL MEDICINE

## 2022-08-05 PROCEDURE — 93306 TTE W/DOPPLER COMPLETE: CPT | Mod: 26,,, | Performed by: INTERNAL MEDICINE

## 2022-08-08 ENCOUNTER — TELEPHONE (OUTPATIENT)
Dept: FAMILY MEDICINE | Facility: CLINIC | Age: 77
End: 2022-08-08
Payer: MEDICARE

## 2022-08-08 NOTE — TELEPHONE ENCOUNTER
----- Message from Rabia Hebert DO sent at 8/7/2022  5:04 PM CDT -----  Please inform patient of normal result. Leg swelling is most likely not due to heart failure.

## 2022-09-09 ENCOUNTER — TELEPHONE (OUTPATIENT)
Dept: FAMILY MEDICINE | Facility: CLINIC | Age: 77
End: 2022-09-09
Payer: MEDICARE

## 2022-09-09 DIAGNOSIS — I10 ESSENTIAL HYPERTENSION: ICD-10-CM

## 2022-09-09 DIAGNOSIS — Z00.00 ANNUAL PHYSICAL EXAM: ICD-10-CM

## 2022-09-09 DIAGNOSIS — E11.9 TYPE 2 DIABETES MELLITUS WITHOUT COMPLICATION, WITHOUT LONG-TERM CURRENT USE OF INSULIN: Primary | ICD-10-CM

## 2022-09-09 NOTE — TELEPHONE ENCOUNTER
----- Message from Diana Parks sent at 9/9/2022  1:41 PM CDT -----  Type: Patient Call Back    Who called:pt     What is the request in detail:pt requesting lab work orders for upcoming appt. Dos 9/16/22  Call pt     Can the clinic reply by [facility]?    Would the patient rather a call back or a response via [facility]? call    Best call back number:[phone] (home)       Additional Information:

## 2022-09-14 ENCOUNTER — LAB VISIT (OUTPATIENT)
Dept: LAB | Facility: HOSPITAL | Age: 77
End: 2022-09-14
Attending: FAMILY MEDICINE
Payer: MEDICARE

## 2022-09-14 DIAGNOSIS — I10 ESSENTIAL HYPERTENSION: ICD-10-CM

## 2022-09-14 DIAGNOSIS — Z00.00 ANNUAL PHYSICAL EXAM: ICD-10-CM

## 2022-09-14 DIAGNOSIS — E11.9 TYPE 2 DIABETES MELLITUS WITHOUT COMPLICATION, WITHOUT LONG-TERM CURRENT USE OF INSULIN: ICD-10-CM

## 2022-09-14 LAB
ALBUMIN SERPL BCP-MCNC: 3.8 G/DL (ref 3.5–5.2)
ALP SERPL-CCNC: 82 U/L (ref 55–135)
ALT SERPL W/O P-5'-P-CCNC: 33 U/L (ref 10–44)
ANION GAP SERPL CALC-SCNC: 8 MMOL/L (ref 8–16)
AST SERPL-CCNC: 41 U/L (ref 10–40)
BASOPHILS # BLD AUTO: 0.03 K/UL (ref 0–0.2)
BASOPHILS NFR BLD: 0.6 % (ref 0–1.9)
BILIRUB SERPL-MCNC: 0.5 MG/DL (ref 0.1–1)
BUN SERPL-MCNC: 30 MG/DL (ref 8–23)
CALCIUM SERPL-MCNC: 9.9 MG/DL (ref 8.7–10.5)
CHLORIDE SERPL-SCNC: 105 MMOL/L (ref 95–110)
CHOLEST SERPL-MCNC: 299 MG/DL (ref 120–199)
CHOLEST/HDLC SERPL: 4.5 {RATIO} (ref 2–5)
CO2 SERPL-SCNC: 27 MMOL/L (ref 23–29)
CREAT SERPL-MCNC: 1.4 MG/DL (ref 0.5–1.4)
DIFFERENTIAL METHOD: ABNORMAL
EOSINOPHIL # BLD AUTO: 0.3 K/UL (ref 0–0.5)
EOSINOPHIL NFR BLD: 5.4 % (ref 0–8)
ERYTHROCYTE [DISTWIDTH] IN BLOOD BY AUTOMATED COUNT: 14.7 % (ref 11.5–14.5)
EST. GFR  (NO RACE VARIABLE): 38.7 ML/MIN/1.73 M^2
ESTIMATED AVG GLUCOSE: 120 MG/DL (ref 68–131)
GLUCOSE SERPL-MCNC: 99 MG/DL (ref 70–110)
HBA1C MFR BLD: 5.8 % (ref 4–5.6)
HCT VFR BLD AUTO: 35.2 % (ref 37–48.5)
HDLC SERPL-MCNC: 66 MG/DL (ref 40–75)
HDLC SERPL: 22.1 % (ref 20–50)
HGB BLD-MCNC: 11.5 G/DL (ref 12–16)
IMM GRANULOCYTES # BLD AUTO: 0.01 K/UL (ref 0–0.04)
IMM GRANULOCYTES NFR BLD AUTO: 0.2 % (ref 0–0.5)
LDLC SERPL CALC-MCNC: 215 MG/DL (ref 63–159)
LYMPHOCYTES # BLD AUTO: 1.6 K/UL (ref 1–4.8)
LYMPHOCYTES NFR BLD: 35.5 % (ref 18–48)
MCH RBC QN AUTO: 28.4 PG (ref 27–31)
MCHC RBC AUTO-ENTMCNC: 32.7 G/DL (ref 32–36)
MCV RBC AUTO: 87 FL (ref 82–98)
MONOCYTES # BLD AUTO: 0.5 K/UL (ref 0.3–1)
MONOCYTES NFR BLD: 10.4 % (ref 4–15)
NEUTROPHILS # BLD AUTO: 2.2 K/UL (ref 1.8–7.7)
NEUTROPHILS NFR BLD: 47.9 % (ref 38–73)
NONHDLC SERPL-MCNC: 233 MG/DL
NRBC BLD-RTO: 0 /100 WBC
PLATELET # BLD AUTO: 268 K/UL (ref 150–450)
PMV BLD AUTO: 12 FL (ref 9.2–12.9)
POTASSIUM SERPL-SCNC: 4.5 MMOL/L (ref 3.5–5.1)
PROT SERPL-MCNC: 7.5 G/DL (ref 6–8.4)
RBC # BLD AUTO: 4.05 M/UL (ref 4–5.4)
SODIUM SERPL-SCNC: 140 MMOL/L (ref 136–145)
TRIGL SERPL-MCNC: 90 MG/DL (ref 30–150)
TSH SERPL DL<=0.005 MIU/L-ACNC: 1.1 UIU/ML (ref 0.4–4)
WBC # BLD AUTO: 4.62 K/UL (ref 3.9–12.7)

## 2022-09-14 PROCEDURE — 85025 COMPLETE CBC W/AUTO DIFF WBC: CPT | Performed by: FAMILY MEDICINE

## 2022-09-14 PROCEDURE — 80061 LIPID PANEL: CPT | Performed by: FAMILY MEDICINE

## 2022-09-14 PROCEDURE — 83036 HEMOGLOBIN GLYCOSYLATED A1C: CPT | Performed by: FAMILY MEDICINE

## 2022-09-14 PROCEDURE — 84443 ASSAY THYROID STIM HORMONE: CPT | Performed by: FAMILY MEDICINE

## 2022-09-14 PROCEDURE — 80053 COMPREHEN METABOLIC PANEL: CPT | Performed by: FAMILY MEDICINE

## 2022-09-14 PROCEDURE — 36415 COLL VENOUS BLD VENIPUNCTURE: CPT | Mod: PO | Performed by: FAMILY MEDICINE

## 2022-09-16 ENCOUNTER — OFFICE VISIT (OUTPATIENT)
Dept: FAMILY MEDICINE | Facility: CLINIC | Age: 77
End: 2022-09-16
Payer: MEDICARE

## 2022-09-16 VITALS
TEMPERATURE: 99 F | BODY MASS INDEX: 26.47 KG/M2 | DIASTOLIC BLOOD PRESSURE: 80 MMHG | HEART RATE: 88 BPM | WEIGHT: 164.69 LBS | OXYGEN SATURATION: 97 % | SYSTOLIC BLOOD PRESSURE: 124 MMHG | HEIGHT: 66 IN

## 2022-09-16 DIAGNOSIS — M54.50 CHRONIC MIDLINE LOW BACK PAIN WITHOUT SCIATICA: ICD-10-CM

## 2022-09-16 DIAGNOSIS — E11.22 TYPE 2 DIABETES MELLITUS WITH STAGE 3A CHRONIC KIDNEY DISEASE, WITHOUT LONG-TERM CURRENT USE OF INSULIN: Primary | ICD-10-CM

## 2022-09-16 DIAGNOSIS — J84.10 CALCIFIED GRANULOMA OF LUNG: ICD-10-CM

## 2022-09-16 DIAGNOSIS — N18.31 STAGE 3A CHRONIC KIDNEY DISEASE: ICD-10-CM

## 2022-09-16 DIAGNOSIS — E78.00 PURE HYPERCHOLESTEROLEMIA: ICD-10-CM

## 2022-09-16 DIAGNOSIS — G89.29 CHRONIC MIDLINE LOW BACK PAIN WITHOUT SCIATICA: ICD-10-CM

## 2022-09-16 DIAGNOSIS — N18.31 TYPE 2 DIABETES MELLITUS WITH STAGE 3A CHRONIC KIDNEY DISEASE, WITHOUT LONG-TERM CURRENT USE OF INSULIN: Primary | ICD-10-CM

## 2022-09-16 DIAGNOSIS — Z78.0 ASYMPTOMATIC MENOPAUSAL STATE: ICD-10-CM

## 2022-09-16 PROCEDURE — 99499 RISK ADDL DX/OHS AUDIT: ICD-10-PCS | Mod: HCNC,S$GLB,, | Performed by: FAMILY MEDICINE

## 2022-09-16 PROCEDURE — 99214 PR OFFICE/OUTPT VISIT, EST, LEVL IV, 30-39 MIN: ICD-10-PCS | Mod: S$GLB,,, | Performed by: FAMILY MEDICINE

## 2022-09-16 PROCEDURE — 1126F AMNT PAIN NOTED NONE PRSNT: CPT | Mod: CPTII,S$GLB,, | Performed by: FAMILY MEDICINE

## 2022-09-16 PROCEDURE — 99999 PR PBB SHADOW E&M-EST. PATIENT-LVL IV: CPT | Mod: PBBFAC,,, | Performed by: FAMILY MEDICINE

## 2022-09-16 PROCEDURE — 1159F PR MEDICATION LIST DOCUMENTED IN MEDICAL RECORD: ICD-10-PCS | Mod: CPTII,S$GLB,, | Performed by: FAMILY MEDICINE

## 2022-09-16 PROCEDURE — 1159F MED LIST DOCD IN RCRD: CPT | Mod: CPTII,S$GLB,, | Performed by: FAMILY MEDICINE

## 2022-09-16 PROCEDURE — 99214 OFFICE O/P EST MOD 30 MIN: CPT | Mod: S$GLB,,, | Performed by: FAMILY MEDICINE

## 2022-09-16 PROCEDURE — 3288F FALL RISK ASSESSMENT DOCD: CPT | Mod: CPTII,S$GLB,, | Performed by: FAMILY MEDICINE

## 2022-09-16 PROCEDURE — 3079F PR MOST RECENT DIASTOLIC BLOOD PRESSURE 80-89 MM HG: ICD-10-PCS | Mod: CPTII,S$GLB,, | Performed by: FAMILY MEDICINE

## 2022-09-16 PROCEDURE — 1160F RVW MEDS BY RX/DR IN RCRD: CPT | Mod: CPTII,S$GLB,, | Performed by: FAMILY MEDICINE

## 2022-09-16 PROCEDURE — 99999 PR PBB SHADOW E&M-EST. PATIENT-LVL IV: ICD-10-PCS | Mod: PBBFAC,,, | Performed by: FAMILY MEDICINE

## 2022-09-16 PROCEDURE — 99499 UNLISTED E&M SERVICE: CPT | Mod: HCNC,S$GLB,, | Performed by: FAMILY MEDICINE

## 2022-09-16 PROCEDURE — 3079F DIAST BP 80-89 MM HG: CPT | Mod: CPTII,S$GLB,, | Performed by: FAMILY MEDICINE

## 2022-09-16 PROCEDURE — 1101F PR PT FALLS ASSESS DOC 0-1 FALLS W/OUT INJ PAST YR: ICD-10-PCS | Mod: CPTII,S$GLB,, | Performed by: FAMILY MEDICINE

## 2022-09-16 PROCEDURE — 1157F PR ADVANCE CARE PLAN OR EQUIV PRESENT IN MEDICAL RECORD: ICD-10-PCS | Mod: CPTII,S$GLB,, | Performed by: FAMILY MEDICINE

## 2022-09-16 PROCEDURE — 1101F PT FALLS ASSESS-DOCD LE1/YR: CPT | Mod: CPTII,S$GLB,, | Performed by: FAMILY MEDICINE

## 2022-09-16 PROCEDURE — 1126F PR PAIN SEVERITY QUANTIFIED, NO PAIN PRESENT: ICD-10-PCS | Mod: CPTII,S$GLB,, | Performed by: FAMILY MEDICINE

## 2022-09-16 PROCEDURE — 3074F SYST BP LT 130 MM HG: CPT | Mod: CPTII,S$GLB,, | Performed by: FAMILY MEDICINE

## 2022-09-16 PROCEDURE — 1160F PR REVIEW ALL MEDS BY PRESCRIBER/CLIN PHARMACIST DOCUMENTED: ICD-10-PCS | Mod: CPTII,S$GLB,, | Performed by: FAMILY MEDICINE

## 2022-09-16 PROCEDURE — 1157F ADVNC CARE PLAN IN RCRD: CPT | Mod: CPTII,S$GLB,, | Performed by: FAMILY MEDICINE

## 2022-09-16 PROCEDURE — 3288F PR FALLS RISK ASSESSMENT DOCUMENTED: ICD-10-PCS | Mod: CPTII,S$GLB,, | Performed by: FAMILY MEDICINE

## 2022-09-16 PROCEDURE — 3074F PR MOST RECENT SYSTOLIC BLOOD PRESSURE < 130 MM HG: ICD-10-PCS | Mod: CPTII,S$GLB,, | Performed by: FAMILY MEDICINE

## 2022-09-20 RX ORDER — ATORVASTATIN CALCIUM 40 MG/1
40 TABLET, FILM COATED ORAL DAILY
Qty: 90 TABLET | Refills: 3 | Status: SHIPPED | OUTPATIENT
Start: 2022-09-20 | End: 2023-12-21 | Stop reason: SDUPTHER

## 2022-09-20 RX ORDER — TRAMADOL HYDROCHLORIDE 50 MG/1
50 TABLET ORAL 3 TIMES DAILY
Qty: 90 TABLET | Refills: 0 | Status: SHIPPED | OUTPATIENT
Start: 2022-09-20 | End: 2022-09-23 | Stop reason: SDUPTHER

## 2022-09-20 NOTE — PROGRESS NOTES
"  Assessment & Plan  Problem List Items Addressed This Visit          Pulmonary    Calcified granuloma of lung    Overview     "Heart size is normal.  Few calcified granulomas noted.  The right hemidiaphragm is elevated and subsegmental atelectatic changes seen at the right lung base.  Otherwise the lungs are clear" - Xray Chest 2-            Cardiac/Vascular    Hyperlipidemia    Relevant Medications    atorvastatin (LIPITOR) 40 MG tablet       Renal/    Stage 3 chronic kidney disease       Endocrine    Type 2 diabetes mellitus with stage 3a chronic kidney disease, without long-term current use of insulin       Orthopedic    Chronic low back pain    Relevant Medications    traMADoL (ULTRAM) 50 mg tablet     Other Visit Diagnoses       Asymptomatic menopausal state    -  Primary    Relevant Orders    DXA Bone Density Spine And Hip              Health Maintenance reviewed.    Follow-up: No follow-ups on file.    ______________________________________________________________________    Chief Complaint  No chief complaint on file.      HPI  Nuha Bernstein is a 77 y.o. female with multiple medical diagnoses as listed in the medical history and problem list that presents for diabetes, bonedensity.  Pt is known to me with last appointment 3/16/2022.      Patient denies any new symptoms including chest pain, SOB, blurry vision, N/V, diarrhea.  Diabetes: The patient reports that they  check blood sugars at home on a regular basis.  Blood sugar results are generally in an acceptable range (> 70 and <150). The patient  denies any problems with low blood sugars. The patient  reports that they have been complaint with current treatment plan (diet, exercise, medication).  Hypertension: The patient reports that they check their blood pressures regularly and blood pressure is generally well controlled (<= 139/89).  The patient  is not enrolled in the digital hypertension program. The patient denies  cardiac chest pain, " shortness of breath, lower extremity edema, headaches, and side effects of medication. The patient reports problems with  none. The patient  has been compliant with the current medication regimen.  The patient  : tries to follow a low salt diet.  Hyperlipidemia: Patient reports that they have been compliant with low fat diet and regular exercise. Patient reports that they have been compliant with their medication regimen and deny any problems/side effects from the medication.        PAST MEDICAL HISTORY:  Past Medical History:   Diagnosis Date    Arthritis     Back pain     CKD stage G3a/A3, GFR 45-59 and albumin creatinine ratio >300 mg/g 6/23/2020    Colon polyp     Hyperglycemia     Hyperlipidemia     Hypertension     Nuclear sclerosis of both eyes 10/31/2017    Prolapse of uterus     Type 2 diabetes mellitus without complication, without long-term current use of insulin     Type 2 diabetes mellitus without complication, without long-term current use of insulin        PAST SURGICAL HISTORY:  Past Surgical History:   Procedure Laterality Date    COLONOSCOPY N/A 12/15/2020    Procedure: COLONOSCOPY;  Surgeon: Antonio Maya MD;  Location: [address];  Service: Endoscopy;  Laterality: N/A;  prep ins. 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     Drug use: No    Sexual activity: Not Currently     Partners: Male   Social History Narrative     4 children retired        FAMILY HISTORY:  Family History   Problem Relation Age of Onset    Hypertension Mother      Cataracts Mother     Heart disease Mother     Hypertension Father     Cataracts Father     No Known Problems Sister     No Known Problems Brother     No Known Problems Brother     No Known Problems Maternal Aunt     No Known Problems Maternal Uncle     No Known Problems Paternal Aunt     No Known Problems Paternal Uncle     No Known Problems Maternal Grandmother     No Known Problems Maternal Grandfather     No Known Problems Paternal Grandmother     No Known Problems Paternal Grandfather     Amblyopia Neg Hx     Blindness Neg Hx     Cancer Neg Hx     Diabetes Neg Hx     Glaucoma Neg Hx     Macular degeneration Neg Hx     Retinal detachment Neg Hx     Strabismus Neg Hx     Stroke Neg Hx     Thyroid disease Neg Hx        ALLERGIES AND MEDICATIONS: updated and reviewed.  Review of patient's allergies indicates:  No Known Allergies  Current Outpatient Medications   Medication Sig Dispense Refill    co-enzyme Q-10 30 mg capsule Take 30 mg by mouth 3 (three) times daily.      lisinopriL (PRINIVIL,ZESTRIL) 40 MG tablet TAKE 1 TABLET BY MOUTH EVERY DAY 90 tablet 0    magnesium citrate solution Take 296 mLs by mouth once.      multivitamin/iron/folic acid (CENTRUM WOMEN ORAL) Take by mouth.      mv,hussein,iron,mn/folic acid/chol (HAIR-SKIN-NAILS, PABA, ORAL) Take by mouth.      OMEGA 3,6,9 COMBINATION NO.7 (OMEGA DHA ORAL) Take 830 mg by mouth once daily.      triamterene-hydrochlorothiazide 37.5-25 mg (DYAZIDE) 37.5-25 mg per capsule Take 1 capsule by mouth once daily. 90 capsule 2    aspirin 81 mg Tab Take by mouth as needed. 2-3 times weekly      atorvastatin (LIPITOR) 40 MG tablet Take 1 tablet (40 mg total) by mouth once daily. 90 tablet 3    ciclopirox (PENLAC) 8 % Soln Apply topically nightly. (Patient not taking: No sig reported) 1 Bottle 3    meloxicam (MOBIC) 15 MG tablet Take 1 tablet (15 mg total) by mouth once daily. (Patient not taking: No sig reported) 90 tablet 0    traMADoL (ULTRAM) 50 mg tablet Take 1 tablet  "(50 mg total) by mouth 3 (three) times daily. 90 tablet 0     No current facility-administered medications for this visit.         ROS  Review of Systems   Constitutional:  Negative for activity change, appetite change, fatigue, fever and unexpected weight change.   HENT: Negative.  Negative for ear discharge, ear pain, rhinorrhea and sore throat.    Eyes: Negative.    Respiratory:  Negative for apnea, cough, chest tightness, shortness of breath and wheezing.    Cardiovascular:  Negative for chest pain, palpitations and leg swelling.   Gastrointestinal:  Negative for abdominal distention, abdominal pain, constipation, diarrhea and vomiting.   Endocrine: Negative for cold intolerance, heat intolerance, polydipsia and polyuria.   Genitourinary:  Negative for decreased urine volume and urgency.   Musculoskeletal: Negative.    Skin:  Negative for rash.   Neurological:  Negative for dizziness and headaches.   Hematological:  Does not bruise/bleed easily.   Psychiatric/Behavioral:  Negative for agitation, sleep disturbance and suicidal ideas.          Physical Exam  Vitals:    09/16/22 1303   BP: 124/80   Pulse: 88   Temp: 98.7 °F (37.1 °C)   TempSrc: Oral   SpO2: 97%   Weight: 74.7 kg (164 lb 10.9 oz)   Height: 5' 6" (1.676 m)    Body mass index is 26.58 kg/m².  Weight: 74.7 kg (164 lb 10.9 oz)   Height: 5' 6" (167.6 cm)   Physical Exam  Vitals reviewed.   Constitutional:       Appearance: Normal appearance. She is well-developed.   HENT:      Head: Normocephalic and atraumatic.      Right Ear: External ear normal.      Left Ear: External ear normal.      Nose: Nose normal.      Mouth/Throat:      Mouth: Mucous membranes are moist.      Pharynx: Oropharynx is clear.   Eyes:      Extraocular Movements: Extraocular movements intact.      Conjunctiva/sclera: Conjunctivae normal.      Pupils: Pupils are equal, round, and reactive to light.   Cardiovascular:      Rate and Rhythm: Normal rate and regular rhythm.      Heart " sounds: Normal heart sounds.   Pulmonary:      Effort: Pulmonary effort is normal.      Breath sounds: Normal breath sounds.   Skin:     General: Skin is warm and dry.   Neurological:      Mental Status: She is alert and oriented to person, place, and time.         Health Maintenance         Date Due Completion Date    Foot Exam Never done ---    Shingles Vaccine (1 of 2) Never done ---    Pneumococcal Vaccines (Age 65+) (2 - PCV) 05/03/2011 5/3/2010    Diabetes Urine Screening 05/13/2016 5/13/2015    Eye Exam 06/28/2022 6/28/2021    Influenza Vaccine (1) 09/01/2022 11/13/2019    DEXA Scan 09/23/2022 9/23/2019    Hemoglobin A1c 03/14/2023 9/14/2022    TETANUS VACCINE 04/27/2023 4/27/2013    Lipid Panel 09/14/2023 9/14/2022    Colonoscopy 12/15/2025 12/15/2020                Patient note was created using [employer].  Any errors in syntax or even information may not have been identified and edited on initial review prior to signing this note.

## 2022-09-23 DIAGNOSIS — M54.50 CHRONIC MIDLINE LOW BACK PAIN WITHOUT SCIATICA: ICD-10-CM

## 2022-09-23 DIAGNOSIS — G89.29 CHRONIC MIDLINE LOW BACK PAIN WITHOUT SCIATICA: ICD-10-CM

## 2022-09-23 NOTE — TELEPHONE ENCOUNTER
No new care gaps identified.  [facility] Embedded Care Gaps. Reference number: [id number]. 9/23/2022   4:34:47 PM CDT

## 2022-09-23 NOTE — TELEPHONE ENCOUNTER
----- Message from Parul Chilel sent at 9/23/2022  4:26 PM CDT -----  Type: Patient Call Back    Who called:self    What is the request in detail:Pt states pharmacy does not have her traMADoL (ULTRAM) 50 mg tablet prescription. Pharmacy told pt hey have been trying to reach office, but have not received a response. Pt is almost out of medication.    Can the clinic reply by [facility]?no    Would the patient rather a call back or a response via [facility]? call    Best call back number:[phone] (home)

## 2022-09-24 RX ORDER — TRAMADOL HYDROCHLORIDE 50 MG/1
50 TABLET ORAL 3 TIMES DAILY
Qty: 90 TABLET | Refills: 0 | Status: SHIPPED | OUTPATIENT
Start: 2022-09-24 | End: 2022-10-26

## 2022-10-03 ENCOUNTER — OFFICE VISIT (OUTPATIENT)
Dept: FAMILY MEDICINE | Facility: CLINIC | Age: 77
End: 2022-10-03
Payer: MEDICARE

## 2022-10-03 VITALS
HEIGHT: 64 IN | DIASTOLIC BLOOD PRESSURE: 72 MMHG | OXYGEN SATURATION: 97 % | HEART RATE: 82 BPM | BODY MASS INDEX: 28.88 KG/M2 | SYSTOLIC BLOOD PRESSURE: 130 MMHG | TEMPERATURE: 98 F | WEIGHT: 169.19 LBS

## 2022-10-03 DIAGNOSIS — M54.50 CHRONIC MIDLINE LOW BACK PAIN WITHOUT SCIATICA: ICD-10-CM

## 2022-10-03 DIAGNOSIS — N18.31 STAGE 3A CHRONIC KIDNEY DISEASE: ICD-10-CM

## 2022-10-03 DIAGNOSIS — G89.29 CHRONIC NECK PAIN: Primary | ICD-10-CM

## 2022-10-03 DIAGNOSIS — I10 ESSENTIAL HYPERTENSION: ICD-10-CM

## 2022-10-03 DIAGNOSIS — G89.29 CHRONIC MIDLINE LOW BACK PAIN WITHOUT SCIATICA: ICD-10-CM

## 2022-10-03 DIAGNOSIS — M54.2 CHRONIC NECK PAIN: Primary | ICD-10-CM

## 2022-10-03 DIAGNOSIS — S46.819A STRAIN OF TRAPEZIUS MUSCLE, UNSPECIFIED LATERALITY, INITIAL ENCOUNTER: ICD-10-CM

## 2022-10-03 PROCEDURE — 99214 PR OFFICE/OUTPT VISIT, EST, LEVL IV, 30-39 MIN: ICD-10-PCS | Mod: S$GLB,,, | Performed by: INTERNAL MEDICINE

## 2022-10-03 PROCEDURE — 3075F SYST BP GE 130 - 139MM HG: CPT | Mod: CPTII,S$GLB,, | Performed by: INTERNAL MEDICINE

## 2022-10-03 PROCEDURE — 1125F AMNT PAIN NOTED PAIN PRSNT: CPT | Mod: CPTII,S$GLB,, | Performed by: INTERNAL MEDICINE

## 2022-10-03 PROCEDURE — 1101F PT FALLS ASSESS-DOCD LE1/YR: CPT | Mod: CPTII,S$GLB,, | Performed by: INTERNAL MEDICINE

## 2022-10-03 PROCEDURE — 3075F PR MOST RECENT SYSTOLIC BLOOD PRESS GE 130-139MM HG: ICD-10-PCS | Mod: CPTII,S$GLB,, | Performed by: INTERNAL MEDICINE

## 2022-10-03 PROCEDURE — 1125F PR PAIN SEVERITY QUANTIFIED, PAIN PRESENT: ICD-10-PCS | Mod: CPTII,S$GLB,, | Performed by: INTERNAL MEDICINE

## 2022-10-03 PROCEDURE — 3288F FALL RISK ASSESSMENT DOCD: CPT | Mod: CPTII,S$GLB,, | Performed by: INTERNAL MEDICINE

## 2022-10-03 PROCEDURE — 99999 PR PBB SHADOW E&M-EST. PATIENT-LVL III: CPT | Mod: PBBFAC,,, | Performed by: INTERNAL MEDICINE

## 2022-10-03 PROCEDURE — 1160F PR REVIEW ALL MEDS BY PRESCRIBER/CLIN PHARMACIST DOCUMENTED: ICD-10-PCS | Mod: CPTII,S$GLB,, | Performed by: INTERNAL MEDICINE

## 2022-10-03 PROCEDURE — 1160F RVW MEDS BY RX/DR IN RCRD: CPT | Mod: CPTII,S$GLB,, | Performed by: INTERNAL MEDICINE

## 2022-10-03 PROCEDURE — 3078F DIAST BP <80 MM HG: CPT | Mod: CPTII,S$GLB,, | Performed by: INTERNAL MEDICINE

## 2022-10-03 PROCEDURE — 1159F MED LIST DOCD IN RCRD: CPT | Mod: CPTII,S$GLB,, | Performed by: INTERNAL MEDICINE

## 2022-10-03 PROCEDURE — 1157F ADVNC CARE PLAN IN RCRD: CPT | Mod: CPTII,S$GLB,, | Performed by: INTERNAL MEDICINE

## 2022-10-03 PROCEDURE — 3288F PR FALLS RISK ASSESSMENT DOCUMENTED: ICD-10-PCS | Mod: CPTII,S$GLB,, | Performed by: INTERNAL MEDICINE

## 2022-10-03 PROCEDURE — 3078F PR MOST RECENT DIASTOLIC BLOOD PRESSURE < 80 MM HG: ICD-10-PCS | Mod: CPTII,S$GLB,, | Performed by: INTERNAL MEDICINE

## 2022-10-03 PROCEDURE — 1101F PR PT FALLS ASSESS DOC 0-1 FALLS W/OUT INJ PAST YR: ICD-10-PCS | Mod: CPTII,S$GLB,, | Performed by: INTERNAL MEDICINE

## 2022-10-03 PROCEDURE — 1159F PR MEDICATION LIST DOCUMENTED IN MEDICAL RECORD: ICD-10-PCS | Mod: CPTII,S$GLB,, | Performed by: INTERNAL MEDICINE

## 2022-10-03 PROCEDURE — 99214 OFFICE O/P EST MOD 30 MIN: CPT | Mod: S$GLB,,, | Performed by: INTERNAL MEDICINE

## 2022-10-03 PROCEDURE — 99999 PR PBB SHADOW E&M-EST. PATIENT-LVL III: ICD-10-PCS | Mod: PBBFAC,,, | Performed by: INTERNAL MEDICINE

## 2022-10-03 PROCEDURE — 1157F PR ADVANCE CARE PLAN OR EQUIV PRESENT IN MEDICAL RECORD: ICD-10-PCS | Mod: CPTII,S$GLB,, | Performed by: INTERNAL MEDICINE

## 2022-10-03 RX ORDER — METHOCARBAMOL 500 MG/1
500-1000 TABLET, FILM COATED ORAL NIGHTLY PRN
Qty: 60 TABLET | Refills: 0 | Status: SHIPPED | OUTPATIENT
Start: 2022-10-03 | End: 2023-04-12
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
## 2022-10-03 NOTE — ASSESSMENT & PLAN NOTE
Nephrology Progress Note    Subjective/   59y.o. year old female who we are seeing in consultation for end-stage renal disease on hemodialysis    Interval history:  Patient seen and examined today  Patient has mild lightheadedness with sitting from lying position,no  headache shortness of breath at rest or chest pain. Hemoglobin is stable at 9.8 gm/dl. Patient had dialysis on Thursday with 1.5 kg removed  Patient is now on TTS schedule. Chest x-ray was ordered mild CHF    History of Present Illness: This is a 59 y.o. female with hypertension, type 2 diabetes mellitus, end-stage renal disease on hemodialysis   who was recently taken off dialysis about 6 weeks ago due to regaining residual kidney function. Over the course of the last 2 weeks patient  was restarted on hemodialysis due to worsening fluid overload. Initially had problems with her  tunneled catheter which was replaced last week- patient was dialyzed on Saturday, 4/9/2021 and yesterday-4/13/22  at the outpatient dialysis unit. Patient presented with complaints of shortness of breath over the last 2 to 3 days progressively worsening. Patient had associated  Nausea, chest pain but no vomiting. She is on 3 L nasal cannula chronically at home. Patient was found to be in acute DKA with blood sugars greater than 500 was started on the modified DKA protocol. Blood pressures on admission were elevated above 200s and her chest x-ray showed evidence of pulmonary vascular congestion. Labs showed a potassium of 3.3 with BUN/creatinine of 18 and 1.78 mg/dL, bicarbonate of 18 and troponin of 78. Beta hydroxybutyrate was 7.57.   Objective/     Vitals:    04/22/22 1900 04/23/22 0000 04/23/22 0300 04/23/22 0745   BP: 111/88 (!) 124/59  (!) 157/71   Pulse: 65 62  70   Resp: 18 18  20   Temp: 97.8 °F (36.6 °C) 97.8 °F (36.6 °C)  98.6 °F (37 °C)   TempSrc: Oral Axillary  Oral   SpO2: 95% 98%  94%   Weight:   258 lb 6.1 oz (117.2 kg)    Height: Tramadol per PCP   24HR INTAKE/OUTPUT:      Intake/Output Summary (Last 24 hours) at 4/23/2022 1342  Last data filed at 4/23/2022 0844  Gross per 24 hour   Intake 975 ml   Output 300 ml   Net 675 ml     Patient Vitals for the past 96 hrs (Last 3 readings):   Weight   04/23/22 0300 258 lb 6.1 oz (117.2 kg)   04/22/22 0515 253 lb 8.5 oz (115 kg)   04/21/22 1645 245 lb 13 oz (111.5 kg)       Constitutional: Alert and oriented x3  Cardiovascular:  S1, S2 without m/r/g  Respiratory: Diminished air entry  Abdomen: +bs, soft, nt  Ext: 1+ LE edema    Data/  Recent Labs     04/21/22  0531 04/22/22  0537 04/23/22  0535   WBC 4.8 4.6 4.3   HGB 9.4* 9.8* 9.8*   HCT 28.3* 28.8* 28.6*   MCV 94.3 93.4 93.3   * 124* 126*     Recent Labs     04/21/22  0531 04/22/22  0537 04/23/22  0535   * 135 134*   K 4.3 4.3 4.9   CL 98 98 97*   CO2 25 26 26   GLUCOSE 91 55* 135*   BUN 31* 16 23   CREATININE 4.16* 2.86* 3.66*   LABGLOM 11* 17* 12*   GFRAA 13* 20* 15*         Assessment/   1. End-stage renal disease on hemodialysis by way of a right-sided tunneled catheter, transiently taken off dialysis 6 weeks ago and re- started due to worsening fluid overload 1 week ago. Patient still makes urine proximately 400 to 500 cc daily     2. Hypertensive urgency- improving     3. Acute DKA currently on DKA protocol-resolved     4. Hypokalemia #2 to osmotic diuresis and  total body depletion-improved     5. CHF with diastolic dysfunction and evidence of fluid overload pulmonary vascular congestion on chest xray     6.  Urinary tract infection with pyuria     7. Thrombocytopenia-improving    Plan/   HD TTS, hemodialysis as per schedule  Dialysis today-goal of 1.5  To 2 kg  Lasix 80 mg daily  Basic metabolic panel daily  Midodrine  and albumin with dialysis.     Viraj Desir MD    Nephrologist

## 2022-10-07 ENCOUNTER — HOSPITAL ENCOUNTER (OUTPATIENT)
Dept: RADIOLOGY | Facility: CLINIC | Age: 77
Discharge: HOME | End: 2022-10-07
Attending: FAMILY MEDICINE
Payer: MEDICARE

## 2022-10-07 DIAGNOSIS — Z78.0 ASYMPTOMATIC MENOPAUSAL STATE: ICD-10-CM

## 2022-10-07 PROCEDURE — 77080 DEXA BONE DENSITY SPINE HIP: ICD-10-PCS | Mod: 26,,, | Performed by: INTERNAL MEDICINE

## 2022-10-07 PROCEDURE — 77080 DXA BONE DENSITY AXIAL: CPT | Mod: 26,,, | Performed by: INTERNAL MEDICINE

## 2022-10-07 PROCEDURE — 77080 DXA BONE DENSITY AXIAL: CPT | Mod: TC,PO

## 2022-12-16 ENCOUNTER — OFFICE VISIT (OUTPATIENT)
Dept: FAMILY MEDICINE | Facility: CLINIC | Age: 77
End: 2022-12-16
Payer: MEDICARE

## 2022-12-16 ENCOUNTER — LAB VISIT (OUTPATIENT)
Dept: LAB | Facility: HOSPITAL | Age: 77
End: 2022-12-16
Attending: FAMILY MEDICINE
Payer: MEDICARE

## 2022-12-16 VITALS
HEIGHT: 64 IN | HEART RATE: 80 BPM | DIASTOLIC BLOOD PRESSURE: 78 MMHG | TEMPERATURE: 98 F | SYSTOLIC BLOOD PRESSURE: 142 MMHG | BODY MASS INDEX: 28.7 KG/M2 | OXYGEN SATURATION: 96 % | WEIGHT: 168.13 LBS

## 2022-12-16 DIAGNOSIS — N18.31 STAGE 3A CHRONIC KIDNEY DISEASE: ICD-10-CM

## 2022-12-16 DIAGNOSIS — N18.31 TYPE 2 DIABETES MELLITUS WITH STAGE 3A CHRONIC KIDNEY DISEASE, WITHOUT LONG-TERM CURRENT USE OF INSULIN: ICD-10-CM

## 2022-12-16 DIAGNOSIS — E78.49 OTHER HYPERLIPIDEMIA: ICD-10-CM

## 2022-12-16 DIAGNOSIS — G89.29 CHRONIC MIDLINE LOW BACK PAIN WITHOUT SCIATICA: ICD-10-CM

## 2022-12-16 DIAGNOSIS — Z00.00 ANNUAL PHYSICAL EXAM: Primary | ICD-10-CM

## 2022-12-16 DIAGNOSIS — I10 ESSENTIAL HYPERTENSION: ICD-10-CM

## 2022-12-16 DIAGNOSIS — E11.22 TYPE 2 DIABETES MELLITUS WITH STAGE 3A CHRONIC KIDNEY DISEASE, WITHOUT LONG-TERM CURRENT USE OF INSULIN: ICD-10-CM

## 2022-12-16 DIAGNOSIS — M54.50 CHRONIC MIDLINE LOW BACK PAIN WITHOUT SCIATICA: ICD-10-CM

## 2022-12-16 LAB
ALBUMIN SERPL BCP-MCNC: 4.2 G/DL (ref 3.5–5.2)
ALP SERPL-CCNC: 90 U/L (ref 55–135)
ALT SERPL W/O P-5'-P-CCNC: 32 U/L (ref 10–44)
ANION GAP SERPL CALC-SCNC: 11 MMOL/L (ref 8–16)
AST SERPL-CCNC: 42 U/L (ref 10–40)
BILIRUB SERPL-MCNC: 0.6 MG/DL (ref 0.1–1)
BUN SERPL-MCNC: 38 MG/DL (ref 8–23)
CALCIUM SERPL-MCNC: 10.6 MG/DL (ref 8.7–10.5)
CHLORIDE SERPL-SCNC: 104 MMOL/L (ref 95–110)
CHOLEST SERPL-MCNC: 212 MG/DL (ref 120–199)
CHOLEST/HDLC SERPL: 3.1 {RATIO} (ref 2–5)
CO2 SERPL-SCNC: 26 MMOL/L (ref 23–29)
CREAT SERPL-MCNC: 1.3 MG/DL (ref 0.5–1.4)
EST. GFR  (NO RACE VARIABLE): 42.4 ML/MIN/1.73 M^2
ESTIMATED AVG GLUCOSE: 123 MG/DL (ref 68–131)
GLUCOSE SERPL-MCNC: 76 MG/DL (ref 70–110)
HBA1C MFR BLD: 5.9 % (ref 4–5.6)
HDLC SERPL-MCNC: 69 MG/DL (ref 40–75)
HDLC SERPL: 32.5 % (ref 20–50)
LDLC SERPL CALC-MCNC: 131.4 MG/DL (ref 63–159)
NONHDLC SERPL-MCNC: 143 MG/DL
POTASSIUM SERPL-SCNC: 4.2 MMOL/L (ref 3.5–5.1)
PROT SERPL-MCNC: 8 G/DL (ref 6–8.4)
SODIUM SERPL-SCNC: 141 MMOL/L (ref 136–145)
TRIGL SERPL-MCNC: 58 MG/DL (ref 30–150)

## 2022-12-16 PROCEDURE — 83036 HEMOGLOBIN GLYCOSYLATED A1C: CPT | Performed by: FAMILY MEDICINE

## 2022-12-16 PROCEDURE — 99397 PR PREVENTIVE VISIT,EST,65 & OVER: ICD-10-PCS | Mod: S$GLB,,, | Performed by: FAMILY MEDICINE

## 2022-12-16 PROCEDURE — 3077F SYST BP >= 140 MM HG: CPT | Mod: CPTII,S$GLB,, | Performed by: FAMILY MEDICINE

## 2022-12-16 PROCEDURE — 36415 COLL VENOUS BLD VENIPUNCTURE: CPT | Mod: PO | Performed by: FAMILY MEDICINE

## 2022-12-16 PROCEDURE — 80053 COMPREHEN METABOLIC PANEL: CPT | Performed by: FAMILY MEDICINE

## 2022-12-16 PROCEDURE — 1101F PT FALLS ASSESS-DOCD LE1/YR: CPT | Mod: CPTII,S$GLB,, | Performed by: FAMILY MEDICINE

## 2022-12-16 PROCEDURE — 1160F RVW MEDS BY RX/DR IN RCRD: CPT | Mod: CPTII,S$GLB,, | Performed by: FAMILY MEDICINE

## 2022-12-16 PROCEDURE — 80061 LIPID PANEL: CPT | Performed by: FAMILY MEDICINE

## 2022-12-16 PROCEDURE — 1126F PR PAIN SEVERITY QUANTIFIED, NO PAIN PRESENT: ICD-10-PCS | Mod: CPTII,S$GLB,, | Performed by: FAMILY MEDICINE

## 2022-12-16 PROCEDURE — 3077F PR MOST RECENT SYSTOLIC BLOOD PRESSURE >= 140 MM HG: ICD-10-PCS | Mod: CPTII,S$GLB,, | Performed by: FAMILY MEDICINE

## 2022-12-16 PROCEDURE — 3078F PR MOST RECENT DIASTOLIC BLOOD PRESSURE < 80 MM HG: ICD-10-PCS | Mod: CPTII,S$GLB,, | Performed by: FAMILY MEDICINE

## 2022-12-16 PROCEDURE — 99999 PR PBB SHADOW E&M-EST. PATIENT-LVL III: CPT | Mod: PBBFAC,,, | Performed by: FAMILY MEDICINE

## 2022-12-16 PROCEDURE — 3288F PR FALLS RISK ASSESSMENT DOCUMENTED: ICD-10-PCS | Mod: CPTII,S$GLB,, | Performed by: FAMILY MEDICINE

## 2022-12-16 PROCEDURE — 1160F PR REVIEW ALL MEDS BY PRESCRIBER/CLIN PHARMACIST DOCUMENTED: ICD-10-PCS | Mod: CPTII,S$GLB,, | Performed by: FAMILY MEDICINE

## 2022-12-16 PROCEDURE — 3288F FALL RISK ASSESSMENT DOCD: CPT | Mod: CPTII,S$GLB,, | Performed by: FAMILY MEDICINE

## 2022-12-16 PROCEDURE — 99999 PR PBB SHADOW E&M-EST. PATIENT-LVL III: ICD-10-PCS | Mod: PBBFAC,,, | Performed by: FAMILY MEDICINE

## 2022-12-16 PROCEDURE — 1159F PR MEDICATION LIST DOCUMENTED IN MEDICAL RECORD: ICD-10-PCS | Mod: CPTII,S$GLB,, | Performed by: FAMILY MEDICINE

## 2022-12-16 PROCEDURE — 1101F PR PT FALLS ASSESS DOC 0-1 FALLS W/OUT INJ PAST YR: ICD-10-PCS | Mod: CPTII,S$GLB,, | Performed by: FAMILY MEDICINE

## 2022-12-16 PROCEDURE — 3078F DIAST BP <80 MM HG: CPT | Mod: CPTII,S$GLB,, | Performed by: FAMILY MEDICINE

## 2022-12-16 PROCEDURE — 1159F MED LIST DOCD IN RCRD: CPT | Mod: CPTII,S$GLB,, | Performed by: FAMILY MEDICINE

## 2022-12-16 PROCEDURE — 1157F ADVNC CARE PLAN IN RCRD: CPT | Mod: CPTII,S$GLB,, | Performed by: FAMILY MEDICINE

## 2022-12-16 PROCEDURE — 1157F PR ADVANCE CARE PLAN OR EQUIV PRESENT IN MEDICAL RECORD: ICD-10-PCS | Mod: CPTII,S$GLB,, | Performed by: FAMILY MEDICINE

## 2022-12-16 PROCEDURE — 1126F AMNT PAIN NOTED NONE PRSNT: CPT | Mod: CPTII,S$GLB,, | Performed by: FAMILY MEDICINE

## 2022-12-16 PROCEDURE — 99397 PER PM REEVAL EST PAT 65+ YR: CPT | Mod: S$GLB,,, | Performed by: FAMILY MEDICINE

## 2022-12-16 RX ORDER — TRAMADOL HYDROCHLORIDE 50 MG/1
50 TABLET ORAL 3 TIMES DAILY
Qty: 90 TABLET | Refills: 0 | Status: SHIPPED | OUTPATIENT
Start: 2022-12-16 | End: 2023-01-30 | Stop reason: SDUPTHER

## 2022-12-16 NOTE — PROGRESS NOTES
Assessment & Plan  Problem List Items Addressed This Visit          Cardiac/Vascular    Hyperlipidemia    Relevant Medications    traMADoL (ULTRAM) 50 mg tablet    Other Relevant Orders    Lipid Panel (Completed)    Comprehensive Metabolic Panel (Completed)    Essential hypertension    Relevant Medications    traMADoL (ULTRAM) 50 mg tablet    Other Relevant Orders    Lipid Panel (Completed)    Comprehensive Metabolic Panel (Completed)       Renal/    Stage 3 chronic kidney disease    Relevant Medications    traMADoL (ULTRAM) 50 mg tablet    Other Relevant Orders    Lipid Panel (Completed)    Comprehensive Metabolic Panel (Completed)       Endocrine    Type 2 diabetes mellitus with stage 3a chronic kidney disease, without long-term current use of insulin    Relevant Orders    Hemoglobin A1C (Completed)       Orthopedic    Chronic low back pain    Relevant Medications    traMADoL (ULTRAM) 50 mg tablet     Other Visit Diagnoses       Annual physical exam    -  Primary          I addressed all major concerns as it related to health maintenance.  All were ordered and scheduled based on the patients wishes.  Any additional health maintenance will be readdressed at the next physical if declined or deferred by the patient.      Health Maintenance reviewed.    Follow-up: No follow-ups on file.    ______________________________________________________________________    Chief Complaint  Chief Complaint   Patient presents with    Annual Exam       HPI  Nuha Bernstein is a 77 y.o. female with multiple medical diagnoses as listed in the medical history and problem list that presents for annual exam.  Pt is known to me with last appointment 9/16/2022.    Patient denies any new symptoms including chest pain, SOB, blurry vision, N/V, diarrhea.        PAST MEDICAL HISTORY:  Past Medical History:   Diagnosis Date    Arthritis     Back pain     CKD stage G3a/A3, GFR 45-59 and albumin creatinine ratio >300 mg/g 6/23/2020    Colon  polyp     Hyperglycemia     Hyperlipidemia     Hypertension     Nuclear sclerosis of both eyes 10/31/2017    Prolapse of uterus     Type 2 diabetes mellitus without complication, without long-term current use of insulin     Type 2 diabetes mellitus without complication, without long-term current use of insulin        PAST SURGICAL HISTORY:  Past Surgical History:   Procedure Laterality Date    COLONOSCOPY N/A 12/15/2020    Procedure: COLONOSCOPY;  Surgeon: Antonio Maya MD;  Location: [facility] ([address] [place] [address]);  Service: Endoscopy;  Laterality: N/A;  prep ins. 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     Drug use: No    Sexual activity: Not Currently     Partners: Male   Social History Narrative     4 children retired        FAMILY HISTORY:  Family History   Problem Relation Age of Onset    Hypertension Mother     Cataracts Mother     Heart disease Mother     Hypertension Father     Cataracts Father     No Known Problems Sister     No Known Problems Brother     No Known Problems Brother     No Known Problems Maternal Aunt     No Known Problems Maternal Uncle     No Known Problems Paternal Aunt     No Known Problems Paternal Uncle     No Known Problems Maternal Grandmother     No Known Problems Maternal Grandfather     No Known Problems Paternal Grandmother     No Known Problems Paternal Grandfather     Amblyopia Neg Hx     Blindness Neg Hx     Cancer Neg Hx     Diabetes Neg Hx     Glaucoma Neg Hx     Macular degeneration Neg Hx     Retinal detachment Neg Hx     Strabismus  Neg Hx     Stroke Neg Hx     Thyroid disease Neg Hx        ALLERGIES AND MEDICATIONS: updated and reviewed.  Review of patient's allergies indicates:  No Known Allergies  Current Outpatient Medications   Medication Sig Dispense Refill    atorvastatin (LIPITOR) 40 MG tablet Take 1 tablet (40 mg total) by mouth once daily. 90 tablet 3    biotin 10,000 mcg Chew Take by mouth.      lisinopriL (PRINIVIL,ZESTRIL) 40 MG tablet TAKE 1 TABLET BY MOUTH EVERY DAY 90 tablet 3    magnesium citrate solution Take 296 mLs by mouth once.      multivitamin/iron/folic acid (CENTRUM WOMEN ORAL) Take by mouth.      OMEGA 3,6,9 COMBINATION NO.7 (OMEGA DHA ORAL) Take 830 mg by mouth once daily.      triamterene-hydrochlorothiazide 37.5-25 mg (DYAZIDE) 37.5-25 mg per capsule Take 1 capsule by mouth once daily. 90 capsule 2    aspirin 81 mg Tab Take by mouth as needed. 2-3 times weekly      co-enzyme Q-10 30 mg capsule Take 30 mg by mouth 3 (three) times daily.      methocarbamoL (ROBAXIN) 500 MG Tab Take 1-2 tablets (500-1,000 mg total) by mouth nightly as needed (shoulder pain). (Patient not taking: Reported on 12/16/2022) 60 tablet 0    mv,hussein,iron,mn/folic acid/chol (HAIR-SKIN-NAILS, PABA, ORAL) Take by mouth.      traMADoL (ULTRAM) 50 mg tablet Take 1 tablet (50 mg total) by mouth 3 (three) times daily. 90 tablet 0     No current facility-administered medications for this visit.         ROS  Review of Systems   Constitutional:  Negative for activity change, appetite change, fatigue, fever and unexpected weight change.   HENT: Negative.  Negative for ear discharge, ear pain, rhinorrhea and sore throat.    Eyes: Negative.    Respiratory:  Negative for apnea, cough, chest tightness, shortness of breath and wheezing.    Cardiovascular:  Negative for chest pain, palpitations and leg swelling.   Gastrointestinal:  Negative for abdominal distention, abdominal pain, constipation, diarrhea and vomiting.   Endocrine: Negative for cold  "intolerance, heat intolerance, polydipsia and polyuria.   Genitourinary:  Negative for decreased urine volume and urgency.   Musculoskeletal: Negative.    Skin:  Negative for rash.   Neurological:  Negative for dizziness and headaches.   Hematological:  Does not bruise/bleed easily.   Psychiatric/Behavioral:  Negative for agitation, sleep disturbance and suicidal ideas.        Physical Exam  Vitals:    12/16/22 1415   BP: (!) 142/78   Pulse: 80   Temp: 98.3 °F (36.8 °C)   TempSrc: Oral   SpO2: 96%   Weight: 76.2 kg (168 lb 1.6 oz)   Height: 5' 4" (1.626 m)    Body mass index is 28.85 kg/m².  Weight: 76.2 kg (168 lb 1.6 oz)   Height: 5' 4" (162.6 cm)   Physical Exam  Vitals reviewed.   Constitutional:       Appearance: Normal appearance. She is well-developed.   HENT:      Head: Normocephalic and atraumatic.      Right Ear: External ear normal.      Left Ear: External ear normal.      Nose: Nose normal.      Mouth/Throat:      Mouth: Mucous membranes are moist.      Pharynx: Oropharynx is clear.   Eyes:      Extraocular Movements: Extraocular movements intact.      Conjunctiva/sclera: Conjunctivae normal.      Pupils: Pupils are equal, round, and reactive to light.   Cardiovascular:      Rate and Rhythm: Normal rate and regular rhythm.      Heart sounds: Normal heart sounds.   Pulmonary:      Effort: Pulmonary effort is normal.      Breath sounds: Normal breath sounds.   Skin:     General: Skin is warm and dry.   Neurological:      Mental Status: She is alert and oriented to person, place, and time.         Health Maintenance         Date Due Completion Date    Shingles Vaccine (1 of 2) Never done ---    Pneumococcal Vaccines (Age 65+) (2 - PCV) 05/03/2011 5/3/2010    Diabetes Urine Screening 05/13/2016 5/13/2015    Eye Exam 06/28/2022 6/28/2021    Hemoglobin A1c 03/14/2023 9/14/2022    TETANUS VACCINE 04/27/2023 4/27/2013    Lipid Panel 09/14/2023 9/14/2022    DEXA Scan 10/07/2024 10/7/2022    Colonoscopy 12/15/2025 " 12/15/2020                Patient note was created using [employer].  Any errors in syntax or even information may not have been identified and edited on initial review prior to signing this note.

## 2022-12-20 ENCOUNTER — PATIENT MESSAGE (OUTPATIENT)
Dept: FAMILY MEDICINE | Facility: CLINIC | Age: 77
End: 2022-12-20
Payer: MEDICARE

## 2023-01-19 ENCOUNTER — HOSPITAL ENCOUNTER (EMERGENCY)
Facility: HOSPITAL | Age: 78
Discharge: HOME | End: 2023-01-19
Attending: EMERGENCY MEDICINE
Payer: MEDICARE

## 2023-01-19 VITALS
SYSTOLIC BLOOD PRESSURE: 123 MMHG | DIASTOLIC BLOOD PRESSURE: 78 MMHG | HEART RATE: 78 BPM | WEIGHT: 170 LBS | OXYGEN SATURATION: 99 % | TEMPERATURE: 98 F | BODY MASS INDEX: 29.02 KG/M2 | HEIGHT: 64 IN | RESPIRATION RATE: 18 BRPM

## 2023-01-19 DIAGNOSIS — R49.0 HOARSE: ICD-10-CM

## 2023-01-19 DIAGNOSIS — R09.81 NASAL CONGESTION: ICD-10-CM

## 2023-01-19 DIAGNOSIS — R05.9 COUGH, UNSPECIFIED TYPE: ICD-10-CM

## 2023-01-19 DIAGNOSIS — J20.9 ACUTE BRONCHITIS, UNSPECIFIED ORGANISM: Primary | ICD-10-CM

## 2023-01-19 DIAGNOSIS — R05.8 PRODUCTIVE COUGH: ICD-10-CM

## 2023-01-19 DIAGNOSIS — R06.02 SHORTNESS OF BREATH: ICD-10-CM

## 2023-01-19 DIAGNOSIS — J34.89 RHINORRHEA: ICD-10-CM

## 2023-01-19 LAB
ALBUMIN SERPL BCP-MCNC: 3.6 G/DL (ref 3.5–5.2)
ALP SERPL-CCNC: 96 U/L (ref 55–135)
ALT SERPL W/O P-5'-P-CCNC: 66 U/L (ref 10–44)
ANION GAP SERPL CALC-SCNC: 11 MMOL/L (ref 8–16)
AST SERPL-CCNC: 74 U/L (ref 10–40)
BASOPHILS # BLD AUTO: 0.02 K/UL (ref 0–0.2)
BASOPHILS NFR BLD: 0.4 % (ref 0–1.9)
BILIRUB SERPL-MCNC: 0.3 MG/DL (ref 0.1–1)
BNP SERPL-MCNC: 27 PG/ML (ref 0–99)
BUN SERPL-MCNC: 31 MG/DL (ref 8–23)
CALCIUM SERPL-MCNC: 9.7 MG/DL (ref 8.7–10.5)
CHLORIDE SERPL-SCNC: 107 MMOL/L (ref 95–110)
CO2 SERPL-SCNC: 25 MMOL/L (ref 23–29)
CREAT SERPL-MCNC: 1.6 MG/DL (ref 0.5–1.4)
CTP QC/QA: YES
CTP QC/QA: YES
DIFFERENTIAL METHOD: ABNORMAL
EOSINOPHIL # BLD AUTO: 0.2 K/UL (ref 0–0.5)
EOSINOPHIL NFR BLD: 4.6 % (ref 0–8)
ERYTHROCYTE [DISTWIDTH] IN BLOOD BY AUTOMATED COUNT: 14 % (ref 11.5–14.5)
EST. GFR  (NO RACE VARIABLE): 33 ML/MIN/1.73 M^2
GLUCOSE SERPL-MCNC: 155 MG/DL (ref 70–110)
HCT VFR BLD AUTO: 33.9 % (ref 37–48.5)
HGB BLD-MCNC: 11 G/DL (ref 12–16)
IMM GRANULOCYTES # BLD AUTO: 0.01 K/UL (ref 0–0.04)
IMM GRANULOCYTES NFR BLD AUTO: 0.2 % (ref 0–0.5)
LYMPHOCYTES # BLD AUTO: 1.4 K/UL (ref 1–4.8)
LYMPHOCYTES NFR BLD: 28.9 % (ref 18–48)
MAGNESIUM SERPL-MCNC: 2 MG/DL (ref 1.6–2.6)
MCH RBC QN AUTO: 28.6 PG (ref 27–31)
MCHC RBC AUTO-ENTMCNC: 32.4 G/DL (ref 32–36)
MCV RBC AUTO: 88 FL (ref 82–98)
MONOCYTES # BLD AUTO: 0.6 K/UL (ref 0.3–1)
MONOCYTES NFR BLD: 11.5 % (ref 4–15)
NEUTROPHILS # BLD AUTO: 2.6 K/UL (ref 1.8–7.7)
NEUTROPHILS NFR BLD: 54.4 % (ref 38–73)
NRBC BLD-RTO: 0 /100 WBC
PLATELET # BLD AUTO: 223 K/UL (ref 150–450)
PMV BLD AUTO: 10.8 FL (ref 9.2–12.9)
POC MOLECULAR INFLUENZA A AGN: NEGATIVE
POC MOLECULAR INFLUENZA B AGN: NEGATIVE
POTASSIUM SERPL-SCNC: 4.2 MMOL/L (ref 3.5–5.1)
PROT SERPL-MCNC: 7.2 G/DL (ref 6–8.4)
RBC # BLD AUTO: 3.84 M/UL (ref 4–5.4)
SARS-COV-2 RDRP RESP QL NAA+PROBE: NEGATIVE
SODIUM SERPL-SCNC: 143 MMOL/L (ref 136–145)
TROPONIN I SERPL DL<=0.01 NG/ML-MCNC: 0.01 NG/ML (ref 0–0.03)
WBC # BLD AUTO: 4.78 K/UL (ref 3.9–12.7)

## 2023-01-19 PROCEDURE — 84484 ASSAY OF TROPONIN QUANT: CPT | Mod: HCNC | Performed by: EMERGENCY MEDICINE

## 2023-01-19 PROCEDURE — 93010 EKG 12-LEAD: ICD-10-PCS | Mod: HCNC,,, | Performed by: INTERNAL MEDICINE

## 2023-01-19 PROCEDURE — 25000242 PHARM REV CODE 250 ALT 637 W/ HCPCS: Mod: HCNC | Performed by: EMERGENCY MEDICINE

## 2023-01-19 PROCEDURE — 63700000 PHARM REV CODE 250 ALT 637 W/O HCPCS: Mod: HCNC | Performed by: EMERGENCY MEDICINE

## 2023-01-19 PROCEDURE — 94640 AIRWAY INHALATION TREATMENT: CPT | Mod: HCNC

## 2023-01-19 PROCEDURE — 83880 ASSAY OF NATRIURETIC PEPTIDE: CPT | Mod: HCNC | Performed by: EMERGENCY MEDICINE

## 2023-01-19 PROCEDURE — 25000003 PHARM REV CODE 250: Mod: HCNC | Performed by: EMERGENCY MEDICINE

## 2023-01-19 PROCEDURE — 87635 SARS-COV-2 COVID-19 AMP PRB: CPT | Mod: HCNC | Performed by: EMERGENCY MEDICINE

## 2023-01-19 PROCEDURE — 93005 ELECTROCARDIOGRAM TRACING: CPT | Mod: HCNC

## 2023-01-19 PROCEDURE — 99285 EMERGENCY DEPT VISIT HI MDM: CPT | Mod: 25,HCNC

## 2023-01-19 PROCEDURE — 80053 COMPREHEN METABOLIC PANEL: CPT | Mod: HCNC | Performed by: EMERGENCY MEDICINE

## 2023-01-19 PROCEDURE — 87502 INFLUENZA DNA AMP PROBE: CPT | Mod: HCNC

## 2023-01-19 PROCEDURE — 93010 ELECTROCARDIOGRAM REPORT: CPT | Mod: HCNC,,, | Performed by: INTERNAL MEDICINE

## 2023-01-19 PROCEDURE — 85025 COMPLETE CBC W/AUTO DIFF WBC: CPT | Mod: HCNC | Performed by: EMERGENCY MEDICINE

## 2023-01-19 PROCEDURE — 83735 ASSAY OF MAGNESIUM: CPT | Mod: HCNC | Performed by: EMERGENCY MEDICINE

## 2023-01-19 RX ORDER — GUAIFENESIN/DEXTROMETHORPHAN 100-10MG/5
10 SYRUP ORAL
Status: COMPLETED | OUTPATIENT
Start: 2023-01-19 | End: 2023-01-19

## 2023-01-19 RX ORDER — GUAIFENESIN/DEXTROMETHORPHAN 100-10MG/5
5 SYRUP ORAL 4 TIMES DAILY PRN
Qty: 120 ML | Refills: 0 | Status: SHIPPED | OUTPATIENT
Start: 2023-01-19 | End: 2023-01-29

## 2023-01-19 RX ORDER — AZITHROMYCIN 250 MG/1
500 TABLET, FILM COATED ORAL
Status: COMPLETED | OUTPATIENT
Start: 2023-01-19 | End: 2023-01-19

## 2023-01-19 RX ORDER — ALBUTEROL SULFATE 90 UG/1
2 AEROSOL, METERED RESPIRATORY (INHALATION) EVERY 4 HOURS PRN
Qty: 18 G | Refills: 0 | Status: SHIPPED | OUTPATIENT
Start: 2023-01-19 | End: 2023-04-12

## 2023-01-19 RX ORDER — IPRATROPIUM BROMIDE AND ALBUTEROL SULFATE 2.5; .5 MG/3ML; MG/3ML
3 SOLUTION RESPIRATORY (INHALATION)
Status: COMPLETED | OUTPATIENT
Start: 2023-01-19 | End: 2023-01-19

## 2023-01-19 RX ORDER — FLUTICASONE PROPIONATE 50 MCG
2 SPRAY, SUSPENSION (ML) NASAL
Status: COMPLETED | OUTPATIENT
Start: 2023-01-19 | End: 2023-01-19

## 2023-01-19 RX ORDER — AZITHROMYCIN 250 MG/1
TABLET, FILM COATED ORAL
Qty: 6 TABLET | Refills: 0 | Status: SHIPPED | OUTPATIENT
Start: 2023-01-20 | End: 2023-04-12

## 2023-01-19 RX ADMIN — GUAIFENESIN AND DEXTROMETHORPHAN 10 ML: 100; 10 SYRUP ORAL at 06:01

## 2023-01-19 RX ADMIN — FLUTICASONE PROPIONATE 100 MCG: 50 SPRAY, METERED NASAL at 04:01

## 2023-01-19 RX ADMIN — AZITHROMYCIN MONOHYDRATE 500 MG: 250 TABLET ORAL at 05:01

## 2023-01-19 RX ADMIN — IPRATROPIUM BROMIDE AND ALBUTEROL SULFATE 3 ML: .5; 3 SOLUTION RESPIRATORY (INHALATION) at 05:01

## 2023-01-19 NOTE — ED PROVIDER NOTES
Encounter Date: 1/19/2023       History     Chief Complaint   Patient presents with    Shortness of Breath     Woke her from sleep while coughing x1 hour ago. Denies CP, fever, chills. BLE swelling noted.      76 yo female with CKD3, HTN, DLD, DM2, presents via family with shortness of breath.  Patient states she developed rhinorrhea, nasal congestion, hoarse phonation, cough productive of yellow sputum, and some mild shortness of breath yesterday Wednesday 1/18/23.  She took Coricidin HBP 2 tablets around 4pm and then another 2 tablets around 2 hours prior to ER arrival.  She had worsening shortness of breath so presented to ER.  No diaphoresis.  No nausea/vomiting.  Patient reports recent contact with her grandson (age 14), who has rhinorrhea, and who was recently around his sister (age 8), who was diagnosed with COVID-19 around 4 days ago.      TTE 8/5/22  ? The left ventricle is normal in size with concentric hypertrophy and normal systolic function.  ? The estimated ejection fraction is 60%.  ? Normal left ventricular diastolic function.  ? Normal right ventricular size with normal right ventricular systolic function.  ? Normal central venous pressure (3 mmHg).  ? The estimated PA systolic pressure is 28 mmHg.    Review of patient's allergies indicates:  No Known Allergies  Past Medical History:   Diagnosis Date    Arthritis     Back pain     CKD stage G3a/A3, GFR 45-59 and albumin creatinine ratio >300 mg/g 6/23/2020    Colon polyp     Hyperglycemia     Hyperlipidemia     Hypertension     Nuclear sclerosis of both eyes 10/31/2017    Prolapse of uterus     Type 2 diabetes mellitus without complication, without long-term current use of insulin     Type 2 diabetes mellitus without complication, without long-term current use of insulin      Past Surgical History:   Procedure Laterality Date    COLONOSCOPY N/A 12/15/2020    Procedure: COLONOSCOPY;  Surgeon: Antonio Maya MD;  Location: [facility] ([address] [place] [address]);   Service: Endoscopy;  Laterality: N/A;  prep ins. emailed - ERW  covid test on 12/12/20 -PC[facility]    COLONOSCOPY W/ POLYPECTOMY      Right Thumb      vaginal dilator       Family History   Problem Relation Age of Onset    Hypertension Mother     Cataracts Mother     Heart disease Mother     Hypertension Father     Cataracts Father     No Known Problems Sister     No Known Problems Brother     No Known Problems Brother     No Known Problems Maternal Aunt     No Known Problems Maternal Uncle     No Known Problems Paternal Aunt     No Known Problems Paternal Uncle     No Known Problems Maternal Grandmother     No Known Problems Maternal Grandfather     No Known Problems Paternal Grandmother     No Known Problems Paternal Grandfather     Amblyopia Neg Hx     Blindness Neg Hx     Cancer Neg Hx     Diabetes Neg Hx     Glaucoma Neg Hx     Macular degeneration Neg Hx     Retinal detachment Neg Hx     Strabismus Neg Hx     Stroke Neg Hx     Thyroid disease Neg Hx      Social History     Tobacco Use    Smoking status: Never    Smokeless tobacco: Never   Substance Use Topics    Alcohol use: No     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     Drug use: No     Review of Systems   Constitutional:  Negative for fever.   HENT:  Positive for congestion and rhinorrhea.    Eyes:  Negative for photophobia.   Respiratory:  Positive for cough and shortness of breath.    Cardiovascular:  Negative for chest pain.   Gastrointestinal:  Negative for nausea and vomiting.   Genitourinary:  Negative for dysuria.   Musculoskeletal:  Negative for gait problem.   Skin:  Negative for rash.   Neurological:  Negative for syncope.     Physical Exam     Initial Vitals [01/19/23 0332]   BP Pulse Resp Temp SpO2   126/61 86 20 98.1 °F (36.7 °C) 97 %      MAP       --         Physical Exam    Nursing note and vitals reviewed.  Constitutional: She appears well-developed and well-nourished. She is not diaphoretic.   Awake, alert, nontoxic, elderly female.    HENT:   Head: Normocephalic and atraumatic.   Many absent teeth. Hoarse phonation. Clear oropharynx.   Eyes: Conjunctivae and EOM are normal. Pupils are equal, round, and reactive to light.   Neck: Neck supple.   Normal range of motion.  Cardiovascular:  Normal rate, regular rhythm and intact distal pulses.           No murmur heard.  Pulmonary/Chest: No respiratory distress. She has no wheezes. She has no rhonchi. She has no rales.   Intermittent cough   Abdominal: Abdomen is soft. There is no abdominal tenderness. There is no rebound and no guarding.   Musculoskeletal:         General: No tenderness or edema. Normal range of motion.      Cervical back: Normal range of motion and neck supple.     Neurological: She is alert and oriented to person, place, and time. She has normal strength.   Moving all extremities.   Skin: Skin is warm and dry. No erythema. No pallor.   Psychiatric: She has a normal mood and affect.       ED Course   Procedures  Labs Reviewed   CBC W/ AUTO DIFFERENTIAL - Abnormal; Notable for the following components:       Result Value    RBC 3.84 (*)     Hemoglobin 11.0 (*)     Hematocrit 33.9 (*)     All other components within normal limits   COMPREHENSIVE METABOLIC PANEL - Abnormal; Notable for the following components:    Glucose 155 (*)     BUN 31 (*)     Creatinine 1.6 (*)     AST 74 (*)     ALT 66 (*)     eGFR 33 (*)     All other components within normal limits   B-TYPE NATRIURETIC PEPTIDE   TROPONIN I   MAGNESIUM   SARS-COV-2 RDRP GENE   POCT INFLUENZA A/B MOLECULAR     EKG Readings: (Independently Interpreted)   03:45: NSR, HR 80. First degree AV block. No ectopy. Diffuse T wave flattening. No STEMI.      Imaging Results              X-Ray Chest AP Portable (Final result)  Result time 01/19/23 06:58:10      Final result by Marino Young MD (01/19/23 06:58:10)                   Impression:      Borderline enlargement of the cardiomediastinal silhouette and elevated right  "hemidiaphragm with right basilar subsegmental atelectasis.      Electronically signed by: Marino Young MD  Date:    01/19/2023  Time:    06:58               Narrative:    EXAMINATION:  XR CHEST AP PORTABLE    CLINICAL HISTORY:  Provided history is "  Shortness of breath".    TECHNIQUE:  One view of the chest.    COMPARISON:  02/17/2014.    FINDINGS:  Cardiac wires overlie the chest.  Cardiomediastinal silhouette is magnified by portable technique and likely at the upper limits of normal in size.  Right hemidiaphragm is elevated with right basilar subsegmental atelectasis.  There are coarsened interstitial lung markings but no confluent area of consolidation.  No large pleural effusion.  No distinct pneumothorax.  Probable calcified mediastinal lymph nodes suggestive of prior granulomatous disease.  Scattered calcified granuloma.                                    X-Rays:   Independently Interpreted Readings:   Other Readings:  CXR NAD  Medications   fluticasone propionate 50 mcg/actuation nasal spray 100 mcg (100 mcg Each Nostril Given 1/19/23 0407)   albuterol-ipratropium 2.5 mg-0.5 mg/3 mL nebulizer solution 3 mL (3 mLs Nebulization Given 1/19/23 0523)   azithromycin tablet 500 mg (500 mg Oral Given 1/19/23 0539)   dextromethorphan-guaiFENesin  mg/5 ml liquid 10 mL (10 mLs Oral Given 1/19/23 0620)     Medical Decision Making:   History:   Old Medical Records: I decided to obtain old medical records.  Old Records Summarized: records from clinic visits.  Initial Assessment:   77 y.o. female with shortness of breath, rhinorrhea, nasal congestion, hoarse phonation, productive cough.  Differential Diagnosis:   Ddx includes ACS, CHF, pneumonia, viral URI (COVID-19, influenza, other), other.  Independently Interpreted Test(s):   I have ordered and independently interpreted X-rays - see prior notes.  I have ordered and independently interpreted EKG Reading(s) - see prior notes  Clinical Tests:   Lab Tests: " Reviewed and Ordered  Radiological Study: Ordered and Reviewed  Medical Tests: Ordered and Reviewed  ED Management:  EKG no STEMI.    CXR NAD.    Labs reassuring. Renal function consistent with prior. BNP and troponin negative.     Flu/COVID-19 negative.    I suspect viral URI vs bronchitis given constellation of symptoms (rhinorrhea, nasal congestion, hoarse phonation, cough productive of yellow sputum, and shortness of breath) with reassuring workup.  Patient received duoneb, flonase, dextromethorphan/guaifenesin, and azithromycin in ER.  I have rx'ed albuterol, dextromethorphan/guaifenesin, and azithromycin on discharge; patient also received flonase from ER to go home with.    I discussed diagnosis and return precautions with patient and family members. D/c'ed, VSS, ambulatory in no acute distress.                        Clinical Impression:   Final diagnoses:  [R06.02] Shortness of breath  [R05.9] Cough, unspecified type  [J34.89] Rhinorrhea  [R49.0] Hoarse  [R09.81] Nasal congestion  [R05.8] Productive cough  [J20.9] Acute bronchitis, unspecified organism (Primary)        ED Disposition Condition    Discharge Stable          ED Prescriptions       Medication Sig Dispense Start Date End Date Auth. Provider    albuterol (PROVENTIL/VENTOLIN HFA) 90 mcg/actuation inhaler Inhale 2 puffs into the lungs every 4 (four) hours as needed for Wheezing or Shortness of Breath. Rescue 18 g 1/19/2023 -- Tracy Pink MD    azithromycin (Z-MARTIN) 250 MG tablet Take 2 tablets on first day, then 1 tablet a day thereafter until all tablets are done. 6 tablet 1/20/2023 -- Tracy Pink MD    dextromethorphan-guaiFENesin  mg/5 ml (ROBITUSSIN-DM)  mg/5 mL liquid Take 5 mLs by mouth 4 (four) times daily as needed (cough). 120 mL 1/19/2023 1/29/2023 Tracy Pink MD          Follow-up Information       Follow up With Specialties Details Why Contact Info    Maryjo Pichardo MD Family Medicine, Wound Care    [address] [facility]  [place] [address]  [phone]               Tracy Pink MD  01/19/23 0819

## 2023-01-19 NOTE — ED NOTES
Pt to ED c/o intermittent SOB and yellow tinged productive cough. Pt states her grandson recently tested positive for covid. Denies chest pain at present. Placed on monitor. Dr Pink at bedside. See assessment for further.

## 2023-01-19 NOTE — DISCHARGE INSTRUCTIONS
Use Flonase at home: 1-2 sprays per nostril 1-2 times a day until congestion improves.    Return to the ER for new/worsening symptoms.

## 2023-01-30 ENCOUNTER — TELEPHONE (OUTPATIENT)
Dept: FAMILY MEDICINE | Facility: CLINIC | Age: 78
End: 2023-01-30
Payer: MEDICARE

## 2023-01-30 DIAGNOSIS — N18.31 STAGE 3A CHRONIC KIDNEY DISEASE: ICD-10-CM

## 2023-01-30 DIAGNOSIS — E78.49 OTHER HYPERLIPIDEMIA: ICD-10-CM

## 2023-01-30 DIAGNOSIS — M54.50 CHRONIC MIDLINE LOW BACK PAIN WITHOUT SCIATICA: ICD-10-CM

## 2023-01-30 DIAGNOSIS — I10 ESSENTIAL HYPERTENSION: ICD-10-CM

## 2023-01-30 DIAGNOSIS — G89.29 CHRONIC MIDLINE LOW BACK PAIN WITHOUT SCIATICA: ICD-10-CM

## 2023-01-30 NOTE — TELEPHONE ENCOUNTER
No new care gaps identified.  [facility] Embedded Care Gaps. Reference number: [id number]. 1/30/2023   4:58:29 PM CST

## 2023-01-30 NOTE — TELEPHONE ENCOUNTER
----- Message from Tika Peterson sent at 1/30/2023  4:50 PM CST -----  Regarding: self [phone]  Type: RX Refill Request    Who Called: self     Have you contacted your pharmacy: yes    Refill or New Rx: refill     RX Name and Strength:traMADoL (ULTRAM) 50 mg tablet    Preferred Pharmacy with phone number:  [facility]/pharmacy #[id number] - [place] - [address]  [address] [place] [address]  Phone: [phone] Fax: [phone]    Local or Mail Order:local     Would the patient rather a call back or a response via [facility]? Call back     Best Call Back Number: [phone]        She is out of medication, she picked up 90 tablets in Dec and was given 21 pills last week but they are gone.

## 2023-01-30 NOTE — TELEPHONE ENCOUNTER
----- Message from Meño Alcaraz sent at 1/30/2023  3:41 PM CST -----  Regarding: Self [phone]  Type: Patient Call Back    Who called: Self     What is the request in detail: pt. Stated that her pharmacy is requesting paperwork stating that the pt is supposed to have 90 pills in her refill for  traMADoL (ULTRAM) 50 mg tablet    Can the clinic reply by [facility]? No     Would the patient rather a call back or a response via [facility]? Call back     Best call back number: [phone]    Additional Information:    Pharmacy:   [facility]/pharmacy #[id number] - [place] - [address]  [address] [place] [address]  Phone: [phone] Fax: [phone]    Thank you.

## 2023-01-31 RX ORDER — TRAMADOL HYDROCHLORIDE 50 MG/1
50 TABLET ORAL 3 TIMES DAILY
Qty: 90 TABLET | Refills: 0 | Status: SHIPPED | OUTPATIENT
Start: 2023-02-01 | End: 2023-03-03

## 2023-02-07 DIAGNOSIS — Z00.00 ENCOUNTER FOR MEDICARE ANNUAL WELLNESS EXAM: ICD-10-CM

## 2023-02-09 DIAGNOSIS — Z00.00 ENCOUNTER FOR MEDICARE ANNUAL WELLNESS EXAM: ICD-10-CM

## 2023-02-10 ENCOUNTER — TELEPHONE (OUTPATIENT)
Dept: FAMILY MEDICINE | Facility: CLINIC | Age: 78
End: 2023-02-10
Payer: MEDICARE

## 2023-02-10 DIAGNOSIS — Z12.31 BREAST CANCER SCREENING BY MAMMOGRAM: Primary | ICD-10-CM

## 2023-02-10 NOTE — TELEPHONE ENCOUNTER
----- Message from Nelida Shannon sent at 2/10/2023  3:27 PM CST -----   Name of Who is Calling:     What is the request in detail: patient request call back in reference to mammogram order Please contact to further discuss and advise      Can the clinic reply by [facility]:     What Number to Call Back if not in [facility]:  [phone]

## 2023-02-14 ENCOUNTER — PES CALL (OUTPATIENT)
Dept: ADMINISTRATIVE | Facility: CLINIC | Age: 78
End: 2023-02-14
Payer: MEDICARE

## 2023-03-01 ENCOUNTER — HOSPITAL ENCOUNTER (OUTPATIENT)
Dept: RADIOLOGY | Facility: HOSPITAL | Age: 78
Discharge: HOME | End: 2023-03-01
Attending: FAMILY MEDICINE
Payer: MEDICARE

## 2023-03-01 DIAGNOSIS — Z12.31 BREAST CANCER SCREENING BY MAMMOGRAM: ICD-10-CM

## 2023-03-01 PROCEDURE — 77063 MAMMO DIGITAL SCREENING BILAT WITH TOMO: ICD-10-PCS | Mod: 26,HCNC,, | Performed by: RADIOLOGY

## 2023-03-01 PROCEDURE — 77067 SCR MAMMO BI INCL CAD: CPT | Mod: 26,HCNC,, | Performed by: RADIOLOGY

## 2023-03-01 PROCEDURE — 77067 SCR MAMMO BI INCL CAD: CPT | Mod: TC,HCNC,PO

## 2023-03-01 PROCEDURE — 77067 MAMMO DIGITAL SCREENING BILAT WITH TOMO: ICD-10-PCS | Mod: 26,HCNC,, | Performed by: RADIOLOGY

## 2023-03-01 PROCEDURE — 77063 BREAST TOMOSYNTHESIS BI: CPT | Mod: 26,HCNC,, | Performed by: RADIOLOGY

## 2023-03-09 ENCOUNTER — OFFICE VISIT (OUTPATIENT)
Dept: OPTOMETRY | Facility: CLINIC | Age: 78
End: 2023-03-09
Payer: MEDICARE

## 2023-03-09 DIAGNOSIS — G89.29 CHRONIC MIDLINE LOW BACK PAIN WITHOUT SCIATICA: ICD-10-CM

## 2023-03-09 DIAGNOSIS — M54.50 CHRONIC MIDLINE LOW BACK PAIN WITHOUT SCIATICA: ICD-10-CM

## 2023-03-09 DIAGNOSIS — E11.36 TYPE 2 DIABETES MELLITUS WITH DIABETIC CATARACT, WITHOUT LONG-TERM CURRENT USE OF INSULIN: Primary | ICD-10-CM

## 2023-03-09 DIAGNOSIS — H25.13 NUCLEAR SCLEROSIS, BILATERAL: ICD-10-CM

## 2023-03-09 DIAGNOSIS — H25.011 CORTICAL SENILE CATARACT, RIGHT: ICD-10-CM

## 2023-03-09 DIAGNOSIS — H52.7 REFRACTIVE ERROR: ICD-10-CM

## 2023-03-09 PROCEDURE — 1159F MED LIST DOCD IN RCRD: CPT | Mod: HCNC,CPTII,S$GLB, | Performed by: OPTOMETRIST

## 2023-03-09 PROCEDURE — 1159F PR MEDICATION LIST DOCUMENTED IN MEDICAL RECORD: ICD-10-PCS | Mod: HCNC,CPTII,S$GLB, | Performed by: OPTOMETRIST

## 2023-03-09 PROCEDURE — 92014 COMPRE OPH EXAM EST PT 1/>: CPT | Mod: HCNC,S$GLB,, | Performed by: OPTOMETRIST

## 2023-03-09 PROCEDURE — 92014 PR EYE EXAM, EST PATIENT,COMPREHESV: ICD-10-PCS | Mod: HCNC,S$GLB,, | Performed by: OPTOMETRIST

## 2023-03-09 PROCEDURE — 92015 PR REFRACTION: ICD-10-PCS | Mod: HCNC,S$GLB,, | Performed by: OPTOMETRIST

## 2023-03-09 PROCEDURE — 2023F DILAT RTA XM W/O RTNOPTHY: CPT | Mod: HCNC,CPTII,S$GLB, | Performed by: OPTOMETRIST

## 2023-03-09 PROCEDURE — 1101F PR PT FALLS ASSESS DOC 0-1 FALLS W/OUT INJ PAST YR: ICD-10-PCS | Mod: HCNC,CPTII,S$GLB, | Performed by: OPTOMETRIST

## 2023-03-09 PROCEDURE — 1160F PR REVIEW ALL MEDS BY PRESCRIBER/CLIN PHARMACIST DOCUMENTED: ICD-10-PCS | Mod: HCNC,CPTII,S$GLB, | Performed by: OPTOMETRIST

## 2023-03-09 PROCEDURE — 1157F PR ADVANCE CARE PLAN OR EQUIV PRESENT IN MEDICAL RECORD: ICD-10-PCS | Mod: HCNC,CPTII,S$GLB, | Performed by: OPTOMETRIST

## 2023-03-09 PROCEDURE — 1126F AMNT PAIN NOTED NONE PRSNT: CPT | Mod: HCNC,CPTII,S$GLB, | Performed by: OPTOMETRIST

## 2023-03-09 PROCEDURE — 99999 PR PBB SHADOW E&M-EST. PATIENT-LVL II: CPT | Mod: PBBFAC,HCNC,, | Performed by: OPTOMETRIST

## 2023-03-09 PROCEDURE — 1101F PT FALLS ASSESS-DOCD LE1/YR: CPT | Mod: HCNC,CPTII,S$GLB, | Performed by: OPTOMETRIST

## 2023-03-09 PROCEDURE — 99999 PR PBB SHADOW E&M-EST. PATIENT-LVL II: ICD-10-PCS | Mod: PBBFAC,HCNC,, | Performed by: OPTOMETRIST

## 2023-03-09 PROCEDURE — 1160F RVW MEDS BY RX/DR IN RCRD: CPT | Mod: HCNC,CPTII,S$GLB, | Performed by: OPTOMETRIST

## 2023-03-09 PROCEDURE — 1126F PR PAIN SEVERITY QUANTIFIED, NO PAIN PRESENT: ICD-10-PCS | Mod: HCNC,CPTII,S$GLB, | Performed by: OPTOMETRIST

## 2023-03-09 PROCEDURE — 2023F PR DILATED RETINAL EXAM W/O EVID OF RETINOPATHY: ICD-10-PCS | Mod: HCNC,CPTII,S$GLB, | Performed by: OPTOMETRIST

## 2023-03-09 PROCEDURE — 3288F FALL RISK ASSESSMENT DOCD: CPT | Mod: HCNC,CPTII,S$GLB, | Performed by: OPTOMETRIST

## 2023-03-09 PROCEDURE — 3288F PR FALLS RISK ASSESSMENT DOCUMENTED: ICD-10-PCS | Mod: HCNC,CPTII,S$GLB, | Performed by: OPTOMETRIST

## 2023-03-09 PROCEDURE — 1157F ADVNC CARE PLAN IN RCRD: CPT | Mod: HCNC,CPTII,S$GLB, | Performed by: OPTOMETRIST

## 2023-03-09 PROCEDURE — 92015 DETERMINE REFRACTIVE STATE: CPT | Mod: HCNC,S$GLB,, | Performed by: OPTOMETRIST

## 2023-03-09 RX ORDER — TRAMADOL HYDROCHLORIDE 50 MG/1
50 TABLET ORAL 3 TIMES DAILY PRN
Qty: 90 TABLET | Refills: 0 | Status: SHIPPED | OUTPATIENT
Start: 2023-03-09 | End: 2023-04-12 | Stop reason: SDUPTHER

## 2023-03-09 NOTE — PROGRESS NOTES
Subjective:       Patient ID: Nuha Bernstein is a 78 y.o. female      Chief Complaint   Patient presents with    Concerns About Ocular Health    Diabetic Eye Exam     History of Present Illness  Dls: 6/28/21 Dr. Leyva     77 y/o female presents today for diabetic eye exam.  Pt c/o blurry vision at distance and near ou. Pt wears bifocal glasses.     LBS ?     No tearing  No itching  No burning  No pain  No ha's  No floaters  No flashes    Eye meds  None    Hemoglobin A1C       Date                     Value               Ref Range             Status                12/16/2022               5.9 (H)             4.0 - 5.6 %           Final                 09/14/2022               5.8 (H)             4.0 - 5.6 %           Final                  03/11/2022               6.3 (H)             4.0 - 5.6 %           Final                 Assessment/Plan:     1. Type 2 diabetes mellitus with diabetic cataract, without long-term current use of insulin  No diabetic retinopathy. Discussed with pt the effects of diabetes on vision, importance of good blood sugar control, compliance with meds, and follow up care with PCP. Return in 1 year for dilated eye exam, sooner PRN.    2. Nuclear sclerosis, bilateral  3. Cortical senile cataract, right  NVS per pt. Educated pt on presence of cataracts and effects on vision. No surgery at this time. Recheck in one year, sooner PRN.    4. Refractive error  Educated patient on refractive error and discussed lens options. Dispensed updated spectacle Rx. Educated about adaptation period to new specs.    Eyeglass Final Rx       Eyeglass Final Rx         Sphere Cylinder Axis Add    Right +1.25 +2.50 180 +2.75    Left +1.00 +2.25 180 +2.75      Expiration Date: 3/9/2024                      Follow up in about 1 year (around 3/9/2024) for Diabetic Eye Exam.

## 2023-04-03 ENCOUNTER — LAB VISIT (OUTPATIENT)
Dept: LAB | Facility: HOSPITAL | Age: 78
End: 2023-04-03
Attending: INTERNAL MEDICINE
Payer: MEDICARE

## 2023-04-03 ENCOUNTER — OFFICE VISIT (OUTPATIENT)
Dept: FAMILY MEDICINE | Facility: CLINIC | Age: 78
End: 2023-04-03
Payer: MEDICARE

## 2023-04-03 VITALS
SYSTOLIC BLOOD PRESSURE: 125 MMHG | HEIGHT: 64 IN | HEART RATE: 77 BPM | TEMPERATURE: 98 F | BODY MASS INDEX: 29.6 KG/M2 | WEIGHT: 173.38 LBS | DIASTOLIC BLOOD PRESSURE: 72 MMHG | OXYGEN SATURATION: 96 %

## 2023-04-03 DIAGNOSIS — E11.22 TYPE 2 DIABETES MELLITUS WITH STAGE 3A CHRONIC KIDNEY DISEASE, WITHOUT LONG-TERM CURRENT USE OF INSULIN: ICD-10-CM

## 2023-04-03 DIAGNOSIS — I10 ESSENTIAL HYPERTENSION: ICD-10-CM

## 2023-04-03 DIAGNOSIS — S90.852A FOREIGN BODY IN LEFT FOOT, INITIAL ENCOUNTER: ICD-10-CM

## 2023-04-03 DIAGNOSIS — N18.31 TYPE 2 DIABETES MELLITUS WITH STAGE 3A CHRONIC KIDNEY DISEASE, WITHOUT LONG-TERM CURRENT USE OF INSULIN: ICD-10-CM

## 2023-04-03 DIAGNOSIS — M79.672 LEFT FOOT PAIN: Primary | ICD-10-CM

## 2023-04-03 DIAGNOSIS — J84.10 CALCIFIED GRANULOMA OF LUNG: ICD-10-CM

## 2023-04-03 PROBLEM — Z12.11 SCREENING FOR COLON CANCER: Status: RESOLVED | Noted: 2020-12-15 | Resolved: 2023-04-03

## 2023-04-03 LAB
ALBUMIN/CREAT UR: 73.6 UG/MG (ref 0–30)
CREAT UR-MCNC: 53 MG/DL (ref 15–325)
MICROALBUMIN UR DL<=1MG/L-MCNC: 39 UG/ML

## 2023-04-03 PROCEDURE — 1101F PT FALLS ASSESS-DOCD LE1/YR: CPT | Mod: HCNC,CPTII,S$GLB, | Performed by: INTERNAL MEDICINE

## 2023-04-03 PROCEDURE — 99999 PR PBB SHADOW E&M-EST. PATIENT-LVL V: ICD-10-PCS | Mod: PBBFAC,HCNC,, | Performed by: INTERNAL MEDICINE

## 2023-04-03 PROCEDURE — 3078F PR MOST RECENT DIASTOLIC BLOOD PRESSURE < 80 MM HG: ICD-10-PCS | Mod: HCNC,CPTII,S$GLB, | Performed by: INTERNAL MEDICINE

## 2023-04-03 PROCEDURE — 1101F PR PT FALLS ASSESS DOC 0-1 FALLS W/OUT INJ PAST YR: ICD-10-PCS | Mod: HCNC,CPTII,S$GLB, | Performed by: INTERNAL MEDICINE

## 2023-04-03 PROCEDURE — 3074F SYST BP LT 130 MM HG: CPT | Mod: HCNC,CPTII,S$GLB, | Performed by: INTERNAL MEDICINE

## 2023-04-03 PROCEDURE — 1125F AMNT PAIN NOTED PAIN PRSNT: CPT | Mod: HCNC,CPTII,S$GLB, | Performed by: INTERNAL MEDICINE

## 2023-04-03 PROCEDURE — 3288F FALL RISK ASSESSMENT DOCD: CPT | Mod: HCNC,CPTII,S$GLB, | Performed by: INTERNAL MEDICINE

## 2023-04-03 PROCEDURE — 1160F PR REVIEW ALL MEDS BY PRESCRIBER/CLIN PHARMACIST DOCUMENTED: ICD-10-PCS | Mod: HCNC,CPTII,S$GLB, | Performed by: INTERNAL MEDICINE

## 2023-04-03 PROCEDURE — 1160F RVW MEDS BY RX/DR IN RCRD: CPT | Mod: HCNC,CPTII,S$GLB, | Performed by: INTERNAL MEDICINE

## 2023-04-03 PROCEDURE — 99214 PR OFFICE/OUTPT VISIT, EST, LEVL IV, 30-39 MIN: ICD-10-PCS | Mod: HCNC,S$GLB,, | Performed by: INTERNAL MEDICINE

## 2023-04-03 PROCEDURE — 1157F ADVNC CARE PLAN IN RCRD: CPT | Mod: HCNC,CPTII,S$GLB, | Performed by: INTERNAL MEDICINE

## 2023-04-03 PROCEDURE — 1159F MED LIST DOCD IN RCRD: CPT | Mod: HCNC,CPTII,S$GLB, | Performed by: INTERNAL MEDICINE

## 2023-04-03 PROCEDURE — 99214 OFFICE O/P EST MOD 30 MIN: CPT | Mod: HCNC,S$GLB,, | Performed by: INTERNAL MEDICINE

## 2023-04-03 PROCEDURE — 3078F DIAST BP <80 MM HG: CPT | Mod: HCNC,CPTII,S$GLB, | Performed by: INTERNAL MEDICINE

## 2023-04-03 PROCEDURE — 99999 PR PBB SHADOW E&M-EST. PATIENT-LVL V: CPT | Mod: PBBFAC,HCNC,, | Performed by: INTERNAL MEDICINE

## 2023-04-03 PROCEDURE — 1157F PR ADVANCE CARE PLAN OR EQUIV PRESENT IN MEDICAL RECORD: ICD-10-PCS | Mod: HCNC,CPTII,S$GLB, | Performed by: INTERNAL MEDICINE

## 2023-04-03 PROCEDURE — 1125F PR PAIN SEVERITY QUANTIFIED, PAIN PRESENT: ICD-10-PCS | Mod: HCNC,CPTII,S$GLB, | Performed by: INTERNAL MEDICINE

## 2023-04-03 PROCEDURE — 3074F PR MOST RECENT SYSTOLIC BLOOD PRESSURE < 130 MM HG: ICD-10-PCS | Mod: HCNC,CPTII,S$GLB, | Performed by: INTERNAL MEDICINE

## 2023-04-03 PROCEDURE — 3288F PR FALLS RISK ASSESSMENT DOCUMENTED: ICD-10-PCS | Mod: HCNC,CPTII,S$GLB, | Performed by: INTERNAL MEDICINE

## 2023-04-03 PROCEDURE — 82570 ASSAY OF URINE CREATININE: CPT | Mod: HCNC | Performed by: INTERNAL MEDICINE

## 2023-04-03 PROCEDURE — 1159F PR MEDICATION LIST DOCUMENTED IN MEDICAL RECORD: ICD-10-PCS | Mod: HCNC,CPTII,S$GLB, | Performed by: INTERNAL MEDICINE

## 2023-04-03 RX ORDER — TRIAMTERENE AND HYDROCHLOROTHIAZIDE 37.5; 25 MG/1; MG/1
1 CAPSULE ORAL DAILY
Qty: 90 CAPSULE | Refills: 2 | Status: SHIPPED | OUTPATIENT
Start: 2023-04-03 | End: 2023-12-21 | Stop reason: SDUPTHER

## 2023-04-03 RX ORDER — ZOSTER VACCINE RECOMBINANT, ADJUVANTED 50 MCG/0.5
KIT INTRAMUSCULAR
COMMUNITY
Start: 2023-02-02 | End: 2023-04-12

## 2023-04-03 RX ORDER — FLUTICASONE PROPIONATE 50 MCG
SPRAY, SUSPENSION (ML) NASAL
COMMUNITY
Start: 2023-01-19 | End: 2023-04-12

## 2023-04-03 NOTE — TELEPHONE ENCOUNTER
No new care gaps identified.  [facility] Embedded Care Gaps. Reference number: [id number]. 4/03/2023   10:49:08 AM MELISSAT

## 2023-04-03 NOTE — PROGRESS NOTES
274}  HISTORY OF PRESENT ILLNESS:  Nuha Bernstein is a 78 y.o. female who presents to the clinic today for Foot Pain (Glass in foot)  .     The patient presents to clinic today over concern of possible glass in her left foot.  She states that about 2 weeks ago she dropped a glass in her bathroom.  The glass shattered.  She thought she had slept up all the pieces.  She states about a week ago she thinks she may have stepped on a piece of glass.  She has point tenderness on the bottom of her foot.  She has not been able to palpate anything herself.  She has been having to limit her exercise due to pain in the foot when she does try to exercise.  She tried to soak with some Epsom salts yesterday without much relief of her symptoms.  She has known diabetes, but no neuropathy.      PAST MEDICAL HISTORY:  Past Medical History:   Diagnosis Date    Arthritis     Back pain     CKD stage G3a/A3, GFR 45-59 and albumin creatinine ratio >300 mg/g 6/23/2020    Colon polyp     Hyperglycemia     Hyperlipidemia     Hypertension     Nuclear sclerosis of both eyes 10/31/2017    Prolapse of uterus     Type 2 diabetes mellitus without complication, without long-term current use of insulin     Type 2 diabetes mellitus without complication, without long-term current use of insulin        PAST SURGICAL HISTORY:  Past Surgical History:   Procedure Laterality Date    COLONOSCOPY N/A 12/15/2020    Procedure: COLONOSCOPY;  Surgeon: Antonio Maya MD;  Location: [address];  Service: Endoscopy;  Laterality: N/A;  prep ins. 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     Drug use: No    Sexual activity: Not Currently     Partners: Male   Social History Narrative     4 children retired        FAMILY HISTORY:  Family History   Problem Relation Age of Onset    Hypertension Mother     Cataracts Mother     Heart disease Mother     Hypertension Father     Cataracts Father     No Known Problems Sister     No Known Problems Brother     No Known Problems Brother     No Known Problems Maternal Aunt     No Known Problems Maternal Uncle     No Known Problems Paternal Aunt     No Known Problems Paternal Uncle     No Known Problems Maternal Grandmother     No Known Problems Maternal Grandfather     No Known Problems Paternal Grandmother     No Known Problems Paternal Grandfather     No Known Problems Other     Amblyopia Neg Hx     Blindness Neg Hx     Cancer Neg Hx     Diabetes Neg Hx     Glaucoma Neg Hx     Macular degeneration Neg Hx     Retinal detachment Neg Hx     Strabismus Neg Hx     Stroke Neg Hx     Thyroid disease Neg Hx        ALLERGIES AND MEDICATIONS: updated and reviewed.  Review of patient's allergies indicates:  No Known Allergies  Medication List with Changes/Refills   Current Medications    ALBUTEROL (PROVENTIL/VENTOLIN HFA) 90 MCG/ACTUATION INHALER    Inhale 2 puffs into the lungs every 4 (four) hours as needed for Wheezing or Shortness of Breath. Rescue    ASPIRIN 81 MG TAB    Take by mouth as needed. 2-3 times weekly    ATORVASTATIN (LIPITOR) 40 MG TABLET    Take 1 tablet (40 mg total) by mouth once daily.    AZITHROMYCIN (Z-MARTIN) 250 MG TABLET    Take 2 tablets on first day, then 1 tablet a day thereafter until all tablets are done.    BIOTIN 10,000 MCG CHEW    Take by mouth.    CO-ENZYME Q-10 30 MG CAPSULE    Take 30 mg by mouth 3 (three) times daily.    FLUTICASONE PROPIONATE (FLONASE) 50 MCG/ACTUATION NASAL SPRAY        LISINOPRIL (PRINIVIL,ZESTRIL) 40 MG TABLET    TAKE 1 TABLET BY MOUTH  "EVERY DAY    MAGNESIUM CITRATE SOLUTION    Take 296 mLs by mouth once.    METHOCARBAMOL (ROBAXIN) 500 MG TAB    Take 1-2 tablets (500-1,000 mg total) by mouth nightly as needed (shoulder pain).    MULTIVITAMIN/IRON/FOLIC ACID (CENTRUM WOMEN ORAL)    Take by mouth.    MV,TRISHA,IRON,MN/FOLIC ACID/CHOL (HAIR-SKIN-NAILS, PABA, ORAL)    Take by mouth.    OMEGA 3,6,9 COMBINATION NO.7 (OMEGA DHA ORAL)    Take 830 mg by mouth once daily.    SHINGRIX, PF, 50 MCG/0.5 ML INJECTION        TRAMADOL (ULTRAM) 50 MG TABLET    Take 1 tablet (50 mg total) by mouth 3 (three) times daily as needed for Pain.   Changed and/or Refilled Medications    Modified Medication Previous Medication    TRIAMTERENE-HYDROCHLOROTHIAZIDE 37.5-25 MG (DYAZIDE) 37.5-25 MG PER CAPSULE triamterene-hydrochlorothiazide 37.5-25 mg (DYAZIDE) 37.5-25 mg per capsule       Take 1 capsule by mouth once daily.    Take 1 capsule by mouth once daily.         CARE TEAM:  Patient Care Team:  Maryjo Pichardo MD as PCP - General (Family Medicine)  Silvana Carr LPN as Licensed Practical Nurse         REVIEW OF SYSTEMS:  Review of Systems   Constitutional:  Negative for chills and fever.   Skin:  Positive for wound. Negative for rash.       PHYSICAL EXAM: 274}  Vitals:    04/03/23 1024 04/03/23 1549   BP: (!) 140/90 125/72  Comment: home BP   BP Location: Left arm    Patient Position: Sitting    BP Method: Medium (Manual)    Pulse: 77    Temp: 98.4 °F (36.9 °C)    TempSrc: Oral    SpO2: 96%    Weight: 78.7 kg (173 lb 6.3 oz)    Height: 5' 4" (1.626 m)        Body mass index is 29.76 kg/m².      General appearance - alert, well appearing, and in no distress, overweight  Psychiatric - alert, oriented to person, place, and time, normal behavior, speech, dress, motor activity and thought process  Skin - normal coloration, no suspicious skin lesions, left foot exam: some point tenderness to palpation on bottom of left foot under second/third digit without erythema, " "warmth, discharge and no foreign body could be palpated            Labs:  No labs needed at this time      ASSESSMENT AND PLAN:  274}  1. Left foot pain/2. Foreign body in left foot, initial encounter  I advised the patient that I could not palpate a foreign body in her foot.  She may be having pain from the area that appears to perhaps have a little cut in the callus which is healing.  No signs of infection.  I will make her an appointment with Podiatry for further evaluation in case her symptoms worsen or persist.  -     Ambulatory referral/consult to Podiatry; Future; Expected date: 04/10/2023    3. Type 2 diabetes mellitus with stage 3a chronic kidney disease, without long-term current use of insulin  Lab Results   Component Value Date    HGBA1C 5.9 (H) 12/16/2022     Diabetes is under good control at this time for age and comorbid conditions.   We discussed diabetic diet and regular exercise.   We discussed home blood sugar monitoring, if appropriate - the patient should test once daily and as needed.   Continue current medication regimen.  Diabetic complications addressed: Stable decreased kidney function. Observe. Patient counseled to avoid/minimize the use of anti-inflammatory  Medication. Discussed to stay well hydrated. Also discussed with patient that good control of blood pressure and/or diabetes, if present, will help to prevent progression.  Patient was counseled on the need for yearly eye exam to screen for/monitor diabetic retinopathy and yearly diabetic foot exam.  -     Microalbumin/Creatinine Ratio, Urine; Future; Expected date: 04/03/2023    4. Calcified granuloma of lung  Stable. Asymptomatic. Observe.  Overview:  "Heart size is normal.  Few calcified granulomas noted.  The right hemidiaphragm is elevated and subsegmental atelectatic changes seen at the right lung base.  Otherwise the lungs are clear" - Xray Chest 2-              Orders Placed This Encounter   Procedures    " Microalbumin/Creatinine Ratio, Urine    Ambulatory referral/consult to Podiatry      Follow up if symptoms worsen or fail to improve. or sooner as needed.    The patient will follow-up as per routine with her primary care physician for follow up of her chronic medical condition(s) and routine health maintenance.

## 2023-04-10 ENCOUNTER — HOSPITAL ENCOUNTER (OUTPATIENT)
Dept: RADIOLOGY | Facility: HOSPITAL | Age: 78
Discharge: HOME | End: 2023-04-10
Attending: PODIATRIST
Payer: MEDICARE

## 2023-04-10 ENCOUNTER — OFFICE VISIT (OUTPATIENT)
Dept: PODIATRY | Facility: CLINIC | Age: 78
End: 2023-04-10
Payer: MEDICARE

## 2023-04-10 VITALS — WEIGHT: 173 LBS | BODY MASS INDEX: 29.53 KG/M2 | HEIGHT: 64 IN

## 2023-04-10 DIAGNOSIS — M77.9 CAPSULITIS: Primary | ICD-10-CM

## 2023-04-10 DIAGNOSIS — M79.672 LEFT FOOT PAIN: ICD-10-CM

## 2023-04-10 DIAGNOSIS — M24.573 EQUINUS CONTRACTURE OF ANKLE: ICD-10-CM

## 2023-04-10 DIAGNOSIS — G89.29 CHRONIC MIDLINE LOW BACK PAIN WITHOUT SCIATICA: ICD-10-CM

## 2023-04-10 DIAGNOSIS — M20.10 VALGUS DEFORMITY OF GREAT TOE, UNSPECIFIED LATERALITY: ICD-10-CM

## 2023-04-10 DIAGNOSIS — M54.50 CHRONIC MIDLINE LOW BACK PAIN WITHOUT SCIATICA: ICD-10-CM

## 2023-04-10 DIAGNOSIS — M77.9 CAPSULITIS: ICD-10-CM

## 2023-04-10 PROCEDURE — 73630 XR FOOT COMPLETE 3 VIEW LEFT: ICD-10-PCS | Mod: 26,HCNC,LT, | Performed by: RADIOLOGY

## 2023-04-10 PROCEDURE — 1126F PR PAIN SEVERITY QUANTIFIED, NO PAIN PRESENT: ICD-10-PCS | Mod: HCNC,CPTII,S$GLB, | Performed by: PODIATRIST

## 2023-04-10 PROCEDURE — 1157F PR ADVANCE CARE PLAN OR EQUIV PRESENT IN MEDICAL RECORD: ICD-10-PCS | Mod: HCNC,CPTII,S$GLB, | Performed by: PODIATRIST

## 2023-04-10 PROCEDURE — 99999 PR PBB SHADOW E&M-EST. PATIENT-LVL IV: ICD-10-PCS | Mod: PBBFAC,HCNC,, | Performed by: PODIATRIST

## 2023-04-10 PROCEDURE — 1157F ADVNC CARE PLAN IN RCRD: CPT | Mod: HCNC,CPTII,S$GLB, | Performed by: PODIATRIST

## 2023-04-10 PROCEDURE — 1101F PR PT FALLS ASSESS DOC 0-1 FALLS W/OUT INJ PAST YR: ICD-10-PCS | Mod: HCNC,CPTII,S$GLB, | Performed by: PODIATRIST

## 2023-04-10 PROCEDURE — 99999 PR PBB SHADOW E&M-EST. PATIENT-LVL IV: CPT | Mod: PBBFAC,HCNC,, | Performed by: PODIATRIST

## 2023-04-10 PROCEDURE — 1159F MED LIST DOCD IN RCRD: CPT | Mod: HCNC,CPTII,S$GLB, | Performed by: PODIATRIST

## 2023-04-10 PROCEDURE — 73630 X-RAY EXAM OF FOOT: CPT | Mod: 26,HCNC,LT, | Performed by: RADIOLOGY

## 2023-04-10 PROCEDURE — 1101F PT FALLS ASSESS-DOCD LE1/YR: CPT | Mod: HCNC,CPTII,S$GLB, | Performed by: PODIATRIST

## 2023-04-10 PROCEDURE — 1159F PR MEDICATION LIST DOCUMENTED IN MEDICAL RECORD: ICD-10-PCS | Mod: HCNC,CPTII,S$GLB, | Performed by: PODIATRIST

## 2023-04-10 PROCEDURE — 99214 OFFICE O/P EST MOD 30 MIN: CPT | Mod: HCNC,S$GLB,, | Performed by: PODIATRIST

## 2023-04-10 PROCEDURE — 3288F PR FALLS RISK ASSESSMENT DOCUMENTED: ICD-10-PCS | Mod: HCNC,CPTII,S$GLB, | Performed by: PODIATRIST

## 2023-04-10 PROCEDURE — 99214 PR OFFICE/OUTPT VISIT, EST, LEVL IV, 30-39 MIN: ICD-10-PCS | Mod: HCNC,S$GLB,, | Performed by: PODIATRIST

## 2023-04-10 PROCEDURE — 73630 X-RAY EXAM OF FOOT: CPT | Mod: TC,HCNC,FY,PO,LT

## 2023-04-10 PROCEDURE — 3288F FALL RISK ASSESSMENT DOCD: CPT | Mod: HCNC,CPTII,S$GLB, | Performed by: PODIATRIST

## 2023-04-10 PROCEDURE — 1126F AMNT PAIN NOTED NONE PRSNT: CPT | Mod: HCNC,CPTII,S$GLB, | Performed by: PODIATRIST

## 2023-04-10 RX ORDER — LIDOCAINE AND PRILOCAINE 25; 25 MG/G; MG/G
CREAM TOPICAL
Qty: 30 G | Refills: 2 | OUTPATIENT
Start: 2023-04-10

## 2023-04-10 RX ORDER — LIDOCAINE HYDROCHLORIDE 20 MG/ML
JELLY TOPICAL
Qty: 30 ML | Refills: 2 | Status: SHIPPED | OUTPATIENT
Start: 2023-04-10 | End: 2023-06-05

## 2023-04-10 RX ORDER — TRAMADOL HYDROCHLORIDE 50 MG/1
50 TABLET ORAL 3 TIMES DAILY PRN
Qty: 90 TABLET | Refills: 0 | OUTPATIENT
Start: 2023-04-10 | End: 2023-05-10

## 2023-04-10 NOTE — TELEPHONE ENCOUNTER
Patient here in person requesting refill on pended medication. Please be advised thanks you Dr. Pichardo.

## 2023-04-10 NOTE — TELEPHONE ENCOUNTER
No new care gaps identified.  [facility] Embedded Care Gaps. Reference number: [id number]. 4/10/2023   9:08:53 AM MELISSAT

## 2023-04-10 NOTE — PROGRESS NOTES
Subjective:      Patient ID: Nuha Bernstein is a 78 y.o. female.    Chief Complaint: Diabetes Mellitus (Magalie Wilkins MD at 4/3/2023 ) and Foot Problem (Right foot )    Sharp deep pain bottom right forefoot by 2nd toe.  Gradual onset, worsening over past several weeks, aggravated by increased weight bearing, shoe gear, pressure.  No previous medical treatment.  OTC pain med not helping. D trauma, surgery.    Review of Systems   Constitutional: Negative for chills, diaphoresis, fever, malaise/fatigue and night sweats.   Cardiovascular:  Negative for claudication, cyanosis, leg swelling and syncope.   Skin:  Negative for color change, dry skin, nail changes, rash, suspicious lesions and unusual hair distribution.   Musculoskeletal:  Positive for joint pain. Negative for falls, joint swelling, muscle cramps, muscle weakness and stiffness.   Gastrointestinal:  Negative for constipation, diarrhea, nausea and vomiting.   Neurological:  Negative for brief paralysis, disturbances in coordination, focal weakness, numbness, paresthesias, sensory change and tremors.         Objective:      Physical Exam  Constitutional:       General: She is not in acute distress.     Appearance: She is well-developed. She is not diaphoretic.   Cardiovascular:      Pulses:           Popliteal pulses are 2+ on the right side and 2+ on the left side.        Dorsalis pedis pulses are 2+ on the right side and 2+ on the left side.        Posterior tibial pulses are 2+ on the right side and 2+ on the left side.      Comments: Capillary refill 3 seconds all toes/distal feet, all toes/both feet warm to touch.      Negative lymphadenopathy bilateral popliteal fossa and tarsal tunnel.      Negavie lower extremity edema bilateral.    Musculoskeletal:      Right ankle: No swelling, deformity, ecchymosis or lacerations. Normal range of motion. Normal pulse.      Right Achilles Tendon: Normal. No defects. Page's test negative.      Comments: Junito  valgus bilateral without symptom.    Pain to palpation inferior mtpj 2 right without evidence of trauma or infection.    Ankle dorsiflexion decreased at <10 degrees bilateral with moderate increase with knee flexion bilateral.    Otherwise, Normal angle, base, station of gait. All ten toes without clubbing, cyanosis, or signs of ischemia.  No pain to palpation bilateral lower extremities.  Range of motion, stability, muscle strength, and muscle tone normal bilateral feet and legs.    Lymphadenopathy:      Lower Body: No right inguinal adenopathy. No left inguinal adenopathy.      Comments: Negative lymphadenopathy bilateral popliteal fossa and tarsal tunnel.    Negative lymphangitic streaking bilateral feet/ankles/legs.   Skin:     General: Skin is warm and dry.      Capillary Refill: Capillary refill takes 2 to 3 seconds.      Coloration: Skin is not pale.      Findings: No abrasion, bruising, burn, ecchymosis, erythema, laceration, lesion or rash.      Nails: There is no clubbing.      Comments: Skin is normal age and health appropriate color, turgor, texture, and temperature bilateral lower extremities without ulceration, hyperpigmentation, discoloration, masses nodules or cords palpated.  No ecchymosis, erythema, edema, or cardinal signs of infection bilateral lower extremities.      Neurological:      Mental Status: She is alert and oriented to person, place, and time.      Sensory: No sensory deficit.      Motor: No tremor, atrophy or abnormal muscle tone.      Gait: Gait normal.      Deep Tendon Reflexes:      Reflex Scores:       Patellar reflexes are 2+ on the right side and 2+ on the left side.       Achilles reflexes are 2+ on the right side and 2+ on the left side.     Comments: Negative tinel sign to percussion sural, superficial peroneal, deep peroneal, saphenous, and posterior tibial nerves right and left ankles and feet.      Negative moulder sign/click bilateral all intermetatarsal spaces.     Psychiatric:         Behavior: Behavior is cooperative.           Assessment:       Encounter Diagnoses   Name Primary?    Left foot pain     Capsulitis Yes    Equinus contracture of ankle     Valgus deformity of great toe, unspecified laterality          Plan:       Nuha was seen today for diabetes mellitus and foot problem.    Diagnoses and all orders for this visit:    Capsulitis    Left foot pain  -     Ambulatory referral/consult to Podiatry    Equinus contracture of ankle    Valgus deformity of great toe, unspecified laterality      I counseled the patient on her conditions, their implications and medical management.        Patient will stretch the tendo achilles complex three times daily as demonstrated in the office.  Literature was dispensed illustrating proper stretching technique.        Patient will obtain over the counter arch supports and wear them in shoes whenever possible.  Athletic shoes intended for walking or running are usually best.    The patient was advised that NSAID-type medications have two very important potential side effects: gastrointestinal irritation including hemorrhage and renal injuries. She was asked to take the medication with food and to stop if she experiences any GI upset. I asked her to call for vomiting, abdominal pain or black/bloody stools. The patient expresses understanding of these issues and questions were answered.    Discussed conservative treatment with shoes of adequate dimensions, material, and style to alleviate symptoms and delay or prevent surgical intervention.    Lidocaine gel          Follow up in about 1 month (around 5/10/2023).

## 2023-04-11 ENCOUNTER — TELEPHONE (OUTPATIENT)
Dept: FAMILY MEDICINE | Facility: CLINIC | Age: 78
End: 2023-04-11
Payer: MEDICARE

## 2023-04-11 NOTE — TELEPHONE ENCOUNTER
----- Message from Yamel Sanchez sent at 4/11/2023  3:56 PM CDT -----  Type: Patient Call Back    Who called:pt     What is the request in detail:medication update -traMADoL (ULTRAM) 50 mg tablet    Can the clinic reply by [facility]?no     Would the patient rather a call back or a response via [facility]?call     Best call back number: [phone]      Additional Information:

## 2023-04-12 ENCOUNTER — OFFICE VISIT (OUTPATIENT)
Dept: FAMILY MEDICINE | Facility: CLINIC | Age: 78
End: 2023-04-12
Payer: MEDICARE

## 2023-04-12 VITALS
HEART RATE: 81 BPM | DIASTOLIC BLOOD PRESSURE: 80 MMHG | HEIGHT: 64 IN | OXYGEN SATURATION: 96 % | WEIGHT: 171.5 LBS | SYSTOLIC BLOOD PRESSURE: 124 MMHG | RESPIRATION RATE: 16 BRPM | TEMPERATURE: 99 F | BODY MASS INDEX: 29.28 KG/M2

## 2023-04-12 DIAGNOSIS — F11.20 UNCOMPLICATED OPIOID DEPENDENCE: ICD-10-CM

## 2023-04-12 DIAGNOSIS — G89.29 CHRONIC MIDLINE LOW BACK PAIN WITHOUT SCIATICA: ICD-10-CM

## 2023-04-12 DIAGNOSIS — M54.50 CHRONIC MIDLINE LOW BACK PAIN WITHOUT SCIATICA: ICD-10-CM

## 2023-04-12 PROCEDURE — 3288F PR FALLS RISK ASSESSMENT DOCUMENTED: ICD-10-PCS | Mod: HCNC,CPTII,S$GLB, | Performed by: NURSE PRACTITIONER

## 2023-04-12 PROCEDURE — 1101F PT FALLS ASSESS-DOCD LE1/YR: CPT | Mod: HCNC,CPTII,S$GLB, | Performed by: NURSE PRACTITIONER

## 2023-04-12 PROCEDURE — 1157F PR ADVANCE CARE PLAN OR EQUIV PRESENT IN MEDICAL RECORD: ICD-10-PCS | Mod: HCNC,CPTII,S$GLB, | Performed by: NURSE PRACTITIONER

## 2023-04-12 PROCEDURE — 1159F MED LIST DOCD IN RCRD: CPT | Mod: HCNC,CPTII,S$GLB, | Performed by: NURSE PRACTITIONER

## 2023-04-12 PROCEDURE — 1125F AMNT PAIN NOTED PAIN PRSNT: CPT | Mod: HCNC,CPTII,S$GLB, | Performed by: NURSE PRACTITIONER

## 2023-04-12 PROCEDURE — 99214 OFFICE O/P EST MOD 30 MIN: CPT | Mod: HCNC,S$GLB,, | Performed by: NURSE PRACTITIONER

## 2023-04-12 PROCEDURE — 99999 PR PBB SHADOW E&M-EST. PATIENT-LVL IV: CPT | Mod: PBBFAC,HCNC,, | Performed by: NURSE PRACTITIONER

## 2023-04-12 PROCEDURE — 3288F FALL RISK ASSESSMENT DOCD: CPT | Mod: HCNC,CPTII,S$GLB, | Performed by: NURSE PRACTITIONER

## 2023-04-12 PROCEDURE — 3079F PR MOST RECENT DIASTOLIC BLOOD PRESSURE 80-89 MM HG: ICD-10-PCS | Mod: HCNC,CPTII,S$GLB, | Performed by: NURSE PRACTITIONER

## 2023-04-12 PROCEDURE — 99999 PR PBB SHADOW E&M-EST. PATIENT-LVL IV: ICD-10-PCS | Mod: PBBFAC,HCNC,, | Performed by: NURSE PRACTITIONER

## 2023-04-12 PROCEDURE — 99214 PR OFFICE/OUTPT VISIT, EST, LEVL IV, 30-39 MIN: ICD-10-PCS | Mod: HCNC,S$GLB,, | Performed by: NURSE PRACTITIONER

## 2023-04-12 PROCEDURE — 1160F PR REVIEW ALL MEDS BY PRESCRIBER/CLIN PHARMACIST DOCUMENTED: ICD-10-PCS | Mod: HCNC,CPTII,S$GLB, | Performed by: NURSE PRACTITIONER

## 2023-04-12 PROCEDURE — 1101F PR PT FALLS ASSESS DOC 0-1 FALLS W/OUT INJ PAST YR: ICD-10-PCS | Mod: HCNC,CPTII,S$GLB, | Performed by: NURSE PRACTITIONER

## 2023-04-12 PROCEDURE — 3074F SYST BP LT 130 MM HG: CPT | Mod: HCNC,CPTII,S$GLB, | Performed by: NURSE PRACTITIONER

## 2023-04-12 PROCEDURE — 1125F PR PAIN SEVERITY QUANTIFIED, PAIN PRESENT: ICD-10-PCS | Mod: HCNC,CPTII,S$GLB, | Performed by: NURSE PRACTITIONER

## 2023-04-12 PROCEDURE — 1159F PR MEDICATION LIST DOCUMENTED IN MEDICAL RECORD: ICD-10-PCS | Mod: HCNC,CPTII,S$GLB, | Performed by: NURSE PRACTITIONER

## 2023-04-12 PROCEDURE — 3079F DIAST BP 80-89 MM HG: CPT | Mod: HCNC,CPTII,S$GLB, | Performed by: NURSE PRACTITIONER

## 2023-04-12 PROCEDURE — 1160F RVW MEDS BY RX/DR IN RCRD: CPT | Mod: HCNC,CPTII,S$GLB, | Performed by: NURSE PRACTITIONER

## 2023-04-12 PROCEDURE — 3074F PR MOST RECENT SYSTOLIC BLOOD PRESSURE < 130 MM HG: ICD-10-PCS | Mod: HCNC,CPTII,S$GLB, | Performed by: NURSE PRACTITIONER

## 2023-04-12 PROCEDURE — 1157F ADVNC CARE PLAN IN RCRD: CPT | Mod: HCNC,CPTII,S$GLB, | Performed by: NURSE PRACTITIONER

## 2023-04-12 RX ORDER — TRAMADOL HYDROCHLORIDE 50 MG/1
50 TABLET ORAL 3 TIMES DAILY PRN
Qty: 90 TABLET | Refills: 0 | Status: SHIPPED | OUTPATIENT
Start: 2023-04-12 | End: 2023-05-11 | Stop reason: SDUPTHER

## 2023-04-28 PROBLEM — F11.20 UNCOMPLICATED OPIOID DEPENDENCE: Status: ACTIVE | Noted: 2023-04-28

## 2023-04-28 NOTE — PROGRESS NOTES
"Subjective:      Patient ID: Nuha Bernstein is a 78 y.o. female.  New to me but seen previously in clinic by a fellow provider. Pt presents to clinic in wheelchair with daughter with c/o of chronic joint pain. Daughter states pt is under the care of  but she has been unable to get an appointment for her chronic pain medication, she take tramadol 50mg daily for her chronic arthralgia, today reports being out of medication for 3 days and having an exacerbation of chronic pain. Denies new trauma, injury or fall. Daughter requesting tramadol refill until pt can see     Review of Systems   Constitutional:  Positive for fatigue. Negative for activity change, appetite change, fever and unexpected weight change.   Respiratory:  Negative for chest tightness and shortness of breath.    Cardiovascular:  Negative for chest pain and palpitations.   Gastrointestinal:  Negative for abdominal pain, constipation, diarrhea, nausea and vomiting.   Genitourinary:  Negative for difficulty urinating and dysuria.   Musculoskeletal:  Positive for arthralgias, back pain, gait problem, myalgias, neck pain, neck stiffness and joint deformity. Negative for joint swelling and leg pain.   Integumentary:  Negative for color change and rash.   Neurological:  Negative for headaches.   Psychiatric/Behavioral:  Positive for agitation, behavioral problems, confusion, decreased concentration, dysphoric mood, hallucinations and sleep disturbance. Negative for self-injury and suicidal ideas. The patient is nervous/anxious. The patient is not hyperactive.         Due to pain   All other systems reviewed and are negative.      Objective:     Vitals:    04/12/23 1438   BP: 124/80   Pulse: 81   Resp: 16   Temp: 98.6 °F (37 °C)   TempSrc: Oral   SpO2: 96%   Weight: 77.8 kg (171 lb 8.3 oz)   Height: 5' 4" (1.626 m)     Physical Exam  Vitals and nursing note reviewed.   Constitutional:       General: She is not in acute distress.     " Appearance: Normal appearance. She is well-developed and well-groomed. She is not ill-appearing.   HENT:      Head: Normocephalic and atraumatic.      Right Ear: External ear normal.      Left Ear: External ear normal.      Nose: Nose normal.      Mouth/Throat:      Lips: Pink.      Mouth: Mucous membranes are moist.   Eyes:      General: Lids are normal. Vision grossly intact. Gaze aligned appropriately.      Conjunctiva/sclera: Conjunctivae normal.   Neck:      Trachea: Phonation normal.   Pulmonary:      Effort: Pulmonary effort is normal. No accessory muscle usage or respiratory distress.   Abdominal:      General: Abdomen is flat. There is no distension.   Musculoskeletal:      Cervical back: Neck supple.      Comments: Generalized limited ROM with crepitus   Skin:     General: Skin is warm and dry.      Findings: No rash.   Neurological:      General: No focal deficit present.      Mental Status: She is alert and oriented to person, place, and time. Mental status is at baseline.      Motor: Weakness and atrophy present. No tremor or abnormal muscle tone.      Gait: Gait abnormal.   Psychiatric:         Attention and Perception: Attention and perception normal.         Mood and Affect: Mood and affect normal.         Speech: Speech normal.         Behavior: Behavior normal. Behavior is cooperative.         Thought Content: Thought content normal.         Cognition and Memory: Cognition normal. Memory is impaired.         Judgment: Judgment normal.     Assessment and Plan:     1. Uncomplicated opioid dependence  Thorough chart review, [place] reviewed without discrepency, discussed with , prescription of Tramdol 50mg #30 sent to pharmacy by  and pt scheduled for office f/u for medication refill, daughter understand plan of care           KATIE Perez, FNP-C  Family/Internal Medicine  [facility]

## 2023-05-11 ENCOUNTER — OFFICE VISIT (OUTPATIENT)
Dept: FAMILY MEDICINE | Facility: CLINIC | Age: 78
End: 2023-05-11
Payer: MEDICARE

## 2023-05-11 VITALS
DIASTOLIC BLOOD PRESSURE: 74 MMHG | HEART RATE: 81 BPM | RESPIRATION RATE: 20 BRPM | WEIGHT: 170.88 LBS | TEMPERATURE: 98 F | HEIGHT: 66 IN | BODY MASS INDEX: 27.46 KG/M2 | SYSTOLIC BLOOD PRESSURE: 130 MMHG | OXYGEN SATURATION: 98 %

## 2023-05-11 DIAGNOSIS — E78.49 OTHER HYPERLIPIDEMIA: ICD-10-CM

## 2023-05-11 DIAGNOSIS — E11.22 TYPE 2 DIABETES MELLITUS WITH STAGE 3A CHRONIC KIDNEY DISEASE, WITHOUT LONG-TERM CURRENT USE OF INSULIN: Primary | ICD-10-CM

## 2023-05-11 DIAGNOSIS — M54.50 CHRONIC MIDLINE LOW BACK PAIN WITHOUT SCIATICA: ICD-10-CM

## 2023-05-11 DIAGNOSIS — N18.31 TYPE 2 DIABETES MELLITUS WITH STAGE 3A CHRONIC KIDNEY DISEASE, WITHOUT LONG-TERM CURRENT USE OF INSULIN: Primary | ICD-10-CM

## 2023-05-11 DIAGNOSIS — I10 ESSENTIAL HYPERTENSION: ICD-10-CM

## 2023-05-11 DIAGNOSIS — G89.29 CHRONIC MIDLINE LOW BACK PAIN WITHOUT SCIATICA: ICD-10-CM

## 2023-05-11 PROCEDURE — 3288F FALL RISK ASSESSMENT DOCD: CPT | Mod: HCNC,CPTII,, | Performed by: FAMILY MEDICINE

## 2023-05-11 PROCEDURE — 1126F AMNT PAIN NOTED NONE PRSNT: CPT | Mod: HCNC,CPTII,, | Performed by: FAMILY MEDICINE

## 2023-05-11 PROCEDURE — 1126F PR PAIN SEVERITY QUANTIFIED, NO PAIN PRESENT: ICD-10-PCS | Mod: HCNC,CPTII,, | Performed by: FAMILY MEDICINE

## 2023-05-11 PROCEDURE — 1160F PR REVIEW ALL MEDS BY PRESCRIBER/CLIN PHARMACIST DOCUMENTED: ICD-10-PCS | Mod: HCNC,CPTII,, | Performed by: FAMILY MEDICINE

## 2023-05-11 PROCEDURE — 3078F PR MOST RECENT DIASTOLIC BLOOD PRESSURE < 80 MM HG: ICD-10-PCS | Mod: HCNC,CPTII,, | Performed by: FAMILY MEDICINE

## 2023-05-11 PROCEDURE — 1159F MED LIST DOCD IN RCRD: CPT | Mod: HCNC,CPTII,, | Performed by: FAMILY MEDICINE

## 2023-05-11 PROCEDURE — 1157F PR ADVANCE CARE PLAN OR EQUIV PRESENT IN MEDICAL RECORD: ICD-10-PCS | Mod: HCNC,CPTII,, | Performed by: FAMILY MEDICINE

## 2023-05-11 PROCEDURE — 3288F PR FALLS RISK ASSESSMENT DOCUMENTED: ICD-10-PCS | Mod: HCNC,CPTII,, | Performed by: FAMILY MEDICINE

## 2023-05-11 PROCEDURE — 99214 PR OFFICE/OUTPT VISIT, EST, LEVL IV, 30-39 MIN: ICD-10-PCS | Mod: HCNC,S$GLB,, | Performed by: FAMILY MEDICINE

## 2023-05-11 PROCEDURE — 1101F PT FALLS ASSESS-DOCD LE1/YR: CPT | Mod: HCNC,CPTII,, | Performed by: FAMILY MEDICINE

## 2023-05-11 PROCEDURE — 1157F ADVNC CARE PLAN IN RCRD: CPT | Mod: HCNC,CPTII,, | Performed by: FAMILY MEDICINE

## 2023-05-11 PROCEDURE — 1160F RVW MEDS BY RX/DR IN RCRD: CPT | Mod: HCNC,CPTII,, | Performed by: FAMILY MEDICINE

## 2023-05-11 PROCEDURE — 3075F SYST BP GE 130 - 139MM HG: CPT | Mod: HCNC,CPTII,, | Performed by: FAMILY MEDICINE

## 2023-05-11 PROCEDURE — 1101F PR PT FALLS ASSESS DOC 0-1 FALLS W/OUT INJ PAST YR: ICD-10-PCS | Mod: HCNC,CPTII,, | Performed by: FAMILY MEDICINE

## 2023-05-11 PROCEDURE — 3075F PR MOST RECENT SYSTOLIC BLOOD PRESS GE 130-139MM HG: ICD-10-PCS | Mod: HCNC,CPTII,, | Performed by: FAMILY MEDICINE

## 2023-05-11 PROCEDURE — 3078F DIAST BP <80 MM HG: CPT | Mod: HCNC,CPTII,, | Performed by: FAMILY MEDICINE

## 2023-05-11 PROCEDURE — 99214 OFFICE O/P EST MOD 30 MIN: CPT | Mod: HCNC,PO | Performed by: FAMILY MEDICINE

## 2023-05-11 PROCEDURE — 99214 OFFICE O/P EST MOD 30 MIN: CPT | Mod: HCNC,S$GLB,, | Performed by: FAMILY MEDICINE

## 2023-05-11 PROCEDURE — 1159F PR MEDICATION LIST DOCUMENTED IN MEDICAL RECORD: ICD-10-PCS | Mod: HCNC,CPTII,, | Performed by: FAMILY MEDICINE

## 2023-05-11 RX ORDER — TRAMADOL HYDROCHLORIDE 50 MG/1
50 TABLET ORAL 3 TIMES DAILY PRN
Qty: 90 TABLET | Refills: 5 | Status: SHIPPED | OUTPATIENT
Start: 2023-05-11 | End: 2023-11-27

## 2023-05-11 NOTE — PROGRESS NOTES
Chief Complaint   Patient presents with    Medication Refill       SUBJECTIVE:  Nuha Bernstein is a 78 y.o. female presents with   Chief Complaint   Patient presents with    Medication Refill     Conservative treatment with tylenol, NSAIDs, alternative treatments, complementary non opioid nerve/epileptic medication has failed with primary use.  Now opioid dependent for relief.  Has tried PT/OT, injections with mixed success.  Diabetes is doing well  She is establishing care.    Past Medical History:   Diagnosis Date    Arthritis     Back pain     CKD stage G3a/A3, GFR 45-59 and albumin creatinine ratio >300 mg/g 6/23/2020    Colon polyp     Hyperglycemia     Hyperlipidemia     Hypertension     Nuclear sclerosis of both eyes 10/31/2017    Prolapse of uterus     Type 2 diabetes mellitus without complication, without long-term current use of insulin     Type 2 diabetes mellitus without complication, without long-term current use of insulin      Past Surgical History:   Procedure Laterality Date    COLONOSCOPY N/A 12/15/2020    Procedure: COLONOSCOPY;  Surgeon: Antonio Maya MD;  Location: [facility] ([address] [place] [address]);  Service: Endoscopy;  Laterality: N/A;  prep ins. 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     Drug use: No    Sexual activity: Not Currently     Partners: Male   Social History Narrative     4 children retired      Family History   Problem Relation Age of Onset    Hypertension Mother     Cataracts Mother     Heart disease Mother     Hypertension Father     Cataracts Father     No  "Known Problems Sister     No Known Problems Brother     No Known Problems Brother     No Known Problems Maternal Aunt     No Known Problems Maternal Uncle     No Known Problems Paternal Aunt     No Known Problems Paternal Uncle     No Known Problems Maternal Grandmother     No Known Problems Maternal Grandfather     No Known Problems Paternal Grandmother     No Known Problems Paternal Grandfather     No Known Problems Other     Amblyopia Neg Hx     Blindness Neg Hx     Cancer Neg Hx     Diabetes Neg Hx     Glaucoma Neg Hx     Macular degeneration Neg Hx     Retinal detachment Neg Hx     Strabismus Neg Hx     Stroke Neg Hx     Thyroid disease Neg Hx      Current Outpatient Medications on File Prior to Visit   Medication Sig Dispense Refill    atorvastatin (LIPITOR) 40 MG tablet Take 1 tablet (40 mg total) by mouth once daily. 90 tablet 3    co-enzyme Q-10 30 mg capsule Take 30 mg by mouth 3 (three) times daily.      lisinopriL (PRINIVIL,ZESTRIL) 40 MG tablet TAKE 1 TABLET BY MOUTH EVERY DAY 90 tablet 3    multivitamin/iron/folic acid (CENTRUM WOMEN ORAL) Take by mouth.      mv,hussein,iron,mn/folic acid/chol (HAIR-SKIN-NAILS, PABA, ORAL) Take by mouth.      OMEGA 3,6,9 COMBINATION NO.7 (OMEGA DHA ORAL) Take 830 mg by mouth once daily.      triamterene-hydrochlorothiazide 37.5-25 mg (DYAZIDE) 37.5-25 mg per capsule Take 1 capsule by mouth once daily. 90 capsule 2    LIDOcaine HCL 2% (XYLOCAINE) 2 % jelly Apply topically as needed. Apply topically once nightly to affected part of foot/feet. (Patient not taking: Reported on 4/12/2023) 30 mL 2     No current facility-administered medications on file prior to visit.     Review of patient's allergies indicates:  No Known Allergies    ROS    OBJECTIVE:  /74   Pulse 81   Temp 98.3 °F (36.8 °C) (Oral)   Resp 20   Ht 5' 6" (1.676 m)   Wt 77.5 kg (170 lb 13.7 oz)   SpO2 98%   BMI 27.58 kg/m²   Wt Readings from Last 5 Encounters:   05/11/23 77.5 kg (170 lb 13.7 oz) "   04/12/23 77.8 kg (171 lb 8.3 oz)   04/10/23 78.5 kg (173 lb)   04/03/23 78.7 kg (173 lb 6.3 oz)   01/19/23 77.1 kg (170 lb)       In usual state of health without signs of drug misuse/abuse today.  Specifically no alteration in cognition, signs of injections into the skin.  Patient areas of chronic pain in he nabeel and major joints  No new focal neurological findings noted.    Chronic aids to ambulation    Reviewed  and NARx: reviewed    Assessment/Plan:    1. Type 2 diabetes mellitus with stage 3a chronic kidney disease, without long-term current use of insulin    2. Chronic midline low back pain without sciatica    3. Other hyperlipidemia    4. Essential hypertension        Discussed chronic pain diagnosis again, reviewed pain contract and patient is still in agreement with it. Continued to stress the importance of smoking cessation/avoiding tobacco products which can exacerbate the pain.  Continued to discuss the importance of good nutrition and some type of exercise program even if it's very basic and light to help sustain function.  Continued to discuss risks, benefits and alternatives of care with high risk medication and we have decided to continue to use them.  Our goal continues to be stabilizing of function, quality of life and avoidance of medication side effects.  Patient voiced understanding.    Problem List Items Addressed This Visit       Hyperlipidemia    Current Assessment & Plan     The 10-year ASCVD risk score (Ervin MERCADO, et al., 2019) is: 43.4%    Values used to calculate the score:      Age: 78 years      Sex: Female      Is Non- : Yes      Diabetic: Yes      Tobacco smoker: No      Systolic Blood Pressure: 130 mmHg      Is BP treated: Yes      HDL Cholesterol: 69 mg/dL      Total Cholesterol: 212 mg/dL  Very high risk  Continue to monitor closely  Continue rx           Chronic low back pain    Current Assessment & Plan     The current medical regimen is effective;   continue present plan and medications.  Watch it closely  Medication is working             Relevant Medications    traMADoL (ULTRAM) 50 mg tablet    Essential hypertension    Current Assessment & Plan     The current medical regimen is effective;  continue present plan and medications.  Currently stable           Type 2 diabetes mellitus with stage 3a chronic kidney disease, without long-term current use of insulin - Primary    Current Assessment & Plan     Lab Results   Component Value Date    HGBA1C 5.9 (H) 12/16/2022    HGBA1C 5.8 (H) 09/14/2022    HGBA1C 6.3 (H) 03/11/2022     Lab Results   Component Value Date    LDLCALC 131.4 12/16/2022    CREATININE 1.6 (H) 01/19/2023   Continue with current rx         Relevant Orders    Hemoglobin A1C    Microalbumin/Creatinine Ratio, Urine    PTH, Intact    Lipid Panel    CBC Auto Differential    Comprehensive Metabolic Panel    Urinalysis       Follow up in 3 months

## 2023-05-12 ENCOUNTER — PES CALL (OUTPATIENT)
Dept: ADMINISTRATIVE | Facility: CLINIC | Age: 78
End: 2023-05-12
Payer: MEDICARE

## 2023-05-22 NOTE — ASSESSMENT & PLAN NOTE
The 10-year ASCVD risk score (Ervin MERCADO, et al., 2019) is: 43.4%    Values used to calculate the score:      Age: 78 years      Sex: Female      Is Non- : Yes      Diabetic: Yes      Tobacco smoker: No      Systolic Blood Pressure: 130 mmHg      Is BP treated: Yes      HDL Cholesterol: 69 mg/dL      Total Cholesterol: 212 mg/dL  Very high risk  Continue to monitor closely  Continue rx

## 2023-05-22 NOTE — PATIENT INSTRUCTIONS
Diabetes Management Status    Statin: Taking  ACE/ARB: Taking    Screening or Prevention Patient's value Goal Complete/Controlled?   HgA1C Testing and Control   Lab Results   Component Value Date    HGBA1C 5.9 (H) 12/16/2022      Annually/Less than 8% Yes   Lipid profile : 12/16/2022 Annually Yes   LDL control Lab Results   Component Value Date    LDLCALC 131.4 12/16/2022    Annually/Less than 100 mg/dl  No   Nephropathy screening Lab Results   Component Value Date    LABMICR 39.0 04/03/2023     Lab Results   Component Value Date    PROTEINUA Negative 05/13/2015     No results found for: UTPCR   Annually Yes   Blood pressure BP Readings from Last 1 Encounters:   05/11/23 130/74    Less than 140/90 Yes   Dilated retinal exam : 03/09/2023 Annually Yes   Foot exam   Most Recent Foot Exam Date: Not Found Annually No

## 2023-05-22 NOTE — ASSESSMENT & PLAN NOTE
Lab Results   Component Value Date    HGBA1C 5.9 (H) 12/16/2022    HGBA1C 5.8 (H) 09/14/2022    HGBA1C 6.3 (H) 03/11/2022     Lab Results   Component Value Date    LDLCALC 131.4 12/16/2022    CREATININE 1.6 (H) 01/19/2023     Continue with current rx

## 2023-05-22 NOTE — ASSESSMENT & PLAN NOTE
The current medical regimen is effective;  continue present plan and medications.  Currently stable

## 2023-05-22 NOTE — ASSESSMENT & PLAN NOTE
The current medical regimen is effective;  continue present plan and medications.  Watch it closely  Medication is working

## 2023-05-26 ENCOUNTER — OFFICE VISIT (OUTPATIENT)
Dept: FAMILY MEDICINE | Facility: CLINIC | Age: 78
End: 2023-05-26
Payer: MEDICARE

## 2023-05-26 VITALS
WEIGHT: 169.75 LBS | TEMPERATURE: 99 F | DIASTOLIC BLOOD PRESSURE: 80 MMHG | HEART RATE: 90 BPM | OXYGEN SATURATION: 96 % | BODY MASS INDEX: 28.28 KG/M2 | SYSTOLIC BLOOD PRESSURE: 130 MMHG | HEIGHT: 65 IN

## 2023-05-26 DIAGNOSIS — L08.9 SKIN INFECTION: Primary | ICD-10-CM

## 2023-05-26 PROCEDURE — 1159F MED LIST DOCD IN RCRD: CPT | Mod: HCNC,CPTII,S$GLB, | Performed by: FAMILY MEDICINE

## 2023-05-26 PROCEDURE — 3075F SYST BP GE 130 - 139MM HG: CPT | Mod: HCNC,CPTII,S$GLB, | Performed by: FAMILY MEDICINE

## 2023-05-26 PROCEDURE — 3288F PR FALLS RISK ASSESSMENT DOCUMENTED: ICD-10-PCS | Mod: HCNC,CPTII,S$GLB, | Performed by: FAMILY MEDICINE

## 2023-05-26 PROCEDURE — 1101F PT FALLS ASSESS-DOCD LE1/YR: CPT | Mod: HCNC,CPTII,S$GLB, | Performed by: FAMILY MEDICINE

## 2023-05-26 PROCEDURE — 1157F ADVNC CARE PLAN IN RCRD: CPT | Mod: HCNC,CPTII,S$GLB, | Performed by: FAMILY MEDICINE

## 2023-05-26 PROCEDURE — 3079F PR MOST RECENT DIASTOLIC BLOOD PRESSURE 80-89 MM HG: ICD-10-PCS | Mod: HCNC,CPTII,S$GLB, | Performed by: FAMILY MEDICINE

## 2023-05-26 PROCEDURE — 99214 PR OFFICE/OUTPT VISIT, EST, LEVL IV, 30-39 MIN: ICD-10-PCS | Mod: 25,HCNC,S$GLB, | Performed by: FAMILY MEDICINE

## 2023-05-26 PROCEDURE — 1101F PR PT FALLS ASSESS DOC 0-1 FALLS W/OUT INJ PAST YR: ICD-10-PCS | Mod: HCNC,CPTII,S$GLB, | Performed by: FAMILY MEDICINE

## 2023-05-26 PROCEDURE — 99214 OFFICE O/P EST MOD 30 MIN: CPT | Mod: 25,HCNC,S$GLB, | Performed by: FAMILY MEDICINE

## 2023-05-26 PROCEDURE — 1157F PR ADVANCE CARE PLAN OR EQUIV PRESENT IN MEDICAL RECORD: ICD-10-PCS | Mod: HCNC,CPTII,S$GLB, | Performed by: FAMILY MEDICINE

## 2023-05-26 PROCEDURE — 1159F PR MEDICATION LIST DOCUMENTED IN MEDICAL RECORD: ICD-10-PCS | Mod: HCNC,CPTII,S$GLB, | Performed by: FAMILY MEDICINE

## 2023-05-26 PROCEDURE — 99999 PR PBB SHADOW E&M-EST. PATIENT-LVL IV: ICD-10-PCS | Mod: PBBFAC,HCNC,, | Performed by: FAMILY MEDICINE

## 2023-05-26 PROCEDURE — 3079F DIAST BP 80-89 MM HG: CPT | Mod: HCNC,CPTII,S$GLB, | Performed by: FAMILY MEDICINE

## 2023-05-26 PROCEDURE — 10060 I&D ABSCESS SIMPLE/SINGLE: CPT | Mod: HCNC,S$GLB,, | Performed by: FAMILY MEDICINE

## 2023-05-26 PROCEDURE — 3075F PR MOST RECENT SYSTOLIC BLOOD PRESS GE 130-139MM HG: ICD-10-PCS | Mod: HCNC,CPTII,S$GLB, | Performed by: FAMILY MEDICINE

## 2023-05-26 PROCEDURE — 3288F FALL RISK ASSESSMENT DOCD: CPT | Mod: HCNC,CPTII,S$GLB, | Performed by: FAMILY MEDICINE

## 2023-05-26 PROCEDURE — 1125F PR PAIN SEVERITY QUANTIFIED, PAIN PRESENT: ICD-10-PCS | Mod: HCNC,CPTII,S$GLB, | Performed by: FAMILY MEDICINE

## 2023-05-26 PROCEDURE — 10060 INCISION & DRAINAGE: ICD-10-PCS | Mod: HCNC,S$GLB,, | Performed by: FAMILY MEDICINE

## 2023-05-26 PROCEDURE — 1125F AMNT PAIN NOTED PAIN PRSNT: CPT | Mod: HCNC,CPTII,S$GLB, | Performed by: FAMILY MEDICINE

## 2023-05-26 PROCEDURE — 99999 PR PBB SHADOW E&M-EST. PATIENT-LVL IV: CPT | Mod: PBBFAC,HCNC,, | Performed by: FAMILY MEDICINE

## 2023-05-26 RX ORDER — SULFAMETHOXAZOLE AND TRIMETHOPRIM 800; 160 MG/1; MG/1
1 TABLET ORAL 2 TIMES DAILY
Qty: 6 TABLET | Refills: 0 | Status: SHIPPED | OUTPATIENT
Start: 2023-05-26 | End: 2023-05-29

## 2023-05-26 NOTE — PROCEDURES
"Incision & Drainage    Date/Time: 5/26/2023 10:20 AM  Performed by: Pollo Lowe MD  Authorized by: Pollo Lowe MD     Time out: Immediately prior to procedure a "time out" was called to verify the correct patient, procedure, equipment, support staff and site/side marked as required.    Consent Done?:  Yes (Verbal)    Type:  Cyst  Body area:  Head/neck  Location details:  Neck  Scalpel size:  11  Incision type:  Single straight  Incision depth: subcutaneous    Complexity:  Simple  Drainage:  Pus  Drainage amount:  Scant  Wound treatment:  Incision and removal of cyst capsule  Patient tolerance:  Patient tolerated the procedure well with no immediate complications  "

## 2023-06-05 ENCOUNTER — OFFICE VISIT (OUTPATIENT)
Dept: FAMILY MEDICINE | Facility: CLINIC | Age: 78
End: 2023-06-05
Payer: MEDICARE

## 2023-06-05 VITALS
BODY MASS INDEX: 27.49 KG/M2 | RESPIRATION RATE: 16 BRPM | TEMPERATURE: 98 F | OXYGEN SATURATION: 97 % | HEART RATE: 77 BPM | WEIGHT: 171.06 LBS | DIASTOLIC BLOOD PRESSURE: 80 MMHG | SYSTOLIC BLOOD PRESSURE: 120 MMHG | HEIGHT: 66 IN

## 2023-06-05 DIAGNOSIS — M25.512 CHRONIC PAIN OF BOTH SHOULDERS: Primary | ICD-10-CM

## 2023-06-05 DIAGNOSIS — M25.511 CHRONIC PAIN OF BOTH SHOULDERS: Primary | ICD-10-CM

## 2023-06-05 DIAGNOSIS — G89.29 CHRONIC PAIN OF BOTH SHOULDERS: Primary | ICD-10-CM

## 2023-06-05 PROCEDURE — 3079F PR MOST RECENT DIASTOLIC BLOOD PRESSURE 80-89 MM HG: ICD-10-PCS | Mod: HCNC,CPTII,S$GLB, | Performed by: NURSE PRACTITIONER

## 2023-06-05 PROCEDURE — 1101F PT FALLS ASSESS-DOCD LE1/YR: CPT | Mod: HCNC,CPTII,S$GLB, | Performed by: NURSE PRACTITIONER

## 2023-06-05 PROCEDURE — 1160F RVW MEDS BY RX/DR IN RCRD: CPT | Mod: HCNC,CPTII,S$GLB, | Performed by: NURSE PRACTITIONER

## 2023-06-05 PROCEDURE — 1159F PR MEDICATION LIST DOCUMENTED IN MEDICAL RECORD: ICD-10-PCS | Mod: HCNC,CPTII,S$GLB, | Performed by: NURSE PRACTITIONER

## 2023-06-05 PROCEDURE — 3079F DIAST BP 80-89 MM HG: CPT | Mod: HCNC,CPTII,S$GLB, | Performed by: NURSE PRACTITIONER

## 2023-06-05 PROCEDURE — 3074F PR MOST RECENT SYSTOLIC BLOOD PRESSURE < 130 MM HG: ICD-10-PCS | Mod: HCNC,CPTII,S$GLB, | Performed by: NURSE PRACTITIONER

## 2023-06-05 PROCEDURE — 99999 PR PBB SHADOW E&M-EST. PATIENT-LVL IV: CPT | Mod: PBBFAC,HCNC,, | Performed by: NURSE PRACTITIONER

## 2023-06-05 PROCEDURE — 1125F PR PAIN SEVERITY QUANTIFIED, PAIN PRESENT: ICD-10-PCS | Mod: HCNC,CPTII,S$GLB, | Performed by: NURSE PRACTITIONER

## 2023-06-05 PROCEDURE — 1125F AMNT PAIN NOTED PAIN PRSNT: CPT | Mod: HCNC,CPTII,S$GLB, | Performed by: NURSE PRACTITIONER

## 2023-06-05 PROCEDURE — 1159F MED LIST DOCD IN RCRD: CPT | Mod: HCNC,CPTII,S$GLB, | Performed by: NURSE PRACTITIONER

## 2023-06-05 PROCEDURE — 3288F PR FALLS RISK ASSESSMENT DOCUMENTED: ICD-10-PCS | Mod: HCNC,CPTII,S$GLB, | Performed by: NURSE PRACTITIONER

## 2023-06-05 PROCEDURE — 1157F PR ADVANCE CARE PLAN OR EQUIV PRESENT IN MEDICAL RECORD: ICD-10-PCS | Mod: HCNC,CPTII,S$GLB, | Performed by: NURSE PRACTITIONER

## 2023-06-05 PROCEDURE — 1101F PR PT FALLS ASSESS DOC 0-1 FALLS W/OUT INJ PAST YR: ICD-10-PCS | Mod: HCNC,CPTII,S$GLB, | Performed by: NURSE PRACTITIONER

## 2023-06-05 PROCEDURE — 3074F SYST BP LT 130 MM HG: CPT | Mod: HCNC,CPTII,S$GLB, | Performed by: NURSE PRACTITIONER

## 2023-06-05 PROCEDURE — 1160F PR REVIEW ALL MEDS BY PRESCRIBER/CLIN PHARMACIST DOCUMENTED: ICD-10-PCS | Mod: HCNC,CPTII,S$GLB, | Performed by: NURSE PRACTITIONER

## 2023-06-05 PROCEDURE — 1157F ADVNC CARE PLAN IN RCRD: CPT | Mod: HCNC,CPTII,S$GLB, | Performed by: NURSE PRACTITIONER

## 2023-06-05 PROCEDURE — 3288F FALL RISK ASSESSMENT DOCD: CPT | Mod: HCNC,CPTII,S$GLB, | Performed by: NURSE PRACTITIONER

## 2023-06-05 PROCEDURE — 99214 PR OFFICE/OUTPT VISIT, EST, LEVL IV, 30-39 MIN: ICD-10-PCS | Mod: HCNC,24,S$GLB, | Performed by: NURSE PRACTITIONER

## 2023-06-05 PROCEDURE — 99999 PR PBB SHADOW E&M-EST. PATIENT-LVL IV: ICD-10-PCS | Mod: PBBFAC,HCNC,, | Performed by: NURSE PRACTITIONER

## 2023-06-05 PROCEDURE — 99214 OFFICE O/P EST MOD 30 MIN: CPT | Mod: HCNC,24,S$GLB, | Performed by: NURSE PRACTITIONER

## 2023-06-05 RX ORDER — LIDOCAINE 50 MG/G
3 PATCH TOPICAL DAILY
Qty: 90 PATCH | Refills: 2 | Status: SHIPPED | OUTPATIENT
Start: 2023-06-05 | End: 2023-06-22 | Stop reason: ALTCHOICE

## 2023-06-05 RX ORDER — DICLOFENAC SODIUM 10 MG/G
2 GEL TOPICAL DAILY
Qty: 450 G | Refills: 2 | Status: SHIPPED | OUTPATIENT
Start: 2023-06-05 | End: 2023-09-21 | Stop reason: SDUPTHER

## 2023-06-08 NOTE — PROGRESS NOTES
"Subjective:      Patient ID: Nuha Bernstein is a 78 y.o. female.  Pt with hx of chronic arthritis pain presents to clinic with flare of shoulder pain. Worse with direct pressure while side sleeping. Denies new trauma, injury or fall. Continues to take tramadol daily . Is exercising at home, does home walking program, ~1 mile per day.     Review of Systems   Constitutional:  Negative for activity change, appetite change, fever and unexpected weight change.   Respiratory:  Negative for chest tightness and shortness of breath.    Cardiovascular:  Negative for chest pain and palpitations.   Gastrointestinal:  Negative for abdominal pain, constipation, diarrhea, nausea and vomiting.   Genitourinary:  Negative for difficulty urinating, dysuria and menstrual problem.   Musculoskeletal:  Positive for arthralgias, back pain, joint swelling, leg pain, myalgias, neck pain and neck stiffness. Negative for gait problem and joint deformity.   Integumentary:  Negative for color change and rash.   Neurological:  Negative for headaches.   All other systems reviewed and are negative.      Objective:     Vitals:    06/05/23 1454   BP: 120/80   Pulse: 77   Resp: 16   Temp: 98.1 °F (36.7 °C)   TempSrc: Oral   SpO2: 97%   Weight: 77.6 kg (171 lb 1.2 oz)   Height: 5' 6" (1.676 m)     Physical Exam  Vitals and nursing note reviewed.   Constitutional:       General: She is not in acute distress.     Appearance: Normal appearance. She is well-developed and well-groomed. She is not ill-appearing.   HENT:      Head: Normocephalic and atraumatic.      Right Ear: External ear normal.      Left Ear: External ear normal.      Nose: Nose normal.      Mouth/Throat:      Lips: Pink.      Mouth: Mucous membranes are moist.   Eyes:      General: Lids are normal. Vision grossly intact. Gaze aligned appropriately.      Conjunctiva/sclera: Conjunctivae normal.   Neck:      Trachea: Phonation normal.   Pulmonary:      Effort: Pulmonary effort is normal. " No accessory muscle usage or respiratory distress.   Abdominal:      General: Abdomen is flat. There is no distension.   Musculoskeletal:      Right shoulder: Tenderness and crepitus present. No swelling, deformity, effusion, laceration or bony tenderness. Decreased range of motion. Decreased strength. Normal pulse.      Left shoulder: Tenderness and crepitus present. No swelling, deformity, effusion, laceration or bony tenderness. Decreased range of motion. Decreased strength. Normal pulse.      Cervical back: Neck supple. Spasms, tenderness and crepitus present. No swelling, edema, deformity, erythema, signs of trauma, lacerations, rigidity, torticollis or bony tenderness. No pain with movement. Decreased range of motion.   Skin:     General: Skin is warm and dry.      Findings: No rash.   Neurological:      General: No focal deficit present.      Mental Status: She is alert and oriented to person, place, and time. Mental status is at baseline.      Motor: Weakness and atrophy present. No tremor or abnormal muscle tone.      Gait: Gait abnormal (antalgic).   Psychiatric:         Attention and Perception: Attention and perception normal.         Mood and Affect: Mood and affect normal.         Speech: Speech normal.         Behavior: Behavior normal. Behavior is cooperative.         Thought Content: Thought content normal.         Cognition and Memory: Cognition and memory normal.         Judgment: Judgment normal.     Assessment and Plan:     1. Chronic pain of both shoulders  Natural history and expected course discussed. Questions answered.  Home exercises discussed.  NSAIDs per medication orders.  OTC analgesics as needed.  Continue current pain regimen   - diclofenac sodium (VOLTAREN) 1 % Gel; Apply 2 g topically once daily.  Dispense: 450 g; Refill: 2  - LIDOcaine (LIDODERM) 5 %; Place 3 patches onto the skin once daily.  Dispense: 90 patch; Refill: 2           KATIE Perez, FNP-C  Family/Internal  [facility]  [facility]

## 2023-06-22 ENCOUNTER — OFFICE VISIT (OUTPATIENT)
Dept: FAMILY MEDICINE | Facility: CLINIC | Age: 78
End: 2023-06-22
Payer: MEDICARE

## 2023-06-22 ENCOUNTER — LAB VISIT (OUTPATIENT)
Dept: LAB | Facility: HOSPITAL | Age: 78
End: 2023-06-22
Attending: FAMILY MEDICINE
Payer: MEDICARE

## 2023-06-22 ENCOUNTER — TELEPHONE (OUTPATIENT)
Dept: ADMINISTRATIVE | Facility: CLINIC | Age: 78
End: 2023-06-22
Payer: MEDICARE

## 2023-06-22 VITALS
TEMPERATURE: 98 F | DIASTOLIC BLOOD PRESSURE: 80 MMHG | BODY MASS INDEX: 27.39 KG/M2 | OXYGEN SATURATION: 96 % | HEART RATE: 91 BPM | SYSTOLIC BLOOD PRESSURE: 126 MMHG | WEIGHT: 170.44 LBS | HEIGHT: 66 IN

## 2023-06-22 DIAGNOSIS — N18.31 TYPE 2 DIABETES MELLITUS WITH STAGE 3A CHRONIC KIDNEY DISEASE, WITHOUT LONG-TERM CURRENT USE OF INSULIN: ICD-10-CM

## 2023-06-22 DIAGNOSIS — M16.0 OSTEOARTHRITIS OF BOTH HIPS, UNSPECIFIED OSTEOARTHRITIS TYPE: ICD-10-CM

## 2023-06-22 DIAGNOSIS — I10 ESSENTIAL HYPERTENSION: ICD-10-CM

## 2023-06-22 DIAGNOSIS — E11.22 TYPE 2 DIABETES MELLITUS WITH STAGE 3A CHRONIC KIDNEY DISEASE, WITHOUT LONG-TERM CURRENT USE OF INSULIN: ICD-10-CM

## 2023-06-22 DIAGNOSIS — Z00.00 ANNUAL PHYSICAL EXAM: Primary | ICD-10-CM

## 2023-06-22 LAB
ALBUMIN SERPL BCP-MCNC: 3.9 G/DL (ref 3.5–5.2)
ALP SERPL-CCNC: 78 U/L (ref 55–135)
ALT SERPL W/O P-5'-P-CCNC: 31 U/L (ref 10–44)
ANION GAP SERPL CALC-SCNC: 11 MMOL/L (ref 8–16)
AST SERPL-CCNC: 40 U/L (ref 10–40)
BASOPHILS # BLD AUTO: 0.03 K/UL (ref 0–0.2)
BASOPHILS NFR BLD: 0.5 % (ref 0–1.9)
BILIRUB SERPL-MCNC: 0.4 MG/DL (ref 0.1–1)
BILIRUB UR QL STRIP: NEGATIVE
BUN SERPL-MCNC: 39 MG/DL (ref 8–23)
CALCIUM SERPL-MCNC: 10 MG/DL (ref 8.7–10.5)
CHLORIDE SERPL-SCNC: 105 MMOL/L (ref 95–110)
CHOLEST SERPL-MCNC: 197 MG/DL (ref 120–199)
CHOLEST/HDLC SERPL: 3 {RATIO} (ref 2–5)
CLARITY UR: CLEAR
CO2 SERPL-SCNC: 26 MMOL/L (ref 23–29)
COLOR UR: YELLOW
CREAT SERPL-MCNC: 1.3 MG/DL (ref 0.5–1.4)
DIFFERENTIAL METHOD: ABNORMAL
EOSINOPHIL # BLD AUTO: 0.2 K/UL (ref 0–0.5)
EOSINOPHIL NFR BLD: 3.4 % (ref 0–8)
ERYTHROCYTE [DISTWIDTH] IN BLOOD BY AUTOMATED COUNT: 13.9 % (ref 11.5–14.5)
EST. GFR  (NO RACE VARIABLE): 42 ML/MIN/1.73 M^2
GLUCOSE SERPL-MCNC: 96 MG/DL (ref 70–110)
GLUCOSE UR QL STRIP: NEGATIVE
HCT VFR BLD AUTO: 36.7 % (ref 37–48.5)
HDLC SERPL-MCNC: 66 MG/DL (ref 40–75)
HDLC SERPL: 33.5 % (ref 20–50)
HGB BLD-MCNC: 12 G/DL (ref 12–16)
HGB UR QL STRIP: NEGATIVE
IMM GRANULOCYTES # BLD AUTO: 0.01 K/UL (ref 0–0.04)
IMM GRANULOCYTES NFR BLD AUTO: 0.2 % (ref 0–0.5)
KETONES UR QL STRIP: NEGATIVE
LDLC SERPL CALC-MCNC: 111.8 MG/DL (ref 63–159)
LEUKOCYTE ESTERASE UR QL STRIP: NEGATIVE
LYMPHOCYTES # BLD AUTO: 1.7 K/UL (ref 1–4.8)
LYMPHOCYTES NFR BLD: 28.1 % (ref 18–48)
MCH RBC QN AUTO: 28.9 PG (ref 27–31)
MCHC RBC AUTO-ENTMCNC: 32.7 G/DL (ref 32–36)
MCV RBC AUTO: 88 FL (ref 82–98)
MONOCYTES # BLD AUTO: 0.6 K/UL (ref 0.3–1)
MONOCYTES NFR BLD: 9.9 % (ref 4–15)
NEUTROPHILS # BLD AUTO: 3.6 K/UL (ref 1.8–7.7)
NEUTROPHILS NFR BLD: 57.9 % (ref 38–73)
NITRITE UR QL STRIP: NEGATIVE
NONHDLC SERPL-MCNC: 131 MG/DL
NRBC BLD-RTO: 0 /100 WBC
PH UR STRIP: 7 [PH] (ref 5–8)
PLATELET # BLD AUTO: 258 K/UL (ref 150–450)
PMV BLD AUTO: 11.1 FL (ref 9.2–12.9)
POTASSIUM SERPL-SCNC: 4.2 MMOL/L (ref 3.5–5.1)
PROT SERPL-MCNC: 7.6 G/DL (ref 6–8.4)
PROT UR QL STRIP: NEGATIVE
PTH-INTACT SERPL-MCNC: 100.7 PG/ML (ref 9–77)
RBC # BLD AUTO: 4.15 M/UL (ref 4–5.4)
SODIUM SERPL-SCNC: 142 MMOL/L (ref 136–145)
SP GR UR STRIP: 1.01 (ref 1–1.03)
TRIGL SERPL-MCNC: 96 MG/DL (ref 30–150)
URN SPEC COLLECT METH UR: NORMAL
UROBILINOGEN UR STRIP-ACNC: NEGATIVE EU/DL
WBC # BLD AUTO: 6.19 K/UL (ref 3.9–12.7)

## 2023-06-22 PROCEDURE — 99397 PER PM REEVAL EST PAT 65+ YR: CPT | Mod: HCNC,S$GLB,, | Performed by: FAMILY MEDICINE

## 2023-06-22 PROCEDURE — 3079F PR MOST RECENT DIASTOLIC BLOOD PRESSURE 80-89 MM HG: ICD-10-PCS | Mod: HCNC,CPTII,S$GLB, | Performed by: FAMILY MEDICINE

## 2023-06-22 PROCEDURE — 1101F PT FALLS ASSESS-DOCD LE1/YR: CPT | Mod: HCNC,CPTII,S$GLB, | Performed by: FAMILY MEDICINE

## 2023-06-22 PROCEDURE — 3074F SYST BP LT 130 MM HG: CPT | Mod: HCNC,CPTII,S$GLB, | Performed by: FAMILY MEDICINE

## 2023-06-22 PROCEDURE — 81003 URINALYSIS AUTO W/O SCOPE: CPT | Mod: HCNC | Performed by: FAMILY MEDICINE

## 2023-06-22 PROCEDURE — 85025 COMPLETE CBC W/AUTO DIFF WBC: CPT | Mod: HCNC | Performed by: FAMILY MEDICINE

## 2023-06-22 PROCEDURE — 1101F PR PT FALLS ASSESS DOC 0-1 FALLS W/OUT INJ PAST YR: ICD-10-PCS | Mod: HCNC,CPTII,S$GLB, | Performed by: FAMILY MEDICINE

## 2023-06-22 PROCEDURE — 80053 COMPREHEN METABOLIC PANEL: CPT | Mod: HCNC | Performed by: FAMILY MEDICINE

## 2023-06-22 PROCEDURE — 99999 PR PBB SHADOW E&M-EST. PATIENT-LVL IV: ICD-10-PCS | Mod: PBBFAC,HCNC,, | Performed by: FAMILY MEDICINE

## 2023-06-22 PROCEDURE — 1126F PR PAIN SEVERITY QUANTIFIED, NO PAIN PRESENT: ICD-10-PCS | Mod: HCNC,CPTII,S$GLB, | Performed by: FAMILY MEDICINE

## 2023-06-22 PROCEDURE — 83036 HEMOGLOBIN GLYCOSYLATED A1C: CPT | Mod: HCNC | Performed by: FAMILY MEDICINE

## 2023-06-22 PROCEDURE — 82570 ASSAY OF URINE CREATININE: CPT | Mod: HCNC | Performed by: FAMILY MEDICINE

## 2023-06-22 PROCEDURE — 1157F PR ADVANCE CARE PLAN OR EQUIV PRESENT IN MEDICAL RECORD: ICD-10-PCS | Mod: HCNC,CPTII,S$GLB, | Performed by: FAMILY MEDICINE

## 2023-06-22 PROCEDURE — 3288F FALL RISK ASSESSMENT DOCD: CPT | Mod: HCNC,CPTII,S$GLB, | Performed by: FAMILY MEDICINE

## 2023-06-22 PROCEDURE — 1157F ADVNC CARE PLAN IN RCRD: CPT | Mod: HCNC,CPTII,S$GLB, | Performed by: FAMILY MEDICINE

## 2023-06-22 PROCEDURE — 3074F PR MOST RECENT SYSTOLIC BLOOD PRESSURE < 130 MM HG: ICD-10-PCS | Mod: HCNC,CPTII,S$GLB, | Performed by: FAMILY MEDICINE

## 2023-06-22 PROCEDURE — 3079F DIAST BP 80-89 MM HG: CPT | Mod: HCNC,CPTII,S$GLB, | Performed by: FAMILY MEDICINE

## 2023-06-22 PROCEDURE — 99397 PR PREVENTIVE VISIT,EST,65 & OVER: ICD-10-PCS | Mod: HCNC,S$GLB,, | Performed by: FAMILY MEDICINE

## 2023-06-22 PROCEDURE — 3288F PR FALLS RISK ASSESSMENT DOCUMENTED: ICD-10-PCS | Mod: HCNC,CPTII,S$GLB, | Performed by: FAMILY MEDICINE

## 2023-06-22 PROCEDURE — 1126F AMNT PAIN NOTED NONE PRSNT: CPT | Mod: HCNC,CPTII,S$GLB, | Performed by: FAMILY MEDICINE

## 2023-06-22 PROCEDURE — 36415 COLL VENOUS BLD VENIPUNCTURE: CPT | Mod: HCNC,PO | Performed by: FAMILY MEDICINE

## 2023-06-22 PROCEDURE — 83970 ASSAY OF PARATHORMONE: CPT | Mod: HCNC | Performed by: FAMILY MEDICINE

## 2023-06-22 PROCEDURE — 80061 LIPID PANEL: CPT | Mod: HCNC | Performed by: FAMILY MEDICINE

## 2023-06-22 PROCEDURE — 99999 PR PBB SHADOW E&M-EST. PATIENT-LVL IV: CPT | Mod: PBBFAC,HCNC,, | Performed by: FAMILY MEDICINE

## 2023-06-22 RX ORDER — DICLOFENAC EPOLAMINE 0.01 G/1
SYSTEM TOPICAL
Qty: 30 PATCH | Refills: 5 | Status: SHIPPED | OUTPATIENT
Start: 2023-06-22 | End: 2023-09-21 | Stop reason: ALTCHOICE

## 2023-06-22 RX ORDER — LISINOPRIL 40 MG/1
40 TABLET ORAL DAILY
Qty: 90 TABLET | Refills: 3 | Status: SHIPPED | OUTPATIENT
Start: 2023-06-22

## 2023-06-23 ENCOUNTER — OFFICE VISIT (OUTPATIENT)
Dept: FAMILY MEDICINE | Facility: CLINIC | Age: 78
End: 2023-06-23
Payer: MEDICARE

## 2023-06-23 VITALS
SYSTOLIC BLOOD PRESSURE: 124 MMHG | OXYGEN SATURATION: 96 % | DIASTOLIC BLOOD PRESSURE: 80 MMHG | HEART RATE: 72 BPM | TEMPERATURE: 98 F | BODY MASS INDEX: 27.35 KG/M2 | HEIGHT: 66 IN | WEIGHT: 170.19 LBS

## 2023-06-23 DIAGNOSIS — E78.5 HYPERLIPIDEMIA, UNSPECIFIED HYPERLIPIDEMIA TYPE: ICD-10-CM

## 2023-06-23 DIAGNOSIS — E21.3 HYPERPARATHYROIDISM: ICD-10-CM

## 2023-06-23 DIAGNOSIS — N18.32 TYPE 2 DIABETES MELLITUS WITH STAGE 3B CHRONIC KIDNEY DISEASE, WITHOUT LONG-TERM CURRENT USE OF INSULIN: ICD-10-CM

## 2023-06-23 DIAGNOSIS — F11.20 UNCOMPLICATED OPIOID DEPENDENCE: ICD-10-CM

## 2023-06-23 DIAGNOSIS — Z00.00 ENCOUNTER FOR PREVENTIVE HEALTH EXAMINATION: Primary | ICD-10-CM

## 2023-06-23 DIAGNOSIS — M16.0 OSTEOARTHRITIS OF BOTH HIPS, UNSPECIFIED OSTEOARTHRITIS TYPE: ICD-10-CM

## 2023-06-23 DIAGNOSIS — J84.10 CALCIFIED GRANULOMA OF LUNG: ICD-10-CM

## 2023-06-23 DIAGNOSIS — I10 ESSENTIAL HYPERTENSION: ICD-10-CM

## 2023-06-23 DIAGNOSIS — E11.22 TYPE 2 DIABETES MELLITUS WITH STAGE 3B CHRONIC KIDNEY DISEASE, WITHOUT LONG-TERM CURRENT USE OF INSULIN: ICD-10-CM

## 2023-06-23 DIAGNOSIS — N18.32 STAGE 3B CHRONIC KIDNEY DISEASE: ICD-10-CM

## 2023-06-23 LAB
ALBUMIN/CREAT UR: 30.6 UG/MG (ref 0–30)
CREAT UR-MCNC: 49 MG/DL (ref 15–325)
ESTIMATED AVG GLUCOSE: 123 MG/DL (ref 68–131)
HBA1C MFR BLD: 5.9 % (ref 4–5.6)
MICROALBUMIN UR DL<=1MG/L-MCNC: 15 UG/ML

## 2023-06-23 PROCEDURE — 1159F PR MEDICATION LIST DOCUMENTED IN MEDICAL RECORD: ICD-10-PCS | Mod: HCNC,CPTII,S$GLB, | Performed by: NURSE PRACTITIONER

## 2023-06-23 PROCEDURE — G0439 PPPS, SUBSEQ VISIT: HCPCS | Mod: HCNC,S$GLB,, | Performed by: NURSE PRACTITIONER

## 2023-06-23 PROCEDURE — 1170F PR FUNCTIONAL STATUS ASSESSED: ICD-10-PCS | Mod: HCNC,CPTII,S$GLB, | Performed by: NURSE PRACTITIONER

## 2023-06-23 PROCEDURE — 3074F PR MOST RECENT SYSTOLIC BLOOD PRESSURE < 130 MM HG: ICD-10-PCS | Mod: HCNC,CPTII,S$GLB, | Performed by: NURSE PRACTITIONER

## 2023-06-23 PROCEDURE — 3288F PR FALLS RISK ASSESSMENT DOCUMENTED: ICD-10-PCS | Mod: HCNC,CPTII,S$GLB, | Performed by: NURSE PRACTITIONER

## 2023-06-23 PROCEDURE — 1160F PR REVIEW ALL MEDS BY PRESCRIBER/CLIN PHARMACIST DOCUMENTED: ICD-10-PCS | Mod: HCNC,CPTII,S$GLB, | Performed by: NURSE PRACTITIONER

## 2023-06-23 PROCEDURE — G0439 PR MEDICARE ANNUAL WELLNESS SUBSEQUENT VISIT: ICD-10-PCS | Mod: HCNC,S$GLB,, | Performed by: NURSE PRACTITIONER

## 2023-06-23 PROCEDURE — 99999 PR PBB SHADOW E&M-EST. PATIENT-LVL V: ICD-10-PCS | Mod: PBBFAC,HCNC,, | Performed by: NURSE PRACTITIONER

## 2023-06-23 PROCEDURE — 3079F DIAST BP 80-89 MM HG: CPT | Mod: HCNC,CPTII,S$GLB, | Performed by: NURSE PRACTITIONER

## 2023-06-23 PROCEDURE — 1101F PT FALLS ASSESS-DOCD LE1/YR: CPT | Mod: HCNC,CPTII,S$GLB, | Performed by: NURSE PRACTITIONER

## 2023-06-23 PROCEDURE — 3079F PR MOST RECENT DIASTOLIC BLOOD PRESSURE 80-89 MM HG: ICD-10-PCS | Mod: HCNC,CPTII,S$GLB, | Performed by: NURSE PRACTITIONER

## 2023-06-23 PROCEDURE — 1157F ADVNC CARE PLAN IN RCRD: CPT | Mod: HCNC,CPTII,S$GLB, | Performed by: NURSE PRACTITIONER

## 2023-06-23 PROCEDURE — 1159F MED LIST DOCD IN RCRD: CPT | Mod: HCNC,CPTII,S$GLB, | Performed by: NURSE PRACTITIONER

## 2023-06-23 PROCEDURE — 99999 PR PBB SHADOW E&M-EST. PATIENT-LVL V: CPT | Mod: PBBFAC,HCNC,, | Performed by: NURSE PRACTITIONER

## 2023-06-23 PROCEDURE — 1160F RVW MEDS BY RX/DR IN RCRD: CPT | Mod: HCNC,CPTII,S$GLB, | Performed by: NURSE PRACTITIONER

## 2023-06-23 PROCEDURE — 1101F PR PT FALLS ASSESS DOC 0-1 FALLS W/OUT INJ PAST YR: ICD-10-PCS | Mod: HCNC,CPTII,S$GLB, | Performed by: NURSE PRACTITIONER

## 2023-06-23 PROCEDURE — 3074F SYST BP LT 130 MM HG: CPT | Mod: HCNC,CPTII,S$GLB, | Performed by: NURSE PRACTITIONER

## 2023-06-23 PROCEDURE — 1157F PR ADVANCE CARE PLAN OR EQUIV PRESENT IN MEDICAL RECORD: ICD-10-PCS | Mod: HCNC,CPTII,S$GLB, | Performed by: NURSE PRACTITIONER

## 2023-06-23 PROCEDURE — 1170F FXNL STATUS ASSESSED: CPT | Mod: HCNC,CPTII,S$GLB, | Performed by: NURSE PRACTITIONER

## 2023-06-23 PROCEDURE — 3288F FALL RISK ASSESSMENT DOCD: CPT | Mod: HCNC,CPTII,S$GLB, | Performed by: NURSE PRACTITIONER

## 2023-06-23 NOTE — PROGRESS NOTES
"  Nuha Bernstein presented for a  Medicare AWV and comprehensive Health Risk Assessment today. The following components were reviewed and updated:    Medical history  Family History  Social history  Allergies and Current Medications  Health Risk Assessment  Health Maintenance  Care Team       ** See Completed Assessments for Annual Wellness Visit within the encounter summary.**       The following assessments were completed:  Living Situation  CAGE  Depression Screening  Timed Get Up and Go  Whisper Test  Cognitive Function Screening  Nutrition Screening  ADL Screening  PAQ Screening          Vitals:    06/23/23 1258   BP: 124/80   BP Location: Left arm   Patient Position: Sitting   BP Method: Large (Manual)   Pulse: 72   Temp: 97.7 °F (36.5 °C)   TempSrc: Oral   SpO2: 96%   Weight: 77.2 kg (170 lb 3.1 oz)   Height: 5' 6" (1.676 m)     Body mass index is 27.47 kg/m².  Physical Exam  Vitals and nursing note reviewed.   Constitutional:       Appearance: Normal appearance.   Cardiovascular:      Rate and Rhythm: Normal rate.      Pulses: Normal pulses.      Heart sounds: Normal heart sounds.   Pulmonary:      Effort: Pulmonary effort is normal.      Breath sounds: Normal breath sounds.   Musculoskeletal:         General: Normal range of motion.   Neurological:      Mental Status: She is alert and oriented to person, place, and time.   Psychiatric:         Mood and Affect: Mood normal.         Behavior: Behavior normal.           Diagnoses and health risks identified today and associated recommendations/orders:    1. Encounter for preventive health examination  Pt was seen today for an Annual Wellness visit. Healthcare maintenance and screening recommendations were discussed and updated as indicated. Return in one year for AWV.    Review current opioid prescriptions:yes  Screen for potential Substance Use Disorders:yes  Patient is aware of non-narcotic pain options  Followed by prescribing provider    2. Type 2 diabetes " mellitus with stage 3b chronic kidney disease, without long-term current use of insulin  The current medical regimen is effective;  continue present plan and medications.  Hemoglobin A1C   Date Value Ref Range Status   06/22/2023 5.9 (H) 4.0 - 5.6 % Final             12/16/2022 5.9 (H) 4.0 - 5.6 % Final             09/14/2022 5.8 (H) 4.0 - 5.6 % Final               3. Stage 3b chronic kidney disease  The current medical regimen is effective;  continue present plan and medications.    4. Calcified granuloma of lung  The current medical regimen is effective;  continue present plan and medications.    5. Uncomplicated opioid dependence  The current medical regimen is effective;  continue present plan and medications.    6. Essential hypertension  The current medical regimen is effective;  continue present plan and medications.    7. Hyperlipidemia, unspecified hyperlipidemia type  The current medical regimen is effective;  continue present plan and medications.    8. Osteoarthritis of both hips, unspecified osteoarthritis type  The current medical regimen is effective;  continue present plan and medications.    9. Hyperparathyroidism  The current medical regimen is effective;  continue present plan and medications.      Provided Nuha with a 5-10 year written screening schedule and personal prevention plan. Recommendations were developed using the USPSTF age appropriate recommendations. Education, counseling, and referrals were provided as needed. After Visit Summary printed and given to patient which includes a list of additional screenings\tests needed.    Follow up in about 1 year (around 6/23/2024).    KRISTIE Gonzalez  I offered to discuss advanced care planning, including how to pick a person who would make decisions for you if you were unable to make them for yourself, called a health care power of , and what kind of decisions you might make such as use of life sustaining treatments such as  ventilators and tube feeding when faced with a life limiting illness recorded on a living will that they will need to know. (How you want to be cared for as you near the end of your natural life)     X Patient is interested in learning more about how to make advanced directives.  I provided them paperwork and offered to discuss this with them.

## 2023-06-23 NOTE — PATIENT INSTRUCTIONS
Counseling and Referral of Other Preventative  (Italic type indicates deductible and co-insurance are waived)    Patient Name: Nuha Bernstein  Today's Date: 6/23/2023    Health Maintenance       Date Due Completion Date    TETANUS VACCINE 04/27/2023 4/27/2013    Hemoglobin A1c 12/22/2023 6/22/2023    Eye Exam 03/09/2024 3/9/2023    Diabetes Urine Screening 06/22/2024 6/22/2023    Lipid Panel 06/22/2024 6/22/2023    DEXA Scan 10/07/2024 10/7/2022    Colonoscopy 12/15/2025 12/15/2020        No orders of the defined types were placed in this encounter.    The following information is provided to all patients.  This information is to help you find resources for any of the problems found today that may be affecting your health:                Living healthy guide: www.[facility].[place].[facility]      Understanding Diabetes: www.diabetes.org      Eating healthy: www.cdc.gov/healthyweight      CDC home safety checklist: www.cdc.gov/steadi/patient.html      Agency on Aging: www.goea.[place].[facility]      Alcoholics anonymous (AA): www.aa.org      Physical Activity: www.[place].nih.gov/ep9ihts      Tobacco use: www.quitwithusla.org

## 2023-09-21 ENCOUNTER — OFFICE VISIT (OUTPATIENT)
Dept: FAMILY MEDICINE | Facility: CLINIC | Age: 78
End: 2023-09-21
Payer: MEDICARE

## 2023-09-21 VITALS
WEIGHT: 169.75 LBS | HEIGHT: 65 IN | TEMPERATURE: 98 F | OXYGEN SATURATION: 98 % | HEART RATE: 68 BPM | BODY MASS INDEX: 28.28 KG/M2 | SYSTOLIC BLOOD PRESSURE: 124 MMHG | DIASTOLIC BLOOD PRESSURE: 78 MMHG

## 2023-09-21 DIAGNOSIS — M19.019 AC JOINT ARTHROPATHY: Primary | ICD-10-CM

## 2023-09-21 DIAGNOSIS — M25.511 CHRONIC PAIN OF BOTH SHOULDERS: ICD-10-CM

## 2023-09-21 DIAGNOSIS — G89.29 CHRONIC PAIN OF BOTH SHOULDERS: ICD-10-CM

## 2023-09-21 DIAGNOSIS — M25.512 CHRONIC PAIN OF BOTH SHOULDERS: ICD-10-CM

## 2023-09-21 PROCEDURE — 1101F PT FALLS ASSESS-DOCD LE1/YR: CPT | Mod: HCNC,CPTII,S$GLB, | Performed by: FAMILY MEDICINE

## 2023-09-21 PROCEDURE — 1159F MED LIST DOCD IN RCRD: CPT | Mod: HCNC,CPTII,S$GLB, | Performed by: FAMILY MEDICINE

## 2023-09-21 PROCEDURE — 99999 PR PBB SHADOW E&M-EST. PATIENT-LVL III: ICD-10-PCS | Mod: PBBFAC,HCNC,, | Performed by: FAMILY MEDICINE

## 2023-09-21 PROCEDURE — 99213 OFFICE O/P EST LOW 20 MIN: CPT | Mod: HCNC,S$GLB,, | Performed by: FAMILY MEDICINE

## 2023-09-21 PROCEDURE — 99999 PR PBB SHADOW E&M-EST. PATIENT-LVL III: CPT | Mod: PBBFAC,HCNC,, | Performed by: FAMILY MEDICINE

## 2023-09-21 PROCEDURE — 3078F DIAST BP <80 MM HG: CPT | Mod: HCNC,CPTII,S$GLB, | Performed by: FAMILY MEDICINE

## 2023-09-21 PROCEDURE — 1125F PR PAIN SEVERITY QUANTIFIED, PAIN PRESENT: ICD-10-PCS | Mod: HCNC,CPTII,S$GLB, | Performed by: FAMILY MEDICINE

## 2023-09-21 PROCEDURE — 1157F PR ADVANCE CARE PLAN OR EQUIV PRESENT IN MEDICAL RECORD: ICD-10-PCS | Mod: HCNC,CPTII,S$GLB, | Performed by: FAMILY MEDICINE

## 2023-09-21 PROCEDURE — 1159F PR MEDICATION LIST DOCUMENTED IN MEDICAL RECORD: ICD-10-PCS | Mod: HCNC,CPTII,S$GLB, | Performed by: FAMILY MEDICINE

## 2023-09-21 PROCEDURE — 3288F FALL RISK ASSESSMENT DOCD: CPT | Mod: HCNC,CPTII,S$GLB, | Performed by: FAMILY MEDICINE

## 2023-09-21 PROCEDURE — 3074F SYST BP LT 130 MM HG: CPT | Mod: HCNC,CPTII,S$GLB, | Performed by: FAMILY MEDICINE

## 2023-09-21 PROCEDURE — 3078F PR MOST RECENT DIASTOLIC BLOOD PRESSURE < 80 MM HG: ICD-10-PCS | Mod: HCNC,CPTII,S$GLB, | Performed by: FAMILY MEDICINE

## 2023-09-21 PROCEDURE — 1101F PR PT FALLS ASSESS DOC 0-1 FALLS W/OUT INJ PAST YR: ICD-10-PCS | Mod: HCNC,CPTII,S$GLB, | Performed by: FAMILY MEDICINE

## 2023-09-21 PROCEDURE — 99213 PR OFFICE/OUTPT VISIT, EST, LEVL III, 20-29 MIN: ICD-10-PCS | Mod: HCNC,S$GLB,, | Performed by: FAMILY MEDICINE

## 2023-09-21 PROCEDURE — 3288F PR FALLS RISK ASSESSMENT DOCUMENTED: ICD-10-PCS | Mod: HCNC,CPTII,S$GLB, | Performed by: FAMILY MEDICINE

## 2023-09-21 PROCEDURE — 3074F PR MOST RECENT SYSTOLIC BLOOD PRESSURE < 130 MM HG: ICD-10-PCS | Mod: HCNC,CPTII,S$GLB, | Performed by: FAMILY MEDICINE

## 2023-09-21 PROCEDURE — 1157F ADVNC CARE PLAN IN RCRD: CPT | Mod: HCNC,CPTII,S$GLB, | Performed by: FAMILY MEDICINE

## 2023-09-21 PROCEDURE — 1125F AMNT PAIN NOTED PAIN PRSNT: CPT | Mod: HCNC,CPTII,S$GLB, | Performed by: FAMILY MEDICINE

## 2023-09-21 RX ORDER — DICLOFENAC SODIUM 10 MG/G
2 GEL TOPICAL DAILY
Qty: 450 G | Refills: 2 | Status: SHIPPED | OUTPATIENT
Start: 2023-09-21

## 2023-09-21 NOTE — PROGRESS NOTES
"HISTORY OF PRESENT ILLNESS:  Nuha Bernstein is a 78 y.o. female who presents to the clinic today for Shoulder Pain (Left shoulder pain with a knot on it )  .       Left shoulder changes  The tramadol helps  No falls  Just noted new lumps    Patient Active Problem List   Diagnosis    Hyperlipidemia    Cervical stenosis of spinal canal    Osteoarthritis of both hips (moderate)    DJD (degenerative joint disease) of cervical spine    Chronic low back pain    Chronic neck pain    Arthropathy of cervical facet joint    Essential hypertension    Calcified granuloma of lung    Type 2 diabetes mellitus with stage 3a chronic kidney disease, without long-term current use of insulin    Nuclear sclerosis of both eyes    Refractive error    Incomplete bladder emptying    Cystocele, midline    Uterovaginal prolapse    Rectocele, female    Cyst of right eyelid    Stage 3 chronic kidney disease    Uncomplicated opioid dependence    Hyperparathyroidism           CARE TEAM:  Patient Care Team:  Pollo Lowe MD as PCP - General (Family Medicine)  Silvana Carr LPN as Licensed Practical Nurse         ROS  Per HPI    PHYSICAL EXAM:   /78   Pulse 68   Temp 98.3 °F (36.8 °C) (Oral)   Ht 5' 5" (1.651 m)   Wt 77 kg (169 lb 12.1 oz)   SpO2 98%   BMI 28.25 kg/m²   BP Readings from Last 5 Encounters:   09/21/23 124/78   06/23/23 124/80   06/22/23 126/80   06/05/23 120/80   05/26/23 130/80     Wt Readings from Last 5 Encounters:   09/21/23 77 kg (169 lb 12.1 oz)   06/23/23 77.2 kg (170 lb 3.1 oz)   06/22/23 77.3 kg (170 lb 6.7 oz)   06/05/23 77.6 kg (171 lb 1.2 oz)   05/26/23 77 kg (169 lb 12.1 oz)             Left shoulder AC joint changes  No redness or heat  Decent ROM  Has crepitus     Medication List with Changes/Refills   Current Medications    ATORVASTATIN (LIPITOR) 40 MG TABLET    Take 1 tablet (40 mg total) by mouth once daily.    CO-ENZYME Q-10 30 MG CAPSULE    Take 30 mg by mouth 3 (three) times daily.    " LISINOPRIL (PRINIVIL,ZESTRIL) 40 MG TABLET    Take 1 tablet (40 mg total) by mouth once daily.    MULTIVITAMIN/IRON/FOLIC ACID (CENTRUM WOMEN ORAL)    Take by mouth.    MV,TRISHA,IRON,MN/FOLIC ACID/CHOL (HAIR-SKIN-NAILS, PABA, ORAL)    Take by mouth.    OMEGA 3,6,9 COMBINATION NO.7 (OMEGA DHA ORAL)    Take 830 mg by mouth once daily.    TRAMADOL (ULTRAM) 50 MG TABLET    Take 1 tablet (50 mg total) by mouth 3 (three) times daily as needed for Pain.    TRIAMTERENE-HYDROCHLOROTHIAZIDE 37.5-25 MG (DYAZIDE) 37.5-25 MG PER CAPSULE    Take 1 capsule by mouth once daily.   Changed and/or Refilled Medications    Modified Medication Previous Medication    DICLOFENAC SODIUM (VOLTAREN) 1 % GEL diclofenac sodium (VOLTAREN) 1 % Gel       Apply 2 g topically once daily.    Apply 2 g topically once daily.   Discontinued Medications    DICLOFENAC (FLECTOR) 1.3 % PT12    Use 1 patch daily on the affected area         ASSESSMENT AND PLAN:    Problem List Items Addressed This Visit    None  Visit Diagnoses       AC joint arthropathy    -  Primary    Chronic pain of both shoulders        Relevant Medications    diclofenac sodium (VOLTAREN) 1 % Gel            Arthritic changes.  Potential medication side effects were discussed with the patient; let me know if any occur.  Continue tramadol.    Future Appointments   Date Time Provider Department Center   12/21/2023 10:20 AM Pollo Lowe MD [facility] Cheryl Gutiérrez       No follow-ups on file. or sooner as needed.

## 2023-11-27 DIAGNOSIS — M54.50 CHRONIC MIDLINE LOW BACK PAIN WITHOUT SCIATICA: ICD-10-CM

## 2023-11-27 DIAGNOSIS — G89.29 CHRONIC MIDLINE LOW BACK PAIN WITHOUT SCIATICA: ICD-10-CM

## 2023-11-27 RX ORDER — TRAMADOL HYDROCHLORIDE 50 MG/1
50 TABLET ORAL
Qty: 90 TABLET | Refills: 2 | Status: SHIPPED | OUTPATIENT
Start: 2023-11-27 | End: 2024-03-08

## 2023-11-27 NOTE — TELEPHONE ENCOUNTER
No care due was identified.  [facility] Embedded Care Due Messages. Reference number: [id number].   11/27/2023 1:29:04 PM CST

## 2023-12-19 ENCOUNTER — PATIENT MESSAGE (OUTPATIENT)
Dept: FAMILY MEDICINE | Facility: CLINIC | Age: 78
End: 2023-12-19
Payer: MEDICARE

## 2023-12-19 DIAGNOSIS — E11.22 TYPE 2 DIABETES MELLITUS WITH STAGE 3A CHRONIC KIDNEY DISEASE, WITHOUT LONG-TERM CURRENT USE OF INSULIN: Primary | ICD-10-CM

## 2023-12-19 DIAGNOSIS — N18.31 TYPE 2 DIABETES MELLITUS WITH STAGE 3A CHRONIC KIDNEY DISEASE, WITHOUT LONG-TERM CURRENT USE OF INSULIN: Primary | ICD-10-CM

## 2023-12-20 ENCOUNTER — LAB VISIT (OUTPATIENT)
Dept: LAB | Facility: HOSPITAL | Age: 78
End: 2023-12-20
Payer: MEDICARE

## 2023-12-20 DIAGNOSIS — N18.31 TYPE 2 DIABETES MELLITUS WITH STAGE 3A CHRONIC KIDNEY DISEASE, WITHOUT LONG-TERM CURRENT USE OF INSULIN: ICD-10-CM

## 2023-12-20 DIAGNOSIS — E11.22 TYPE 2 DIABETES MELLITUS WITH STAGE 3A CHRONIC KIDNEY DISEASE, WITHOUT LONG-TERM CURRENT USE OF INSULIN: ICD-10-CM

## 2023-12-20 LAB
ALBUMIN SERPL BCP-MCNC: 3.8 G/DL (ref 3.5–5.2)
ALP SERPL-CCNC: 85 U/L (ref 55–135)
ALT SERPL W/O P-5'-P-CCNC: 27 U/L (ref 10–44)
ANION GAP SERPL CALC-SCNC: 9 MMOL/L (ref 8–16)
AST SERPL-CCNC: 36 U/L (ref 10–40)
BASOPHILS # BLD AUTO: 0.04 K/UL (ref 0–0.2)
BASOPHILS NFR BLD: 0.7 % (ref 0–1.9)
BILIRUB SERPL-MCNC: 0.4 MG/DL (ref 0.1–1)
BUN SERPL-MCNC: 36 MG/DL (ref 8–23)
CALCIUM SERPL-MCNC: 10 MG/DL (ref 8.7–10.5)
CHLORIDE SERPL-SCNC: 106 MMOL/L (ref 95–110)
CHOLEST SERPL-MCNC: 216 MG/DL (ref 120–199)
CHOLEST/HDLC SERPL: 3.2 {RATIO} (ref 2–5)
CO2 SERPL-SCNC: 26 MMOL/L (ref 23–29)
CREAT SERPL-MCNC: 1.4 MG/DL (ref 0.5–1.4)
DIFFERENTIAL METHOD: ABNORMAL
EOSINOPHIL # BLD AUTO: 0.3 K/UL (ref 0–0.5)
EOSINOPHIL NFR BLD: 4.7 % (ref 0–8)
ERYTHROCYTE [DISTWIDTH] IN BLOOD BY AUTOMATED COUNT: 14.4 % (ref 11.5–14.5)
EST. GFR  (NO RACE VARIABLE): 38.5 ML/MIN/1.73 M^2
ESTIMATED AVG GLUCOSE: 128 MG/DL (ref 68–131)
GLUCOSE SERPL-MCNC: 94 MG/DL (ref 70–110)
HBA1C MFR BLD: 6.1 % (ref 4–5.6)
HCT VFR BLD AUTO: 35.1 % (ref 37–48.5)
HDLC SERPL-MCNC: 68 MG/DL (ref 40–75)
HDLC SERPL: 31.5 % (ref 20–50)
HGB BLD-MCNC: 11.7 G/DL (ref 12–16)
IMM GRANULOCYTES # BLD AUTO: 0.01 K/UL (ref 0–0.04)
IMM GRANULOCYTES NFR BLD AUTO: 0.2 % (ref 0–0.5)
LDLC SERPL CALC-MCNC: 129.6 MG/DL (ref 63–159)
LYMPHOCYTES # BLD AUTO: 2.4 K/UL (ref 1–4.8)
LYMPHOCYTES NFR BLD: 40 % (ref 18–48)
MCH RBC QN AUTO: 28.7 PG (ref 27–31)
MCHC RBC AUTO-ENTMCNC: 33.3 G/DL (ref 32–36)
MCV RBC AUTO: 86 FL (ref 82–98)
MONOCYTES # BLD AUTO: 0.6 K/UL (ref 0.3–1)
MONOCYTES NFR BLD: 9.5 % (ref 4–15)
NEUTROPHILS # BLD AUTO: 2.7 K/UL (ref 1.8–7.7)
NEUTROPHILS NFR BLD: 44.9 % (ref 38–73)
NONHDLC SERPL-MCNC: 148 MG/DL
NRBC BLD-RTO: 0 /100 WBC
PLATELET # BLD AUTO: 276 K/UL (ref 150–450)
PMV BLD AUTO: 10.8 FL (ref 9.2–12.9)
POTASSIUM SERPL-SCNC: 4.6 MMOL/L (ref 3.5–5.1)
PROT SERPL-MCNC: 7.5 G/DL (ref 6–8.4)
PTH-INTACT SERPL-MCNC: 110.9 PG/ML (ref 9–77)
RBC # BLD AUTO: 4.07 M/UL (ref 4–5.4)
SODIUM SERPL-SCNC: 141 MMOL/L (ref 136–145)
TRIGL SERPL-MCNC: 92 MG/DL (ref 30–150)
WBC # BLD AUTO: 6.02 K/UL (ref 3.9–12.7)

## 2023-12-20 PROCEDURE — 80053 COMPREHEN METABOLIC PANEL: CPT | Mod: HCNC | Performed by: FAMILY MEDICINE

## 2023-12-20 PROCEDURE — 83036 HEMOGLOBIN GLYCOSYLATED A1C: CPT | Mod: HCNC | Performed by: FAMILY MEDICINE

## 2023-12-20 PROCEDURE — 80061 LIPID PANEL: CPT | Mod: HCNC | Performed by: FAMILY MEDICINE

## 2023-12-20 PROCEDURE — 85025 COMPLETE CBC W/AUTO DIFF WBC: CPT | Mod: HCNC | Performed by: FAMILY MEDICINE

## 2023-12-20 PROCEDURE — 36415 COLL VENOUS BLD VENIPUNCTURE: CPT | Mod: HCNC,PO | Performed by: FAMILY MEDICINE

## 2023-12-20 PROCEDURE — 83970 ASSAY OF PARATHORMONE: CPT | Mod: HCNC | Performed by: FAMILY MEDICINE

## 2023-12-21 ENCOUNTER — OFFICE VISIT (OUTPATIENT)
Dept: FAMILY MEDICINE | Facility: CLINIC | Age: 78
End: 2023-12-21
Payer: MEDICARE

## 2023-12-21 ENCOUNTER — PATIENT MESSAGE (OUTPATIENT)
Dept: FAMILY MEDICINE | Facility: CLINIC | Age: 78
End: 2023-12-21

## 2023-12-21 VITALS
SYSTOLIC BLOOD PRESSURE: 128 MMHG | TEMPERATURE: 99 F | OXYGEN SATURATION: 96 % | WEIGHT: 172.38 LBS | DIASTOLIC BLOOD PRESSURE: 76 MMHG | HEIGHT: 65 IN | HEART RATE: 81 BPM | BODY MASS INDEX: 28.72 KG/M2

## 2023-12-21 DIAGNOSIS — M54.2 CHRONIC NECK PAIN: Primary | ICD-10-CM

## 2023-12-21 DIAGNOSIS — I10 ESSENTIAL HYPERTENSION: ICD-10-CM

## 2023-12-21 DIAGNOSIS — M47.812 ARTHROPATHY OF CERVICAL FACET JOINT: ICD-10-CM

## 2023-12-21 DIAGNOSIS — G89.29 CHRONIC NECK PAIN: Primary | ICD-10-CM

## 2023-12-21 DIAGNOSIS — E78.00 PURE HYPERCHOLESTEROLEMIA: ICD-10-CM

## 2023-12-21 PROCEDURE — 3074F PR MOST RECENT SYSTOLIC BLOOD PRESSURE < 130 MM HG: ICD-10-PCS | Mod: HCNC,CPTII,S$GLB, | Performed by: FAMILY MEDICINE

## 2023-12-21 PROCEDURE — 1101F PT FALLS ASSESS-DOCD LE1/YR: CPT | Mod: HCNC,CPTII,S$GLB, | Performed by: FAMILY MEDICINE

## 2023-12-21 PROCEDURE — 99999 PR PBB SHADOW E&M-EST. PATIENT-LVL IV: CPT | Mod: PBBFAC,HCNC,, | Performed by: FAMILY MEDICINE

## 2023-12-21 PROCEDURE — 1160F PR REVIEW ALL MEDS BY PRESCRIBER/CLIN PHARMACIST DOCUMENTED: ICD-10-PCS | Mod: HCNC,CPTII,S$GLB, | Performed by: FAMILY MEDICINE

## 2023-12-21 PROCEDURE — 3288F PR FALLS RISK ASSESSMENT DOCUMENTED: ICD-10-PCS | Mod: HCNC,CPTII,S$GLB, | Performed by: FAMILY MEDICINE

## 2023-12-21 PROCEDURE — 99214 OFFICE O/P EST MOD 30 MIN: CPT | Mod: HCNC,S$GLB,, | Performed by: FAMILY MEDICINE

## 2023-12-21 PROCEDURE — 3288F FALL RISK ASSESSMENT DOCD: CPT | Mod: HCNC,CPTII,S$GLB, | Performed by: FAMILY MEDICINE

## 2023-12-21 PROCEDURE — 3078F PR MOST RECENT DIASTOLIC BLOOD PRESSURE < 80 MM HG: ICD-10-PCS | Mod: HCNC,CPTII,S$GLB, | Performed by: FAMILY MEDICINE

## 2023-12-21 PROCEDURE — 3078F DIAST BP <80 MM HG: CPT | Mod: HCNC,CPTII,S$GLB, | Performed by: FAMILY MEDICINE

## 2023-12-21 PROCEDURE — 1159F PR MEDICATION LIST DOCUMENTED IN MEDICAL RECORD: ICD-10-PCS | Mod: HCNC,CPTII,S$GLB, | Performed by: FAMILY MEDICINE

## 2023-12-21 PROCEDURE — 1101F PR PT FALLS ASSESS DOC 0-1 FALLS W/OUT INJ PAST YR: ICD-10-PCS | Mod: HCNC,CPTII,S$GLB, | Performed by: FAMILY MEDICINE

## 2023-12-21 PROCEDURE — 1157F ADVNC CARE PLAN IN RCRD: CPT | Mod: HCNC,CPTII,S$GLB, | Performed by: FAMILY MEDICINE

## 2023-12-21 PROCEDURE — 99214 PR OFFICE/OUTPT VISIT, EST, LEVL IV, 30-39 MIN: ICD-10-PCS | Mod: HCNC,S$GLB,, | Performed by: FAMILY MEDICINE

## 2023-12-21 PROCEDURE — 1159F MED LIST DOCD IN RCRD: CPT | Mod: HCNC,CPTII,S$GLB, | Performed by: FAMILY MEDICINE

## 2023-12-21 PROCEDURE — 1157F PR ADVANCE CARE PLAN OR EQUIV PRESENT IN MEDICAL RECORD: ICD-10-PCS | Mod: HCNC,CPTII,S$GLB, | Performed by: FAMILY MEDICINE

## 2023-12-21 PROCEDURE — 3074F SYST BP LT 130 MM HG: CPT | Mod: HCNC,CPTII,S$GLB, | Performed by: FAMILY MEDICINE

## 2023-12-21 PROCEDURE — 1126F PR PAIN SEVERITY QUANTIFIED, NO PAIN PRESENT: ICD-10-PCS | Mod: HCNC,CPTII,S$GLB, | Performed by: FAMILY MEDICINE

## 2023-12-21 PROCEDURE — 1160F RVW MEDS BY RX/DR IN RCRD: CPT | Mod: HCNC,CPTII,S$GLB, | Performed by: FAMILY MEDICINE

## 2023-12-21 PROCEDURE — 1126F AMNT PAIN NOTED NONE PRSNT: CPT | Mod: HCNC,CPTII,S$GLB, | Performed by: FAMILY MEDICINE

## 2023-12-21 PROCEDURE — 99999 PR PBB SHADOW E&M-EST. PATIENT-LVL IV: ICD-10-PCS | Mod: PBBFAC,HCNC,, | Performed by: FAMILY MEDICINE

## 2023-12-21 RX ORDER — ATORVASTATIN CALCIUM 40 MG/1
40 TABLET, FILM COATED ORAL DAILY
Qty: 90 TABLET | Refills: 3 | Status: SHIPPED | OUTPATIENT
Start: 2023-12-21

## 2023-12-21 RX ORDER — LIDOCAINE 50 MG/G
1 PATCH TOPICAL DAILY
Qty: 30 PATCH | Refills: 2 | Status: SHIPPED | OUTPATIENT
Start: 2023-12-21

## 2023-12-21 RX ORDER — TRIAMTERENE AND HYDROCHLOROTHIAZIDE 37.5; 25 MG/1; MG/1
1 CAPSULE ORAL DAILY
Qty: 90 CAPSULE | Refills: 3 | Status: SHIPPED | OUTPATIENT
Start: 2023-12-21

## 2023-12-21 NOTE — PROGRESS NOTES
"HISTORY OF PRESENT ILLNESS:  Nuha Bernstein is a 78 y.o. female who presents to the clinic today for Medication Refill  .       Medication is helping  She is still independent  She is not having side effects  Pain worse at night    Patient Active Problem List   Diagnosis    Hyperlipidemia    Cervical stenosis of spinal canal    Osteoarthritis of both hips (moderate)    DJD (degenerative joint disease) of cervical spine    Chronic low back pain    Chronic neck pain    Arthropathy of cervical facet joint    Essential hypertension    Calcified granuloma of lung    Type 2 diabetes mellitus with stage 3a chronic kidney disease, without long-term current use of insulin    Nuclear sclerosis of both eyes    Refractive error    Incomplete bladder emptying    Cystocele, midline    Uterovaginal prolapse    Rectocele, female    Cyst of right eyelid    Stage 3 chronic kidney disease    Uncomplicated opioid dependence    Hyperparathyroidism           CARE TEAM:  Patient Care Team:  Pollo Lowe MD as PCP - General (Family Medicine)  Silvana Carr LPN as Licensed Practical Nurse         ROS    Per HPI    PHYSICAL EXAM:   /76   Pulse 81   Temp 98.5 °F (36.9 °C) (Oral)   Ht 5' 5" (1.651 m)   Wt 78.2 kg (172 lb 6.4 oz)   SpO2 96%   BMI 28.69 kg/m²   BP Readings from Last 5 Encounters:   12/21/23 128/76   09/21/23 124/78   06/23/23 124/80   06/22/23 126/80   06/05/23 120/80     Wt Readings from Last 5 Encounters:   12/21/23 78.2 kg (172 lb 6.4 oz)   09/21/23 77 kg (169 lb 12.1 oz)   06/23/23 77.2 kg (170 lb 3.1 oz)   06/22/23 77.3 kg (170 lb 6.7 oz)   06/05/23 77.6 kg (171 lb 1.2 oz)             She appears well, in no apparent distress.  Alert and oriented times three, pleasant and cooperative. Vital signs are as documented in vital signs section.  Chronic changes in the neck and back  Shoulders and the hands/knees  S1 and S2 normal, no murmurs, clicks, gallops or rubs. Regular rate and rhythm. Chest is clear; " no wheezes or rales. No edema or JVD.    Lab Visit on 12/20/2023   Component Date Value Ref Range Status    Hemoglobin A1C 12/20/2023 6.1 (H)  4.0 - 5.6 % Final    Comment: ADA Screening Guidelines:  5.7-6.4%  Consistent with prediabetes  >or=6.5%  Consistent with diabetes    High levels of fetal hemoglobin interfere with the HbA1C  assay. Heterozygous hemoglobin variants (HbS, HgC, etc)do  not significantly interfere with this assay.   However, presence of multiple variants may affect accuracy.      Estimated Avg Glucose 12/20/2023 128  68 - 131 mg/dL Final    PTH, Intact 12/20/2023 110.9 (H)  9.0 - 77.0 pg/mL Final    Cholesterol 12/20/2023 216 (H)  120 - 199 mg/dL Final    Comment: The National Cholesterol Education Program (NCEP) has set the  following guidelines (reference ranges) for Cholesterol:  Optimal.....................<200 mg/dL  Borderline High.............200-239 mg/dL  High........................> or = 240 mg/dL      Triglycerides 12/20/2023 92  30 - 150 mg/dL Final    Comment: The National Cholesterol Education Program (NCEP) has set the  following guidelines (reference values) for triglycerides:  Normal......................<150 mg/dL  Borderline High.............150-199 mg/dL  High........................200-499 mg/dL      HDL 12/20/2023 68  40 - 75 mg/dL Final    Comment: The National Cholesterol Education Program (NCEP) has set the  following guidelines (reference values) for HDL Cholesterol:  Low...............<40 mg/dL  Optimal...........>60 mg/dL      LDL Cholesterol 12/20/2023 129.6  63.0 - 159.0 mg/dL Final    Comment: The National Cholesterol Education Program (NCEP) has set the  following guidelines (reference values) for LDL Cholesterol:  Optimal.......................<130 mg/dL  Borderline High...............130-159 mg/dL  High..........................160-189 mg/dL  Very High.....................>190 mg/dL      HDL/Cholesterol Ratio 12/20/2023 31.5  20.0 - 50.0 % Final    Total  Cholesterol/HDL Ratio 12/20/2023 3.2  2.0 - 5.0 Final    Non-HDL Cholesterol 12/20/2023 148  mg/dL Final    Comment: Risk category and Non-HDL cholesterol goals:  Coronary heart disease (CHD)or equivalent (10-year risk of CHD >20%):  Non-HDL cholesterol goal     <130 mg/dL  Two or more CHD risk factors and 10-year risk of CHD <= 20%:  Non-HDL cholesterol goal     <160 mg/dL  0 to 1 CHD risk factor:  Non-HDL cholesterol goal     <190 mg/dL      WBC 12/20/2023 6.02  3.90 - 12.70 K/uL Final    RBC 12/20/2023 4.07  4.00 - 5.40 M/uL Final    Hemoglobin 12/20/2023 11.7 (L)  12.0 - 16.0 g/dL Final    Hematocrit 12/20/2023 35.1 (L)  37.0 - 48.5 % Final    MCV 12/20/2023 86  82 - 98 fL Final    MCH 12/20/2023 28.7  27.0 - 31.0 pg Final    MCHC 12/20/2023 33.3  32.0 - 36.0 g/dL Final    RDW 12/20/2023 14.4  11.5 - 14.5 % Final    Platelets 12/20/2023 276  150 - 450 K/uL Final    MPV 12/20/2023 10.8  9.2 - 12.9 fL Final    Immature Granulocytes 12/20/2023 0.2  0.0 - 0.5 % Final    Gran # (ANC) 12/20/2023 2.7  1.8 - 7.7 K/uL Final    Immature Grans (Abs) 12/20/2023 0.01  0.00 - 0.04 K/uL Final    Comment: Mild elevation in immature granulocytes is non specific and   can be seen in a variety of conditions including stress response,   acute inflammation, trauma and pregnancy. Correlation with other   laboratory and clinical findings is essential.      Lymph # 12/20/2023 2.4  1.0 - 4.8 K/uL Final    Mono # 12/20/2023 0.6  0.3 - 1.0 K/uL Final    Eos # 12/20/2023 0.3  0.0 - 0.5 K/uL Final    Baso # 12/20/2023 0.04  0.00 - 0.20 K/uL Final    nRBC 12/20/2023 0  0 /100 WBC Final    Gran % 12/20/2023 44.9  38.0 - 73.0 % Final    Lymph % 12/20/2023 40.0  18.0 - 48.0 % Final    Mono % 12/20/2023 9.5  4.0 - 15.0 % Final    Eosinophil % 12/20/2023 4.7  0.0 - 8.0 % Final    Basophil % 12/20/2023 0.7  0.0 - 1.9 % Final    Differential Method 12/20/2023 Automated   Final    Sodium 12/20/2023 141  136 - 145 mmol/L Final    Potassium 12/20/2023  4.6  3.5 - 5.1 mmol/L Final    Chloride 12/20/2023 106  95 - 110 mmol/L Final    CO2 12/20/2023 26  23 - 29 mmol/L Final    Glucose 12/20/2023 94  70 - 110 mg/dL Final    BUN 12/20/2023 36 (H)  8 - 23 mg/dL Final    Creatinine 12/20/2023 1.4  0.5 - 1.4 mg/dL Final    Calcium 12/20/2023 10.0  8.7 - 10.5 mg/dL Final    Total Protein 12/20/2023 7.5  6.0 - 8.4 g/dL Final    Albumin 12/20/2023 3.8  3.5 - 5.2 g/dL Final    Total Bilirubin 12/20/2023 0.4  0.1 - 1.0 mg/dL Final    Comment: For infants and newborns, interpretation of results should be based  on gestational age, weight and in agreement with clinical  observations.    Premature Infant recommended reference ranges:  Up to 24 hours.............<8.0 mg/dL  Up to 48 hours............<12.0 mg/dL  3-5 days..................<15.0 mg/dL  6-29 days.................<15.0 mg/dL      Alkaline Phosphatase 12/20/2023 85  55 - 135 U/L Final    AST 12/20/2023 36  10 - 40 U/L Final    ALT 12/20/2023 27  10 - 44 U/L Final    eGFR 12/20/2023 38.5 (A)  >60 mL/min/1.73 m^2 Final    Anion Gap 12/20/2023 9  8 - 16 mmol/L Final   Lab Visit on 12/20/2023   Component Date Value Ref Range Status    Microalbumin, Urine 12/20/2023 16.0  ug/mL Final    Creatinine, Urine 12/20/2023 63.0  15.0 - 325.0 mg/dL Final    Microalb/Creat Ratio 12/20/2023 25.4  0.0 - 30.0 ug/mg Final    Specimen UA 12/20/2023 Urine, Clean Catch   Final    Color, UA 12/20/2023 Colorless (A)  Yellow, Straw, Joie Final    Appearance, UA 12/20/2023 Clear  Clear Final    pH, UA 12/20/2023 6.0  5.0 - 8.0 Final    Specific Gravity, UA 12/20/2023 1.015  1.005 - 1.030 Final    Protein, UA 12/20/2023 Negative  Negative Final    Comment: Recommend a 24 hour urine protein or a urine   protein/creatinine ratio if globulin induced proteinuria is  clinically suspected.      Glucose, UA 12/20/2023 Negative  Negative Final    Ketones, UA 12/20/2023 Negative  Negative Final    Bilirubin (UA) 12/20/2023 Negative  Negative Final     Occult Blood UA 12/20/2023 Negative  Negative Final    Nitrite, UA 12/20/2023 Negative  Negative Final    Leukocytes, UA 12/20/2023 Negative  Negative Final          Medication List with Changes/Refills   New Medications    LIDOCAINE (LIDODERM) 5 %    Place 1 patch onto the skin once daily. Remove & Discard patch within 12 hours or as directed by MD   Current Medications    CO-ENZYME Q-10 30 MG CAPSULE    Take 30 mg by mouth 3 (three) times daily.    DICLOFENAC SODIUM (VOLTAREN) 1 % GEL    Apply 2 g topically once daily.    LISINOPRIL (PRINIVIL,ZESTRIL) 40 MG TABLET    Take 1 tablet (40 mg total) by mouth once daily.    MULTIVITAMIN/IRON/FOLIC ACID (CENTRUM WOMEN ORAL)    Take by mouth.    MV,TRISHA,IRON,MN/FOLIC ACID/CHOL (HAIR-SKIN-NAILS, PABA, ORAL)    Take by mouth.    OMEGA 3,6,9 COMBINATION NO.7 (OMEGA DHA ORAL)    Take 830 mg by mouth once daily.    TRAMADOL (ULTRAM) 50 MG TABLET    TAKE 1 TABLET BY MOUTH 3 TIMES DAILY AS NEEDED FOR PAIN.   Changed and/or Refilled Medications    Modified Medication Previous Medication    ATORVASTATIN (LIPITOR) 40 MG TABLET atorvastatin (LIPITOR) 40 MG tablet       Take 1 tablet (40 mg total) by mouth once daily.    Take 1 tablet (40 mg total) by mouth once daily.    TRIAMTERENE-HYDROCHLOROTHIAZIDE 37.5-25 MG (DYAZIDE) 37.5-25 MG PER CAPSULE triamterene-hydrochlorothiazide 37.5-25 mg (DYAZIDE) 37.5-25 mg per capsule       Take 1 capsule by mouth once daily.    Take 1 capsule by mouth once daily.         ASSESSMENT AND PLAN:    Problem List Items Addressed This Visit       Hyperlipidemia    Relevant Medications    atorvastatin (LIPITOR) 40 MG tablet    Chronic neck pain - Primary    Relevant Medications    LIDOcaine (LIDODERM) 5 %    Arthropathy of cervical facet joint    Relevant Medications    LIDOcaine (LIDODERM) 5 %    Essential hypertension    Relevant Medications    triamterene-hydrochlorothiazide 37.5-25 mg (DYAZIDE) 37.5-25 mg per capsule     Continue with the  tramadol.  Potential medication side effects were discussed with the patient; let me know if any occur.  She is independent    Future Appointments   Date Time Provider Department Center   3/20/2024 10:20 AM Pollo Lowe MD [facility] [place]       Follow up in about 3 months (around 3/21/2024) for assess treatment plan. or sooner as needed.

## 2024-03-08 DIAGNOSIS — G89.29 CHRONIC MIDLINE LOW BACK PAIN WITHOUT SCIATICA: ICD-10-CM

## 2024-03-08 DIAGNOSIS — M54.50 CHRONIC MIDLINE LOW BACK PAIN WITHOUT SCIATICA: ICD-10-CM

## 2024-03-08 RX ORDER — TRAMADOL HYDROCHLORIDE 50 MG/1
50 TABLET ORAL
Qty: 90 TABLET | Refills: 0 | Status: SHIPPED | OUTPATIENT
Start: 2024-03-08 | End: 2024-03-20 | Stop reason: SDUPTHER

## 2024-03-08 NOTE — TELEPHONE ENCOUNTER
No care due was identified.  Health [facility] Embedded Care Due Messages. Reference number: [id number].   3/08/2024 4:03:56 PM CST

## 2024-03-14 ENCOUNTER — TELEPHONE (OUTPATIENT)
Dept: FAMILY MEDICINE | Facility: CLINIC | Age: 79
End: 2024-03-14
Payer: MEDICARE

## 2024-03-14 DIAGNOSIS — Z12.31 BREAST CANCER SCREENING BY MAMMOGRAM: Primary | ICD-10-CM

## 2024-03-20 ENCOUNTER — OFFICE VISIT (OUTPATIENT)
Dept: FAMILY MEDICINE | Facility: CLINIC | Age: 79
End: 2024-03-20
Payer: MEDICARE

## 2024-03-20 ENCOUNTER — LAB VISIT (OUTPATIENT)
Dept: LAB | Facility: HOSPITAL | Age: 79
End: 2024-03-20
Attending: FAMILY MEDICINE
Payer: MEDICARE

## 2024-03-20 VITALS
TEMPERATURE: 98 F | OXYGEN SATURATION: 97 % | HEIGHT: 65 IN | DIASTOLIC BLOOD PRESSURE: 74 MMHG | WEIGHT: 170.88 LBS | SYSTOLIC BLOOD PRESSURE: 128 MMHG | BODY MASS INDEX: 28.47 KG/M2 | HEART RATE: 73 BPM

## 2024-03-20 DIAGNOSIS — R79.9 ABNORMAL FINDING OF BLOOD CHEMISTRY: Primary | ICD-10-CM

## 2024-03-20 DIAGNOSIS — M54.50 CHRONIC MIDLINE LOW BACK PAIN WITHOUT SCIATICA: ICD-10-CM

## 2024-03-20 DIAGNOSIS — F11.20 UNCOMPLICATED OPIOID DEPENDENCE: ICD-10-CM

## 2024-03-20 DIAGNOSIS — J84.10 CALCIFIED GRANULOMA OF LUNG: ICD-10-CM

## 2024-03-20 DIAGNOSIS — R79.9 ABNORMAL FINDING OF BLOOD CHEMISTRY: ICD-10-CM

## 2024-03-20 DIAGNOSIS — N18.32 STAGE 3B CHRONIC KIDNEY DISEASE: ICD-10-CM

## 2024-03-20 DIAGNOSIS — N18.32 TYPE 2 DIABETES MELLITUS WITH STAGE 3B CHRONIC KIDNEY DISEASE, WITHOUT LONG-TERM CURRENT USE OF INSULIN: ICD-10-CM

## 2024-03-20 DIAGNOSIS — E21.3 HYPERPARATHYROIDISM: ICD-10-CM

## 2024-03-20 DIAGNOSIS — E11.22 TYPE 2 DIABETES MELLITUS WITH STAGE 3B CHRONIC KIDNEY DISEASE, WITHOUT LONG-TERM CURRENT USE OF INSULIN: ICD-10-CM

## 2024-03-20 DIAGNOSIS — G89.29 CHRONIC MIDLINE LOW BACK PAIN WITHOUT SCIATICA: ICD-10-CM

## 2024-03-20 LAB
CREAT UR-MCNC: 79.2 MG/DL (ref 15–325)
PROT UR-MCNC: 12 MG/DL
PROT/CREAT UR: 0.15 MG/G{CREAT} (ref 0–0.2)

## 2024-03-20 PROCEDURE — 99215 OFFICE O/P EST HI 40 MIN: CPT | Mod: HCNC,S$GLB,, | Performed by: FAMILY MEDICINE

## 2024-03-20 PROCEDURE — 3072F LOW RISK FOR RETINOPATHY: CPT | Mod: HCNC,CPTII,S$GLB, | Performed by: FAMILY MEDICINE

## 2024-03-20 PROCEDURE — 3288F FALL RISK ASSESSMENT DOCD: CPT | Mod: HCNC,CPTII,S$GLB, | Performed by: FAMILY MEDICINE

## 2024-03-20 PROCEDURE — 84156 ASSAY OF PROTEIN URINE: CPT | Mod: HCNC | Performed by: FAMILY MEDICINE

## 2024-03-20 PROCEDURE — 1101F PT FALLS ASSESS-DOCD LE1/YR: CPT | Mod: HCNC,CPTII,S$GLB, | Performed by: FAMILY MEDICINE

## 2024-03-20 PROCEDURE — 99999 PR PBB SHADOW E&M-EST. PATIENT-LVL IV: CPT | Mod: PBBFAC,HCNC,, | Performed by: FAMILY MEDICINE

## 2024-03-20 PROCEDURE — 1126F AMNT PAIN NOTED NONE PRSNT: CPT | Mod: HCNC,CPTII,S$GLB, | Performed by: FAMILY MEDICINE

## 2024-03-20 PROCEDURE — 3074F SYST BP LT 130 MM HG: CPT | Mod: HCNC,CPTII,S$GLB, | Performed by: FAMILY MEDICINE

## 2024-03-20 PROCEDURE — 81000 URINALYSIS NONAUTO W/SCOPE: CPT | Mod: HCNC | Performed by: FAMILY MEDICINE

## 2024-03-20 PROCEDURE — 3078F DIAST BP <80 MM HG: CPT | Mod: HCNC,CPTII,S$GLB, | Performed by: FAMILY MEDICINE

## 2024-03-20 PROCEDURE — 1157F ADVNC CARE PLAN IN RCRD: CPT | Mod: HCNC,CPTII,S$GLB, | Performed by: FAMILY MEDICINE

## 2024-03-20 PROCEDURE — 1159F MED LIST DOCD IN RCRD: CPT | Mod: HCNC,CPTII,S$GLB, | Performed by: FAMILY MEDICINE

## 2024-03-20 RX ORDER — TRAMADOL HYDROCHLORIDE 50 MG/1
50 TABLET ORAL EVERY 8 HOURS PRN
Qty: 90 TABLET | Refills: 2 | Status: SHIPPED | OUTPATIENT
Start: 2024-03-20

## 2024-03-20 NOTE — PROGRESS NOTES
Chief Complaint   Patient presents with    Annual Exam       SUBJECTIVE:  Nuha Bernstein is a 79 y.o. female presents with   Chief Complaint   Patient presents with    Annual Exam     Conservative treatment with tylenol, NSAIDs, alternative treatments, complementary non opioid nerve/epileptic medication has failed with primary use.  Now opioid dependent for relief.  Has tried PT/OT, injections with mixed success.  She is stable on tramadol and her exercises    Past Medical History:   Diagnosis Date    Arthritis     Back pain     CKD stage G3a/A3, GFR 45-59 and albumin creatinine ratio >300 mg/g 6/23/2020    Colon polyp     Hyperglycemia     Hyperlipidemia     Hypertension     Nuclear sclerosis of both eyes 10/31/2017    Prolapse of uterus     Type 2 diabetes mellitus without complication, without long-term current use of insulin     Type 2 diabetes mellitus without complication, without long-term current use of insulin      Past Surgical History:   Procedure Laterality Date    COLONOSCOPY N/A 12/15/2020    Procedure: COLONOSCOPY;  Surgeon: Antonio Maya MD;  Location: [facility] ([address] [place] [address]);  Service: Endoscopy;  Laterality: N/A;  prep ins. 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     Drug use: Yes     Comment: Tramadol 2 tabs bid    Sexual activity: Not Currently     Partners: Male   Social History Narrative     4 children retired      Social Determinants of Health     Financial Resource Strain: Low Risk  (8/24/2021)    Overall Financial Resource Strain  (CARDIA)     Difficulty of Paying Living Expenses: Not hard at all   Food Insecurity: No Food Insecurity (8/24/2021)    Hunger Vital Sign     Worried About Running Out of Food in the Last Year: Never true     Ran Out of Food in the Last Year: Never true   Transportation Needs: No Transportation Needs (8/24/2021)    PRAPARE - Transportation     Lack of Transportation (Medical): No     Lack of Transportation (Non-Medical): No   Physical Activity: Sufficiently Active (8/24/2021)    Exercise Vital Sign     Days of Exercise per Week: 4 days     Minutes of Exercise per Session: 120 min   Stress: No Stress Concern Present (8/24/2021)    [employer] - Occupational Stress Questionnaire     Feeling of Stress : Not at all   Social Connections: Moderately Isolated (8/24/2021)    Social Connection and Isolation Panel [NHANES]     Frequency of Communication with Friends and Family: More than three times a week     Frequency of Social Gatherings with Friends and Family: More than three times a week     Attends Mosque Services: More than 4 times per year     Active Member of Clubs or Organizations: No     Attends Club or Organization Meetings: Never     Marital Status:    Housing Stability: Unknown (8/24/2021)    Housing Stability Vital Sign     Unable to Pay for Housing in the Last Year: No     Unstable Housing in the Last Year: No     Family History   Problem Relation Age of Onset    Hypertension Mother     Cataracts Mother     Heart disease Mother     Hypertension Father     Cataracts Father     No Known Problems Sister     No Known Problems Brother     No Known Problems Brother     No Known Problems Maternal Aunt     No Known Problems Maternal Uncle     No Known Problems Paternal Aunt     No Known Problems Paternal Uncle     No Known Problems Maternal Grandmother     No Known Problems Maternal Grandfather     No Known Problems Paternal Grandmother     No Known Problems Paternal Grandfather  "    No Known Problems Other     Amblyopia Neg Hx     Blindness Neg Hx     Cancer Neg Hx     Diabetes Neg Hx     Glaucoma Neg Hx     Macular degeneration Neg Hx     Retinal detachment Neg Hx     Strabismus Neg Hx     Stroke Neg Hx     Thyroid disease Neg Hx      Current Outpatient Medications on File Prior to Visit   Medication Sig Dispense Refill    atorvastatin (LIPITOR) 40 MG tablet Take 1 tablet (40 mg total) by mouth once daily. 90 tablet 3    co-enzyme Q-10 30 mg capsule Take 30 mg by mouth 3 (three) times daily.      diclofenac sodium (VOLTAREN) 1 % Gel Apply 2 g topically once daily. 450 g 2    LIDOcaine (LIDODERM) 5 % Place 1 patch onto the skin once daily. Remove & Discard patch within 12 hours or as directed by MD 30 patch 2    lisinopriL (PRINIVIL,ZESTRIL) 40 MG tablet Take 1 tablet (40 mg total) by mouth once daily. 90 tablet 3    multivitamin/iron/folic acid (CENTRUM WOMEN ORAL) Take by mouth.      mv,hussein,iron,mn/folic acid/chol (HAIR-SKIN-NAILS, PABA, ORAL) Take by mouth.      OMEGA 3,6,9 COMBINATION NO.7 (OMEGA DHA ORAL) Take 830 mg by mouth once daily.      triamterene-hydrochlorothiazide 37.5-25 mg (DYAZIDE) 37.5-25 mg per capsule Take 1 capsule by mouth once daily. 90 capsule 3     No current facility-administered medications on file prior to visit.     Review of patient's allergies indicates:  No Known Allergies    ROS    OBJECTIVE:  /74   Pulse 73   Temp 98.4 °F (36.9 °C) (Oral)   Ht 5' 5" (1.651 m)   Wt 77.5 kg (170 lb 13.7 oz)   SpO2 97%   BMI 28.43 kg/m²   Wt Readings from Last 5 Encounters:   03/20/24 77.5 kg (170 lb 13.7 oz)   12/21/23 78.2 kg (172 lb 6.4 oz)   09/21/23 77 kg (169 lb 12.1 oz)   06/23/23 77.2 kg (170 lb 3.1 oz)   06/22/23 77.3 kg (170 lb 6.7 oz)       In usual state of health without signs of drug misuse/abuse today.  Specifically no alteration in cognition, signs of injections into the skin.  Patient areas of chronic pain in the shoulders and legs  No new focal " "neurological findings noted.    Chronic aids to ambulation    Reviewed  and NARx: reviewed    Assessment/Plan:    1. Abnormal finding of blood chemistry    2. Chronic midline low back pain without sciatica    3. Stage 3b chronic kidney disease    4. Calcified granuloma of lung    5. Type 2 diabetes mellitus with stage 3b chronic kidney disease, without long-term current use of insulin    6. Uncomplicated opioid dependence    7. Hyperparathyroidism        Discussed chronic pain diagnosis again, reviewed pain contract and patient is still in agreement with it. Continued to stress the importance of smoking cessation/avoiding tobacco products which can exacerbate the pain.  Continued to discuss the importance of good nutrition and some type of exercise program even if it's very basic and light to help sustain function.  Continued to discuss risks, benefits and alternatives of care with high risk medication and we have decided to continue to use them.  Our goal continues to be stabilizing of function, quality of life and avoidance of medication side effects.  Patient voiced understanding.    Problem List Items Addressed This Visit       Chronic low back pain    Relevant Medications    traMADoL (ULTRAM) 50 mg tablet    Calcified granuloma of lung    Overview     "Heart size is normal.  Few calcified granulomas noted.  The right hemidiaphragm is elevated and subsegmental atelectatic changes seen at the right lung base.  Otherwise the lungs are clear" - Xray Chest 2-         Current Assessment & Plan     Stable and doing well  No progression of signs/symptoms  Continue to monitor          Type 2 diabetes mellitus with stage 3a chronic kidney disease, without long-term current use of insulin    Current Assessment & Plan     Get new labs  Stable   Get her to eye doctor         Stage 3 chronic kidney disease    Relevant Orders    Protein / creatinine ratio, urine (Completed)    Uncomplicated opioid dependence    " Current Assessment & Plan     Stable on tramadol  Risks assessed  Benefit outweighing         Hyperparathyroidism    Current Assessment & Plan     Get new blood work  No symptoms noted  Monitor calcium          Other Visit Diagnoses       Abnormal finding of blood chemistry    -  Primary    Relevant Orders    Hemoglobin A1C    PTH, Intact (Completed)    Lipid Panel (Completed)    CBC Auto Differential (Completed)    Comprehensive Metabolic Panel (Completed)    Urinalysis    Protein / creatinine ratio, urine (Completed)          Wellness visit in 3 months  Check her improvement  Overall doing very well  Get her eye exam (scheduled in July)  Updated her chart  She has multiple comorbid conditions but is doing very well overall  Prognosis is guarded  Still independent with ADL's  Follow up in 3 months

## 2024-03-21 PROBLEM — J84.10 PULMONARY FIBROSIS, UNSPECIFIED: Status: ACTIVE | Noted: 2024-03-21

## 2024-03-21 PROBLEM — E11.36 TYPE 2 DIABETES MELLITUS WITH DIABETIC CATARACT: Status: ACTIVE | Noted: 2024-03-21

## 2024-03-21 LAB
BACTERIA #/AREA URNS HPF: ABNORMAL /HPF
BILIRUB UR QL STRIP: NEGATIVE
CLARITY UR: ABNORMAL
COLOR UR: YELLOW
GLUCOSE UR QL STRIP: NEGATIVE
HGB UR QL STRIP: NEGATIVE
KETONES UR QL STRIP: NEGATIVE
LEUKOCYTE ESTERASE UR QL STRIP: ABNORMAL
MICROSCOPIC COMMENT: ABNORMAL
NITRITE UR QL STRIP: NEGATIVE
PH UR STRIP: 6 [PH] (ref 5–8)
PROT UR QL STRIP: NEGATIVE
SP GR UR STRIP: 1.01 (ref 1–1.03)
SQUAMOUS #/AREA URNS HPF: 12 /HPF
URN SPEC COLLECT METH UR: ABNORMAL
UROBILINOGEN UR STRIP-ACNC: NEGATIVE EU/DL
WBC #/AREA URNS HPF: 7 /HPF (ref 0–5)

## 2024-03-28 ENCOUNTER — HOSPITAL ENCOUNTER (OUTPATIENT)
Dept: RADIOLOGY | Facility: HOSPITAL | Age: 79
Discharge: HOME | End: 2024-03-28
Attending: FAMILY MEDICINE
Payer: MEDICARE

## 2024-03-28 DIAGNOSIS — Z12.31 BREAST CANCER SCREENING BY MAMMOGRAM: ICD-10-CM

## 2024-03-28 PROCEDURE — 77067 SCR MAMMO BI INCL CAD: CPT | Mod: 26,HCNC,, | Performed by: RADIOLOGY

## 2024-03-28 PROCEDURE — 77063 BREAST TOMOSYNTHESIS BI: CPT | Mod: 26,HCNC,, | Performed by: RADIOLOGY

## 2024-03-28 PROCEDURE — 77063 BREAST TOMOSYNTHESIS BI: CPT | Mod: TC,HCNC,PO

## 2024-05-14 ENCOUNTER — OFFICE VISIT (OUTPATIENT)
Dept: RHEUMATOLOGY | Facility: CLINIC | Age: 79
End: 2024-05-14
Payer: MEDICARE

## 2024-05-14 VITALS
HEIGHT: 65 IN | SYSTOLIC BLOOD PRESSURE: 155 MMHG | BODY MASS INDEX: 29.24 KG/M2 | HEART RATE: 78 BPM | DIASTOLIC BLOOD PRESSURE: 70 MMHG | WEIGHT: 175.5 LBS

## 2024-05-14 DIAGNOSIS — M25.512 CHRONIC PAIN OF BOTH SHOULDERS: Primary | ICD-10-CM

## 2024-05-14 DIAGNOSIS — M62.838 MUSCLE SPASM: ICD-10-CM

## 2024-05-14 DIAGNOSIS — M25.511 CHRONIC PAIN OF BOTH SHOULDERS: Primary | ICD-10-CM

## 2024-05-14 DIAGNOSIS — G89.29 CHRONIC PAIN OF BOTH SHOULDERS: Primary | ICD-10-CM

## 2024-05-14 PROCEDURE — 99204 OFFICE O/P NEW MOD 45 MIN: CPT | Mod: S$GLB,,, | Performed by: STUDENT IN AN ORGANIZED HEALTH CARE EDUCATION/TRAINING PROGRAM

## 2024-05-14 PROCEDURE — 1101F PT FALLS ASSESS-DOCD LE1/YR: CPT | Mod: CPTII,S$GLB,, | Performed by: STUDENT IN AN ORGANIZED HEALTH CARE EDUCATION/TRAINING PROGRAM

## 2024-05-14 PROCEDURE — 1157F ADVNC CARE PLAN IN RCRD: CPT | Mod: CPTII,S$GLB,, | Performed by: STUDENT IN AN ORGANIZED HEALTH CARE EDUCATION/TRAINING PROGRAM

## 2024-05-14 PROCEDURE — 3288F FALL RISK ASSESSMENT DOCD: CPT | Mod: CPTII,S$GLB,, | Performed by: STUDENT IN AN ORGANIZED HEALTH CARE EDUCATION/TRAINING PROGRAM

## 2024-05-14 PROCEDURE — 3072F LOW RISK FOR RETINOPATHY: CPT | Mod: CPTII,S$GLB,, | Performed by: STUDENT IN AN ORGANIZED HEALTH CARE EDUCATION/TRAINING PROGRAM

## 2024-05-14 PROCEDURE — 3077F SYST BP >= 140 MM HG: CPT | Mod: CPTII,S$GLB,, | Performed by: STUDENT IN AN ORGANIZED HEALTH CARE EDUCATION/TRAINING PROGRAM

## 2024-05-14 PROCEDURE — 3078F DIAST BP <80 MM HG: CPT | Mod: CPTII,S$GLB,, | Performed by: STUDENT IN AN ORGANIZED HEALTH CARE EDUCATION/TRAINING PROGRAM

## 2024-05-14 PROCEDURE — 99999 PR PBB SHADOW E&M-EST. PATIENT-LVL IV: CPT | Mod: PBBFAC,,, | Performed by: STUDENT IN AN ORGANIZED HEALTH CARE EDUCATION/TRAINING PROGRAM

## 2024-05-14 PROCEDURE — 1125F AMNT PAIN NOTED PAIN PRSNT: CPT | Mod: CPTII,S$GLB,, | Performed by: STUDENT IN AN ORGANIZED HEALTH CARE EDUCATION/TRAINING PROGRAM

## 2024-05-14 PROCEDURE — 1159F MED LIST DOCD IN RCRD: CPT | Mod: CPTII,S$GLB,, | Performed by: STUDENT IN AN ORGANIZED HEALTH CARE EDUCATION/TRAINING PROGRAM

## 2024-05-14 NOTE — PROGRESS NOTES
RHEUMATOLOGY OUTPATIENT CLINIC NOTE    5/14/2024    Attending Rheumatologist: Rebecca Mustafa  Primary Care Provider: Pollo Lowe MD   Physician Requesting Consultation: Self, Aaareferral  No address on file  Chief Complaint/Reason For Consultation:  Joint Pain (Bilateral shoulder pain)      Subjective:       HPI  Nuha Bernstein is a 79 y.o. Black or  female who comes for evaluation of shoulder pain     She reports chronic pain in bilateral shoulders, upper back for 10+ years. She has been taking tramadol since around that time, has been able to taper to every 12 hours. Has noted that lately pain is getting worse in her upper back, she does not have any limited ROM, she is able to do her ADLs, no trouble washing her hair. She denies any radiculopathy symptoms. Pain is worse at night. Denies any headaches, vision changes, fever, chills, oral ulcers, swollen glands, chest pain, shortness of breath, abdominal pain. She has been told she has arthritis in her whole spine. She denies any other joint pain or swelling.   She is very active, able to drive by herself. She does home exercises every day for about 20-30 min.     Review of Systems   Constitutional:  Negative for fever and unexpected weight change.   HENT:  Negative for mouth sores and trouble swallowing.    Eyes:  Negative for redness.   Respiratory:  Negative for cough and shortness of breath.    Cardiovascular:  Negative for chest pain.   Gastrointestinal:  Negative for constipation and diarrhea.   Genitourinary:  Negative for dysuria and genital sores.   Integumentary:  Negative for rash.   Neurological:  Negative for headaches.   Hematological:  Does not bruise/bleed easily.        Chronic comorbid conditions affecting medical decision making today:  Past Medical History:   Diagnosis Date    Arthritis     Back pain     CKD stage G3a/A3, GFR 45-59 and albumin creatinine ratio >300 mg/g 6/23/2020    Colon polyp     Hyperglycemia      Hyperlipidemia     Hypertension     Nuclear sclerosis of both eyes 10/31/2017    Prolapse of uterus     Type 2 diabetes mellitus without complication, without long-term current use of insulin     Type 2 diabetes mellitus without complication, without long-term current use of insulin      Past Surgical History:   Procedure Laterality Date    COLONOSCOPY N/A 12/15/2020    Procedure: COLONOSCOPY;  Surgeon: Antonio Maya MD;  Location: [facility] ([address] [place] [address]);  Service: Endoscopy;  Laterality: N/A;  prep ins. emailed - ERW  covid test on 12/12/20 -PCW-BB    COLONOSCOPY W/ POLYPECTOMY      Right Thumb      vaginal dilator       Family History   Problem Relation Name Age of Onset    Hypertension Mother      Cataracts Mother      Heart disease Mother      Hypertension Father      Cataracts Father      No Known Problems Sister      No Known Problems Brother      No Known Problems Brother      No Known Problems Maternal Aunt      No Known Problems Maternal Uncle      No Known Problems Paternal Aunt      No Known Problems Paternal Uncle      No Known Problems Maternal Grandmother      No Known Problems Maternal Grandfather      No Known Problems Paternal Grandmother      No Known Problems Paternal Grandfather      No Known Problems Other      Amblyopia Neg Hx      Blindness Neg Hx      Cancer Neg Hx      Diabetes Neg Hx      Glaucoma Neg Hx      Macular degeneration Neg Hx      Retinal detachment Neg Hx      Strabismus Neg Hx      Stroke Neg Hx      Thyroid disease Neg Hx       Social History     Substance and Sexual Activity   Alcohol Use No    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      Social History     Tobacco Use   Smoking Status Never    Passive exposure: Never   Smokeless Tobacco Never     Social History     Substance and Sexual Activity   Drug Use Yes    Comment: Tramadol 2 tabs bid       Current Outpatient Medications:     atorvastatin (LIPITOR) 40 MG tablet, Take 1 tablet (40 mg total) by mouth once daily., Disp:  90 tablet, Rfl: 3    co-enzyme Q-10 30 mg capsule, Take 30 mg by mouth 3 (three) times daily., Disp: , Rfl:     lisinopriL (PRINIVIL,ZESTRIL) 40 MG tablet, Take 1 tablet (40 mg total) by mouth once daily., Disp: 90 tablet, Rfl: 3    multivitamin/iron/folic acid (CENTRUM WOMEN ORAL), Take by mouth., Disp: , Rfl:     mv,hussein,iron,mn/folic acid/chol (HAIR-SKIN-NAILS, PABA, ORAL), Take by mouth., Disp: , Rfl:     OMEGA 3,6,9 COMBINATION NO.7 (OMEGA DHA ORAL), Take 830 mg by mouth once daily., Disp: , Rfl:     traMADoL (ULTRAM) 50 mg tablet, Take 1 tablet (50 mg total) by mouth every 8 (eight) hours as needed for Pain., Disp: 90 tablet, Rfl: 2    triamterene-hydrochlorothiazide 37.5-25 mg (DYAZIDE) 37.5-25 mg per capsule, Take 1 capsule by mouth once daily., Disp: 90 capsule, Rfl: 3     Objective:         Vitals:    05/14/24 0848   BP: (!) 155/70   Pulse: 78     Physical Exam    Significant tenderness in right trapezius area.   Shoulders bilaterally are normal, no signs of impingement.     Reviewed old and all outside pertinent medical records available.    All lab results personally reviewed and interpreted by me.  Lab Results   Component Value Date    WBC 5.67 03/20/2024    HGB 12.1 03/20/2024    HCT 38.3 03/20/2024    MCV 89 03/20/2024    MCH 28.2 03/20/2024    MCHC 31.6 (L) 03/20/2024    RDW 14.1 03/20/2024     03/20/2024    MPV 11.0 03/20/2024       Lab Results   Component Value Date     03/20/2024    K 4.3 03/20/2024     03/20/2024    CO2 27 03/20/2024    GLU 90 03/20/2024    BUN 29 (H) 03/20/2024    CALCIUM 10.0 03/20/2024    PROT 7.6 03/20/2024    ALBUMIN 3.9 03/20/2024    BILITOT 0.5 03/20/2024    AST 38 03/20/2024    ALKPHOS 82 03/20/2024    ALT 27 03/20/2024       Lab Results   Component Value Date    COLORU Yellow 03/20/2024    APPEARANCEUA Hazy (A) 03/20/2024    SPECGRAV 1.010 03/20/2024    PHUR 6.0 03/20/2024    PROTEINUA Negative 03/20/2024    KETONESU Negative 03/20/2024    LEUKOCYTESUR  "Trace (A) 03/20/2024    NITRITE Negative 03/20/2024    UROBILINOGEN Negative 03/20/2024       No results found for: "CRP"    No results found for: "CRISTOPHER", "RF", "SEDRATE", "CCPANTIBODIE"    No components found for: "25OHVITDTOT", "50ZBRLVG4", "50PCXAZF8", "METHODNOTE"    No results found for: "URICACID"    Lab Results   Component Value Date    HEPBSAG Negative 05/11/2010    HEPCAB Negative 05/11/2010       Imaging:  All imaging reviewed and independently interpreted by me.         ASSESSMENT / PLAN:     Nuha Bernstein is a 79 y.o. Black or  female with:    1. Muscle spasm  -Significant muscle spasm in bilateral trapezius, right worse than left.   -discussed about doing conservative management with topical creams such as lidocaine and menthol  -home exercises , handout given to patient  -she would benefit from physical therapy but she would want to do home exercises first and then let me know via secure message if she wants a referral  -reassurance     2. Chronic pain of both shoulders  -obtain XR of bilateral shoulders. Discussed with patient that she likely has a degree of OA due to age, will see.   -she has been taking tramadol 50 mg BID. She would like to taper as much as she can but advised her that this may not be possible as she has been on it for many years.     Follow up if symptoms worsen or fail to improve.    Method of contact with patient concerns: [facility]    Disclaimer:  This note is prepared using voice recognition software and as such is likely to have errors and has not been proof read. Please contact me for questions.     Time spent: 50 minutes in face to face discussion concerning diagnosis, prognosis, review of lab and test results, benefits of treatment as well as management of disease, counseling of patient and coordination of care between various health care providers.  Greater than half the time spent was used for coordination of care and counseling of " patient.    Rebecca Mustafa M.D.  Rheumatology  [facility]

## 2024-05-14 NOTE — PATIENT INSTRUCTIONS
Use bengay cream or biofreeze cream every day especially at night. But can use 3 times per day  Use lidocaine cream up to 3 times per day   Do exercises   Let me know if you want physical therapy

## 2024-06-19 ENCOUNTER — TELEPHONE (OUTPATIENT)
Dept: FAMILY MEDICINE | Facility: CLINIC | Age: 79
End: 2024-06-19
Payer: MEDICARE

## 2024-06-19 DIAGNOSIS — I10 ESSENTIAL HYPERTENSION: ICD-10-CM

## 2024-06-19 DIAGNOSIS — N18.32 TYPE 2 DIABETES MELLITUS WITH STAGE 3B CHRONIC KIDNEY DISEASE, WITHOUT LONG-TERM CURRENT USE OF INSULIN: ICD-10-CM

## 2024-06-19 DIAGNOSIS — Z00.00 ANNUAL PHYSICAL EXAM: ICD-10-CM

## 2024-06-19 DIAGNOSIS — E78.2 MIXED HYPERLIPIDEMIA: Primary | ICD-10-CM

## 2024-06-19 DIAGNOSIS — E11.22 TYPE 2 DIABETES MELLITUS WITH STAGE 3B CHRONIC KIDNEY DISEASE, WITHOUT LONG-TERM CURRENT USE OF INSULIN: ICD-10-CM

## 2024-06-19 DIAGNOSIS — R73.03 PREDIABETES: ICD-10-CM

## 2024-06-19 NOTE — TELEPHONE ENCOUNTER
----- Message from Echo Stewart sent at 6/19/2024  7:56 AM CDT -----  Type: Lab    Caller is requesting to schedule their Lab appointment prior to annual appointment.    Order is not listed in EPIC.  Please enter order and contact patient to schedule.    Name of Caller:self    Preferred Date and Time of Labs:    Date of [facility] Appointment: 6/24/2024    Where would they like the lab performed?[facility]    Would the patient rather a call back or a response via [facility]? call    Best Call Back Number:.[phone] (home)

## 2024-06-24 ENCOUNTER — LAB VISIT (OUTPATIENT)
Dept: LAB | Facility: HOSPITAL | Age: 79
End: 2024-06-24
Payer: MEDICARE

## 2024-06-24 ENCOUNTER — OFFICE VISIT (OUTPATIENT)
Dept: FAMILY MEDICINE | Facility: CLINIC | Age: 79
End: 2024-06-24
Payer: MEDICARE

## 2024-06-24 VITALS
OXYGEN SATURATION: 97 % | DIASTOLIC BLOOD PRESSURE: 68 MMHG | HEART RATE: 81 BPM | SYSTOLIC BLOOD PRESSURE: 134 MMHG | HEIGHT: 66 IN | BODY MASS INDEX: 27.56 KG/M2 | TEMPERATURE: 98 F | WEIGHT: 171.5 LBS

## 2024-06-24 DIAGNOSIS — N18.32 TYPE 2 DIABETES MELLITUS WITH STAGE 3B CHRONIC KIDNEY DISEASE, WITHOUT LONG-TERM CURRENT USE OF INSULIN: ICD-10-CM

## 2024-06-24 DIAGNOSIS — I10 ESSENTIAL HYPERTENSION: ICD-10-CM

## 2024-06-24 DIAGNOSIS — E11.22 TYPE 2 DIABETES MELLITUS WITH STAGE 3B CHRONIC KIDNEY DISEASE, WITHOUT LONG-TERM CURRENT USE OF INSULIN: ICD-10-CM

## 2024-06-24 DIAGNOSIS — E78.2 MIXED HYPERLIPIDEMIA: ICD-10-CM

## 2024-06-24 DIAGNOSIS — G89.29 CHRONIC MIDLINE LOW BACK PAIN WITHOUT SCIATICA: Primary | ICD-10-CM

## 2024-06-24 DIAGNOSIS — R73.03 PREDIABETES: ICD-10-CM

## 2024-06-24 DIAGNOSIS — E11.36 TYPE 2 DIABETES MELLITUS WITH DIABETIC CATARACT, WITHOUT LONG-TERM CURRENT USE OF INSULIN: ICD-10-CM

## 2024-06-24 DIAGNOSIS — I70.0 AORTIC ATHEROSCLEROSIS: ICD-10-CM

## 2024-06-24 DIAGNOSIS — F11.20 UNCOMPLICATED OPIOID DEPENDENCE: ICD-10-CM

## 2024-06-24 DIAGNOSIS — Z00.00 ANNUAL PHYSICAL EXAM: ICD-10-CM

## 2024-06-24 DIAGNOSIS — M54.50 CHRONIC MIDLINE LOW BACK PAIN WITHOUT SCIATICA: Primary | ICD-10-CM

## 2024-06-24 LAB
ALBUMIN SERPL BCP-MCNC: 3.8 G/DL (ref 3.5–5.2)
ALP SERPL-CCNC: 82 U/L (ref 55–135)
ALT SERPL W/O P-5'-P-CCNC: 29 U/L (ref 10–44)
ANION GAP SERPL CALC-SCNC: 10 MMOL/L (ref 8–16)
AST SERPL-CCNC: 38 U/L (ref 10–40)
BASOPHILS # BLD AUTO: 0.03 K/UL (ref 0–0.2)
BASOPHILS NFR BLD: 0.5 % (ref 0–1.9)
BILIRUB SERPL-MCNC: 0.5 MG/DL (ref 0.1–1)
BILIRUB UR QL STRIP: NEGATIVE
BUN SERPL-MCNC: 41 MG/DL (ref 8–23)
CALCIUM SERPL-MCNC: 10 MG/DL (ref 8.7–10.5)
CHLORIDE SERPL-SCNC: 105 MMOL/L (ref 95–110)
CHOLEST SERPL-MCNC: 198 MG/DL (ref 120–199)
CHOLEST/HDLC SERPL: 3.4 {RATIO} (ref 2–5)
CLARITY UR: CLEAR
CO2 SERPL-SCNC: 27 MMOL/L (ref 23–29)
COLOR UR: YELLOW
CREAT SERPL-MCNC: 1.5 MG/DL (ref 0.5–1.4)
DIFFERENTIAL METHOD BLD: ABNORMAL
EOSINOPHIL # BLD AUTO: 0.2 K/UL (ref 0–0.5)
EOSINOPHIL NFR BLD: 3.7 % (ref 0–8)
ERYTHROCYTE [DISTWIDTH] IN BLOOD BY AUTOMATED COUNT: 13.4 % (ref 11.5–14.5)
EST. GFR  (NO RACE VARIABLE): 35 ML/MIN/1.73 M^2
GLUCOSE SERPL-MCNC: 81 MG/DL (ref 70–110)
GLUCOSE UR QL STRIP: NEGATIVE
HCT VFR BLD AUTO: 35.2 % (ref 37–48.5)
HDLC SERPL-MCNC: 58 MG/DL (ref 40–75)
HDLC SERPL: 29.3 % (ref 20–50)
HGB BLD-MCNC: 11.4 G/DL (ref 12–16)
HGB UR QL STRIP: NEGATIVE
IMM GRANULOCYTES # BLD AUTO: 0.01 K/UL (ref 0–0.04)
IMM GRANULOCYTES NFR BLD AUTO: 0.2 % (ref 0–0.5)
KETONES UR QL STRIP: NEGATIVE
LDLC SERPL CALC-MCNC: 122.4 MG/DL (ref 63–159)
LEUKOCYTE ESTERASE UR QL STRIP: NEGATIVE
LYMPHOCYTES # BLD AUTO: 2.2 K/UL (ref 1–4.8)
LYMPHOCYTES NFR BLD: 35.9 % (ref 18–48)
MCH RBC QN AUTO: 29 PG (ref 27–31)
MCHC RBC AUTO-ENTMCNC: 32.4 G/DL (ref 32–36)
MCV RBC AUTO: 90 FL (ref 82–98)
MONOCYTES # BLD AUTO: 0.6 K/UL (ref 0.3–1)
MONOCYTES NFR BLD: 9.3 % (ref 4–15)
NEUTROPHILS # BLD AUTO: 3.1 K/UL (ref 1.8–7.7)
NEUTROPHILS NFR BLD: 50.4 % (ref 38–73)
NITRITE UR QL STRIP: NEGATIVE
NONHDLC SERPL-MCNC: 140 MG/DL
NRBC BLD-RTO: 0 /100 WBC
PH UR STRIP: 7 [PH] (ref 5–8)
PLATELET # BLD AUTO: 267 K/UL (ref 150–450)
PMV BLD AUTO: 11.2 FL (ref 9.2–12.9)
POTASSIUM SERPL-SCNC: 4.5 MMOL/L (ref 3.5–5.1)
PROT SERPL-MCNC: 7.5 G/DL (ref 6–8.4)
PROT UR QL STRIP: NEGATIVE
PTH-INTACT SERPL-MCNC: 115.3 PG/ML (ref 9–77)
RBC # BLD AUTO: 3.93 M/UL (ref 4–5.4)
SODIUM SERPL-SCNC: 142 MMOL/L (ref 136–145)
SP GR UR STRIP: 1.01 (ref 1–1.03)
TRIGL SERPL-MCNC: 88 MG/DL (ref 30–150)
TSH SERPL DL<=0.005 MIU/L-ACNC: 1.06 UIU/ML (ref 0.4–4)
URN SPEC COLLECT METH UR: NORMAL
UROBILINOGEN UR STRIP-ACNC: NEGATIVE EU/DL
WBC # BLD AUTO: 6.21 K/UL (ref 3.9–12.7)

## 2024-06-24 PROCEDURE — 1126F AMNT PAIN NOTED NONE PRSNT: CPT | Mod: HCNC,CPTII,S$GLB, | Performed by: FAMILY MEDICINE

## 2024-06-24 PROCEDURE — 1157F ADVNC CARE PLAN IN RCRD: CPT | Mod: HCNC,CPTII,S$GLB, | Performed by: FAMILY MEDICINE

## 2024-06-24 PROCEDURE — 1101F PT FALLS ASSESS-DOCD LE1/YR: CPT | Mod: HCNC,CPTII,S$GLB, | Performed by: FAMILY MEDICINE

## 2024-06-24 PROCEDURE — 3075F SYST BP GE 130 - 139MM HG: CPT | Mod: HCNC,CPTII,S$GLB, | Performed by: FAMILY MEDICINE

## 2024-06-24 PROCEDURE — 80061 LIPID PANEL: CPT | Mod: HCNC | Performed by: NURSE PRACTITIONER

## 2024-06-24 PROCEDURE — 82570 ASSAY OF URINE CREATININE: CPT | Mod: HCNC | Performed by: NURSE PRACTITIONER

## 2024-06-24 PROCEDURE — 84443 ASSAY THYROID STIM HORMONE: CPT | Mod: HCNC | Performed by: NURSE PRACTITIONER

## 2024-06-24 PROCEDURE — 36415 COLL VENOUS BLD VENIPUNCTURE: CPT | Mod: HCNC,PO | Performed by: NURSE PRACTITIONER

## 2024-06-24 PROCEDURE — 83970 ASSAY OF PARATHORMONE: CPT | Mod: HCNC | Performed by: NURSE PRACTITIONER

## 2024-06-24 PROCEDURE — 81003 URINALYSIS AUTO W/O SCOPE: CPT | Mod: HCNC | Performed by: NURSE PRACTITIONER

## 2024-06-24 PROCEDURE — 3288F FALL RISK ASSESSMENT DOCD: CPT | Mod: HCNC,CPTII,S$GLB, | Performed by: FAMILY MEDICINE

## 2024-06-24 PROCEDURE — 3072F LOW RISK FOR RETINOPATHY: CPT | Mod: HCNC,CPTII,S$GLB, | Performed by: FAMILY MEDICINE

## 2024-06-24 PROCEDURE — 83036 HEMOGLOBIN GLYCOSYLATED A1C: CPT | Mod: HCNC | Performed by: NURSE PRACTITIONER

## 2024-06-24 PROCEDURE — 99999 PR PBB SHADOW E&M-EST. PATIENT-LVL III: CPT | Mod: PBBFAC,HCNC,, | Performed by: FAMILY MEDICINE

## 2024-06-24 PROCEDURE — G2211 COMPLEX E/M VISIT ADD ON: HCPCS | Mod: HCNC,S$GLB,, | Performed by: FAMILY MEDICINE

## 2024-06-24 PROCEDURE — 99215 OFFICE O/P EST HI 40 MIN: CPT | Mod: HCNC,S$GLB,, | Performed by: FAMILY MEDICINE

## 2024-06-24 PROCEDURE — 3078F DIAST BP <80 MM HG: CPT | Mod: HCNC,CPTII,S$GLB, | Performed by: FAMILY MEDICINE

## 2024-06-24 PROCEDURE — 80053 COMPREHEN METABOLIC PANEL: CPT | Mod: HCNC | Performed by: NURSE PRACTITIONER

## 2024-06-24 PROCEDURE — 1159F MED LIST DOCD IN RCRD: CPT | Mod: HCNC,CPTII,S$GLB, | Performed by: FAMILY MEDICINE

## 2024-06-24 PROCEDURE — 85025 COMPLETE CBC W/AUTO DIFF WBC: CPT | Mod: HCNC | Performed by: NURSE PRACTITIONER

## 2024-06-24 RX ORDER — TRAMADOL HYDROCHLORIDE 50 MG/1
50 TABLET ORAL EVERY 8 HOURS PRN
Qty: 90 TABLET | Refills: 2 | Status: SHIPPED | OUTPATIENT
Start: 2024-06-24

## 2024-06-24 NOTE — PROGRESS NOTES
Chief Complaint   Patient presents with    Annual Exam       SUBJECTIVE:  Nuha Bernstein is a 79 y.o. female presents with   Chief Complaint   Patient presents with    Annual Exam     Conservative treatment with tylenol, NSAIDs, alternative treatments, complementary non opioid nerve/epileptic medication has failed with primary use.  Now opioid dependent for relief.  Has tried PT/OT, injections with mixed success.      Past Medical History:   Diagnosis Date    Arthritis     Back pain     CKD stage G3a/A3, GFR 45-59 and albumin creatinine ratio >300 mg/g 6/23/2020    Colon polyp     Hyperglycemia     Hyperlipidemia     Hypertension     Nuclear sclerosis of both eyes 10/31/2017    Prolapse of uterus     Type 2 diabetes mellitus without complication, without long-term current use of insulin     Type 2 diabetes mellitus without complication, without long-term current use of insulin      Past Surgical History:   Procedure Laterality Date    COLONOSCOPY N/A 12/15/2020    Procedure: COLONOSCOPY;  Surgeon: Antonio Maya MD;  Location: [address]);  Service: Endoscopy;  Laterality: N/A;  prep ins. 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     Drug use: Yes     Comment: Tramadol 2 tabs bid    Sexual activity: Not Currently     Partners: Male   Social History Narrative     4 children retired      Social Determinants of Health     Financial Resource Strain: Low Risk  (8/24/2021)    Overall Financial Resource Strain (CARDIA)     Difficulty of Paying Living  Expenses: Not hard at all   Food Insecurity: No Food Insecurity (8/24/2021)    Hunger Vital Sign     Worried About Running Out of Food in the Last Year: Never true     Ran Out of Food in the Last Year: Never true   Transportation Needs: No Transportation Needs (8/24/2021)    PRAPARE - Transportation     Lack of Transportation (Medical): No     Lack of Transportation (Non-Medical): No   Physical Activity: Sufficiently Active (8/24/2021)    Exercise Vital Sign     Days of Exercise per Week: 4 days     Minutes of Exercise per Session: 120 min   Stress: No Stress Concern Present (8/24/2021)    [employer] [employer] - Occupational Stress Questionnaire     Feeling of Stress : Not at all   Housing Stability: Unknown (8/24/2021)    Housing Stability Vital Sign     Unable to Pay for Housing in the Last Year: No     Unstable Housing in the Last Year: No     Family History   Problem Relation Name Age of Onset    Hypertension Mother      Cataracts Mother      Heart disease Mother      Hypertension Father      Cataracts Father      No Known Problems Sister      No Known Problems Brother      No Known Problems Brother      No Known Problems Maternal Aunt      No Known Problems Maternal Uncle      No Known Problems Paternal Aunt      No Known Problems Paternal Uncle      No Known Problems Maternal Grandmother      No Known Problems Maternal Grandfather      No Known Problems Paternal Grandmother      No Known Problems Paternal Grandfather      No Known Problems Other      Amblyopia Neg Hx      Blindness Neg Hx      Cancer Neg Hx      Diabetes Neg Hx      Glaucoma Neg Hx      Macular degeneration Neg Hx      Retinal detachment Neg Hx      Strabismus Neg Hx      Stroke Neg Hx      Thyroid disease Neg Hx       Current Outpatient Medications on File Prior to Visit   Medication Sig Dispense Refill    atorvastatin (LIPITOR) 40 MG tablet Take 1 tablet (40 mg total) by mouth once daily. 90 tablet 3    co-enzyme Q-10 30  "mg capsule Take 30 mg by mouth 3 (three) times daily.      lisinopriL (PRINIVIL,ZESTRIL) 40 MG tablet Take 1 tablet (40 mg total) by mouth once daily. 90 tablet 3    multivitamin/iron/folic acid (CENTRUM WOMEN ORAL) Take by mouth.      mv,hussein,iron,mn/folic acid/chol (HAIR-SKIN-NAILS, PABA, ORAL) Take by mouth.      OMEGA 3,6,9 COMBINATION NO.7 (OMEGA DHA ORAL) Take 830 mg by mouth once daily.      triamterene-hydrochlorothiazide 37.5-25 mg (DYAZIDE) 37.5-25 mg per capsule Take 1 capsule by mouth once daily. 90 capsule 3     No current facility-administered medications on file prior to visit.     Review of patient's allergies indicates:  No Known Allergies    ROS    OBJECTIVE:  /68   Pulse 81   Temp 97.5 °F (36.4 °C) (Oral)   Ht 5' 6" (1.676 m)   Wt 77.8 kg (171 lb 8.3 oz)   SpO2 97%   BMI 27.68 kg/m²   Wt Readings from Last 5 Encounters:   06/24/24 77.8 kg (171 lb 8.3 oz)   05/14/24 79.6 kg (175 lb 7.8 oz)   03/20/24 77.5 kg (170 lb 13.7 oz)   12/21/23 78.2 kg (172 lb 6.4 oz)   09/21/23 77 kg (169 lb 12.1 oz)       In usual state of health without signs of drug misuse/abuse today.  Specifically no alteration in cognition, signs of injections into the skin.  Patient areas of chronic pain in the back and large joints/and the neck  No new focal neurological findings noted.    Chronic aids to ambulation    Reviewed  and NARx: reviewed    Assessment/Plan:    1. Chronic midline low back pain without sciatica    2. Aortic atherosclerosis    3. Uncomplicated opioid dependence    4. Type 2 diabetes mellitus with diabetic cataract, without long-term current use of insulin        Discussed chronic pain diagnosis again, reviewed pain contract and patient is still in agreement with it. Continued to stress the importance of smoking cessation/avoiding tobacco products which can exacerbate the pain.  Continued to discuss the importance of good nutrition and some type of exercise program even if it's very basic and " light to help sustain function.  Continued to discuss risks, benefits and alternatives of care with high risk medication and we have decided to continue to use them.  Our goal continues to be stabilizing of function, quality of life and avoidance of medication side effects.  Patient voiced understanding.    Problem List Items Addressed This Visit       Chronic low back pain - Primary    Relevant Medications    traMADoL (ULTRAM) 50 mg tablet    Uncomplicated opioid dependence    Current Assessment & Plan     Stable and doing well  No other issues noted.           Type 2 diabetes mellitus with diabetic cataract    Overview     Noted by CLINT BAUGH OD  last documented on [id number]         Current Assessment & Plan     She has upcoming eye exam with optometry  We will get that done.           Aortic atherosclerosis    Current Assessment & Plan     She has this on the xray of shoulder  With ckd 3 and on lipitor 40 mg.  Continue with prevention            Multiple chronic conditions that are controlled with high risk medication use.  MDM is complex, f/u with her closely every 3 months to try to assess for any significant side effects/harms that could be life threatening.    Follow up in 3 months

## 2024-06-25 PROBLEM — I70.0 AORTIC ATHEROSCLEROSIS: Status: ACTIVE | Noted: 2024-06-25

## 2024-06-25 LAB
ALBUMIN/CREAT UR: NORMAL UG/MG (ref 0–30)
CREAT UR-MCNC: 57 MG/DL (ref 15–325)
ESTIMATED AVG GLUCOSE: 128 MG/DL (ref 68–131)
HBA1C MFR BLD: 6.1 % (ref 4–5.6)
MICROALBUMIN UR DL<=1MG/L-MCNC: <5 UG/ML

## 2024-06-25 NOTE — ASSESSMENT & PLAN NOTE
She has this on the xray of shoulder  With ckd 3 and on lipitor 40 mg.  Continue with prevention

## 2024-07-03 DIAGNOSIS — I10 ESSENTIAL HYPERTENSION: ICD-10-CM

## 2024-07-03 RX ORDER — LISINOPRIL 40 MG/1
40 TABLET ORAL
Qty: 90 TABLET | Refills: 3 | Status: SHIPPED | OUTPATIENT
Start: 2024-07-03

## 2024-07-03 NOTE — TELEPHONE ENCOUNTER
No care due was identified.  [facility] Embedded Care Due Messages. Reference number: [id number].   7/03/2024 12:06:55 AM CDT

## 2024-07-03 NOTE — TELEPHONE ENCOUNTER
Refill Decision Note   Nuha Amandeep  is requesting a refill authorization.  Brief Assessment and Rationale for Refill:  Approve     Medication Therapy Plan:         Comments:     Note composed:2:20 AM 07/03/2024

## 2024-07-31 ENCOUNTER — OFFICE VISIT (OUTPATIENT)
Dept: OPTOMETRY | Facility: CLINIC | Age: 79
End: 2024-07-31
Payer: MEDICARE

## 2024-07-31 DIAGNOSIS — E11.36 TYPE 2 DIABETES MELLITUS WITH DIABETIC CATARACT, WITHOUT LONG-TERM CURRENT USE OF INSULIN: ICD-10-CM

## 2024-07-31 DIAGNOSIS — H25.011 CORTICAL SENILE CATARACT, RIGHT: Primary | ICD-10-CM

## 2024-07-31 DIAGNOSIS — H25.13 NUCLEAR SCLEROSIS, BILATERAL: ICD-10-CM

## 2024-07-31 PROCEDURE — 1101F PT FALLS ASSESS-DOCD LE1/YR: CPT | Mod: HCNC,CPTII,S$GLB, | Performed by: OPTOMETRIST

## 2024-07-31 PROCEDURE — 1157F ADVNC CARE PLAN IN RCRD: CPT | Mod: HCNC,CPTII,S$GLB, | Performed by: OPTOMETRIST

## 2024-07-31 PROCEDURE — 3288F FALL RISK ASSESSMENT DOCD: CPT | Mod: HCNC,CPTII,S$GLB, | Performed by: OPTOMETRIST

## 2024-07-31 PROCEDURE — 1126F AMNT PAIN NOTED NONE PRSNT: CPT | Mod: HCNC,CPTII,S$GLB, | Performed by: OPTOMETRIST

## 2024-07-31 PROCEDURE — 1159F MED LIST DOCD IN RCRD: CPT | Mod: HCNC,CPTII,S$GLB, | Performed by: OPTOMETRIST

## 2024-07-31 PROCEDURE — 2023F DILAT RTA XM W/O RTNOPTHY: CPT | Mod: HCNC,CPTII,S$GLB, | Performed by: OPTOMETRIST

## 2024-07-31 PROCEDURE — 99214 OFFICE O/P EST MOD 30 MIN: CPT | Mod: HCNC,S$GLB,, | Performed by: OPTOMETRIST

## 2024-07-31 PROCEDURE — 1160F RVW MEDS BY RX/DR IN RCRD: CPT | Mod: HCNC,CPTII,S$GLB, | Performed by: OPTOMETRIST

## 2024-07-31 PROCEDURE — 99999 PR PBB SHADOW E&M-EST. PATIENT-LVL III: CPT | Mod: PBBFAC,HCNC,, | Performed by: OPTOMETRIST

## 2024-07-31 NOTE — PROGRESS NOTES
Subjective:       Patient ID: Nuha Bernstein is a 79 y.o. female      Chief Complaint   Patient presents with    Concerns About Ocular Health    Diabetic Eye Exam     History of Present Illness     Dls: 3/9/23 Dr. Leyva     78 y/o female presents today for diabetic eye exam.   Pt c/o blurry vision at distance and near ou.  Pt wears bifocal glasses     LBS ?     No tearing  No itching  No burning  No pain  No ha's  No floaters  No flashes    Eye meds  None    Pohx:   None    Fohx:   Cat - mother, father     Hemoglobin A1C       Date                     Value               Ref Range             Status                06/24/2024               6.1 (H)             4.0 - 5.6 %           Final                  03/20/2024               6.0 (H)             4.0 - 5.6 %           Final                   12/20/2023               6.1 (H)             4.0 - 5.6 %           Final                    Assessment/Plan:     1. Cortical senile cataract, right  2. Nuclear sclerosis, bilateral  Pt reports blurry vision. Visually significant cataract. Discussed diagnosis with patient and surgical process. Referral for cataract evaluation.     - Ambulatory referral/consult to Ophthalmology    3. Type 2 diabetes mellitus with diabetic cataract, without long-term current use of insulin  No diabetic retinopathy. Discussed with pt the effects of diabetes on vision, importance of good blood sugar control, compliance with meds, and follow up care with PCP. Return in 1 year for dilated eye exam, sooner PRN.      Follow up for cataract consult.

## 2024-08-01 ENCOUNTER — OFFICE VISIT (OUTPATIENT)
Dept: FAMILY MEDICINE | Facility: CLINIC | Age: 79
End: 2024-08-01
Payer: MEDICARE

## 2024-08-01 ENCOUNTER — LAB VISIT (OUTPATIENT)
Dept: LAB | Facility: HOSPITAL | Age: 79
End: 2024-08-01
Payer: MEDICARE

## 2024-08-01 VITALS
OXYGEN SATURATION: 96 % | WEIGHT: 174.5 LBS | DIASTOLIC BLOOD PRESSURE: 79 MMHG | HEART RATE: 87 BPM | SYSTOLIC BLOOD PRESSURE: 138 MMHG | BODY MASS INDEX: 28.04 KG/M2 | TEMPERATURE: 98 F | HEIGHT: 66 IN

## 2024-08-01 DIAGNOSIS — R22.0 FACIAL SWELLING: ICD-10-CM

## 2024-08-01 DIAGNOSIS — R22.0 FACIAL SWELLING: Primary | ICD-10-CM

## 2024-08-01 DIAGNOSIS — R74.8 ELEVATED LIVER ENZYMES: Primary | ICD-10-CM

## 2024-08-01 DIAGNOSIS — E11.22 TYPE 2 DIABETES MELLITUS WITH STAGE 3A CHRONIC KIDNEY DISEASE, WITHOUT LONG-TERM CURRENT USE OF INSULIN: ICD-10-CM

## 2024-08-01 DIAGNOSIS — N18.32 STAGE 3B CHRONIC KIDNEY DISEASE: ICD-10-CM

## 2024-08-01 DIAGNOSIS — I10 ESSENTIAL HYPERTENSION: ICD-10-CM

## 2024-08-01 DIAGNOSIS — N18.31 TYPE 2 DIABETES MELLITUS WITH STAGE 3A CHRONIC KIDNEY DISEASE, WITHOUT LONG-TERM CURRENT USE OF INSULIN: ICD-10-CM

## 2024-08-01 LAB
ALBUMIN SERPL BCP-MCNC: 3.9 G/DL (ref 3.5–5.2)
ALP SERPL-CCNC: 82 U/L (ref 55–135)
ALT SERPL W/O P-5'-P-CCNC: 45 U/L (ref 10–44)
ANION GAP SERPL CALC-SCNC: 8 MMOL/L (ref 8–16)
AST SERPL-CCNC: 50 U/L (ref 10–40)
BASOPHILS # BLD AUTO: 0.03 K/UL (ref 0–0.2)
BASOPHILS NFR BLD: 0.4 % (ref 0–1.9)
BILIRUB SERPL-MCNC: 0.4 MG/DL (ref 0.1–1)
BNP SERPL-MCNC: 11 PG/ML (ref 0–99)
BUN SERPL-MCNC: 36 MG/DL (ref 8–23)
CALCIUM SERPL-MCNC: 9.9 MG/DL (ref 8.7–10.5)
CHLORIDE SERPL-SCNC: 105 MMOL/L (ref 95–110)
CO2 SERPL-SCNC: 29 MMOL/L (ref 23–29)
CREAT SERPL-MCNC: 1.5 MG/DL (ref 0.5–1.4)
DIFFERENTIAL METHOD BLD: ABNORMAL
EOSINOPHIL # BLD AUTO: 0.2 K/UL (ref 0–0.5)
EOSINOPHIL NFR BLD: 3 % (ref 0–8)
ERYTHROCYTE [DISTWIDTH] IN BLOOD BY AUTOMATED COUNT: 14 % (ref 11.5–14.5)
EST. GFR  (NO RACE VARIABLE): 35.2 ML/MIN/1.73 M^2
GLUCOSE SERPL-MCNC: 92 MG/DL (ref 70–110)
HCT VFR BLD AUTO: 35.9 % (ref 37–48.5)
HGB BLD-MCNC: 11.6 G/DL (ref 12–16)
IMM GRANULOCYTES # BLD AUTO: 0.01 K/UL (ref 0–0.04)
IMM GRANULOCYTES NFR BLD AUTO: 0.1 % (ref 0–0.5)
LYMPHOCYTES # BLD AUTO: 1.4 K/UL (ref 1–4.8)
LYMPHOCYTES NFR BLD: 20.7 % (ref 18–48)
MCH RBC QN AUTO: 29.1 PG (ref 27–31)
MCHC RBC AUTO-ENTMCNC: 32.3 G/DL (ref 32–36)
MCV RBC AUTO: 90 FL (ref 82–98)
MONOCYTES # BLD AUTO: 0.5 K/UL (ref 0.3–1)
MONOCYTES NFR BLD: 7.8 % (ref 4–15)
NEUTROPHILS # BLD AUTO: 4.7 K/UL (ref 1.8–7.7)
NEUTROPHILS NFR BLD: 68 % (ref 38–73)
NRBC BLD-RTO: 0 /100 WBC
PLATELET # BLD AUTO: 265 K/UL (ref 150–450)
PMV BLD AUTO: 12.1 FL (ref 9.2–12.9)
POTASSIUM SERPL-SCNC: 4.6 MMOL/L (ref 3.5–5.1)
PROT SERPL-MCNC: 7.6 G/DL (ref 6–8.4)
RBC # BLD AUTO: 3.98 M/UL (ref 4–5.4)
SODIUM SERPL-SCNC: 142 MMOL/L (ref 136–145)
WBC # BLD AUTO: 6.95 K/UL (ref 3.9–12.7)

## 2024-08-01 PROCEDURE — 1126F AMNT PAIN NOTED NONE PRSNT: CPT | Mod: HCNC,CPTII,S$GLB,

## 2024-08-01 PROCEDURE — 3288F FALL RISK ASSESSMENT DOCD: CPT | Mod: HCNC,CPTII,S$GLB,

## 2024-08-01 PROCEDURE — 80053 COMPREHEN METABOLIC PANEL: CPT | Mod: HCNC

## 2024-08-01 PROCEDURE — 1101F PT FALLS ASSESS-DOCD LE1/YR: CPT | Mod: HCNC,CPTII,S$GLB,

## 2024-08-01 PROCEDURE — 99999 PR PBB SHADOW E&M-EST. PATIENT-LVL V: CPT | Mod: PBBFAC,HCNC,,

## 2024-08-01 PROCEDURE — 3078F DIAST BP <80 MM HG: CPT | Mod: HCNC,CPTII,S$GLB,

## 2024-08-01 PROCEDURE — 1157F ADVNC CARE PLAN IN RCRD: CPT | Mod: HCNC,CPTII,S$GLB,

## 2024-08-01 PROCEDURE — 36415 COLL VENOUS BLD VENIPUNCTURE: CPT | Mod: HCNC,PO

## 2024-08-01 PROCEDURE — 1159F MED LIST DOCD IN RCRD: CPT | Mod: HCNC,CPTII,S$GLB,

## 2024-08-01 PROCEDURE — 3075F SYST BP GE 130 - 139MM HG: CPT | Mod: HCNC,CPTII,S$GLB,

## 2024-08-01 PROCEDURE — 83880 ASSAY OF NATRIURETIC PEPTIDE: CPT | Mod: HCNC

## 2024-08-01 PROCEDURE — 1160F RVW MEDS BY RX/DR IN RCRD: CPT | Mod: HCNC,CPTII,S$GLB,

## 2024-08-01 PROCEDURE — 99214 OFFICE O/P EST MOD 30 MIN: CPT | Mod: HCNC,S$GLB,,

## 2024-08-01 PROCEDURE — 85025 COMPLETE CBC W/AUTO DIFF WBC: CPT | Mod: HCNC

## 2024-08-01 NOTE — PROGRESS NOTES
HPI     Chief Complaint:  Chief Complaint   Patient presents with    Facial Swelling       Nuha Bernstein is a 79 y.o. female with multiple medical diagnoses as listed in the medical history and problem list that presents for   Chief Complaint   Patient presents with    Facial Swelling   .   Patient is not known to me.      HPI    Returned from [place] x 2 days, family noticed some facial puffiness, worse yesterday and some improvement today. No swelling to lower legs. Does admit increased sodium intake when she was in [place]. Staying well hydrated with water. No chest pain or SOB. Denies orthopnea or SINGER. Does report getting new wig since being in [place] but no rash. No oral swelling or difficulty swallowing. Started taken Collagen approx 4 months ago because hair falling out. Also taking daily turmuric. No Other changes in daily meds. Using aspercreme topical cream.    Other concerns below  Assessment & Plan       Problem List Items Addressed This Visit          Cardiac/Vascular    Essential hypertension  The current medical regimen is effective;  continue present plan and medications.         Renal/    Stage 3 chronic kidney disease  Continue sodium restriction, and avoidance of nephrotoxic agents.         Endocrine    Type 2 diabetes mellitus with stage 3a chronic kidney disease, without long-term current use of insulin  Lab Results   Component Value Date    HGBA1C 6.1 (H) 06/24/2024     The current medical regimen is effective;  continue present plan and medications.       Other Visit Diagnoses       Facial swelling    -  Primary  Given historical noted improvement since yesterday could have been r/t poor diet and recetn travel. Encouraged low sodium diet but will complete labs given CKD history.   Return precautions  Education provided    Relevant Orders    CBC Auto Differential    Comprehensive Metabolic Panel    B-TYPE NATRIURETIC PEPTIDE            --------------------------------------------      Health  Maintenance:  Health Maintenance         Date Due Completion Date    TETANUS VACCINE 04/27/2023 4/27/2013    DEXA Scan 10/07/2024 10/7/2022    Hemoglobin A1c 12/24/2024 6/24/2024    Diabetes Urine Screening 06/24/2025 6/24/2024    Lipid Panel 06/24/2025 6/24/2024    Eye Exam 07/31/2025 7/31/2024    Colonoscopy 12/15/2025 12/15/2020            Health maintenance reviewed    Follow Up:  Follow up if symptoms worsen or fail to improve.    Discussed DDx, condition, and treatment.   Education sent to patient portal/included in after visit summary.  ED precautions given.   Notify provider if symptoms do not resolve or increase in severity.   Patient verbalizes understanding and agrees with plan of care.    Exam     Review of Systems:  (as noted above)  Review of Systems    Physical Exam:   Physical Exam  Constitutional:       General: She is not in acute distress.     Appearance: Normal appearance. She is not toxic-appearing.   HENT:      Head: Normocephalic.      Comments: No swelling appreciated on exam; mild indentation under eye glasses      Right Ear: Tympanic membrane normal.      Left Ear: Tympanic membrane normal.   Cardiovascular:      Rate and Rhythm: Normal rate and regular rhythm.      Heart sounds: Normal heart sounds.   Pulmonary:      Effort: Pulmonary effort is normal.      Breath sounds: Normal breath sounds.   Musculoskeletal:         General: No swelling.      Cervical back: Normal range of motion and neck supple.      Right lower leg: No edema.      Left lower leg: No edema.   Skin:     Capillary Refill: Capillary refill takes less than 2 seconds.   Neurological:      General: No focal deficit present.      Mental Status: She is alert and oriented to person, place, and time.   Psychiatric:         Mood and Affect: Mood normal.       Vitals:    08/01/24 1100 08/01/24 1127   BP: (!) 140/72 138/79   Pulse: 87    Temp: 98 °F (36.7 °C)    TempSrc: Oral    SpO2: 96%    Weight: 79.2 kg (174 lb 7.9 oz)   "  Height: 5' 6" (1.676 m)       Body mass index is 28.16 kg/m².        History     Past Medical History:  Past Medical History:   Diagnosis Date    Arthritis     Back pain     CKD stage G3a/A3, GFR 45-59 and albumin creatinine ratio >300 mg/g 6/23/2020    Colon polyp     Hyperglycemia     Hyperlipidemia     Hypertension     Nuclear sclerosis of both eyes 10/31/2017    Prolapse of uterus     Type 2 diabetes mellitus without complication, without long-term current use of insulin     Type 2 diabetes mellitus without complication, without long-term current use of insulin        Past Surgical History:  Past Surgical History:   Procedure Laterality Date    COLONOSCOPY N/A 12/15/2020    Procedure: COLONOSCOPY;  Surgeon: Antonio Maya MD;  Location: [facility] ([address] [place] [address]);  Service: Endoscopy;  Laterality: N/A;  prep ins. 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     Drug use: Yes     Comment: Tramadol 2 tabs bid    Sexual activity: Not Currently     Partners: Male   Social History Narrative     4 children retired      Social Determinants of Health     Financial Resource Strain: Low Risk  (8/24/2021)    Overall Financial Resource Strain (CARDIA)     Difficulty of Paying Living Expenses: Not hard at all   Food Insecurity: No Food Insecurity (8/24/2021)    Hunger Vital Sign     Worried About Running Out of Food in the Last Year: Never true     Ran Out of Food in the Last Year: Never true   Transportation Needs: No Transportation Needs (8/24/2021)    " PRAPARE - Transportation     Lack of Transportation (Medical): No     Lack of Transportation (Non-Medical): No   Physical Activity: Sufficiently Active (8/24/2021)    Exercise Vital Sign     Days of Exercise per Week: 4 days     Minutes of Exercise per Session: 120 min   Stress: No Stress Concern Present (8/24/2021)    [employer] [place] [employer] - Occupational Stress Questionnaire     Feeling of Stress : Not at all   Housing Stability: Unknown (8/24/2021)    Housing Stability Vital Sign     Unable to Pay for Housing in the Last Year: No     Unstable Housing in the Last Year: No       Family History:  Family History   Problem Relation Name Age of Onset    Hypertension Mother      Cataracts Mother      Heart disease Mother      Hypertension Father      Cataracts Father      No Known Problems Sister      No Known Problems Brother      No Known Problems Brother      No Known Problems Maternal Aunt      No Known Problems Maternal Uncle      No Known Problems Paternal Aunt      No Known Problems Paternal Uncle      No Known Problems Maternal Grandmother      No Known Problems Maternal Grandfather      No Known Problems Paternal Grandmother      No Known Problems Paternal Grandfather      No Known Problems Other      Amblyopia Neg Hx      Blindness Neg Hx      Cancer Neg Hx      Diabetes Neg Hx      Glaucoma Neg Hx      Macular degeneration Neg Hx      Retinal detachment Neg Hx      Strabismus Neg Hx      Stroke Neg Hx      Thyroid disease Neg Hx         Allergies and Medications: (updated and reviewed)  Review of patient's allergies indicates:  No Known Allergies  Current Outpatient Medications   Medication Sig Dispense Refill    atorvastatin (LIPITOR) 40 MG tablet Take 1 tablet (40 mg total) by mouth once daily. 90 tablet 3    co-enzyme Q-10 30 mg capsule Take 30 mg by mouth 3 (three) times daily.      lisinopriL (PRINIVIL,ZESTRIL) 40 MG tablet TAKE 1 TABLET BY MOUTH EVERY DAY 90 tablet 3     multivitamin/iron/folic acid (CENTRUM WOMEN ORAL) Take by mouth.      mv,hussein,iron,mn/folic acid/chol (HAIR-SKIN-NAILS, PABA, ORAL) Take by mouth.      OMEGA 3,6,9 COMBINATION NO.7 (OMEGA DHA ORAL) Take 830 mg by mouth once daily.      traMADoL (ULTRAM) 50 mg tablet Take 1 tablet (50 mg total) by mouth every 8 (eight) hours as needed for Pain. 90 tablet 2    triamterene-hydrochlorothiazide 37.5-25 mg (DYAZIDE) 37.5-25 mg per capsule Take 1 capsule by mouth once daily. 90 capsule 3     No current facility-administered medications for this visit.       Patient Care Team:  Pollo Lowe MD as PCP - General (Family Medicine)  Silvana Carr LPN as Licensed Practical Nurse         - The patient is given an After Visit Summary that lists all medications with directions, allergies, education, orders placed during this encounter and follow-up instructions.      - I have reviewed the patient's medical information including past medical, family, and social history sections including the medications and allergies.      - We discussed the patient's current medications.     This note was created by combination of typed  and MModal dictation.  Transcription errors may be present.  If there are any questions, please contact me.

## 2024-08-01 NOTE — PATIENT INSTRUCTIONS
Continue low sodium diet and return if symptoms fail to improve. Monitor for shortness of breath or chest pain.     Labs today

## 2024-08-02 ENCOUNTER — PATIENT MESSAGE (OUTPATIENT)
Dept: FAMILY MEDICINE | Facility: CLINIC | Age: 79
End: 2024-08-02
Payer: MEDICARE

## 2024-08-04 ENCOUNTER — PATIENT MESSAGE (OUTPATIENT)
Dept: RHEUMATOLOGY | Facility: CLINIC | Age: 79
End: 2024-08-04
Payer: MEDICARE

## 2024-08-04 DIAGNOSIS — M25.511 CHRONIC PAIN OF BOTH SHOULDERS: Primary | ICD-10-CM

## 2024-08-04 DIAGNOSIS — G89.29 CHRONIC PAIN OF BOTH SHOULDERS: Primary | ICD-10-CM

## 2024-08-04 DIAGNOSIS — M25.512 CHRONIC PAIN OF BOTH SHOULDERS: Primary | ICD-10-CM

## 2024-08-08 ENCOUNTER — HOSPITAL ENCOUNTER (OUTPATIENT)
Dept: RADIOLOGY | Facility: HOSPITAL | Age: 79
Discharge: HOME | End: 2024-08-08
Payer: MEDICARE

## 2024-08-08 DIAGNOSIS — N18.32 STAGE 3B CHRONIC KIDNEY DISEASE: ICD-10-CM

## 2024-08-08 DIAGNOSIS — R74.8 ELEVATED LIVER ENZYMES: ICD-10-CM

## 2024-08-08 PROCEDURE — 76700 US EXAM ABDOM COMPLETE: CPT | Mod: 26,,, | Performed by: RADIOLOGY

## 2024-08-08 PROCEDURE — 76700 US EXAM ABDOM COMPLETE: CPT | Mod: TC

## 2024-08-28 ENCOUNTER — TELEPHONE (OUTPATIENT)
Dept: ADMINISTRATIVE | Facility: CLINIC | Age: 79
End: 2024-08-28
Payer: MEDICARE

## 2024-08-29 ENCOUNTER — OFFICE VISIT (OUTPATIENT)
Dept: FAMILY MEDICINE | Facility: CLINIC | Age: 79
End: 2024-08-29
Payer: MEDICARE

## 2024-08-29 ENCOUNTER — PATIENT MESSAGE (OUTPATIENT)
Dept: RHEUMATOLOGY | Facility: CLINIC | Age: 79
End: 2024-08-29
Payer: MEDICARE

## 2024-08-29 VITALS
WEIGHT: 173.31 LBS | RESPIRATION RATE: 17 BRPM | TEMPERATURE: 98 F | SYSTOLIC BLOOD PRESSURE: 136 MMHG | BODY MASS INDEX: 27.85 KG/M2 | DIASTOLIC BLOOD PRESSURE: 78 MMHG | HEIGHT: 66 IN | OXYGEN SATURATION: 97 % | HEART RATE: 74 BPM

## 2024-08-29 DIAGNOSIS — R73.03 PREDIABETES: ICD-10-CM

## 2024-08-29 DIAGNOSIS — N18.32 STAGE 3B CHRONIC KIDNEY DISEASE: ICD-10-CM

## 2024-08-29 DIAGNOSIS — N81.4 UTEROVAGINAL PROLAPSE: ICD-10-CM

## 2024-08-29 DIAGNOSIS — J84.10 CALCIFIED GRANULOMA OF LUNG: ICD-10-CM

## 2024-08-29 DIAGNOSIS — F11.20 UNCOMPLICATED OPIOID DEPENDENCE: ICD-10-CM

## 2024-08-29 DIAGNOSIS — Z78.0 ENCOUNTER FOR OSTEOPOROSIS SCREENING IN ASYMPTOMATIC POSTMENOPAUSAL PATIENT: ICD-10-CM

## 2024-08-29 DIAGNOSIS — I10 ESSENTIAL HYPERTENSION: ICD-10-CM

## 2024-08-29 DIAGNOSIS — Z00.00 ENCOUNTER FOR PREVENTIVE HEALTH EXAMINATION: Primary | ICD-10-CM

## 2024-08-29 DIAGNOSIS — Z13.820 ENCOUNTER FOR OSTEOPOROSIS SCREENING IN ASYMPTOMATIC POSTMENOPAUSAL PATIENT: ICD-10-CM

## 2024-08-29 DIAGNOSIS — Z96.0 VAGINAL PESSARY IN SITU: ICD-10-CM

## 2024-08-29 DIAGNOSIS — E21.3 HYPERPARATHYROIDISM: ICD-10-CM

## 2024-08-29 DIAGNOSIS — I70.0 AORTIC ATHEROSCLEROSIS: ICD-10-CM

## 2024-08-29 DIAGNOSIS — E78.2 MIXED HYPERLIPIDEMIA: ICD-10-CM

## 2024-08-29 DIAGNOSIS — N81.11 CYSTOCELE, MIDLINE: ICD-10-CM

## 2024-08-29 PROBLEM — N18.30 STAGE 3 CHRONIC KIDNEY DISEASE: Status: RESOLVED | Noted: 2020-06-23 | Resolved: 2024-08-29

## 2024-08-29 PROCEDURE — 1159F MED LIST DOCD IN RCRD: CPT | Mod: CPTII,S$GLB,, | Performed by: NURSE PRACTITIONER

## 2024-08-29 PROCEDURE — 1101F PT FALLS ASSESS-DOCD LE1/YR: CPT | Mod: CPTII,S$GLB,, | Performed by: NURSE PRACTITIONER

## 2024-08-29 PROCEDURE — 1123F ACP DISCUSS/DSCN MKR DOCD: CPT | Mod: CPTII,S$GLB,, | Performed by: NURSE PRACTITIONER

## 2024-08-29 PROCEDURE — 1160F RVW MEDS BY RX/DR IN RCRD: CPT | Mod: CPTII,S$GLB,, | Performed by: NURSE PRACTITIONER

## 2024-08-29 PROCEDURE — 3288F FALL RISK ASSESSMENT DOCD: CPT | Mod: CPTII,S$GLB,, | Performed by: NURSE PRACTITIONER

## 2024-08-29 PROCEDURE — 1126F AMNT PAIN NOTED NONE PRSNT: CPT | Mod: CPTII,S$GLB,, | Performed by: NURSE PRACTITIONER

## 2024-08-29 PROCEDURE — 3075F SYST BP GE 130 - 139MM HG: CPT | Mod: CPTII,S$GLB,, | Performed by: NURSE PRACTITIONER

## 2024-08-29 PROCEDURE — 99999 PR PBB SHADOW E&M-EST. PATIENT-LVL V: CPT | Mod: PBBFAC,,, | Performed by: NURSE PRACTITIONER

## 2024-08-29 PROCEDURE — 3078F DIAST BP <80 MM HG: CPT | Mod: CPTII,S$GLB,, | Performed by: NURSE PRACTITIONER

## 2024-08-29 PROCEDURE — G0439 PPPS, SUBSEQ VISIT: HCPCS | Mod: S$GLB,,, | Performed by: NURSE PRACTITIONER

## 2024-08-29 NOTE — PROGRESS NOTES
"  Nuha Bernstein presented for a  Medicare AWV and comprehensive Health Risk Assessment today. The following components were reviewed and updated:    Medical history  Family History  Social history  Allergies and Current Medications  Health Risk Assessment  Health Maintenance  Care Team         ** See Completed Assessments for Annual Wellness Visit within the encounter summary.**         The following assessments were completed:  Living Situation  CAGE  Depression Screening  Timed Get Up and Go  Whisper Test  Cognitive Function Screening  Nutrition Screening  ADL Screening  PAQ Screening      Opioid documentation:  Patient does have a current opioid prescription.    Patient accepted further discussion regarding opioid medication use.    Patient is currently taking tramadol narcotic for generalized pain.      Pain level today is 0/10.     In addition to narcotic pain medications, patient is also using physical therapy, NSAIDs, acetaminophen, and topical analgesic for pain control.     Patient is not followed by a specialist currently for their pain and will not be referred today.     Patient's opioid risk potential based on ORT-OUD tool:       Ric each box that applies   No   Yes     Family history of substance abuse   Alcohol [x] []   Illegal drugs [x] []   Rx drugs [x] []     Personal history of substance abuse   Alcohol [x] []   Illegal drugs [x] []   Rx drugs [x] []     Age between 16-45 years   [x]   []     Patient with ADD, OCD, Bipolar disorder, schizoprenia   [x]   []     Patient with depression   [x]   []                         Scoring total                                             0                    Non-opioid treatment options have been discussed today and added to the patient's after visit summary.        Vitals:    08/29/24 1012   BP: 136/78   Pulse: 74   Resp: 17   Temp: 97.7 °F (36.5 °C)   TempSrc: Oral   SpO2: 97%   Weight: 78.6 kg (173 lb 4.5 oz)   Height: 5' 6" (1.676 m)     Body mass index is " 27.97 kg/m².  Physical Exam  Vitals and nursing note reviewed.   Constitutional:       General: She is not in acute distress.     Appearance: Normal appearance. She is well-developed and well-groomed. She is not ill-appearing.   HENT:      Head: Normocephalic and atraumatic.      Right Ear: External ear normal.      Left Ear: External ear normal.      Nose: Nose normal.      Mouth/Throat:      Lips: Pink.      Mouth: Mucous membranes are moist.   Eyes:      General: Lids are normal. Vision grossly intact. Gaze aligned appropriately.      Conjunctiva/sclera: Conjunctivae normal.   Neck:      Trachea: Phonation normal.   Pulmonary:      Effort: Pulmonary effort is normal. No accessory muscle usage or respiratory distress.   Abdominal:      General: Abdomen is flat. There is no distension.   Musculoskeletal:      Cervical back: Neck supple.   Skin:     General: Skin is warm and dry.      Findings: No rash.   Neurological:      General: No focal deficit present.      Mental Status: She is alert and oriented to person, place, and time. Mental status is at baseline.      Motor: No abnormal muscle tone.      Gait: Gait normal.   Psychiatric:         Attention and Perception: Attention and perception normal.         Mood and Affect: Mood and affect normal.         Speech: Speech normal.         Behavior: Behavior normal. Behavior is cooperative.         Thought Content: Thought content normal.         Cognition and Memory: Cognition and memory normal.         Judgment: Judgment normal.               Diagnoses and health risks identified today and associated recommendations/orders:    1. Encounter for preventive health examination  Health maintenance reviewed and up to date, will f/u with PCP for routine preventative care  Review for Opioid Screening: Pt does have Rx for Opioids   Review for Substance Use Disorders: Patient does not use substance     2. Stage 3b chronic kidney disease  BP controlled on ACE and diuretic,  maintain adequate hydration and decrease use of nephrotoxic agents     3. Hyperparathyroidism  CKD stable with normal calcium, continue current plan     4. Uncomplicated opioid dependence  On tramadol for chronic joint pain, managed by PCP without complication and close surveillance     5. Calcified granuloma of lung  Without acute exacerbation or infection   Aggressive environmental control.  Discussed medication dosage, usage, side effects, and goals of treatment in detail.    6. Aortic atherosclerosis  Uncomplicated and asymptomatic, BP controlled, on statin without anticoagulation     7. Mixed hyperlipidemia  Continue dietary measures.  Continue regular exercise.  Lipid-lowering medications:  continue statin and fish oil daily .    8. Essential hypertension  Continue current treatment regimen.  Dietary sodium restriction.  Regular aerobic exercise.  Weight loss.  Patient Education: Reviewed risks of hypertension and principles of treatment.    9. Prediabetes  The patient is asked to make an attempt to improve diet and exercise patterns to aid in medical management of this problem.    10. Uterovaginal prolapse  Chronic urinary pressure, removes monthly to adjust, f/u with urogyn for further management   - Ambulatory referral/consult to Urogynecology; Future    11. Cystocele, midline  Chronic urinary pressure, removes monthly to adjust, f/u with urogyn for further management   - Ambulatory referral/consult to Urogynecology; Future    12. Vaginal pessary in situ  Chronic urinary pressure, removes monthly to adjust, f/u with urogyn for further management   - Ambulatory referral/consult to Urogynecology; Future    13. Encounter for osteoporosis screening in asymptomatic postmenopausal patient  - DXA Bone Density Axial Skeleton 1 or more sites; Future      Provided Nuha with a 5-10 year written screening schedule and personal prevention plan. Recommendations were developed using the USPSTF age appropriate  recommendations. Education, counseling, and referrals were provided as needed. After Visit Summary printed and given to patient which includes a list of additional screenings\tests needed.    Follow up in about 1 year (around 8/29/2025).    Nenita Jesus NP  I offered to discuss advanced care planning, including how to pick a person who would make decisions for you if you were unable to make them for yourself, called a health care power of , and what kind of decisions you might make such as use of life sustaining treatments such as ventilators and tube feeding when faced with a life limiting illness recorded on a living will that they will need to know. (How you want to be cared for as you near the end of your natural life)     X  Patient has advanced directives on file, which we reviewed, and they do not wish to make changes.

## 2024-08-29 NOTE — PATIENT INSTRUCTIONS
Counseling and Referral of Other Preventative  (Italic type indicates deductible and co-insurance are waived)    Patient Name: Nuha Bernstein  Today's Date: 8/29/2024    Health Maintenance       Date Due Completion Date    TETANUS VACCINE 04/27/2023 4/27/2013    DEXA Scan 10/07/2024 10/7/2022    Diabetes Urine Screening 06/24/2025 6/24/2024    Lipid Panel 06/24/2025 6/24/2024    Hemoglobin A1c 06/24/2025 6/24/2024    Eye Exam 07/31/2025 7/31/2024    Colonoscopy 12/15/2025 12/15/2020        Orders Placed This Encounter   Procedures    DXA Bone Density Axial Skeleton 1 or more sites     The following information is provided to all patients.  This information is to help you find resources for any of the problems found today that may be affecting your health:                  Living healthy guide: www.[facility].[place].gov      Understanding Diabetes: www.diabetes.org      Eating healthy: www.cdc.gov/healthyweight      CDC home safety checklist: www.cdc.gov/steadi/patient.html      Agency on Aging: www.goea.[place].[facility]      Alcoholics anonymous (AA): www.aa.org      Physical Activity: www.[place].nih.gov/po4qcsp      Tobacco use: www.quitwithusla.org

## 2024-09-13 ENCOUNTER — CLINICAL SUPPORT (OUTPATIENT)
Dept: REHABILITATION | Facility: HOSPITAL | Age: 79
End: 2024-09-13
Attending: STUDENT IN AN ORGANIZED HEALTH CARE EDUCATION/TRAINING PROGRAM
Payer: MEDICARE

## 2024-09-13 DIAGNOSIS — M25.511 CHRONIC PAIN OF BOTH SHOULDERS: ICD-10-CM

## 2024-09-13 DIAGNOSIS — M25.612 IMPAIRED RANGE OF MOTION OF BOTH SHOULDERS: Primary | ICD-10-CM

## 2024-09-13 DIAGNOSIS — G89.29 CHRONIC PAIN OF BOTH SHOULDERS: ICD-10-CM

## 2024-09-13 DIAGNOSIS — R29.3 POSTURE IMBALANCE: ICD-10-CM

## 2024-09-13 DIAGNOSIS — R29.898 SHOULDER WEAKNESS: ICD-10-CM

## 2024-09-13 DIAGNOSIS — M25.611 IMPAIRED RANGE OF MOTION OF BOTH SHOULDERS: Primary | ICD-10-CM

## 2024-09-13 DIAGNOSIS — M25.512 CHRONIC PAIN OF BOTH SHOULDERS: ICD-10-CM

## 2024-09-13 PROCEDURE — 97112 NEUROMUSCULAR REEDUCATION: CPT | Mod: HCNC,PN

## 2024-09-13 PROCEDURE — 97140 MANUAL THERAPY 1/> REGIONS: CPT | Mod: HCNC,PN

## 2024-09-13 PROCEDURE — 97161 PT EVAL LOW COMPLEX 20 MIN: CPT | Mod: HCNC,PN

## 2024-09-13 NOTE — PLAN OF CARE
[facility]  Physical Therapy Initial Evaluation    Date: 9/13/2024   Name: Nuha Bernstein  Clinic Number: [id number]    Therapy Diagnosis:   Encounter Diagnoses   Name Primary?    Chronic pain of both shoulders     Impaired range of motion of both shoulders Yes    Shoulder weakness     Posture imbalance      Physician: Rebecca Mustafa*    Physician Orders: PT Eval and Treat   Medical Diagnosis from Referral: Chronic pain of both shoulders   Evaluation Date: 9/13/2024  Authorization Period Expiration: 12-31-24  Plan of Care Expiration: 12-13-24  Visit # / Visits authorized: 1/ 20    Progress Note Due: 10-13-24  FOTO: 1/1    Precautions: Standard, DM, HTN    Time In: 8:00 am  Time Out: 8:45 am  Total Appointment Time (timed & untimed codes): 45 minutes    Subjective   Date of onset: several years  History of current condition - Nuha reports: that she feels shoulder pain. She feels heaviness in the shoulders. She states she feels pain radiating down towards elbows. She denies any neck pain. Pt denies any tingling sensation. She states she fees some numbness in B upper traps     EVETTE: No traumatic event.   Any fall: No   Any popping or clicking or locking: no   Any difficult to elevate shoulder flexion, abd, ER, or IR:  yes   Does pain radiates:  towards elbows   Pain constant or intermittent: intermittent   Has done any injections:  no    Pain:  Current 0/10, worst 9/10, best 0/10   Location: bilateral shoulder   Description: Aching  Aggravating Factors: Standing, Flexing, and Lifting, house shores    Easing Factors: Tramadol     Prior Therapy: yes. Long time   Social History:  lives with their family  Occupation: Retired   Prior Level of Function: Independent   Current Level of Function: Independent     Pts goals: decrease B shoulder pain     Imaging, bone scan films:   FINDINGS:  There is no fracture, dislocation, or bony erosion. There is calcification of the aorta.  There is  degenerative change at the acromioclavicular joints bilaterally.     Medical History:   Past Medical History:   Diagnosis Date    Arthritis     Back pain     CKD stage G3a/A3, GFR 45-59 and albumin creatinine ratio >300 mg/g 6/23/2020    Colon polyp     Hyperglycemia     Hyperlipidemia     Hypertension     Nuclear sclerosis of both eyes 10/31/2017    Prolapse of uterus     Type 2 diabetes mellitus without complication, without long-term current use of insulin     Type 2 diabetes mellitus without complication, without long-term current use of insulin        Surgical History:   Nuha Bernstein  has a past surgical history that includes Right Thumb; vaginal dilator; Colonoscopy w/ polypectomy; and Colonoscopy (N/A, 12/15/2020).    Medications:   Nuha has a current medication list which includes the following prescription(s): atorvastatin, co-enzyme q-10, lisinopril, multivitamin/iron/folic acid, mv,hussein,iron,mn/folic acid/chol, omega 3,6,9 combination no.7, tramadol, and triamterene-hydrochlorothiazide 37.5-25 mg.    Allergies:   Review of patient's allergies indicates:  No Known Allergies       Objective       Observation:     Posture Alignment: slouched posture;rounded shoulders ;forward head   Shoulder rotation at rest: protracted     Sensation: Light touch: intact     DTR: intact     GAIT DEVIATIONS: Nuha displays decreased arm swing;    Cervical Range of Motion:    Degrees Pain   Flexion WFL No/ upper traps pain      Extension WFL no     Right Rotation WFL no     Left Rotation WFL No     Right Side Bending WFL No. Upper traps pain   Left Side Bending WFL No. Upper traps pain      Cervical Special Tests:  Compression: negative  Spurling's:  negative      Active Range of Motion in  (degrees):   Shoulder Right Left   Flexion 130 deg with pain 131 with min pain    Abduction 129 deg with pain 130 deg with pain    ER Functional reach T1 with pain  Functional reach T4 min pain   IR  Functional reach T12 with pain  Functional reach T10 with min pain      Upper Extremity Strength  (R) UE  (L) UE    Shoulder flexion: 3+/5 Shoulder flexion: 4-/5   Shoulder Abduction: 3+/5 Shoulder abduction: 4-/5   Shoulder ER 3+/5 Shoulder ER 4-/5   Shoulder IR 3+/5 Shoulder IR 4-/5       Special Tests:    Impingement   Right Left   Neer's  positive  positive   Carlita-Darrell  positive  positive     Rotator Cuff:     Right Left   Drop Arm Test  negative  negative   Subscapularis Lift Off Test  negative  negative   ER Lag Sign  negative  negative   IR Lag Sign  negative  negative   Empty Can Test  positive positive   Full Can Test positive positive   Scapular Retraction Test positive positive   AC Joint/Biceps:     Right Left   AC Joint Compression Test negative negative   Speed's test positive positive       Joint Mobility:    Cervicothoracic: decrease     Palpation: increase Supraspinatus Region pain    Scapula: protracted     Flexibility: decrease upper traps and levator scap flexibility                    TREATMENT   Treatment Time In: 8:22 am  Treatment Time Out: 8:45 am  Total Treatment time separate from Evaluation: 23 minutes      Nuha participated in neuromuscular re-education activities to improve: Proprioception, Posture, and muscles motor control  for 10 minutes. The following activities were included:  Seated upper traps +postural control  Seated levatal scap + postural control   Seated Scap retraction    Nuha participated in manual therapy to improve decrease soft tissue restriction  for 13  minutes, including:  B shoulder PROM and oscillation and grade I and II    Home Exercises and Patient Education Provided    Education provided:   - Perform HEP 2 times per day     Written Home Exercises Provided: Patient instructed to cont prior HEP.  Exercises were reviewed and Nuha was able to demonstrate them prior to the end of the session.  Nuha demonstrated good  understanding of the education provided.     See EMR under Patient  Instructions for exercises provided 9/13/2024.    Assessment   Nuha is a 79 y.o. female referred to outpatient Physical Therapy with a medical diagnosis of Chronic pain of both shoulders . Pt presents to therapy with sign and symptoms of B supraspinatus tendinitis (R>L pain). Pt demonstrated decrease seated and standing posture with rounded shoulder, slouched posture and forward head. Pt demonstrated decrease shoulder AROM and decrease UE muscles strength. Pt has decrease B upper traps and levator scap flexibility. During palpation, severe pain at B supraspinatus region. Pt will benefit from skilled PT services to return to PLOF     Pt prognosis is Good.   Pt will benefit from skilled outpatient Physical Therapy to address the deficits stated above and in the chart below, provide pt/family education, and to maximize pt's level of independence.     Plan of care discussed with patient: Yes  Pt's spiritual, cultural and educational needs considered and patient is agreeable to the plan of care and goals as stated below:     Anticipated Barriers for therapy: Scheduling issues     Medical Necessity is demonstrated by the following  History  Co-morbidities and personal factors that may impact the plan of care [x] LOW: no personal factors / co-morbidities  [] MODERATE: 1-2 personal factors / co-morbidities  [] HIGH: 3+ personal factors / co-morbidities    Moderate / High Support Documentation:   Co-morbidities affecting plan of care:   Arthritis   Back pain   CKD stage G3a/A3, GFR 45-59 and albumin creatinine ratio >300 mg/g   Colon polyp   Hyperglycemia   Hyperlipidemia   Hypertension   Nuclear sclerosis of both eyes   Prolapse of uterus   Type 2 diabetes mellitus without complication, without long-term current use of insulin   Type 2 diabetes mellitus without complication, without long-term current use of insulin       Personal Factors:   age     Examination  Body Structures and Functions, activity limitations and  participation restrictions that may impact the plan of care [x] LOW: addressing 1-2 elements  [] MODERATE: 3+ elements  [] HIGH: 4+ elements (please support below)    Moderate / High Support Documentation: AROM, strength,      Clinical Presentation [x] LOW: stable  [] MODERATE: Evolving  [] HIGH: Unstable     Decision Making/ Complexity Score: low       GOALS: Short Term Goals: 4 weeks  1.Report decreased in pain at worse less than  <   / =  5  /10  to increase tolerance for functional mobility. On going  2. Pt to improve B shoulders range of motion by 25% to allow for improved functional mobility to allow for improvement in IADLs. On going  3. Increased B UE MMT 1/2 grade to increase tolerance for ADL and work activities. On going  4. Pt to report be conscious of impaired sitting and standing posture daily to decrease pain. On going  5. Pt to tolerate HEP to improve ROM and independence with ADL's. On going    Long Term Goals: 8 weeks  1.Report decreased in pain at worse less than  <   / =  2  /10  to increase tolerance for functional mobility. On going  2.  Patient will demonstrate improved overall function per FOTO Survey to CI = at least 1% but < 20% impaired, limited or restricted score or less.On going  3.Increased B UE MMT 1 grade to increase tolerance for ADL and work activities.On going  4. Pt to report and demonstrate proper posture in standing and sitting to decrease pain. On going  5. Pt to be Independent with HEP to improve ROM and independence with ADL's.On going  6. Pt to improve range of motion by 75% to allow for improved functional mobility to allow for improvement in IADLs. On going    Plan   Plan of care Certification: 9/13/2024 to 12-13-24.    Outpatient Physical Therapy 2 times weekly for 12 weeks to include the following interventions: Manual Therapy, Moist Heat/ Ice, Neuromuscular Re-ed, Patient Education, Self Care, Therapeutic Activities, and Therapeutic Exercise. Dry needling    Arsalan  JAY Srinivasan      I CERTIFY THE NEED FOR THESE SERVICES FURNISHED UNDER THIS PLAN OF TREATMENT AND WHILE UNDER MY CARE   Physician's comments:     Physician's Signature: ___________________________________________________

## 2024-09-17 ENCOUNTER — TELEPHONE (OUTPATIENT)
Dept: UROGYNECOLOGY | Facility: CLINIC | Age: 79
End: 2024-09-17
Payer: MEDICARE

## 2024-09-17 NOTE — TELEPHONE ENCOUNTER
"Placed call to screen pt for [facility] candidacy.  Referring provider: Nenita Jesus NP   Dx: POP  Additional Dx:     Patient denies any GI/CRS complaints- reporting has been seen by Dr. Mccartney and Lidya and was fit for pessary in 2019. States still currently has that pessary- has been changing/cleaning pessary by self at home. Reports now would like to come in for pessary maintenance and to make sure everything is "still fitting right and is ok." Informed patient can come in sooner to see LAMAR Bose for PC. Assisted rescheduling appt, call ended.   "

## 2024-09-19 ENCOUNTER — CLINICAL SUPPORT (OUTPATIENT)
Dept: REHABILITATION | Facility: HOSPITAL | Age: 79
End: 2024-09-19
Payer: MEDICARE

## 2024-09-19 DIAGNOSIS — M25.512 CHRONIC PAIN OF BOTH SHOULDERS: Primary | ICD-10-CM

## 2024-09-19 DIAGNOSIS — M25.511 CHRONIC PAIN OF BOTH SHOULDERS: Primary | ICD-10-CM

## 2024-09-19 DIAGNOSIS — M25.612 IMPAIRED RANGE OF MOTION OF BOTH SHOULDERS: ICD-10-CM

## 2024-09-19 DIAGNOSIS — G89.29 CHRONIC PAIN OF BOTH SHOULDERS: Primary | ICD-10-CM

## 2024-09-19 DIAGNOSIS — R29.3 POSTURE IMBALANCE: ICD-10-CM

## 2024-09-19 DIAGNOSIS — M25.611 IMPAIRED RANGE OF MOTION OF BOTH SHOULDERS: ICD-10-CM

## 2024-09-19 DIAGNOSIS — R29.898 SHOULDER WEAKNESS: ICD-10-CM

## 2024-09-19 PROCEDURE — 97110 THERAPEUTIC EXERCISES: CPT | Mod: HCNC,PN,CQ

## 2024-09-23 ENCOUNTER — OFFICE VISIT (OUTPATIENT)
Dept: FAMILY MEDICINE | Facility: CLINIC | Age: 79
End: 2024-09-23
Payer: MEDICARE

## 2024-09-23 VITALS
BODY MASS INDEX: 28.95 KG/M2 | HEART RATE: 85 BPM | HEIGHT: 65 IN | OXYGEN SATURATION: 95 % | DIASTOLIC BLOOD PRESSURE: 86 MMHG | SYSTOLIC BLOOD PRESSURE: 136 MMHG | TEMPERATURE: 98 F | WEIGHT: 173.75 LBS

## 2024-09-23 DIAGNOSIS — R79.89 LFT ELEVATION: ICD-10-CM

## 2024-09-23 DIAGNOSIS — Z00.00 ANNUAL PHYSICAL EXAM: Primary | ICD-10-CM

## 2024-09-23 DIAGNOSIS — M54.50 CHRONIC MIDLINE LOW BACK PAIN WITHOUT SCIATICA: ICD-10-CM

## 2024-09-23 DIAGNOSIS — G89.29 CHRONIC MIDLINE LOW BACK PAIN WITHOUT SCIATICA: ICD-10-CM

## 2024-09-23 PROCEDURE — 1159F MED LIST DOCD IN RCRD: CPT | Mod: HCNC,CPTII,S$GLB, | Performed by: FAMILY MEDICINE

## 2024-09-23 PROCEDURE — 3288F FALL RISK ASSESSMENT DOCD: CPT | Mod: HCNC,CPTII,S$GLB, | Performed by: FAMILY MEDICINE

## 2024-09-23 PROCEDURE — 1101F PT FALLS ASSESS-DOCD LE1/YR: CPT | Mod: HCNC,CPTII,S$GLB, | Performed by: FAMILY MEDICINE

## 2024-09-23 PROCEDURE — 3075F SYST BP GE 130 - 139MM HG: CPT | Mod: HCNC,CPTII,S$GLB, | Performed by: FAMILY MEDICINE

## 2024-09-23 PROCEDURE — 1157F ADVNC CARE PLAN IN RCRD: CPT | Mod: HCNC,CPTII,S$GLB, | Performed by: FAMILY MEDICINE

## 2024-09-23 PROCEDURE — 1126F AMNT PAIN NOTED NONE PRSNT: CPT | Mod: HCNC,CPTII,S$GLB, | Performed by: FAMILY MEDICINE

## 2024-09-23 PROCEDURE — 3079F DIAST BP 80-89 MM HG: CPT | Mod: HCNC,CPTII,S$GLB, | Performed by: FAMILY MEDICINE

## 2024-09-23 PROCEDURE — 99397 PER PM REEVAL EST PAT 65+ YR: CPT | Mod: HCNC,S$GLB,, | Performed by: FAMILY MEDICINE

## 2024-09-23 PROCEDURE — 99999 PR PBB SHADOW E&M-EST. PATIENT-LVL III: CPT | Mod: PBBFAC,HCNC,, | Performed by: FAMILY MEDICINE

## 2024-09-23 RX ORDER — TRAMADOL HYDROCHLORIDE 50 MG/1
50 TABLET ORAL EVERY 8 HOURS PRN
Qty: 90 TABLET | Refills: 2 | Status: SHIPPED | OUTPATIENT
Start: 2024-09-23

## 2024-09-23 NOTE — PROGRESS NOTES
"Chief Complaint   Patient presents with    Follow-up       SUBJECTIVE:   79 y.o. female for annual routine checkup.    Current Outpatient Medications   Medication Sig Dispense Refill    atorvastatin (LIPITOR) 40 MG tablet Take 1 tablet (40 mg total) by mouth once daily. 90 tablet 3    co-enzyme Q-10 30 mg capsule Take 30 mg by mouth 3 (three) times daily.      lisinopriL (PRINIVIL,ZESTRIL) 40 MG tablet TAKE 1 TABLET BY MOUTH EVERY DAY 90 tablet 3    multivitamin/iron/folic acid (CENTRUM WOMEN ORAL) Take by mouth.      mv,hussein,iron,mn/folic acid/chol (HAIR-SKIN-NAILS, PABA, ORAL) Take by mouth.      OMEGA 3,6,9 COMBINATION NO.7 (OMEGA DHA ORAL) Take 830 mg by mouth once daily.      triamterene-hydrochlorothiazide 37.5-25 mg (DYAZIDE) 37.5-25 mg per capsule Take 1 capsule by mouth once daily. 90 capsule 3    traMADoL (ULTRAM) 50 mg tablet Take 1 tablet (50 mg total) by mouth every 8 (eight) hours as needed for Pain. 90 tablet 2     No current facility-administered medications for this visit.     Allergies: Patient has no known allergies.   No LMP recorded. Patient is postmenopausal.    ROS:  Feeling well. No dyspnea or chest pain on exertion.  No abdominal pain, change in bowel habits, black or bloody stools.  No urinary tract symptoms. GYN ROS: joint pains. No neurological complaints.  She will give us info on the JW questionairre  OBJECTIVE:   The patient appears well, alert, oriented x 3, in no distress.  /86   Pulse 85   Temp 98.2 °F (36.8 °C) (Oral)   Ht 5' 5" (1.651 m)   Wt 78.8 kg (173 lb 11.6 oz)   SpO2 95%   BMI 28.91 kg/m²   Wt Readings from Last 5 Encounters:   09/23/24 78.8 kg (173 lb 11.6 oz)   08/29/24 78.6 kg (173 lb 4.5 oz)   08/01/24 79.2 kg (174 lb 7.9 oz)   06/24/24 77.8 kg (171 lb 8.3 oz)   05/14/24 79.6 kg (175 lb 7.8 oz)       ENT normal.  Neck supple. No adenopathy or thyromegaly. JUAN. Lungs are clear, good air entry, no wheezes, rhonchi or rales. S1 and S2 normal, no murmurs, " regular rate and rhythm. Abdomen soft without tenderness, guarding, mass or organomegaly. Extremities show no edema, normal peripheral pulses. Neurological is normal, no focal findings.  Multiple joint changes  Wearing her glasses    BREAST EXAM: deferred    PELVIC EXAM: deferred    ASSESSMENT:   1. Annual physical exam    2. Chronic midline low back pain without sciatica    3. LFT elevation          PLAN:   Counseled on age appropriate medical preventative services, including age appropriate cancer screenings, over all nutritional health, need for a consistent exercise regimen and an over all push towards maintaining a vigorous and active lifestyle.  Counseled on age appropriate vaccines and discussed upcoming health care needs based on age/gender.  Spent time with patient counseling on need for a good patient/doctor relationship moving forward.  Discussed use of common OTC medications and supplements.  Discussed common dietary aids and use of caffeine and the need for good sleep hygiene and stress management.    Problem List Items Addressed This Visit       Chronic low back pain    Relevant Medications    traMADoL (ULTRAM) 50 mg tablet     Other Visit Diagnoses       Annual physical exam    -  Primary    LFT elevation                F/u in 1 year for wellness

## 2024-09-25 ENCOUNTER — LAB VISIT (OUTPATIENT)
Dept: LAB | Facility: HOSPITAL | Age: 79
End: 2024-09-25
Attending: FAMILY MEDICINE
Payer: MEDICARE

## 2024-09-25 DIAGNOSIS — R79.89 LFT ELEVATION: ICD-10-CM

## 2024-09-25 LAB
BASOPHILS # BLD AUTO: 0.02 K/UL (ref 0–0.2)
BASOPHILS NFR BLD: 0.4 % (ref 0–1.9)
DIFFERENTIAL METHOD BLD: ABNORMAL
EOSINOPHIL # BLD AUTO: 0.3 K/UL (ref 0–0.5)
EOSINOPHIL NFR BLD: 5.5 % (ref 0–8)
ERYTHROCYTE [DISTWIDTH] IN BLOOD BY AUTOMATED COUNT: 13.6 % (ref 11.5–14.5)
HCT VFR BLD AUTO: 35.9 % (ref 37–48.5)
HGB BLD-MCNC: 11.8 G/DL (ref 12–16)
IMM GRANULOCYTES # BLD AUTO: 0.01 K/UL (ref 0–0.04)
IMM GRANULOCYTES NFR BLD AUTO: 0.2 % (ref 0–0.5)
LYMPHOCYTES # BLD AUTO: 1.7 K/UL (ref 1–4.8)
LYMPHOCYTES NFR BLD: 33.4 % (ref 18–48)
MCH RBC QN AUTO: 29.6 PG (ref 27–31)
MCHC RBC AUTO-ENTMCNC: 32.9 G/DL (ref 32–36)
MCV RBC AUTO: 90 FL (ref 82–98)
MONOCYTES # BLD AUTO: 0.5 K/UL (ref 0.3–1)
MONOCYTES NFR BLD: 10.5 % (ref 4–15)
NEUTROPHILS # BLD AUTO: 2.5 K/UL (ref 1.8–7.7)
NEUTROPHILS NFR BLD: 50 % (ref 38–73)
NRBC BLD-RTO: 0 /100 WBC
PLATELET # BLD AUTO: 248 K/UL (ref 150–450)
PMV BLD AUTO: 10.7 FL (ref 9.2–12.9)
RBC # BLD AUTO: 3.98 M/UL (ref 4–5.4)
WBC # BLD AUTO: 5.06 K/UL (ref 3.9–12.7)

## 2024-09-25 PROCEDURE — 36415 COLL VENOUS BLD VENIPUNCTURE: CPT | Mod: HCNC,PO | Performed by: FAMILY MEDICINE

## 2024-09-25 PROCEDURE — 85025 COMPLETE CBC W/AUTO DIFF WBC: CPT | Mod: HCNC | Performed by: FAMILY MEDICINE

## 2024-09-25 PROCEDURE — 80053 COMPREHEN METABOLIC PANEL: CPT | Mod: HCNC | Performed by: FAMILY MEDICINE

## 2024-09-26 ENCOUNTER — CLINICAL SUPPORT (OUTPATIENT)
Dept: REHABILITATION | Facility: HOSPITAL | Age: 79
End: 2024-09-26
Payer: MEDICARE

## 2024-09-26 DIAGNOSIS — G89.29 CHRONIC PAIN OF BOTH SHOULDERS: Primary | ICD-10-CM

## 2024-09-26 DIAGNOSIS — M25.612 IMPAIRED RANGE OF MOTION OF BOTH SHOULDERS: ICD-10-CM

## 2024-09-26 DIAGNOSIS — M25.512 CHRONIC PAIN OF BOTH SHOULDERS: Primary | ICD-10-CM

## 2024-09-26 DIAGNOSIS — M25.611 IMPAIRED RANGE OF MOTION OF BOTH SHOULDERS: ICD-10-CM

## 2024-09-26 DIAGNOSIS — M25.511 CHRONIC PAIN OF BOTH SHOULDERS: Primary | ICD-10-CM

## 2024-09-26 DIAGNOSIS — R29.898 SHOULDER WEAKNESS: ICD-10-CM

## 2024-09-26 DIAGNOSIS — R29.3 POSTURE IMBALANCE: ICD-10-CM

## 2024-09-26 LAB
ALBUMIN SERPL BCP-MCNC: 3.8 G/DL (ref 3.5–5.2)
ALP SERPL-CCNC: 78 U/L (ref 55–135)
ALT SERPL W/O P-5'-P-CCNC: 22 U/L (ref 10–44)
ANION GAP SERPL CALC-SCNC: 12 MMOL/L (ref 8–16)
AST SERPL-CCNC: 37 U/L (ref 10–40)
BILIRUB SERPL-MCNC: 0.5 MG/DL (ref 0.1–1)
BUN SERPL-MCNC: 42 MG/DL (ref 8–23)
CALCIUM SERPL-MCNC: 10 MG/DL (ref 8.7–10.5)
CHLORIDE SERPL-SCNC: 106 MMOL/L (ref 95–110)
CO2 SERPL-SCNC: 24 MMOL/L (ref 23–29)
CREAT SERPL-MCNC: 1.6 MG/DL (ref 0.5–1.4)
EST. GFR  (NO RACE VARIABLE): 33 ML/MIN/1.73 M^2
GLUCOSE SERPL-MCNC: 93 MG/DL (ref 70–110)
POTASSIUM SERPL-SCNC: 4.5 MMOL/L (ref 3.5–5.1)
PROT SERPL-MCNC: 7.4 G/DL (ref 6–8.4)
SODIUM SERPL-SCNC: 142 MMOL/L (ref 136–145)

## 2024-09-26 PROCEDURE — 97110 THERAPEUTIC EXERCISES: CPT | Mod: HCNC,PN,CQ

## 2024-09-26 NOTE — PROGRESS NOTES
"[facility]   Physical Therapy Treatment Note     Name: Nuha Bernstein  Clinic Number: [id number]    Therapy Diagnosis:   Encounter Diagnoses   Name Primary?    Chronic pain of both shoulders Yes    Impaired range of motion of both shoulders     Shoulder weakness     Posture imbalance      Physician: Rebecca Mustafa*    Visit Date: 9/26/2024    Physician Orders: PT Eval and Treat   Medical Diagnosis from Referral: Chronic pain of both shoulders   Evaluation Date: 9/13/2024  Authorization Period Expiration: 12-31-24  Plan of Care Expiration: 12-13-24  Visit # / Visits authorized: 2/ 10    Progress Note Due: 10-13-24  FOTO: 1/1     Precautions: Standard, DM, HTN    Visit #: 2 of 10  PTA Visit #: 2  Time In: 15:00  Time Out: 15:49  Total Billable Time: 30 minutes with PTA    Subjective     Pt reports: she felt sore and pain going down her arms after she did the exercises that was prescribed from last visit.   She was compliant with home exercise program.  Response to previous treatment: No concerns   Functional change: ongoing     Pain: 0/10 at rest   Location: both shoulders     Objective     Nuha participated in neuromuscular re-education activities to improve: Proprioception, Posture, and muscles motor control for 10 minutes. The following activities were included:    Seated upper traps + heat pack (very poor form, defer this today)  Seated levatal scap + heat pack, 3x30" holds   Seated Scap retraction, 2x10     Nuha received therapeutic exercises to develop strength, endurance, ROM, flexibility, and posture for 39 minutes including:    HOB elevated:   Supine wand flexion with 2lb, 2x10  Supine wand chest press with 2lb, 2x10  Supine wand serratus punches with 2lb, 2x10   Supine shoulder horizontal abduction with YTB, 2x10     Seated shoulder external rotation + retraction with YTB, 2x10   Pulleys in flexion 2 minutes   Pulleys in scaption 2 minutes     +Standing rows with RTB, " 2x10  +Standing shoulder extension with RTB, 2x10  +Standing shoulder external rotation walk out with RTB, 2x10 bilateral   +Standing shoulder internal rotation walk out with RTB, 2x10 bilateral         Home Exercises Provided and Patient Education Provided     Written Home Exercises Provided: yes.  Exercises were reviewed and Nuha was able to demonstrate them prior to the end of the session.  Nuha demonstrated good  understanding of the education provided.     Education provided:   - home exercise program     Assessment   First treatment session after evaluation. Focused on bilateral shoulder for improve strength and range of motion. Patient has moderate difficulty with upper trap stretch today due to poor form even with cueing, patient was still unable to perform this stretch correctly without shoulder hikes. Provided moderate cues for all exercises for proper techniques. Patient did fairly well in pulleys for shoulder ROM. Printed home exercise program and review with patient. Will continue to progress as tolerated.     Nuha Is progressing well towards her goals.   Pt prognosis is Good.     Pt will continue to benefit from skilled outpatient physical therapy to address the deficits listed in the problem list box on initial evaluation, provide pt/family education and to maximize pt's level of independence in the home and community environment.     Pt's spiritual, cultural and educational needs considered and pt agreeable to plan of care and goals.     Anticipated barriers to physical therapy: Scheduling issues      Goals:   Short Term Goals: 4 weeks  1.Report decreased in pain at worse less than  <   / =  5  /10  to increase tolerance for functional mobility. On going  2. Pt to improve B shoulders range of motion by 25% to allow for improved functional mobility to allow for improvement in IADLs. On going  3. Increased B UE MMT 1/2 grade to increase tolerance for ADL and work activities. On going  4. Pt to  report be conscious of impaired sitting and standing posture daily to decrease pain. On going  5. Pt to tolerate HEP to improve ROM and independence with ADL's. On going     Long Term Goals: 8 weeks  1.Report decreased in pain at worse less than  <   / =  2  /10  to increase tolerance for functional mobility. On going  2.  Patient will demonstrate improved overall function per FOTO Survey to CI = at least 1% but < 20% impaired, limited or restricted score or less.On going  3.Increased B UE MMT 1 grade to increase tolerance for ADL and work activities.On going  4. Pt to report and demonstrate proper posture in standing and sitting to decrease pain. On going  5. Pt to be Independent with HEP to improve ROM and independence with ADL's.On going  6. Pt to improve range of motion by 75% to allow for improved functional mobility to allow for improvement in IADLs. On going  Plan   Plan of care Certification: 9/13/2024 to 12-13-24.     Outpatient Physical Therapy 2 times weekly for 12 weeks to include the following interventions: Manual Therapy, Moist Heat/ Ice, Neuromuscular Re-ed, Patient Education, Self Care, Therapeutic Activities, and Therapeutic Exercise. Dry needling.    Continue with plan of care     Marissa Martin, PTA   9/26/2024

## 2024-10-03 ENCOUNTER — CLINICAL SUPPORT (OUTPATIENT)
Dept: REHABILITATION | Facility: HOSPITAL | Age: 79
End: 2024-10-03
Payer: MEDICARE

## 2024-10-03 DIAGNOSIS — G89.29 CHRONIC PAIN OF BOTH SHOULDERS: Primary | ICD-10-CM

## 2024-10-03 DIAGNOSIS — R29.898 SHOULDER WEAKNESS: ICD-10-CM

## 2024-10-03 DIAGNOSIS — M25.612 IMPAIRED RANGE OF MOTION OF BOTH SHOULDERS: ICD-10-CM

## 2024-10-03 DIAGNOSIS — M25.512 CHRONIC PAIN OF BOTH SHOULDERS: Primary | ICD-10-CM

## 2024-10-03 DIAGNOSIS — M25.511 CHRONIC PAIN OF BOTH SHOULDERS: Primary | ICD-10-CM

## 2024-10-03 DIAGNOSIS — M25.611 IMPAIRED RANGE OF MOTION OF BOTH SHOULDERS: ICD-10-CM

## 2024-10-03 DIAGNOSIS — R29.3 POSTURE IMBALANCE: ICD-10-CM

## 2024-10-03 PROCEDURE — 97110 THERAPEUTIC EXERCISES: CPT | Mod: HCNC,PN,CQ

## 2024-10-08 ENCOUNTER — HOSPITAL ENCOUNTER (OUTPATIENT)
Dept: RADIOLOGY | Facility: CLINIC | Age: 79
Discharge: HOME | End: 2024-10-08
Attending: NURSE PRACTITIONER
Payer: MEDICARE

## 2024-10-08 ENCOUNTER — CLINICAL SUPPORT (OUTPATIENT)
Dept: REHABILITATION | Facility: HOSPITAL | Age: 79
End: 2024-10-08
Payer: MEDICARE

## 2024-10-08 DIAGNOSIS — M25.511 CHRONIC PAIN OF BOTH SHOULDERS: Primary | ICD-10-CM

## 2024-10-08 DIAGNOSIS — M25.611 IMPAIRED RANGE OF MOTION OF BOTH SHOULDERS: ICD-10-CM

## 2024-10-08 DIAGNOSIS — Z78.0 ENCOUNTER FOR OSTEOPOROSIS SCREENING IN ASYMPTOMATIC POSTMENOPAUSAL PATIENT: ICD-10-CM

## 2024-10-08 DIAGNOSIS — R29.3 POSTURE IMBALANCE: ICD-10-CM

## 2024-10-08 DIAGNOSIS — M25.512 CHRONIC PAIN OF BOTH SHOULDERS: Primary | ICD-10-CM

## 2024-10-08 DIAGNOSIS — M25.612 IMPAIRED RANGE OF MOTION OF BOTH SHOULDERS: ICD-10-CM

## 2024-10-08 DIAGNOSIS — R29.898 SHOULDER WEAKNESS: ICD-10-CM

## 2024-10-08 DIAGNOSIS — G89.29 CHRONIC PAIN OF BOTH SHOULDERS: Primary | ICD-10-CM

## 2024-10-08 DIAGNOSIS — Z13.820 ENCOUNTER FOR OSTEOPOROSIS SCREENING IN ASYMPTOMATIC POSTMENOPAUSAL PATIENT: ICD-10-CM

## 2024-10-08 PROCEDURE — 97110 THERAPEUTIC EXERCISES: CPT | Mod: HCNC,PN,CQ

## 2024-10-08 PROCEDURE — 77080 DXA BONE DENSITY AXIAL: CPT | Mod: TC,HCNC,PO

## 2024-10-08 NOTE — PROGRESS NOTES
"[facility]   Physical Therapy Treatment Note     Name: Nuha Bernstein  Clinic Number: [id number]    Therapy Diagnosis:   Encounter Diagnoses   Name Primary?    Chronic pain of both shoulders Yes    Impaired range of motion of both shoulders     Shoulder weakness     Posture imbalance      Physician: Rebecca Mustafa*    Visit Date: 10/8/2024    Physician Orders: PT Eval and Treat   Medical Diagnosis from Referral: Chronic pain of both shoulders   Evaluation Date: 9/13/2024  Authorization Period Expiration: 12-31-24  Plan of Care Expiration: 12-13-24  Visit # / Visits authorized: 4/ 10    Progress Note Due: 10-13-24  FOTO: 1/1     Precautions: Standard, DM, HTN    Visit #: 4 of 10  PTA Visit #: 4/5  Time In: 15:00  Time Out: 15:50  Total Treatment Time: 50 minutes   Total Billable Time: 30 minutes with PTA    Subjective     Pt reports: bilateral shoulders mainly the left shoulder still bothers her. She feel the pain the most in the morning and she feels "crunchiness" in the left shoulder.   She was compliant with home exercise program.  Response to previous treatment: No concerns   Functional change: ongoing     Pain: 0/10 at rest   Location: both shoulders     Objective     Nuha participated in neuromuscular re-education activities to improve: Proprioception, Posture, and muscles motor control for 10 minutes. The following activities were included:    Seated upper traps + heat pack (very poor form, defer this today)  Seated levatal scap + heat pack, 3x30" holds   Seated Scap retraction, 2x10     Nuha received therapeutic exercises to develop strength, endurance, ROM, flexibility, and posture for 40 minutes including:    HOB elevated:   Supine wand flexion with 2lb, 3x10  Supine wand chest press with 2lb, 2x10  Supine wand serratus punches with 2lb, 2x10   Supine shoulder horizontal abduction with YTB, 2x10     Seated shoulder external rotation + retraction with YTB, 2x10 "   Pulleys in flexion 2 minutes   Pulleys in scaption 2 minutes     Standing rows with RTB, 3x10  Standing shoulder extension with RTB, 3x10  Standing shoulder external rotation walk out with RTB, 3x10 bilateral   Standing shoulder internal rotation walk out with RTB, 3x10 bilateral         Home Exercises Provided and Patient Education Provided     Written Home Exercises Provided: yes.  Exercises were reviewed and Nuha was able to demonstrate them prior to the end of the session.  Nuha demonstrated good  understanding of the education provided.     Education provided:   - home exercise program     Assessment   Patient continues to demonstrates bilateral shoulders pain mainly on left more than right. Still requires closed supervision for all exercises due to poor body form. Focus on improving flexibility/ROM/muscles strength for functional mobility and pain relief. Verbal and tactile instructions to ensure patient perform all exercises with good form, proper technique, and muscle activation. She demonstrates Left shoulder ER weakness during bilateral shoulder ER exercise with YTB, so we break it down to focus on just one side as a time. Instructed her to cont to work on her HEP, she verbalized understanding. Will continue to progress per patient tolerance. Patient may benefits manual therapy techniques in future sessions for pain reduction and add strengthening exercises as well.     Nuha Is progressing well towards her goals.   Pt prognosis is Good.     Pt will continue to benefit from skilled outpatient physical therapy to address the deficits listed in the problem list box on initial evaluation, provide pt/family education and to maximize pt's level of independence in the home and community environment.     Pt's spiritual, cultural and educational needs considered and pt agreeable to plan of care and goals.     Anticipated barriers to physical therapy: Scheduling issues      Goals:   Short Term Goals: 4  weeks  1.Report decreased in pain at worse less than  <   / =  5  /10  to increase tolerance for functional mobility. On going  2. Pt to improve B shoulders range of motion by 25% to allow for improved functional mobility to allow for improvement in IADLs. On going  3. Increased B UE MMT 1/2 grade to increase tolerance for ADL and work activities. On going  4. Pt to report be conscious of impaired sitting and standing posture daily to decrease pain. On going  5. Pt to tolerate HEP to improve ROM and independence with ADL's. On going     Long Term Goals: 8 weeks  1.Report decreased in pain at worse less than  <   / =  2  /10  to increase tolerance for functional mobility. On going  2.  Patient will demonstrate improved overall function per FOTO Survey to CI = at least 1% but < 20% impaired, limited or restricted score or less.On going  3.Increased B UE MMT 1 grade to increase tolerance for ADL and work activities.On going  4. Pt to report and demonstrate proper posture in standing and sitting to decrease pain. On going  5. Pt to be Independent with HEP to improve ROM and independence with ADL's.On going  6. Pt to improve range of motion by 75% to allow for improved functional mobility to allow for improvement in IADLs. On going  Plan   Plan of care Certification: 9/13/2024 to 12-13-24.     Outpatient Physical Therapy 2 times weekly for 12 weeks to include the following interventions: Manual Therapy, Moist Heat/ Ice, Neuromuscular Re-ed, Patient Education, Self Care, Therapeutic Activities, and Therapeutic Exercise. Dry needling.    Continue with plan of care     Jonel To, PTA   10/8/2024

## 2024-10-09 ENCOUNTER — OFFICE VISIT (OUTPATIENT)
Dept: UROGYNECOLOGY | Facility: CLINIC | Age: 79
End: 2024-10-09
Payer: MEDICARE

## 2024-10-09 VITALS
SYSTOLIC BLOOD PRESSURE: 158 MMHG | HEART RATE: 72 BPM | HEIGHT: 65 IN | DIASTOLIC BLOOD PRESSURE: 78 MMHG | BODY MASS INDEX: 28.98 KG/M2 | WEIGHT: 173.94 LBS

## 2024-10-09 DIAGNOSIS — N81.11 CYSTOCELE, MIDLINE: ICD-10-CM

## 2024-10-09 DIAGNOSIS — N95.2 VAGINAL ATROPHY: ICD-10-CM

## 2024-10-09 DIAGNOSIS — Z46.89 PESSARY MAINTENANCE: ICD-10-CM

## 2024-10-09 DIAGNOSIS — N81.4 UTEROVAGINAL PROLAPSE: Primary | ICD-10-CM

## 2024-10-09 PROCEDURE — 99999 PR PBB SHADOW E&M-EST. PATIENT-LVL IV: CPT | Mod: PBBFAC,HCNC,, | Performed by: NURSE PRACTITIONER

## 2024-10-09 NOTE — PROGRESS NOTES
Urogyn follow up  10/09/2024  .  [facility]  [address]  [facility] [id number]    Nuha Bernstein  [id number]  1945      Nuha Bernstein is a 79 y.o.  here for a urogyn pessary care and pvr.    Last HPI from 07/30/2019  1)  UI:  (--) TIFFANIE.  (--) UUI .  No h/o UI. Daytime frequency: Q1- 2 hours, worsening.  Nocturia: Yes: 1/night.   (--) dysuria,  (--) hematuria,  (--) frequent UTIs.  (+) complete bladder emptying.  2)  POP:  Present. To introitus.  Not sure--noticed when trying to douche.  Symptoms:(+)  Discomfort--urinary urgency/pressure.  (--) vaginal bleeding. (--) vaginal discharge. (+) sexually active.   has dementia so not much.  (--) dyspareunia. (--)  Vaginal dryness.  (--) vaginal estrogen use.   3)  BM:  (--) constipation/straining. Has BM QAM.  (--) chronic diarrhea. (--) hematochezia.  (--) fecal incontinence.  (--) fecal smearing/urgency.  (+) complete evacuation.     10/29/2019  1) (--) TIFFANIE.  (--) UUI .  No h/o UI. Daytime frequency: Q1- 2 hours, worsening.  Nocturia: Yes: 1/night.   (--) dysuria,  (--) hematuria,  (--) frequent UTIs.  (+) complete bladder emptying.    Pvr 40 mL    2)  POP:  Present. To introitus.  Not sure--noticed when trying to douche.  Symptoms:(+)  Discomfort--urinary urgency/pressure.  (--) vaginal bleeding. (--) vaginal discharge. (+) sexually active.   has dementia so not much.  (--) dyspareunia. (--)  Vaginal dryness.  (--) vaginal estrogen use.   Using #5 ring with support pessary    3)  BM:  (--) constipation/straining. Has BM QAM.  (--) chronic diarrhea. (--) hematochezia.  (--) fecal incontinence.  (--) fecal smearing/urgency.  (+) complete evacuation.     4)Pessary:  Denies pain, bleeding, or discharge.  Using #5 ring with support pessary independently      Changes since last visit:  1)  Stage 3 cystocele, stage 2 uterine prolapse/rectocele:  --using #5 ring with support pessary --independent in use-- taking out at least every  2-3 months     2)  Elevated PVR:  --normal with pessary in place  --denies UI  --voiding 2 hours during the day and 1/ night     3)Vaginal atrophy (dryness):   using rephresh once every 1-2 weeks     4)denies constipation--eating high fiber diet          Past Medical History:   Diagnosis Date    Arthritis     Back pain     CKD stage G3a/A3, GFR 45-59 and albumin creatinine ratio >300 mg/g 6/23/2020    Colon polyp     Hyperglycemia     Hyperlipidemia     Hypertension     Nuclear sclerosis of both eyes 10/31/2017    Prolapse of uterus     Type 2 diabetes mellitus without complication, without long-term current use of insulin     Type 2 diabetes mellitus without complication, without long-term current use of insulin        Past Surgical History:   Procedure Laterality Date    COLONOSCOPY N/A 12/15/2020    Procedure: COLONOSCOPY;  Surgeon: Antonio Maya MD;  Location: [address]);  Service: Endoscopy;  Laterality: N/A;  prep ins. emailed - [facility]  covid test on 12/12/20 -[facility]    COLONOSCOPY W/ POLYPECTOMY      Right Thumb      vaginal dilator         Current Outpatient Medications   Medication Sig    atorvastatin (LIPITOR) 40 MG tablet Take 1 tablet (40 mg total) by mouth once daily.    co-enzyme Q-10 30 mg capsule Take 30 mg by mouth 3 (three) times daily.    lisinopriL (PRINIVIL,ZESTRIL) 40 MG tablet TAKE 1 TABLET BY MOUTH EVERY DAY    multivitamin/iron/folic acid (CENTRUM WOMEN ORAL) Take by mouth.    mv,hussein,iron,mn/folic acid/chol (HAIR-SKIN-NAILS, PABA, ORAL) Take by mouth.    OMEGA 3,6,9 COMBINATION NO.7 (OMEGA DHA ORAL) Take 830 mg by mouth once daily.    traMADoL (ULTRAM) 50 mg tablet Take 1 tablet (50 mg total) by mouth every 8 (eight) hours as needed for Pain.    triamterene-hydrochlorothiazide 37.5-25 mg (DYAZIDE) 37.5-25 mg per capsule Take 1 capsule by mouth once daily.     No current facility-administered medications for this visit.       Well woman:  Pap test: 2012, normal (legacy).  History of  "abnormal paps: No.  History of STIs:  No  Mammogram: Date of last:03/2024.  Result: Normal  Colonoscopy: Date of last: 9/15.  Result:  Polyp, benign.  Repeat due:  Per PCP.    DEXA:  Date of last: 10/22  normal -- 10/2024 pending    ROS:  As per HPI.      Exam  BP (!) 158/78 (BP Location: Left arm, Patient Position: Sitting)   Pulse 72   Ht 5' 5" (1.651 m)   Wt 78.9 kg (173 lb 15.1 oz)   BMI 28.95 kg/m²   General: alert and oriented, no acute distress  Respiratory: normal respiratory effort  Abd: soft, non-tender, non-distended    Pelvic  Ext. Genitalia: normal external genitalia. Normal bartholin's and skeens glands  Vagina: + atrophy. Normal vaginal mucosa without lesions. No discharge noted.   Non-tender bladder base without palpable mass.  #5 ring with support pessary in place  Cervix: no lesions  Uterus:  uterus is normal size, shape, consistency and nontender   Urethra: no masses or tenderness  Urethral meatus: no lesions, caruncle or prolapse.    Pessary was removed without difficulty.  Washed with soap and water. Reinserted without difficulty.        Impression  1. Uterovaginal prolapse  Ambulatory referral/consult to Urogynecology      2. Cystocele, midline  Ambulatory referral/consult to Urogynecology      3. Pessary maintenance  Ambulatory referral/consult to Urogynecology      4. Vaginal atrophy            We reviewed the above issues and discussed options for short-term versus long-term management of her problems.   Plan:   1)  Stage 3 cystocele, stage 2 uterine prolapse/rectocele:  --discussed pathophysiology  --observation/PT vs pessary vs surgery (?TVH/USS--has large cystocele though)  --continue:#5 ring with support pessary      2)  Elevated PVR:  --normal with pessary in place     3)Vaginal atrophy (dryness):   Use REPLENS or REFRESH OTC: 1/2 applicator full in vagina twice a week.      4)  RTC 1 year for pessary check    I spent a total of 30 minutes on the day of the visit.  This includes " face to face time and non-face to face time preparing to see the patient (eg, review of tests), obtaining and/or reviewing separately obtained history, documenting clinical information in the electronic or other health record, independently interpreting results and communicating results to the patient/family/caregiver, or care coordinator.       Lidya Butcher, BROOKP-BC  [facility]  Division of Female Pelvic Medicine and Reconstructive Surgery  Department of Obstetrics & Gynecology

## 2024-10-09 NOTE — PATIENT INSTRUCTIONS
1)  Stage 3 cystocele, stage 2 uterine prolapse/rectocele:  --discussed pathophysiology  --observation/PT vs pessary vs surgery (?TVH/USS--has large cystocele though)  --continue:#5 ring with support pessary      2)  Elevated PVR:  --normal with pessary in place     3)Vaginal atrophy (dryness):   Use REPLENS or REFRESH OTC: 1/2 applicator full in vagina twice a week.      4)  RTC 1 year for pessary check

## 2024-10-10 ENCOUNTER — CLINICAL SUPPORT (OUTPATIENT)
Dept: REHABILITATION | Facility: HOSPITAL | Age: 79
End: 2024-10-10
Payer: MEDICARE

## 2024-10-10 DIAGNOSIS — M25.611 IMPAIRED RANGE OF MOTION OF BOTH SHOULDERS: ICD-10-CM

## 2024-10-10 DIAGNOSIS — M25.511 CHRONIC PAIN OF BOTH SHOULDERS: Primary | ICD-10-CM

## 2024-10-10 DIAGNOSIS — M25.512 CHRONIC PAIN OF BOTH SHOULDERS: Primary | ICD-10-CM

## 2024-10-10 DIAGNOSIS — R29.898 SHOULDER WEAKNESS: ICD-10-CM

## 2024-10-10 DIAGNOSIS — R29.3 POSTURE IMBALANCE: ICD-10-CM

## 2024-10-10 DIAGNOSIS — M25.612 IMPAIRED RANGE OF MOTION OF BOTH SHOULDERS: ICD-10-CM

## 2024-10-10 DIAGNOSIS — G89.29 CHRONIC PAIN OF BOTH SHOULDERS: Primary | ICD-10-CM

## 2024-10-10 PROCEDURE — 97110 THERAPEUTIC EXERCISES: CPT | Mod: HCNC,PN,CQ

## 2024-10-10 NOTE — PROGRESS NOTES
"[facility]   Physical Therapy Treatment Note     Name: Nuha Bernstein  Clinic Number: [id number]    Therapy Diagnosis:   Encounter Diagnoses   Name Primary?    Chronic pain of both shoulders Yes    Impaired range of motion of both shoulders     Shoulder weakness     Posture imbalance      Physician: Rebecca Mustafa*    Visit Date: 10/10/2024    Physician Orders: PT Eval and Treat   Medical Diagnosis from Referral: Chronic pain of both shoulders   Evaluation Date: 9/13/2024  Authorization Period Expiration: 12-31-24  Plan of Care Expiration: 12-13-24  Visit # / Visits authorized: 5/ 10    Progress Note Due: 10-13-24  FOTO: 1/1     Precautions: Standard, DM, HTN    Visit #: 5 of 10  PTA Visit #: 5/5  Time In: 15:00  Time Out: 15:50  Total Treatment Time: 50 minutes   Total Billable Time: 30 minutes with PTA    Subjective     Pt reports: bilateral shoulders mainly the left shoulder still bothers her. She feel the pain the most in the morning and she feels "crunchiness" in the left shoulder.   She was compliant with home exercise program.  Response to previous treatment: No concerns   Functional change: ongoing     Pain: 0/10 at rest   Location: both shoulders     Objective     Nuha received therapeutic exercises to develop strength, endurance, ROM, flexibility, and posture for 50 minutes including:    HOB elevated:   Supine wand flexion with 3lb, 3x10  Supine wand chest press with 3lb, 3x10  Supine wand serratus punches with 3lb, 3x10   Supine shoulder horizontal abduction with GTB, 3x10     Seated shoulder external rotation + retraction with GTB, 3x10     Standing rows with GTB, 3x10  Standing shoulder extension with GTB, 3x10  Standing shoulder external rotation walk out with GTB, 3x10 bilateral   Standing shoulder internal rotation walk out with GTB, 3x10 bilateral     Manual therapy techniques x 5 minutes to bilateral upper traps    - soft tissue massage using Biofreeze to upper " trap        Home Exercises Provided and Patient Education Provided     Written Home Exercises Provided: yes.  Exercises were reviewed and Nuha was able to demonstrate them prior to the end of the session.  Nuha demonstrated good  understanding of the education provided.     Education provided:   - home exercise program     Assessment   Tried biofreeze bilateral upper traps today for decrease muscles tension which patient responded well. Increase resistance in all exercises from red to green theraband. Still requires moderate verbal cues for all exercises for proper body form and techniques. Overall, it appears her shoulder range of motion is improving, but still very weak in general. Will work on shoulder strength and postural awareness.     Nuha Is progressing well towards her goals.   Pt prognosis is Good.     Pt will continue to benefit from skilled outpatient physical therapy to address the deficits listed in the problem list box on initial evaluation, provide pt/family education and to maximize pt's level of independence in the home and community environment.     Pt's spiritual, cultural and educational needs considered and pt agreeable to plan of care and goals.     Anticipated barriers to physical therapy: Scheduling issues      Goals:   Short Term Goals: 4 weeks  1.Report decreased in pain at worse less than  <   / =  5  /10  to increase tolerance for functional mobility. On going  2. Pt to improve B shoulders range of motion by 25% to allow for improved functional mobility to allow for improvement in IADLs. On going  3. Increased B UE MMT 1/2 grade to increase tolerance for ADL and work activities. On going  4. Pt to report be conscious of impaired sitting and standing posture daily to decrease pain. On going  5. Pt to tolerate HEP to improve ROM and independence with ADL's. On going     Long Term Goals: 8 weeks  1.Report decreased in pain at worse less than  <   / =  2  /10  to increase tolerance  for functional mobility. On going  2.  Patient will demonstrate improved overall function per FOTO Survey to CI = at least 1% but < 20% impaired, limited or restricted score or less.On going  3.Increased B UE MMT 1 grade to increase tolerance for ADL and work activities.On going  4. Pt to report and demonstrate proper posture in standing and sitting to decrease pain. On going  5. Pt to be Independent with HEP to improve ROM and independence with ADL's.On going  6. Pt to improve range of motion by 75% to allow for improved functional mobility to allow for improvement in IADLs. On going  Plan   Plan of care Certification: 9/13/2024 to 12-13-24.     Outpatient Physical Therapy 2 times weekly for 12 weeks to include the following interventions: Manual Therapy, Moist Heat/ Ice, Neuromuscular Re-ed, Patient Education, Self Care, Therapeutic Activities, and Therapeutic Exercise. Dry needling.    Continue with plan of care     Marissa Martin, PTA   10/10/2024

## 2024-10-11 ENCOUNTER — OFFICE VISIT (OUTPATIENT)
Dept: OPHTHALMOLOGY | Facility: CLINIC | Age: 79
End: 2024-10-11
Payer: MEDICARE

## 2024-10-11 DIAGNOSIS — H25.13 NUCLEAR SCLEROSIS OF BOTH EYES: Primary | ICD-10-CM

## 2024-10-11 DIAGNOSIS — H52.7 REFRACTIVE ERROR: ICD-10-CM

## 2024-10-11 DIAGNOSIS — H26.9 CORTICAL CATARACT OF BOTH EYES: ICD-10-CM

## 2024-10-11 PROCEDURE — 99999 PR PBB SHADOW E&M-EST. PATIENT-LVL II: CPT | Mod: PBBFAC,HCNC,, | Performed by: OPHTHALMOLOGY

## 2024-10-11 NOTE — PROGRESS NOTES
Subjective:       Patient ID: Nuha Bernstein is a 79 y.o. female.    Chief Complaint: Cataract    HPI    DLS: With Dr. Leyva 7/31/2024    Pt here for Cataract Eval per Dr. Leyva;  Pt states vision seems to be getting blurry, states she had to stop   wearing her current eye glasses and now wears another pair because she   can't see out of them. Pt denies any floaters, flashes or diplopia.     Meds;  No GTTS      Last edited by Luz Manjarrez on 10/11/2024 10:14 AM.             Assessment:       1. Nuclear sclerosis of both eyes    2. Cortical cataract of both eyes    3. Refractive error        Plan:       Visually significant cataract OU -Pt. Wants Sx.    RE      Cataract Surgery Consent: Patient with a visually significant cataract with difficulties of ADLs, reading, driving, night vision, glare (any and all).  Discussed with Patient/Family/Caregiver: options, risks and benefits, expectations of cataract surgery, utilized an eye model with questions and answers to facilitate discussion.  Discussed lens options and patient understands that glasses may be required for optimal vision for distance and/or near vision after cataract surgery.  The Patient/Family/Caregiver  voice good understanding and patient wishes to proceed with surgery.  The patient will likely benefit from surgery and patient signed consent for Right Eye.  CE OD 1st CNAOTO 21.0,        OS 2nd CNAOTO 20.0.

## 2024-10-14 ENCOUNTER — CLINICAL SUPPORT (OUTPATIENT)
Dept: REHABILITATION | Facility: HOSPITAL | Age: 79
End: 2024-10-14
Payer: MEDICARE

## 2024-10-14 ENCOUNTER — PATIENT MESSAGE (OUTPATIENT)
Dept: FAMILY MEDICINE | Facility: CLINIC | Age: 79
End: 2024-10-14
Payer: MEDICARE

## 2024-10-14 DIAGNOSIS — M25.612 IMPAIRED RANGE OF MOTION OF BOTH SHOULDERS: ICD-10-CM

## 2024-10-14 DIAGNOSIS — M25.511 CHRONIC PAIN OF BOTH SHOULDERS: Primary | ICD-10-CM

## 2024-10-14 DIAGNOSIS — R29.898 SHOULDER WEAKNESS: ICD-10-CM

## 2024-10-14 DIAGNOSIS — R29.3 POSTURE IMBALANCE: ICD-10-CM

## 2024-10-14 DIAGNOSIS — G89.29 CHRONIC PAIN OF BOTH SHOULDERS: Primary | ICD-10-CM

## 2024-10-14 DIAGNOSIS — M25.611 IMPAIRED RANGE OF MOTION OF BOTH SHOULDERS: ICD-10-CM

## 2024-10-14 DIAGNOSIS — M25.512 CHRONIC PAIN OF BOTH SHOULDERS: Primary | ICD-10-CM

## 2024-10-14 PROCEDURE — 97140 MANUAL THERAPY 1/> REGIONS: CPT | Mod: HCNC,PN,CQ

## 2024-10-14 PROCEDURE — 97110 THERAPEUTIC EXERCISES: CPT | Mod: HCNC,PN,CQ

## 2024-10-14 NOTE — PROGRESS NOTES
[facility]   Physical Therapy Treatment Note     Name: Nuha Bernstein  Clinic Number: [id number]    Therapy Diagnosis:   Encounter Diagnoses   Name Primary?    Chronic pain of both shoulders Yes    Impaired range of motion of both shoulders     Shoulder weakness     Posture imbalance      Physician: Rebecca Mustafa*    Visit Date: 10/14/2024    Physician Orders: PT Eval and Treat   Medical Diagnosis from Referral: Chronic pain of both shoulders   Evaluation Date: 9/13/2024  Authorization Period Expiration: 12-31-24  Plan of Care Expiration: 12-13-24  Visit # / Visits authorized: 6/ 10    Progress Note Due: 10-13-24  FOTO: 1/1     Precautions: Standard, DM, HTN    Visit #: 6 of 10  PTA Visit #: 6/5  Time In: 14:45  Time Out: 15:40  Total Treatment Time: 55 minutes   Total Billable Time: 55 minutes with PTA    Subjective     Pt reports: she did went to go buy [employer] over the weekend. She still takes Tramadol for pain relief. Patient states right now her main issues is that she has pain at night. During the day she has no problem at all, but when she goes to sleep the pain occurs.   She was compliant with home exercise program.  Response to previous treatment: No concerns   Functional change: ongoing     Pain: 0/10 at rest   Location: both shoulders     Objective     Nuha received therapeutic exercises to develop strength, endurance, ROM, flexibility, and posture for 45 minutes including:    HOB elevated:   Supine wand flexion with 3lb, 3x10  Supine wand chest press with 3lb, 3x10  Supine wand serratus punches with 3lb, 3x10 (need cues)   Supine shoulder horizontal abduction with GTB, 3x10     Seated shoulder external rotation + retraction with GTB, 3x10 (need cues)    Standing rows with GTB, 3x10  Standing shoulder extension with GTB, 3x10  Standing shoulder external rotation RTB with towel rolled under armpit, 2x10 bilateral (need cues)  Standing shoulder internal rotation RTB  "with towel rolled under armpit, 2x10 bilateral (need cues)   +standing shoulder rotations (begin in internal rotation and rotate in external rotation) with GTB x 3     Manual therapy techniques x 10 minutes to bilateral upper traps    - soft tissue massage using Biofreeze to upper trap    - manual bilateral shoulder distraction and shoulder oscillations grade I and II         Home Exercises Provided and Patient Education Provided     Written Home Exercises Provided: yes.  Exercises were reviewed and Nuha was able to demonstrate them prior to the end of the session.  Nuha demonstrated good  understanding of the education provided.     Education provided:   - home exercise program   - education on sleeping position for comfort  - education on AC joint pain    Assessment   Again, it appears her bilateral shoulder pain is mainly only at night. She does take Tramadol for pain relief. Patient asked if she took x-ray for her shoulders. Her recent x-ray for shoulders was on 5/14/2024 "FINDINGS: There is no fracture, dislocation, or bony erosion. There is calcification of the aorta.  There is degenerative change at the acromioclavicular joints bilaterally". Explained to patient about her conditions which patient voiced understanding. At this time, we will continue to work on shoulder joint mobilization, distraction, strength, and decrease pain intensity to improve her symptoms. Still requires moderate cues for proper techniques in supine serratus punches with wand. Adjust standing shoulder external rotation and internal rotation with RTB today and decrease reps. Very poor technique in shoulder external rotation movement and requires supervision for correct body form. Spent time educating patient on changes to AC joint in bilateral shoulder and being aware of her posture. Trial standing shoulder external rotation movement to improve her symptoms. Also, educated on sleeping position for comfort. At this time, patient " appears frustrated and expressed that she just wants to get better. Needs to be re-assess next visit to determine what goals are met.     Nuha Is progressing well towards her goals.   Pt prognosis is Good.     Pt will continue to benefit from skilled outpatient physical therapy to address the deficits listed in the problem list box on initial evaluation, provide pt/family education and to maximize pt's level of independence in the home and community environment.     Pt's spiritual, cultural and educational needs considered and pt agreeable to plan of care and goals.     Anticipated barriers to physical therapy: Scheduling issues      Goals:   Short Term Goals: 4 weeks  1.Report decreased in pain at worse less than  <   / =  5  /10  to increase tolerance for functional mobility. On going  2. Pt to improve B shoulders range of motion by 25% to allow for improved functional mobility to allow for improvement in IADLs. On going  3. Increased B UE MMT 1/2 grade to increase tolerance for ADL and work activities. On going  4. Pt to report be conscious of impaired sitting and standing posture daily to decrease pain. On going  5. Pt to tolerate HEP to improve ROM and independence with ADL's. On going     Long Term Goals: 8 weeks  1.Report decreased in pain at worse less than  <   / =  2  /10  to increase tolerance for functional mobility. On going  2.  Patient will demonstrate improved overall function per FOTO Survey to CI = at least 1% but < 20% impaired, limited or restricted score or less.On going  3.Increased B UE MMT 1 grade to increase tolerance for ADL and work activities.On going  4. Pt to report and demonstrate proper posture in standing and sitting to decrease pain. On going  5. Pt to be Independent with HEP to improve ROM and independence with ADL's.On going  6. Pt to improve range of motion by 75% to allow for improved functional mobility to allow for improvement in IADLs. On going  Plan   Plan of care  Certification: 9/13/2024 to 12-13-24.     Outpatient Physical Therapy 2 times weekly for 12 weeks to include the following interventions: Manual Therapy, Moist Heat/ Ice, Neuromuscular Re-ed, Patient Education, Self Care, Therapeutic Activities, and Therapeutic Exercise. Dry needling.    Re-assess next visit.     Marissa Martin, PTA   10/14/2024

## 2024-10-15 ENCOUNTER — TELEPHONE (OUTPATIENT)
Dept: OPHTHALMOLOGY | Facility: CLINIC | Age: 79
End: 2024-10-15
Payer: MEDICARE

## 2024-10-15 DIAGNOSIS — H25.11 NS (NUCLEAR SCLEROSIS), RIGHT: Primary | ICD-10-CM

## 2024-10-16 DIAGNOSIS — H25.13 NUCLEAR SCLEROSIS OF BOTH EYES: Primary | ICD-10-CM

## 2024-10-16 RX ORDER — PREDNISOLONE/MOXIFLOX/BROMFEN 1 %-0.5 %
1 SUSPENSION, DROPS(FINAL DOSAGE FORM)(ML) OPHTHALMIC (EYE) 3 TIMES DAILY
Qty: 8 ML | Refills: 2 | Status: SHIPPED | OUTPATIENT
Start: 2024-11-23

## 2024-10-17 ENCOUNTER — PATIENT MESSAGE (OUTPATIENT)
Dept: RHEUMATOLOGY | Facility: CLINIC | Age: 79
End: 2024-10-17
Payer: MEDICARE

## 2024-10-17 ENCOUNTER — CLINICAL SUPPORT (OUTPATIENT)
Dept: REHABILITATION | Facility: HOSPITAL | Age: 79
End: 2024-10-17
Payer: MEDICARE

## 2024-10-17 ENCOUNTER — TELEPHONE (OUTPATIENT)
Dept: OPHTHALMOLOGY | Facility: CLINIC | Age: 79
End: 2024-10-17
Payer: MEDICARE

## 2024-10-17 DIAGNOSIS — M25.512 CHRONIC PAIN OF BOTH SHOULDERS: Primary | ICD-10-CM

## 2024-10-17 DIAGNOSIS — G89.29 CHRONIC PAIN OF BOTH SHOULDERS: Primary | ICD-10-CM

## 2024-10-17 DIAGNOSIS — R29.3 POSTURE IMBALANCE: ICD-10-CM

## 2024-10-17 DIAGNOSIS — R29.898 SHOULDER WEAKNESS: ICD-10-CM

## 2024-10-17 DIAGNOSIS — M25.511 CHRONIC PAIN OF BOTH SHOULDERS: Primary | ICD-10-CM

## 2024-10-17 DIAGNOSIS — M25.611 IMPAIRED RANGE OF MOTION OF BOTH SHOULDERS: ICD-10-CM

## 2024-10-17 DIAGNOSIS — H25.12 NS (NUCLEAR SCLEROSIS), LEFT: Primary | ICD-10-CM

## 2024-10-17 DIAGNOSIS — M25.612 IMPAIRED RANGE OF MOTION OF BOTH SHOULDERS: ICD-10-CM

## 2024-10-17 PROCEDURE — 97110 THERAPEUTIC EXERCISES: CPT | Mod: HCNC,PN

## 2024-10-17 NOTE — PROGRESS NOTES
[facility]   Physical Therapy Treatment Note     Name: Nuha Bernstein  Clinic Number: [id number]    Therapy Diagnosis:   Encounter Diagnoses   Name Primary?    Chronic pain of both shoulders Yes    Impaired range of motion of both shoulders     Shoulder weakness     Posture imbalance      Physician: Rebecca Mustafa*    Visit Date: 10/17/2024    Physician Orders: PT Eval and Treat   Medical Diagnosis from Referral: Chronic pain of both shoulders   Evaluation Date: 9/13/2024  Authorization Period Expiration: 12-31-24  Plan of Care Expiration: 12-13-24  Visit # / Visits authorized: 7/ 10    Progress Note Due: 11-17-24  FOTO: 1/1     Precautions: Standard, DM, HTN    Visit #: 6 of 10  PTA Visit #: 6/5  Time In: 7:00 am  Time Out: 7:25 am  Total Treatment Time: 25 minutes   Total Billable Time: 25 minutes with PT    Subjective     Pt reports: that she has not pain in both shoulder throughout day, but she has pain at night. She states she cannot sleep more than 2-3 hours at night due to shoulder pain. Pt states that therapy has not been helping decrease pain.   She was compliant with home exercise program.  Response to previous treatment: No concerns   Functional change: ongoing     Pain: 0/10 at rest   Location: both shoulders     Objective         Active Range of Motion in  (degrees):   Shoulder Right Left   Flexion 130 deg with pain 131 with min pain    Abduction 129 deg with pain 130 deg with pain    ER Functional reach T1 with pain  Functional reach T4 min pain   IR  Functional reach T12 with pain Functional reach T10 with min pain       Upper Extremity Strength  (R) UE   (L) UE     Shoulder flexion: 3+/5 Shoulder flexion: 4-/5   Shoulder Abduction: 3+/5 Shoulder abduction: 4-/5   Shoulder ER 3+/5 Shoulder ER 4-/5   Shoulder IR 3+/5 Shoulder IR 4-/5        Nuha received therapeutic exercises to develop strength, endurance, ROM, flexibility, and posture for 25 minutes  Pt left without medication, discharge instructions and discharge paperwork.   including:    HOB elevated:   Supine wand flexion with 3lb, 3x10  Supine wand chest press with 3lb, 3x10  Supine wand serratus punches with 3lb, 3x10 (need cues)   Supine shoulder horizontal abduction with GTB, 3x10     Seated shoulder external rotation + retraction with GTB, 3x10 (need cues)    Standing rows with GTB, 3x10  Standing shoulder extension with GTB, 3x10  Standing shoulder external rotation RTB with towel rolled under armpit, 2x10 bilateral (need cues)  Standing shoulder internal rotation RTB with towel rolled under armpit, 2x10 bilateral (need cues)   +standing shoulder rotations (begin in internal rotation and rotate in external rotation) with GTB x 3     Manual therapy techniques x 10 minutes to bilateral upper traps    - soft tissue massage using Biofreeze to upper trap    - manual bilateral shoulder distraction and shoulder oscillations grade I and II         Home Exercises Provided and Patient Education Provided     Written Home Exercises Provided: yes.  Exercises were reviewed and Nuha was able to demonstrate them prior to the end of the session.  Nuha demonstrated good  understanding of the education provided.     Education provided:   - home exercise program   - education on sleeping position for comfort  - education on AC joint pain    Assessment     Pt presented to therapy with cont with B shoulder pain at night. Pain has been waking her up at night. Pt was re-assessed today. Pt cont with pain when she is lying down at night. Pt demonstrated the same B shoulder AROM with pain and same muscles strength with pain.  Pt cont with decrease posture and rounded shoulders. Pt has met 1/5 STGs. Overall, pt has not been improving in therapy. Plan to pace therapy on hold and return to M.D for further evaluation of problem.       Nuha Is progressing well towards her goals.   Pt prognosis is Good.     Pt will continue to benefit from skilled outpatient physical therapy to address the deficits listed  in the problem list box on initial evaluation, provide pt/family education and to maximize pt's level of independence in the home and community environment.     Pt's spiritual, cultural and educational needs considered and pt agreeable to plan of care and goals.     Anticipated barriers to physical therapy: Scheduling issues      Goals:   Short Term Goals: 4 weeks  1.Report decreased in pain at worse less than  <   / =  5  /10  to increase tolerance for functional mobility. Goal not met 10-17-24  2. Pt to improve B shoulders range of motion by 25% to allow for improved functional mobility to allow for improvement in IADLs. Goal not met 10-17-24  3. Increased B UE MMT 1/2 grade to increase tolerance for ADL and work activities.Goal not met 10-17-24  4. Pt to report be conscious of impaired sitting and standing posture daily to decrease pain. Goal not met 10-17-24  5. Pt to tolerate HEP to improve ROM and independence with ADL's.Goal not met 10-17-24     Long Term Goals: 8 weeks  1.Report decreased in pain at worse less than  <   / =  2  /10  to increase tolerance for functional mobility. On going  2.  Patient will demonstrate improved overall function per FOTO Survey to CI = at least 1% but < 20% impaired, limited or restricted score or less.On going  3.Increased B UE MMT 1 grade to increase tolerance for ADL and work activities.On going  4. Pt to report and demonstrate proper posture in standing and sitting to decrease pain. On going  5. Pt to be Independent with HEP to improve ROM and independence with ADL's.On going  6. Pt to improve range of motion by 75% to allow for improved functional mobility to allow for improvement in IADLs. On going  Plan   Plan of care Certification: 9/13/2024 to 12-13-24.     Outpatient Physical Therapy 2 times weekly for 12 weeks to include the following interventions: Manual Therapy, Moist Heat/ Ice, Neuromuscular Re-ed, Patient Education, Self Care, Therapeutic Activities, and  Therapeutic Exercise. Dry needling.    Re-assess next visit.     Arsalan Srinivasan, PT   10/17/2024           [place]

## 2024-10-22 ENCOUNTER — OFFICE VISIT (OUTPATIENT)
Dept: RHEUMATOLOGY | Facility: CLINIC | Age: 79
End: 2024-10-22
Payer: MEDICARE

## 2024-10-22 VITALS
HEIGHT: 65 IN | WEIGHT: 173.94 LBS | DIASTOLIC BLOOD PRESSURE: 77 MMHG | HEART RATE: 71 BPM | BODY MASS INDEX: 28.98 KG/M2 | SYSTOLIC BLOOD PRESSURE: 166 MMHG

## 2024-10-22 DIAGNOSIS — M25.511 CHRONIC PAIN OF BOTH SHOULDERS: Primary | ICD-10-CM

## 2024-10-22 DIAGNOSIS — M62.838 MUSCLE SPASM: ICD-10-CM

## 2024-10-22 DIAGNOSIS — M25.512 CHRONIC PAIN OF BOTH SHOULDERS: Primary | ICD-10-CM

## 2024-10-22 DIAGNOSIS — G89.29 CHRONIC PAIN OF BOTH SHOULDERS: Primary | ICD-10-CM

## 2024-10-22 PROCEDURE — 3077F SYST BP >= 140 MM HG: CPT | Mod: HCNC,CPTII,S$GLB, | Performed by: STUDENT IN AN ORGANIZED HEALTH CARE EDUCATION/TRAINING PROGRAM

## 2024-10-22 PROCEDURE — 99214 OFFICE O/P EST MOD 30 MIN: CPT | Mod: HCNC,25,S$GLB, | Performed by: STUDENT IN AN ORGANIZED HEALTH CARE EDUCATION/TRAINING PROGRAM

## 2024-10-22 PROCEDURE — 1125F AMNT PAIN NOTED PAIN PRSNT: CPT | Mod: HCNC,CPTII,S$GLB, | Performed by: STUDENT IN AN ORGANIZED HEALTH CARE EDUCATION/TRAINING PROGRAM

## 2024-10-22 PROCEDURE — 99999 PR PBB SHADOW E&M-EST. PATIENT-LVL III: CPT | Mod: PBBFAC,HCNC,, | Performed by: STUDENT IN AN ORGANIZED HEALTH CARE EDUCATION/TRAINING PROGRAM

## 2024-10-22 PROCEDURE — 1101F PT FALLS ASSESS-DOCD LE1/YR: CPT | Mod: HCNC,CPTII,S$GLB, | Performed by: STUDENT IN AN ORGANIZED HEALTH CARE EDUCATION/TRAINING PROGRAM

## 2024-10-22 PROCEDURE — 1157F ADVNC CARE PLAN IN RCRD: CPT | Mod: HCNC,CPTII,S$GLB, | Performed by: STUDENT IN AN ORGANIZED HEALTH CARE EDUCATION/TRAINING PROGRAM

## 2024-10-22 PROCEDURE — 1159F MED LIST DOCD IN RCRD: CPT | Mod: HCNC,CPTII,S$GLB, | Performed by: STUDENT IN AN ORGANIZED HEALTH CARE EDUCATION/TRAINING PROGRAM

## 2024-10-22 PROCEDURE — 3288F FALL RISK ASSESSMENT DOCD: CPT | Mod: HCNC,CPTII,S$GLB, | Performed by: STUDENT IN AN ORGANIZED HEALTH CARE EDUCATION/TRAINING PROGRAM

## 2024-10-22 PROCEDURE — 20610 DRAIN/INJ JOINT/BURSA W/O US: CPT | Mod: HCNC,LT,S$GLB, | Performed by: STUDENT IN AN ORGANIZED HEALTH CARE EDUCATION/TRAINING PROGRAM

## 2024-10-22 PROCEDURE — 3078F DIAST BP <80 MM HG: CPT | Mod: HCNC,CPTII,S$GLB, | Performed by: STUDENT IN AN ORGANIZED HEALTH CARE EDUCATION/TRAINING PROGRAM

## 2024-10-22 RX ORDER — TRIAMCINOLONE ACETONIDE 40 MG/ML
40 INJECTION, SUSPENSION INTRA-ARTICULAR; INTRAMUSCULAR
Status: DISCONTINUED | OUTPATIENT
Start: 2024-10-22 | End: 2024-10-22 | Stop reason: HOSPADM

## 2024-10-22 RX ORDER — LIDOCAINE HYDROCHLORIDE 10 MG/ML
2 INJECTION, SOLUTION EPIDURAL; INFILTRATION; INTRACAUDAL; PERINEURAL
Status: DISCONTINUED | OUTPATIENT
Start: 2024-10-22 | End: 2024-10-22 | Stop reason: HOSPADM

## 2024-10-22 RX ORDER — CYCLOBENZAPRINE HCL 5 MG
5 TABLET ORAL NIGHTLY
Qty: 30 TABLET | Refills: 2 | Status: SHIPPED | OUTPATIENT
Start: 2024-10-22 | End: 2025-01-20

## 2024-10-22 RX ADMIN — TRIAMCINOLONE ACETONIDE 40 MG: 40 INJECTION, SUSPENSION INTRA-ARTICULAR; INTRAMUSCULAR at 11:10

## 2024-10-22 RX ADMIN — LIDOCAINE HYDROCHLORIDE 2 ML: 10 INJECTION, SOLUTION EPIDURAL; INFILTRATION; INTRACAUDAL; PERINEURAL at 11:10

## 2024-10-22 NOTE — PROGRESS NOTES
RHEUMATOLOGY OUTPATIENT CLINIC NOTE    10/22/2024    Attending Rheumatologist: Rebecca Mustafa  Primary Care Provider: Pollo Lowe MD   Physician Requesting Consultation: No referring provider defined for this encounter.  Chief Complaint/Reason For Consultation:  Chronic pain of both shoulders      Subjective:       HPI  Nuha Bernstein is a 79 y.o. Black or  female who comes for evaluation of shoulder pain.     Interim history   -initial visit with me on 5/2024  -she completed physical therapy for bilateral shoulder pain but still reports pain in bilateral shoulders, left worse than right, particularly at night when she is sleeping, she tosses and turns. She uses lidocaine spray which helps. She continues to be active and does home exercises in the morning. She also reports significant pain in trapezius area.   -she continues to take tramadol 50 mg BID.   -unable to do NSAIDS due to CKD.     Disease history      She reports chronic pain in bilateral shoulders, upper back for 10+ years. She has been taking tramadol since around that time, has been able to taper to every 12 hours. Has noted that lately pain is getting worse in her upper back, she does not have any limited ROM, she is able to do her ADLs, no trouble washing her hair. She denies any radiculopathy symptoms. Pain is worse at night. Denies any headaches, vision changes, fever, chills, oral ulcers, swollen glands, chest pain, shortness of breath, abdominal pain. She has been told she has arthritis in her whole spine. She denies any other joint pain or swelling.   She is very active, able to drive by herself. She does home exercises every day for about 20-30 min.     Review of Systems   Constitutional:  Negative for fever and unexpected weight change.   HENT:  Negative for mouth sores and trouble swallowing.    Eyes:  Negative for redness.   Respiratory:  Negative for cough and shortness of breath.    Cardiovascular:  Negative  for chest pain.   Gastrointestinal:  Negative for constipation and diarrhea.   Genitourinary:  Negative for dysuria and genital sores.   Integumentary:  Negative for rash.   Neurological:  Negative for headaches.   Hematological:  Does not bruise/bleed easily.        Chronic comorbid conditions affecting medical decision making today:  Past Medical History:   Diagnosis Date    Arthritis     Back pain     CKD stage G3a/A3, GFR 45-59 and albumin creatinine ratio >300 mg/g 6/23/2020    Colon polyp     Hyperglycemia     Hyperlipidemia     Hypertension     Nuclear sclerosis of both eyes 10/31/2017    Prolapse of uterus     Type 2 diabetes mellitus without complication, without long-term current use of insulin     Type 2 diabetes mellitus without complication, without long-term current use of insulin      Past Surgical History:   Procedure Laterality Date    COLONOSCOPY N/A 12/15/2020    Procedure: COLONOSCOPY;  Surgeon: Antonio Maya MD;  Location: [facility] ([address] [place] [address]);  Service: Endoscopy;  Laterality: N/A;  prep ins. emailed - ERW  covid test on 12/12/20 -PCW-BB    COLONOSCOPY W/ POLYPECTOMY      Right Thumb      vaginal dilator       Family History   Problem Relation Name Age of Onset    Hypertension Mother      Cataracts Mother      Heart disease Mother      Hypertension Father      Cataracts Father      No Known Problems Sister      No Known Problems Brother      No Known Problems Brother      No Known Problems Maternal Aunt      No Known Problems Maternal Uncle      No Known Problems Paternal Aunt      No Known Problems Paternal Uncle      No Known Problems Maternal Grandmother      No Known Problems Maternal Grandfather      No Known Problems Paternal Grandmother      No Known Problems Paternal Grandfather      No Known Problems Other      Amblyopia Neg Hx      Blindness Neg Hx      Cancer Neg Hx      Diabetes Neg Hx      Glaucoma Neg Hx      Macular degeneration Neg Hx      Retinal detachment Neg Hx       Strabismus Neg Hx      Stroke Neg Hx      Thyroid disease Neg Hx       Social History     Substance and Sexual Activity   Alcohol Use No    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      Social History     Tobacco Use   Smoking Status Never    Passive exposure: Never   Smokeless Tobacco Never     Social History     Substance and Sexual Activity   Drug Use Yes    Comment: Tramadol 2 tabs bid       Current Outpatient Medications:     atorvastatin (LIPITOR) 40 MG tablet, Take 1 tablet (40 mg total) by mouth once daily., Disp: 90 tablet, Rfl: 3    co-enzyme Q-10 30 mg capsule, Take 30 mg by mouth 3 (three) times daily., Disp: , Rfl:     lisinopriL (PRINIVIL,ZESTRIL) 40 MG tablet, TAKE 1 TABLET BY MOUTH EVERY DAY, Disp: 90 tablet, Rfl: 3    multivitamin/iron/folic acid (CENTRUM WOMEN ORAL), Take by mouth., Disp: , Rfl:     mv,hussein,iron,mn/folic acid/chol (HAIR-SKIN-NAILS, PABA, ORAL), Take by mouth., Disp: , Rfl:     OMEGA 3,6,9 COMBINATION NO.7 (OMEGA DHA ORAL), Take 830 mg by mouth once daily., Disp: , Rfl:     traMADoL (ULTRAM) 50 mg tablet, Take 1 tablet (50 mg total) by mouth every 8 (eight) hours as needed for Pain., Disp: 90 tablet, Rfl: 2    triamterene-hydrochlorothiazide 37.5-25 mg (DYAZIDE) 37.5-25 mg per capsule, Take 1 capsule by mouth once daily., Disp: 90 capsule, Rfl: 3    cyclobenzaprine (FLEXERIL) 5 MG tablet, Take 1 tablet (5 mg total) by mouth nightly., Disp: 30 tablet, Rfl: 2    [START ON 11/23/2024] prednisoLONE-moxiflox-bromfen 1-0.5-0.075 % DrpS, Place 1 drop into the right eye 3 (three) times daily. (Patient not taking: Reported on 10/22/2024), Disp: 8 mL, Rfl: 2     Objective:         Vitals:    10/22/24 1058   BP: (!) 166/77   Pulse: 71     Physical Exam  Significant tenderness in bilatera trapezius area.   left shoulder with tenderness around biceps head, + signs of impingement.     Reviewed old and all outside pertinent medical records available.    All lab results personally reviewed and  "interpreted by me.  Lab Results   Component Value Date    WBC 5.06 09/25/2024    HGB 11.8 (L) 09/25/2024    HCT 35.9 (L) 09/25/2024    MCV 90 09/25/2024    MCH 29.6 09/25/2024    MCHC 32.9 09/25/2024    RDW 13.6 09/25/2024     09/25/2024    MPV 10.7 09/25/2024       Lab Results   Component Value Date     09/25/2024    K 4.5 09/25/2024     09/25/2024    CO2 24 09/25/2024    GLU 93 09/25/2024    BUN 42 (H) 09/25/2024    CALCIUM 10.0 09/25/2024    PROT 7.4 09/25/2024    ALBUMIN 3.8 09/25/2024    BILITOT 0.5 09/25/2024    AST 37 09/25/2024    ALKPHOS 78 09/25/2024    ALT 22 09/25/2024       Lab Results   Component Value Date    COLORU Yellow 06/24/2024    APPEARANCEUA Clear 06/24/2024    SPECGRAV 1.010 06/24/2024    PHUR 7.0 06/24/2024    PROTEINUA Negative 06/24/2024    KETONESU Negative 06/24/2024    LEUKOCYTESUR Negative 06/24/2024    NITRITE Negative 06/24/2024    UROBILINOGEN Negative 06/24/2024       No results found for: "CRP"    No results found for: "CRISTOPHER", "RF", "SEDRATE", "CCPANTIBODIE"    No components found for: "25OHVITDTOT", "88XCLAYL2", "02CMOFAV2", "METHODNOTE"    No results found for: "URICACID"    Lab Results   Component Value Date    HEPBSAG Negative 05/11/2010    HEPCAB Negative 05/11/2010       Imaging:  All imaging reviewed and independently interpreted by me.     ASSESSMENT / PLAN:     Nuha Bernstein is a 79 y.o. Black or  female with:    1. 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 She uses lidocaine spray and takes tramadol 50 mg BID.   -Discussed about doing left shoulder CSI to see if her symptoms improve. If no improvement, consider referral to pain management or sports medicine.   See procedure note. Post injection care discussed.   -continue doing HEP recommended by PT. Advised her to do exercises at home.   -will obtain markers of inflamamation, RF and CCP now " to rule out systemic causes.     2. Muscle spasm  -Significant muscle spasm in bilateral trapezius. Continue lidocaine spray.   -may need trigger point injections in the future.   -start flexeril 5 mg QHS as needed.     Follow up if symptoms worsen or fail to improve.    Method of contact with patient concerns: [facility]    Disclaimer:  This note is prepared using voice recognition software and as such is likely to have errors and has not been proof read. Please contact me for questions.     Time spent: 10 minutes in face to face discussion concerning diagnosis, prognosis, review of lab and test results, benefits of treatment as well as management of disease, counseling of patient and coordination of care between various health care providers.      Rebecca Mustafa M.D.  Rheumatology  [facility]

## 2024-10-22 NOTE — PATIENT INSTRUCTIONS
Start taking flexeril 5 mg at night as needed. Monitor for any sedation especially if she has to drive     Can take tylenol 1000 mg up to 4 times per day.     Do labs now     Do exercises recommended by PT both in the morning and at night.

## 2024-10-22 NOTE — PROCEDURES
Large Joint Aspiration/Injection: L subacromial bursa    Date/Time: 10/22/2024 11:20 AM    Performed by: Rebecca Mustafa MD  Authorized by: Rebecca Mustafa MD    Consent Done?:  Yes (Written)  Indications:  Pain    Local anesthesia used?: Yes    Local anesthetic:  Co-phenylcaine spray    Details:  Needle Size:  25 G  Ultrasonic Guidance for needle placement?: No    Location:  Shoulder  Site:  L subacromial bursa  Medications:  2 mL LIDOcaine (PF) 10 mg/ml (1%) 10 mg/mL (1 %); 40 mg triamcinolone acetonide 40 mg/mL  Patient tolerance:  Patient tolerated the procedure well with no immediate complications

## 2024-11-19 ENCOUNTER — PATIENT MESSAGE (OUTPATIENT)
Dept: OPHTHALMOLOGY | Facility: CLINIC | Age: 79
End: 2024-11-19
Payer: MEDICARE

## 2024-11-21 ENCOUNTER — TELEPHONE (OUTPATIENT)
Dept: OPHTHALMOLOGY | Facility: CLINIC | Age: 79
End: 2024-11-21
Payer: MEDICARE

## 2024-11-24 NOTE — H&P
[facility]  History & Physical    Subjective:      Chief Complaint/Reason for Admission:     Nuha Bernstein is a 79 y.o. female.    Past Medical History:   Diagnosis Date    Arthritis     Back pain     CKD stage G3a/A3, GFR 45-59 and albumin creatinine ratio >300 mg/g 6/23/2020    Colon polyp     Hyperglycemia     Hyperlipidemia     Hypertension     Nuclear sclerosis of both eyes 10/31/2017    Prolapse of uterus     Type 2 diabetes mellitus without complication, without long-term current use of insulin     Type 2 diabetes mellitus without complication, without long-term current use of insulin      Past Surgical History:   Procedure Laterality Date    COLONOSCOPY N/A 12/15/2020    Procedure: COLONOSCOPY;  Surgeon: Antonio Maya MD;  Location: [facility] ([address] [place] [address]);  Service: Endoscopy;  Laterality: N/A;  prep ins. 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     Drug use: Yes     Comment: Tramadol 2 tabs bid       No medications prior to admission.     Review of patient's allergies indicates:  No Known Allergies     Review of Systems   Eyes:  Positive for blurred vision.   All other systems reviewed and are negative.        Objective:      Vital Signs (Most Recent)       Vital Signs Range (Last 24H):  BP: ()/()   Arterial Line BP: ()/()     Physical Exam  Constitutional:       Appearance: She is well-developed.   HENT:      Head: Normocephalic.   Eyes:      Conjunctiva/sclera: Conjunctivae normal.      Pupils: Pupils are equal, round, and reactive to light.   Cardiovascular:      Rate and Rhythm: Normal rate and regular rhythm.      Heart sounds: Normal heart sounds.   Pulmonary:      Effort: Pulmonary effort is  normal.      Breath sounds: Normal breath sounds.   Abdominal:      General: Bowel sounds are normal.      Palpations: Abdomen is soft.   Musculoskeletal:         General: Normal range of motion.      Cervical back: Normal range of motion and neck supple.   Skin:     General: Skin is warm.   Neurological:      Mental Status: She is alert and oriented to person, place, and time.         ASA Score: II    Mallampati Score: II    Plan for Sedation: Moderate    Patient or Family History of Anesthesia problems : No    Any changes affecting the anesthesia assessment which may have changed since the initial assessment and H and P:  No       Data Review:    ECG:     Assessment:      Cataract OD.    Plan:    CE OD.

## 2024-11-25 NOTE — PRE-PROCEDURE INSTRUCTIONS
Patient reviewed on 11/25/2024.  Okay to proceed at [place]. The following pre-procedure instructions and arrival time have been sent to patient portal for review.  Patient confirmed receiving pre-procedure instructions via [employer] portal.          Dear Nuha     Below you will find basic pre-procedure instructions in preparation for your procedure on 11/26/24 with Dr. Montez              You should receive your arrival time within 24-48 hours of your scheduled procedure date from the  at your surgeon's office.     -Nothing to eat or drink after midnight the night before your procedure until after your procedure, except AM meds with small sips of water.     - HOLD all oral Diabetic medications night before and morning of procedure  - HOLD all Insulin morning of procedure  - HOLD all Fluid pills morning of procedure  - HOLD all non-insulin shots until after surgery (Ozempic, Mounjaro, Trulicity, Victoza, Byetta, Wegovy and Adlyxin) (7 days prior)  - HOLD all vitamins, minerals and herbal supplements morning of procedure   - TAKE all B/P meds, EXCEPT those that contain a fluid pill (ex. Lasix, Hydroclorothiazide/HCTZ, Spirnolactone)  - USE inhalers as needed and bring AM of surgery  - USE EYE DROPS as directed  -TAKE blood thinner meds AM of surgery unless otherwise instructed by your provider to not take     - Shower and wash face with antibacterial soap (ex. Dial) for 3 mins PM prior and AM of surgery  - No powder, lotions, creams, oils, gels, ointments, makeup,  or jewelry    - Wear comfortable clothing (button up shirt)     (Patient is required to have a responsible ride to transport home, ride may not leave while patient is in surgery)     -- [facility], 2nd floor Surgery Center, located   @ [address] [place] [id number]  2nd Floor Registration        If you have any questions or concerns please feel free to contact your surgeon's office.           Please reply to  this message as receipt of delivery.     Cat[place], LPN [facility]  Pre-Admit - Anesthesia Dept

## 2024-11-26 ENCOUNTER — HOSPITAL ENCOUNTER (OUTPATIENT)
Facility: HOSPITAL | Age: 79
Discharge: HOME | End: 2024-11-26
Attending: OPHTHALMOLOGY | Admitting: OPHTHALMOLOGY
Payer: MEDICARE

## 2024-11-26 VITALS
HEART RATE: 60 BPM | BODY MASS INDEX: 27.8 KG/M2 | SYSTOLIC BLOOD PRESSURE: 182 MMHG | OXYGEN SATURATION: 98 % | TEMPERATURE: 98 F | HEIGHT: 66 IN | RESPIRATION RATE: 18 BRPM | WEIGHT: 173 LBS | DIASTOLIC BLOOD PRESSURE: 79 MMHG

## 2024-11-26 DIAGNOSIS — H25.11 NUCLEAR SCLEROTIC CATARACT OF RIGHT EYE: Primary | ICD-10-CM

## 2024-11-26 LAB — POCT GLUCOSE: 94 MG/DL (ref 70–110)

## 2024-11-26 PROCEDURE — V2632 POST CHMBR INTRAOCULAR LENS: HCPCS | Performed by: OPHTHALMOLOGY

## 2024-11-26 PROCEDURE — 71000015 HC POSTOP RECOV 1ST HR: Performed by: OPHTHALMOLOGY

## 2024-11-26 PROCEDURE — 82962 GLUCOSE BLOOD TEST: CPT | Performed by: OPHTHALMOLOGY

## 2024-11-26 PROCEDURE — 36000706: Performed by: OPHTHALMOLOGY

## 2024-11-26 PROCEDURE — 63600175 PHARM REV CODE 636 W HCPCS: Performed by: OPHTHALMOLOGY

## 2024-11-26 PROCEDURE — 99152 MOD SED SAME PHYS/QHP 5/>YRS: CPT | Performed by: OPHTHALMOLOGY

## 2024-11-26 PROCEDURE — 99900035 HC TECH TIME PER 15 MIN (STAT)

## 2024-11-26 PROCEDURE — 66984 XCAPSL CTRC RMVL W/O ECP: CPT | Mod: HCNC,RT,, | Performed by: OPHTHALMOLOGY

## 2024-11-26 PROCEDURE — 94761 N-INVAS EAR/PLS OXIMETRY MLT: CPT

## 2024-11-26 PROCEDURE — 25000003 PHARM REV CODE 250: Performed by: OPHTHALMOLOGY

## 2024-11-26 PROCEDURE — 36000707: Performed by: OPHTHALMOLOGY

## 2024-11-26 PROCEDURE — 99153 MOD SED SAME PHYS/QHP EA: CPT | Performed by: OPHTHALMOLOGY

## 2024-11-26 PROCEDURE — 27201423 OPTIME MED/SURG SUP & DEVICES STERILE SUPPLY: Performed by: OPHTHALMOLOGY

## 2024-11-26 DEVICE — LENS CLAREON AUTONOME 21.0D: Type: IMPLANTABLE DEVICE | Site: EYE | Status: FUNCTIONAL

## 2024-11-26 RX ORDER — EPINEPHRINE 1 MG/ML
INJECTION, SOLUTION, CONCENTRATE INTRAVENOUS
Status: DISCONTINUED | OUTPATIENT
Start: 2024-11-26 | End: 2024-11-26 | Stop reason: HOSPADM

## 2024-11-26 RX ORDER — FENTANYL CITRATE 50 UG/ML
25 INJECTION, SOLUTION INTRAMUSCULAR; INTRAVENOUS
Status: DISCONTINUED | OUTPATIENT
Start: 2024-11-26 | End: 2024-11-26 | Stop reason: HOSPADM

## 2024-11-26 RX ORDER — CYCLOP/TROP/PROPA/PHEN/KET/WAT 1-1-0.1%
1 DROPS (EA) OPHTHALMIC (EYE)
Status: DISCONTINUED | OUTPATIENT
Start: 2024-11-26 | End: 2024-11-26

## 2024-11-26 RX ORDER — SODIUM CHLORIDE 9 MG/ML
INJECTION, SOLUTION INTRAVENOUS CONTINUOUS
Status: DISCONTINUED | OUTPATIENT
Start: 2024-11-26 | End: 2024-11-26 | Stop reason: HOSPADM

## 2024-11-26 RX ORDER — LIDOCAIN/PHENYLEPH/BSS NO.2/PF 1 %-1.5 %
SYRINGE (ML) INTRAOCULAR
Status: DISCONTINUED | OUTPATIENT
Start: 2024-11-26 | End: 2024-11-26 | Stop reason: HOSPADM

## 2024-11-26 RX ORDER — TETRACAINE HYDROCHLORIDE 5 MG/ML
1 SOLUTION OPHTHALMIC EVERY 5 MIN PRN
Status: COMPLETED | OUTPATIENT
Start: 2024-11-26 | End: 2024-11-26

## 2024-11-26 RX ORDER — ACETAMINOPHEN 325 MG/1
650 TABLET ORAL EVERY 4 HOURS PRN
Status: DISCONTINUED | OUTPATIENT
Start: 2024-11-26 | End: 2024-11-26 | Stop reason: HOSPADM

## 2024-11-26 RX ORDER — MOXIFLOXACIN 5 MG/ML
1 SOLUTION/ DROPS OPHTHALMIC EVERY 5 MIN PRN
Status: COMPLETED | OUTPATIENT
Start: 2024-11-26 | End: 2024-11-26

## 2024-11-26 RX ORDER — MIDAZOLAM HYDROCHLORIDE 1 MG/ML
1 INJECTION, SOLUTION INTRAMUSCULAR; INTRAVENOUS
Status: DISCONTINUED | OUTPATIENT
Start: 2024-11-26 | End: 2024-11-26 | Stop reason: HOSPADM

## 2024-11-26 RX ORDER — MOXIFLOXACIN 5 MG/ML
SOLUTION/ DROPS OPHTHALMIC
Status: DISCONTINUED | OUTPATIENT
Start: 2024-11-26 | End: 2024-11-26 | Stop reason: HOSPADM

## 2024-11-26 RX ORDER — PHENYLEPHRINE HYDROCHLORIDE 25 MG/ML
1 SOLUTION/ DROPS OPHTHALMIC EVERY 5 MIN PRN
Status: COMPLETED | OUTPATIENT
Start: 2024-11-26 | End: 2024-11-26

## 2024-11-26 RX ORDER — HYDROCODONE BITARTRATE AND ACETAMINOPHEN 5; 325 MG/1; MG/1
1 TABLET ORAL EVERY 4 HOURS PRN
Status: DISCONTINUED | OUTPATIENT
Start: 2024-11-26 | End: 2024-11-26 | Stop reason: HOSPADM

## 2024-11-26 RX ORDER — PROPARACAINE HYDROCHLORIDE 5 MG/ML
SOLUTION/ DROPS OPHTHALMIC
Status: DISCONTINUED | OUTPATIENT
Start: 2024-11-26 | End: 2024-11-26 | Stop reason: HOSPADM

## 2024-11-26 RX ORDER — LIDOCAINE HYDROCHLORIDE 40 MG/ML
INJECTION, SOLUTION RETROBULBAR
Status: DISCONTINUED | OUTPATIENT
Start: 2024-11-26 | End: 2024-11-26 | Stop reason: HOSPADM

## 2024-11-26 RX ORDER — TROPICAMIDE 10 MG/ML
1 SOLUTION/ DROPS OPHTHALMIC
Status: COMPLETED | OUTPATIENT
Start: 2024-11-26 | End: 2024-11-26

## 2024-11-26 RX ORDER — PREDNISOLONE ACETATE 10 MG/ML
SUSPENSION/ DROPS OPHTHALMIC
Status: DISCONTINUED | OUTPATIENT
Start: 2024-11-26 | End: 2024-11-26 | Stop reason: HOSPADM

## 2024-11-26 RX ADMIN — TROPICAMIDE 1 DROP: 10 SOLUTION/ DROPS OPHTHALMIC at 11:11

## 2024-11-26 RX ADMIN — TETRACAINE HYDROCHLORIDE 1 DROP: 5 SOLUTION OPHTHALMIC at 11:11

## 2024-11-26 RX ADMIN — MIDAZOLAM HYDROCHLORIDE 1 MG: 1 INJECTION, SOLUTION INTRAMUSCULAR; INTRAVENOUS at 12:11

## 2024-11-26 RX ADMIN — PHENYLEPHRINE HYDROCHLORIDE 1 DROP: 25 SOLUTION/ DROPS OPHTHALMIC at 11:11

## 2024-11-26 RX ADMIN — MOXIFLOXACIN OPHTHALMIC 1 DROP: 5 SOLUTION/ DROPS OPHTHALMIC at 11:11

## 2024-11-26 RX ADMIN — FENTANYL CITRATE 25 MCG: 50 INJECTION, SOLUTION INTRAMUSCULAR; INTRAVENOUS at 12:11

## 2024-11-26 NOTE — BRIEF OP NOTE
[facility]  Brief Operative Note    Surgery Date: 11/26/2024     Surgeons and Role:     * Olivier Montez MD - Primary    Assisting Surgeon: None    Pre-op Diagnosis:  NS (nuclear sclerosis), right [H25.11]    Post-op Diagnosis:  Post-Op Diagnosis Codes:     * NS (nuclear sclerosis), right [H25.11]    Procedure(s) (LRB):  EXTRACTION, CATARACT, WITH IOL INSERTION (Right)    Anesthesia: RN IV Sedation    Operative Findings:     Estimated Blood Loss: * No values recorded between 11/26/2024 12:38 PM and 11/26/2024  1:01 PM *         Specimens:   Specimen (24h ago, onward)      None              Discharge Note    OUTCOME: Patient tolerated treatment/procedure well without complication and is now ready for discharge.    DISPOSITION: Home or Self Care    FINAL DIAGNOSIS:  Nuclear sclerotic cataract of right eye    FOLLOWUP: In clinic    DISCHARGE INSTRUCTIONS:    Discharge Procedure Orders   Other restrictions (specify):   Order Comments: No heavy lifting or bending for 1 week.

## 2024-11-26 NOTE — PLAN OF CARE
Pt Resting in bed no s/s of distress RR even and unlabored VSS. Discharge instructions given and pt verbalized. IV D/C. Pt ready for discharge home with family. Protective glasses in place.

## 2024-11-26 NOTE — OP NOTE
Operative Date:  11/26/2024    Discharge Date:  11/26/2024    Discharge Patient Home    Report Title: Operative Note      SURGEON: Olivier Montez MD     ASSISTANT:     PREOPERATIVE DIAGNOSIS: Visually significant NSC cataract,  Right Eye.    POSTOPERATIVE DIAGNOSIS: Visually-significant NSC cataract,  Right Eye.    PROCEDURE PERFORMED: Phacoemulsification of the cataract with posterior chamber intraocular lens Right Eye.    ANESTHESIA:  Moderate Sedation with Local    The team confirmed the patient ID and re-evaluated the patient and sedation plan confirming it is suitable for the patient's condition and procedure prior to administering sedation.    COMPLICATIONS: None    ESTIMATED BLOOD LOSS: Minimal    INDICATIONS FOR PROCEDURE:   The patient is a pleasant 79 year old woman with increasing difficulties with activities of daily living secondary to a dense visually significant cataract in the Right Eye.  Discussions have been carried out with this patient concerning the options to surgery, risks, benefits and expectations.  The patient voiced good understanding and wished to proceed with the above procedure.    PROCEDURE IN DETAIL: The patient was brought to the operating room and received topical anesthetic to the eye and then was prepped and draped in the usual sterile fashion.  Using the Ellis ring and the guarded brady blade set at 0.37 mm, a partial thickness clear cornea incision was made temporally.  The paracentesis site was made at the twelve o'clock position.  Omidria was injected into the anterior chamber through the paracentesis.  Viscoat was then injected into the anterior chamber.  The eye was then reentered at the primary surgical site with a 2.4 mm keratome followed by continuous capsulotomy, hydrodissection, hydrodelineation and phacoemulsification of the cataract.  Residual cortical material was removed using automated irrigation-aspiration technique.  Healon was injected into the  posterior chamber and a CNAOTO 21.0 diopter lens was placed in the bag without difficulty. Residual viscoelastic was removed using automated irrigation-aspiration technique. The eye was re-pressurized using BSS solution and both the paracentesis site and the primary surgical site were demonstrated to be watertight at the end of the case with Weck--Angela manipulation.  One drop of Ofloxacin and one drop of Pred acetate 1% was applied to the Right Eye .The eye was closed, patched and a Cuadra shield placed.  The patient was taken to the recovery room in good and stable condition.  The patient tolerated the procedure well.  The patient was instructed to refrain from any heavy lifting, bending, stooping or straining activities, discharged home  and to follow-up in the morning for routine postoperative care with Olivier Montez MD.

## 2024-11-26 NOTE — DISCHARGE INSTRUCTIONS
Post Operative Instructions for Cataract Surgery- [place]    STARTING THE SAME DAY OF SURGERY:    Place one (1) drop of Prednisolone-Moxifloxacin-Bromfenac (shake bottle) into the operative eye three (3) times a day.     You will use this drop for four (4) weeks following surgery.      1 DAY POST OPERATIVE APPOINTMENT:    Date: ______________________________/ Time: ___________________________    Location: [facility]- [address], [place] [address]      Please use 1 drop of PREDNISOLONE-MOXIFLOXACIN-BROMFENAC in the operative eye before arriving for your appointment; this will help keep your eye comfortable.       RESTRICTIONS FOR SEVEN (7) DAYS FOLLOWING SURGERY:    DO NOT lift anything over 10 pounds.    DO NOT bend at the waist, only at the knees (keep head in upright position).    DO NOT rub your eye.    DO NOT get any water into the eye.    DO NOT wear any makeup, lotions, or creams on/around the eye.    Wear the protective eye shield you were give after surgery anytime you go to sleep. You may remove the shield while awake.       PLEASE NOTE:    You may take over the counter pain medication such as Tylenol or Ibuprofen as directed, if needed for pain.      *** If you experience severe pain, loss of vision, sudden onset of flashes, and/or floaters, IMMEDIATELY CALL Dr. Montez's office [phone]; AFTER HOUR [phone]; OR proceed to the EMERGENCY ROOM.

## 2024-11-27 ENCOUNTER — OFFICE VISIT (OUTPATIENT)
Dept: OPHTHALMOLOGY | Facility: CLINIC | Age: 79
End: 2024-11-27
Payer: MEDICARE

## 2024-11-27 DIAGNOSIS — Z98.890 POST-OPERATIVE STATE: Primary | ICD-10-CM

## 2024-11-27 PROCEDURE — 99999 PR PBB SHADOW E&M-EST. PATIENT-LVL II: CPT | Mod: PBBFAC,,, | Performed by: OPHTHALMOLOGY

## 2024-11-27 NOTE — PROGRESS NOTES
Subjective:       Patient ID: Nuha Bernstein is a 79 y.o. female.    Chief Complaint: Post-op Evaluation (Patient Nuha Bernstein is a 79 year old male./S/p Phaco w/IOL OD: 11/26/2024/Cataract sx OS scheduled 12/10/2024)    HPI     Post-op Evaluation     Additional comments: Patient Nuha Bernstein is a 79 year old male.  S/p Phaco w/IOL OD: 11/26/2024  Cataract sx OS scheduled 12/10/2024           Comments    Pt here for 1 day PCIOL OD post-op visit. Pt states that VA OD is still a   little blurry and has some FBS OS. Pt denies any eye pain.    DLS: 10/11/2024 with Dr. Montez    Meds: PMB TID OD (until 12/24/2024)          Last edited by Natasha Stewart on 11/27/2024 10:47 AM.             Assessment:       1. Post-operative state        Plan:       S/p CE OD- Doing well.        CPM OD.  RTC 1 wk.

## 2024-12-04 ENCOUNTER — TELEPHONE (OUTPATIENT)
Dept: OPHTHALMOLOGY | Facility: CLINIC | Age: 79
End: 2024-12-04
Payer: MEDICARE

## 2024-12-04 ENCOUNTER — TELEPHONE (OUTPATIENT)
Dept: OPTOMETRY | Facility: CLINIC | Age: 79
End: 2024-12-04
Payer: MEDICARE

## 2024-12-05 ENCOUNTER — OFFICE VISIT (OUTPATIENT)
Dept: OPHTHALMOLOGY | Facility: CLINIC | Age: 79
End: 2024-12-05
Payer: MEDICARE

## 2024-12-05 DIAGNOSIS — H25.12 NS (NUCLEAR SCLEROSIS), LEFT: ICD-10-CM

## 2024-12-05 DIAGNOSIS — Z98.890 POST-OPERATIVE STATE: Primary | ICD-10-CM

## 2024-12-05 PROCEDURE — 99999 PR PBB SHADOW E&M-EST. PATIENT-LVL I: CPT | Mod: PBBFAC,HCNC,, | Performed by: OPHTHALMOLOGY

## 2024-12-05 NOTE — PROGRESS NOTES
Subjective:       Patient ID: Nuha Bernstein is a 79 y.o. female.    Chief Complaint: Post-op Evaluation    HPI    Here for 1 week S/p Phaco w/IOL OD 11-26-24    Eye meds: PMB OD TID    79 year old female states vision is clear in right eye. Denies ocular   pain. Pt voices no concerns   Last edited by Tracy Buckner on 12/5/2024  2:36 PM.             Assessment:       1. Post-operative state    2. NS (nuclear sclerosis), left        Plan:       S/p CE OD- Doing well.    Visually significant cataract OS -Pt. Wants Sx.      CPM OD.  Cataract Surgery Consent: Patient with a visually significant cataract with difficulties of ADLs, reading, driving, night vision, glare (any and all).  Discussed with Patient/Family/Caregiver: options, risks and benefits, expectations of cataract surgery, utilized an eye model with questions and answers to facilitate discussion.  Discussed lens options and patient understands that glasses may be required for optimal vision for distance and/or near vision after cataract surgery.  The Patient/Family/Caregiver  voice good understanding and patient wishes to proceed with surgery.  The patient will likely benefit from surgery and patient signed consent for Left Eye.  CE OS 12/10/24.

## 2024-12-08 NOTE — H&P
[facility]  History & Physical    Subjective:      Chief Complaint/Reason for Admission:     Nuha Bernstein is a 79 y.o. female.    Past Medical History:   Diagnosis Date    Arthritis     Back pain     CKD stage G3a/A3, GFR 45-59 and albumin creatinine ratio >300 mg/g 6/23/2020    Colon polyp     Hyperglycemia     Hyperlipidemia     Hypertension     Nuclear sclerosis of both eyes 10/31/2017    Prolapse of uterus     Type 2 diabetes mellitus without complication, without long-term current use of insulin     Type 2 diabetes mellitus without complication, without long-term current use of insulin      Past Surgical History:   Procedure Laterality Date    CATARACT EXTRACTION W/  INTRAOCULAR LENS IMPLANT Right 11/26/2024    Procedure: EXTRACTION, CATARACT, WITH IOL INSERTION;  Surgeon: Olivier Montez MD;  [facility]: [facility] OR;  Service: Ophthalmology;  Laterality: Right;    COLONOSCOPY N/A 12/15/2020    Procedure: COLONOSCOPY;  Surgeon: Antonio Maya MD;  Location: [address]);  Service: Endoscopy;  Laterality: N/A;  prep ins. 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     Drug use: Yes     Comment: Tramadol 2 tabs bid       No medications prior to admission.     Review of patient's allergies indicates:  No Known Allergies     Review of Systems   Eyes:  Positive for blurred vision.   All other systems reviewed and are negative.        Objective:      Vital Signs (Most Recent)       Vital Signs Range (Last 24H):  BP: ()/()   Arterial Line BP: ()/()     Physical Exam  Constitutional:       Appearance: She is well-developed.   HENT:      Head: Normocephalic.   Eyes:      Conjunctiva/sclera: Conjunctivae  normal.      Pupils: Pupils are equal, round, and reactive to light.   Cardiovascular:      Rate and Rhythm: Normal rate and regular rhythm.      Heart sounds: Normal heart sounds.   Pulmonary:      Effort: Pulmonary effort is normal.      Breath sounds: Normal breath sounds.   Abdominal:      General: Bowel sounds are normal.      Palpations: Abdomen is soft.   Musculoskeletal:         General: Normal range of motion.      Cervical back: Normal range of motion and neck supple.   Skin:     General: Skin is warm.   Neurological:      Mental Status: She is alert and oriented to person, place, and time.         ASA Score: II    Mallampati Score: II    Plan for Sedation: Moderate    Patient or Family History of Anesthesia problems : No    Any changes affecting the anesthesia assessment which may have changed since the initial assessment and H and P:  No       Data Review:    ECG:     Assessment:      Cataract OS.    Plan:    CE OS.

## 2024-12-09 NOTE — PRE-PROCEDURE INSTRUCTIONS
Unable to reach pt via phone.  Left voicemail with arrival time also informing pt of need for responsible  accompaniment and instructing pt to follow pre-procedure instructions provided via [facility] portal.  The following message was sent to pt's portal.        Dear Nuha     Below you will find basic pre-procedure instructions in preparation for your procedure on 12/10/24 with Dr. Montez              You should receive your arrival time within 24-48 hours of your scheduled procedure date from the  at your surgeon's office.     -Nothing to eat or drink after midnight the night before your procedure until after your procedure, except AM meds with small sips of water.     - HOLD all oral Diabetic medications night before and morning of procedure  - HOLD all Insulin morning of procedure  - HOLD all Fluid pills morning of procedure  - HOLD all non-insulin shots until after surgery (Ozempic, Mounjaro, Trulicity, Victoza, Byetta, Wegovy and Adlyxin) (7 days prior)  - HOLD all vitamins, minerals and herbal supplements morning of procedure   - TAKE all B/P meds, EXCEPT those that contain a fluid pill (ex. Lasix, Hydroclorothiazide/HCTZ, Spirnolactone)  - USE inhalers as needed and bring AM of surgery  - USE EYE DROPS as directed  -TAKE blood thinner meds AM of surgery unless otherwise instructed by your provider to not take     - Shower and wash face with antibacterial soap (ex. Dial) for 3 mins PM prior and AM of surgery  - No powder, lotions, creams, oils, gels, ointments, makeup,  or jewelry    - Wear comfortable clothing (button up shirt)     (Patient is required to have a responsible ride to transport home, ride may not leave while patient is in surgery)     -- [facility], 2nd floor Surgery Center, located   @ [address] [place] [address]  2nd Floor Registration        If you have any questions or concerns please feel free to contact your surgeon's office.            Please reply to this message as receipt of delivery.     Cat[place], LPN [facility]  Pre-Admit - Anesthesia Dept

## 2024-12-10 ENCOUNTER — HOSPITAL ENCOUNTER (OUTPATIENT)
Facility: HOSPITAL | Age: 79
Discharge: HOME | End: 2024-12-10
Attending: OPHTHALMOLOGY | Admitting: OPHTHALMOLOGY
Payer: MEDICARE

## 2024-12-10 VITALS
BODY MASS INDEX: 27.8 KG/M2 | HEART RATE: 65 BPM | DIASTOLIC BLOOD PRESSURE: 83 MMHG | HEIGHT: 66 IN | RESPIRATION RATE: 18 BRPM | SYSTOLIC BLOOD PRESSURE: 174 MMHG | WEIGHT: 173 LBS | TEMPERATURE: 97 F | OXYGEN SATURATION: 97 %

## 2024-12-10 DIAGNOSIS — H25.12 NUCLEAR SCLEROTIC CATARACT OF LEFT EYE: Primary | ICD-10-CM

## 2024-12-10 PROCEDURE — 94761 N-INVAS EAR/PLS OXIMETRY MLT: CPT

## 2024-12-10 PROCEDURE — 27201423 OPTIME MED/SURG SUP & DEVICES STERILE SUPPLY: Performed by: OPHTHALMOLOGY

## 2024-12-10 PROCEDURE — 99900035 HC TECH TIME PER 15 MIN (STAT)

## 2024-12-10 PROCEDURE — 25000003 PHARM REV CODE 250: Performed by: OPHTHALMOLOGY

## 2024-12-10 PROCEDURE — 63600175 PHARM REV CODE 636 W HCPCS: Performed by: OPHTHALMOLOGY

## 2024-12-10 PROCEDURE — 66984 XCAPSL CTRC RMVL W/O ECP: CPT | Mod: 79,HCNC,LT, | Performed by: OPHTHALMOLOGY

## 2024-12-10 PROCEDURE — 71000015 HC POSTOP RECOV 1ST HR: Performed by: OPHTHALMOLOGY

## 2024-12-10 PROCEDURE — V2632 POST CHMBR INTRAOCULAR LENS: HCPCS | Performed by: OPHTHALMOLOGY

## 2024-12-10 PROCEDURE — 99153 MOD SED SAME PHYS/QHP EA: CPT | Performed by: OPHTHALMOLOGY

## 2024-12-10 PROCEDURE — 99152 MOD SED SAME PHYS/QHP 5/>YRS: CPT | Performed by: OPHTHALMOLOGY

## 2024-12-10 PROCEDURE — 36000707: Performed by: OPHTHALMOLOGY

## 2024-12-10 PROCEDURE — 36000706: Performed by: OPHTHALMOLOGY

## 2024-12-10 DEVICE — LENS CLAREON AUTONOME 20.0D: Type: IMPLANTABLE DEVICE | Site: EYE | Status: FUNCTIONAL

## 2024-12-10 RX ORDER — PREDNISOLONE ACETATE 10 MG/ML
SUSPENSION/ DROPS OPHTHALMIC
Status: DISCONTINUED | OUTPATIENT
Start: 2024-12-10 | End: 2024-12-10 | Stop reason: HOSPADM

## 2024-12-10 RX ORDER — ACETAMINOPHEN 325 MG/1
650 TABLET ORAL EVERY 4 HOURS PRN
Status: DISCONTINUED | OUTPATIENT
Start: 2024-12-10 | End: 2024-12-10 | Stop reason: HOSPADM

## 2024-12-10 RX ORDER — TETRACAINE HYDROCHLORIDE 5 MG/ML
1 SOLUTION OPHTHALMIC EVERY 5 MIN PRN
Status: COMPLETED | OUTPATIENT
Start: 2024-12-10 | End: 2024-12-10

## 2024-12-10 RX ORDER — PHENYLEPHRINE HYDROCHLORIDE 25 MG/ML
1 SOLUTION/ DROPS OPHTHALMIC
Status: COMPLETED | OUTPATIENT
Start: 2024-12-10 | End: 2024-12-10

## 2024-12-10 RX ORDER — TROPICAMIDE 10 MG/ML
1 SOLUTION/ DROPS OPHTHALMIC
Status: COMPLETED | OUTPATIENT
Start: 2024-12-10 | End: 2024-12-10

## 2024-12-10 RX ORDER — MIDAZOLAM HYDROCHLORIDE 1 MG/ML
1 INJECTION, SOLUTION INTRAMUSCULAR; INTRAVENOUS
Status: DISCONTINUED | OUTPATIENT
Start: 2024-12-10 | End: 2024-12-10 | Stop reason: HOSPADM

## 2024-12-10 RX ORDER — LIDOCAIN/PHENYLEPH/BSS NO.2/PF 1 %-1.5 %
SYRINGE (ML) INTRAOCULAR
Status: DISCONTINUED | OUTPATIENT
Start: 2024-12-10 | End: 2024-12-10 | Stop reason: HOSPADM

## 2024-12-10 RX ORDER — LIDOCAINE HYDROCHLORIDE 40 MG/ML
INJECTION, SOLUTION RETROBULBAR
Status: DISCONTINUED | OUTPATIENT
Start: 2024-12-10 | End: 2024-12-10 | Stop reason: HOSPADM

## 2024-12-10 RX ORDER — HYDROCODONE BITARTRATE AND ACETAMINOPHEN 5; 325 MG/1; MG/1
1 TABLET ORAL EVERY 4 HOURS PRN
Status: DISCONTINUED | OUTPATIENT
Start: 2024-12-10 | End: 2024-12-10 | Stop reason: HOSPADM

## 2024-12-10 RX ORDER — CYCLOPENTOLATE HYDROCHLORIDE 10 MG/ML
1 SOLUTION/ DROPS OPHTHALMIC
Status: COMPLETED | OUTPATIENT
Start: 2024-12-10 | End: 2024-12-10

## 2024-12-10 RX ORDER — EPINEPHRINE 1 MG/ML
INJECTION, SOLUTION, CONCENTRATE INTRAVENOUS
Status: DISCONTINUED | OUTPATIENT
Start: 2024-12-10 | End: 2024-12-10 | Stop reason: HOSPADM

## 2024-12-10 RX ORDER — PROPARACAINE HYDROCHLORIDE 5 MG/ML
SOLUTION/ DROPS OPHTHALMIC
Status: DISCONTINUED | OUTPATIENT
Start: 2024-12-10 | End: 2024-12-10 | Stop reason: HOSPADM

## 2024-12-10 RX ORDER — FENTANYL CITRATE 50 UG/ML
25 INJECTION, SOLUTION INTRAMUSCULAR; INTRAVENOUS
Status: DISCONTINUED | OUTPATIENT
Start: 2024-12-10 | End: 2024-12-10 | Stop reason: HOSPADM

## 2024-12-10 RX ORDER — SODIUM CHLORIDE 9 MG/ML
INJECTION, SOLUTION INTRAVENOUS CONTINUOUS
Status: DISCONTINUED | OUTPATIENT
Start: 2024-12-10 | End: 2024-12-10 | Stop reason: HOSPADM

## 2024-12-10 RX ORDER — MOXIFLOXACIN 5 MG/ML
1 SOLUTION/ DROPS OPHTHALMIC EVERY 5 MIN PRN
Status: COMPLETED | OUTPATIENT
Start: 2024-12-10 | End: 2024-12-10

## 2024-12-10 RX ADMIN — MIDAZOLAM HYDROCHLORIDE 1 MG: 1 INJECTION, SOLUTION INTRAMUSCULAR; INTRAVENOUS at 02:12

## 2024-12-10 RX ADMIN — TROPICAMIDE 1 DROP: 10 SOLUTION/ DROPS OPHTHALMIC at 12:12

## 2024-12-10 RX ADMIN — TETRACAINE HYDROCHLORIDE 1 DROP: 5 SOLUTION OPHTHALMIC at 12:12

## 2024-12-10 RX ADMIN — PHENYLEPHRINE HYDROCHLORIDE 1 DROP: 25 SOLUTION/ DROPS OPHTHALMIC at 12:12

## 2024-12-10 RX ADMIN — CYCLOPENTOLATE HYDROCHLORIDE 1 DROP: 10 SOLUTION/ DROPS OPHTHALMIC at 12:12

## 2024-12-10 RX ADMIN — MOXIFLOXACIN OPHTHALMIC 1 DROP: 5 SOLUTION/ DROPS OPHTHALMIC at 12:12

## 2024-12-10 RX ADMIN — FENTANYL CITRATE 25 MCG: 50 INJECTION, SOLUTION INTRAMUSCULAR; INTRAVENOUS at 02:12

## 2024-12-10 NOTE — PLAN OF CARE
Discharge instructions given to patient with verbalization of understanding all.  VSS, denies n/v and tolerating PO, rates pain level tolerable. IV removed, and family notified for patient discharge home.

## 2024-12-10 NOTE — BRIEF OP NOTE
[facility]  Brief Operative Note    Surgery Date: 12/10/2024     Surgeons and Role:     * Olivier Montez MD - Primary    Assisting Surgeon: None    Pre-op Diagnosis:  NS (nuclear sclerosis), left [H25.12]    Post-op Diagnosis:  Post-Op Diagnosis Codes:     * NS (nuclear sclerosis), left [H25.12]    Procedure(s) (LRB):  EXTRACTION, CATARACT, WITH IOL INSERTION (Left)    Anesthesia: RN IV Sedation    Operative Findings:     Estimated Blood Loss: * No values recorded between 12/10/2024  2:10 PM and 12/10/2024  2:33 PM *         Specimens:   Specimen (24h ago, onward)      None              Discharge Note    OUTCOME: Patient tolerated treatment/procedure well without complication and is now ready for discharge.    DISPOSITION: Home or Self Care    FINAL DIAGNOSIS:  Nuclear sclerotic cataract of left eye    FOLLOWUP: In clinic    DISCHARGE INSTRUCTIONS:    Discharge Procedure Orders   Other restrictions (specify):   Order Comments: No heavy lifting or bending for 1 week.

## 2024-12-10 NOTE — DISCHARGE INSTRUCTIONS
Post Operative Instructions for Cataract Surgery- [place]  Olivier Montez MD, Ophthalmology    STARTING THE SAME DAY OF SURGERY:    Place one (1) drop of Prednisolone-Moxifloxacin-Bromfenac (shake bottle) into the operative eye three (3) times a day.     You will use this drop for four (4) weeks following surgery.      1 DAY POST OPERATIVE APPOINTMENT:    Date: ______________________________/ Time: ___________________________    Location: [facility]- [address], [place] [address]      Please use 1 drop of PREDNISOLONE-MOXIFLOXACIN-BROMFENAC in the operative eye before arriving for your appointment; this will help keep your eye comfortable.       RESTRICTIONS FOR SEVEN (7) DAYS FOLLOWING SURGERY:    DO NOT lift anything over 10 pounds.    DO NOT bend at the waist, only at the knees (keep head in upright position).    DO NOT rub your eye.    DO NOT get any water into the eye.    DO NOT wear any makeup, lotions, or creams on/around the eye.    Wear the protective eye shield you were give after surgery anytime you go to sleep. You may remove the shield while awake.       PLEASE NOTE:    You may take over the counter pain medication such as Tylenol or Ibuprofen as directed, if needed for pain.      *** If you experience severe pain, loss of vision, sudden onset of flashes, and/or floaters, IMMEDIATELY CALL Dr. Montez's office [phone]; AFTER HOUR [phone]; OR proceed to the EMERGENCY ROOM.

## 2024-12-10 NOTE — OP NOTE
Operative Date:  12/10/2024    Discharge Date:  12/10/2024    Discharge Patient Home    Report Title: Operative Note      SURGEON: Olivier Montez MD     ASSISTANT:     PREOPERATIVE DIAGNOSIS: Visually significant NSC cataract,  Left Eye.    POSTOPERATIVE DIAGNOSIS: Visually-significant NSC cataract,  Left Eye.    PROCEDURE PERFORMED: Phacoemulsification of the cataract with posterior chamber intraocular lens Left Eye.    ANESTHESIA:  Moderate Sedation with Local    The team confirmed the patient ID and re-evaluated the patient and sedation plan confirming it is suitable for the patient's condition and procedure prior to administering sedation.    COMPLICATIONS: None    ESTIMATED BLOOD LOSS: Minimal    INDICATIONS FOR PROCEDURE:   The patient is a pleasant 79 year old woman with increasing difficulties with activities of daily living secondary to a dense visually significant cataract in the Left Eye.  Discussions have been carried out with this patient concerning the options to surgery, risks, benefits and expectations.  The patient voiced good understanding and wished to proceed with the above procedure.    PROCEDURE IN DETAIL: The patient was brought to the operating room and received topical anesthetic to the eye and then was prepped and draped in the usual sterile fashion.  Using the Ellis ring and the guarded brady blade set at 0.37 mm, a partial thickness clear cornea incision was made temporally.  The paracentesis site was made at the six o'clock position.  Omidria was injected into the anterior chamber through the paracentesis.  Viscoat was then injected into the anterior chamber.  The eye was then reentered at the primary surgical site with a 2.4 mm keratome followed by continuous capsulotomy, hydrodissection, hydrodelineation and phacoemulsification of the cataract.  Residual cortical material was removed using automated irrigation-aspiration technique.  Healon was injected into the posterior  chamber and a CNAOTO 20.0 diopter lens was placed in the bag without difficulty. Residual viscoelastic was removed using automated irrigation-aspiration technique. The eye was re-pressurized using BSS solution and both the paracentesis site and the primary surgical site were demonstrated to be watertight at the end of the case with Weck--Angela manipulation.  One drop of Ofloxacin and one drop of Pred acetate 1% was applied to the Left Eye .The eye was closed, patched and a Cuadra shield placed.  The patient was taken to the recovery room in good and stable condition.  The patient tolerated the procedure well.  The patient was instructed to refrain from any heavy lifting, bending, stooping or straining activities, discharged home  and to follow-up in the morning for routine postoperative care with Olivier Montez MD.

## 2024-12-11 ENCOUNTER — OFFICE VISIT (OUTPATIENT)
Dept: OPHTHALMOLOGY | Facility: CLINIC | Age: 79
End: 2024-12-11
Payer: MEDICARE

## 2024-12-11 DIAGNOSIS — Z98.890 POST-OPERATIVE STATE: Primary | ICD-10-CM

## 2024-12-11 PROCEDURE — 1126F AMNT PAIN NOTED NONE PRSNT: CPT | Mod: CPTII,S$GLB,, | Performed by: OPHTHALMOLOGY

## 2024-12-11 PROCEDURE — 1160F RVW MEDS BY RX/DR IN RCRD: CPT | Mod: CPTII,S$GLB,, | Performed by: OPHTHALMOLOGY

## 2024-12-11 PROCEDURE — 1101F PT FALLS ASSESS-DOCD LE1/YR: CPT | Mod: CPTII,S$GLB,, | Performed by: OPHTHALMOLOGY

## 2024-12-11 PROCEDURE — 1159F MED LIST DOCD IN RCRD: CPT | Mod: CPTII,S$GLB,, | Performed by: OPHTHALMOLOGY

## 2024-12-11 PROCEDURE — 3288F FALL RISK ASSESSMENT DOCD: CPT | Mod: CPTII,S$GLB,, | Performed by: OPHTHALMOLOGY

## 2024-12-11 PROCEDURE — 99024 POSTOP FOLLOW-UP VISIT: CPT | Mod: S$GLB,,, | Performed by: OPHTHALMOLOGY

## 2024-12-11 PROCEDURE — 1157F ADVNC CARE PLAN IN RCRD: CPT | Mod: CPTII,S$GLB,, | Performed by: OPHTHALMOLOGY

## 2024-12-11 PROCEDURE — 99999 PR PBB SHADOW E&M-EST. PATIENT-LVL II: CPT | Mod: PBBFAC,,, | Performed by: OPHTHALMOLOGY

## 2024-12-11 NOTE — PROGRESS NOTES
Subjective:       Patient ID: Nuha Bernstein is a 79 y.o. female.    Chief Complaint: Post-op Evaluation (Patient Nuha Bernstein is a 79 year old female./S/p Phaco w/IOL OD: 11/26/2024/S/p Phaco w/IOL OS: 12/10/2024)    HPI     Post-op Evaluation     Additional comments: Patient Nuha Bernstein is a 79 year old female.  S/p Phaco w/IOL OD: 11/26/2024  S/p Phaco w/IOL OS: 12/10/2024           Comments    Pt here for 1 day PCIOL OS (2nd eye) post-op visit. Pt denies any eye pain   but states that VA is burry.    DLS: 12/05/2024 with Dr. Montez    Meds:  PMB TID OD (until 12/24/2024)  PMB TID OS (until 01/07/2025)          Last edited by Natasha Stewart on 12/11/2024 10:47 AM.             Assessment:       1. Post-operative state        Plan:       S/p CE OU- Doing well.      CPM OU.  RTC 1 wk.

## 2024-12-13 ENCOUNTER — PATIENT MESSAGE (OUTPATIENT)
Dept: OPHTHALMOLOGY | Facility: CLINIC | Age: 79
End: 2024-12-13
Payer: MEDICARE

## 2024-12-17 ENCOUNTER — PATIENT MESSAGE (OUTPATIENT)
Dept: RHEUMATOLOGY | Facility: CLINIC | Age: 79
End: 2024-12-17
Payer: MEDICARE

## 2024-12-19 ENCOUNTER — OFFICE VISIT (OUTPATIENT)
Dept: OPHTHALMOLOGY | Facility: CLINIC | Age: 79
End: 2024-12-19
Payer: MEDICARE

## 2024-12-19 DIAGNOSIS — Z98.890 POST-OPERATIVE STATE: Primary | ICD-10-CM

## 2024-12-19 PROCEDURE — 99999 PR PBB SHADOW E&M-EST. PATIENT-LVL III: CPT | Mod: PBBFAC,HCNC,, | Performed by: OPHTHALMOLOGY

## 2024-12-19 NOTE — PROGRESS NOTES
Subjective:       Patient ID: Nuha Bernstein is a 79 y.o. female.    Chief Complaint: Post-op Evaluation    HPI    S/p phaco w/IOL OS 12/10/24    PMB TID OS    Pt here for 1 week post op OS.  Pt states doing well. Pt denies eye pain   OS.   Last edited by Veena Lozano MA on 12/19/2024  2:51 PM.             Assessment:       1. Post-operative state        Plan:       S/p CE OU- Doing well.      CPM OU.  RTC 3 wks.

## 2024-12-23 ENCOUNTER — OFFICE VISIT (OUTPATIENT)
Dept: FAMILY MEDICINE | Facility: CLINIC | Age: 79
End: 2024-12-23
Payer: MEDICARE

## 2024-12-23 VITALS
SYSTOLIC BLOOD PRESSURE: 136 MMHG | HEART RATE: 75 BPM | TEMPERATURE: 99 F | HEIGHT: 65 IN | WEIGHT: 171.94 LBS | DIASTOLIC BLOOD PRESSURE: 84 MMHG | OXYGEN SATURATION: 97 % | BODY MASS INDEX: 28.65 KG/M2

## 2024-12-23 DIAGNOSIS — R79.9 ABNORMAL FINDING OF BLOOD CHEMISTRY: Primary | ICD-10-CM

## 2024-12-23 DIAGNOSIS — I10 ESSENTIAL HYPERTENSION: ICD-10-CM

## 2024-12-23 DIAGNOSIS — G89.29 CHRONIC MIDLINE LOW BACK PAIN WITHOUT SCIATICA: ICD-10-CM

## 2024-12-23 DIAGNOSIS — R73.03 PREDIABETES: ICD-10-CM

## 2024-12-23 DIAGNOSIS — E21.3 HYPERPARATHYROIDISM: ICD-10-CM

## 2024-12-23 DIAGNOSIS — M54.50 CHRONIC MIDLINE LOW BACK PAIN WITHOUT SCIATICA: ICD-10-CM

## 2024-12-23 PROCEDURE — 1126F AMNT PAIN NOTED NONE PRSNT: CPT | Mod: HCNC,CPTII,S$GLB, | Performed by: FAMILY MEDICINE

## 2024-12-23 PROCEDURE — 1101F PT FALLS ASSESS-DOCD LE1/YR: CPT | Mod: HCNC,CPTII,S$GLB, | Performed by: FAMILY MEDICINE

## 2024-12-23 PROCEDURE — 1157F ADVNC CARE PLAN IN RCRD: CPT | Mod: HCNC,CPTII,S$GLB, | Performed by: FAMILY MEDICINE

## 2024-12-23 PROCEDURE — 3288F FALL RISK ASSESSMENT DOCD: CPT | Mod: HCNC,CPTII,S$GLB, | Performed by: FAMILY MEDICINE

## 2024-12-23 PROCEDURE — 99214 OFFICE O/P EST MOD 30 MIN: CPT | Mod: HCNC,S$GLB,, | Performed by: FAMILY MEDICINE

## 2024-12-23 PROCEDURE — 1159F MED LIST DOCD IN RCRD: CPT | Mod: HCNC,CPTII,S$GLB, | Performed by: FAMILY MEDICINE

## 2024-12-23 PROCEDURE — 3079F DIAST BP 80-89 MM HG: CPT | Mod: HCNC,CPTII,S$GLB, | Performed by: FAMILY MEDICINE

## 2024-12-23 PROCEDURE — 99999 PR PBB SHADOW E&M-EST. PATIENT-LVL III: CPT | Mod: PBBFAC,HCNC,, | Performed by: FAMILY MEDICINE

## 2024-12-23 PROCEDURE — 1160F RVW MEDS BY RX/DR IN RCRD: CPT | Mod: HCNC,CPTII,S$GLB, | Performed by: FAMILY MEDICINE

## 2024-12-23 PROCEDURE — 3075F SYST BP GE 130 - 139MM HG: CPT | Mod: HCNC,CPTII,S$GLB, | Performed by: FAMILY MEDICINE

## 2024-12-23 PROCEDURE — G2211 COMPLEX E/M VISIT ADD ON: HCPCS | Mod: HCNC,S$GLB,, | Performed by: FAMILY MEDICINE

## 2024-12-23 RX ORDER — TRAMADOL HYDROCHLORIDE 50 MG/1
50 TABLET ORAL EVERY 8 HOURS PRN
Qty: 90 TABLET | Refills: 2 | Status: SHIPPED | OUTPATIENT
Start: 2024-12-23

## 2024-12-23 NOTE — PROGRESS NOTES
Chief Complaint   Patient presents with    Follow-up       SUBJECTIVE:  Nuha Bernstein is a 79 y.o. female presents with   Chief Complaint   Patient presents with    Follow-up     Conservative treatment with tylenol, NSAIDs, alternative treatments, complementary non opioid nerve/epileptic medication has failed with primary use.  Now opioid dependent for relief.  Has tried PT/OT, injections with mixed success.      Past Medical History:   Diagnosis Date    Arthritis     Back pain     CKD stage G3a/A3, GFR 45-59 and albumin creatinine ratio >300 mg/g 6/23/2020    Colon polyp     Hyperglycemia     Hyperlipidemia     Hypertension     Nuclear sclerosis of both eyes 10/31/2017    Prolapse of uterus     Type 2 diabetes mellitus without complication, without long-term current use of insulin     Type 2 diabetes mellitus without complication, without long-term current use of insulin      Past Surgical History:   Procedure Laterality Date    CATARACT EXTRACTION W/  INTRAOCULAR LENS IMPLANT Right 11/26/2024    Procedure: EXTRACTION, CATARACT, WITH IOL INSERTION;  Surgeon: Olivier Montez MD;  Location: [facility] OR;  Service: Ophthalmology;  Laterality: Right;    CATARACT EXTRACTION W/  INTRAOCULAR LENS IMPLANT Left 12/10/2024    Procedure: EXTRACTION, CATARACT, WITH IOL INSERTION;  Surgeon: Olivier Montez MD;  Location: [facility] OR;  Service: Ophthalmology;  Laterality: Left;    COLONOSCOPY N/A 12/15/2020    Procedure: COLONOSCOPY;  Surgeon: Antonio Maya MD;  Location: [facility] ([address] [place] [address]);  Service: Endoscopy;  Laterality: N/A;  prep ins. 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     Drug use: Yes     Comment: Tramadol 2 tabs bid    Sexual activity: Not Currently     Partners: Male   Social History Narrative     4 children retired      Social Drivers of Health     Financial Resource Strain: Low Risk  (8/29/2024)    Overall Financial Resource Strain (CARDIA)     Difficulty of Paying Living Expenses: Not hard at all   Food Insecurity: No Food Insecurity (8/29/2024)    Hunger Vital Sign     Worried About Running Out of Food in the Last Year: Never true     Ran Out of Food in the Last Year: Never true   Transportation Needs: No Transportation Needs (8/29/2024)    PRAPARE - Transportation     Lack of Transportation (Medical): No     Lack of Transportation (Non-Medical): No   Physical Activity: Insufficiently Active (8/29/2024)    Exercise Vital Sign     Days of Exercise per Week: 7 days     Minutes of Exercise per Session: 20 min   Stress: No Stress Concern Present (8/29/2024)    [employer] [employer] - Occupational Stress Questionnaire     Feeling of Stress : Only a little   Housing Stability: High Risk (8/29/2024)    Housing Stability Vital Sign     Unable to Pay for Housing in the Last Year: Yes     Homeless in the Last Year: No     Family History   Problem Relation Name Age of Onset    Hypertension Mother      Cataracts Mother      Heart disease Mother      Hypertension Father      Cataracts Father      No Known Problems Sister      No Known Problems Brother      No Known Problems Brother      No Known Problems Maternal Aunt      No Known Problems Maternal Uncle      No Known Problems Paternal Aunt      No Known Problems Paternal Uncle      No Known Problems Maternal Grandmother      No Known Problems Maternal Grandfather      No Known Problems Paternal Grandmother      No Known Problems Paternal Grandfather      No Known Problems Other       "Amblyopia Neg Hx      Blindness Neg Hx      Cancer Neg Hx      Diabetes Neg Hx      Glaucoma Neg Hx      Macular degeneration Neg Hx      Retinal detachment Neg Hx      Strabismus Neg Hx      Stroke Neg Hx      Thyroid disease Neg Hx       Current Outpatient Medications on File Prior to Visit   Medication Sig Dispense Refill    atorvastatin (LIPITOR) 40 MG tablet Take 1 tablet (40 mg total) by mouth once daily. 90 tablet 3    co-enzyme Q-10 30 mg capsule Take 30 mg by mouth 3 (three) times daily.      cyclobenzaprine (FLEXERIL) 5 MG tablet Take 1 tablet (5 mg total) by mouth nightly. 30 tablet 2    lisinopriL (PRINIVIL,ZESTRIL) 40 MG tablet TAKE 1 TABLET BY MOUTH EVERY DAY 90 tablet 3    multivitamin/iron/folic acid (CENTRUM WOMEN ORAL) Take by mouth.      mv,hussein,iron,mn/folic acid/chol (HAIR-SKIN-NAILS, PABA, ORAL) Take by mouth.      OMEGA 3,6,9 COMBINATION NO.7 (OMEGA DHA ORAL) Take 830 mg by mouth once daily.      prednisoLONE-moxiflox-bromfen 1-0.5-0.075 % DrpS Place 1 drop into the right eye 3 (three) times daily. 8 mL 2    triamterene-hydrochlorothiazide 37.5-25 mg (DYAZIDE) 37.5-25 mg per capsule Take 1 capsule by mouth once daily. 90 capsule 3     No current facility-administered medications on file prior to visit.     Review of patient's allergies indicates:  No Known Allergies    ROS    OBJECTIVE:  /84   Pulse 75   Temp 98.5 °F (36.9 °C) (Oral)   Ht 5' 5" (1.651 m)   Wt 78 kg (171 lb 15.3 oz)   SpO2 97%   BMI 28.62 kg/m²   Wt Readings from Last 5 Encounters:   12/23/24 78 kg (171 lb 15.3 oz)   12/10/24 78.5 kg (173 lb)   11/26/24 78.5 kg (173 lb)   10/22/24 78.9 kg (173 lb 15.1 oz)   10/09/24 78.9 kg (173 lb 15.1 oz)       In usual state of health without signs of drug misuse/abuse today.  Specifically no alteration in cognition, signs of injections into the skin.  Patient areas of chronic pain in the joints and knees/spine  No new focal neurological findings noted.    Chronic aids to " ambulation    Reviewed  and NARx: reviewed    Assessment/Plan:    1. Abnormal finding of blood chemistry    2. Prediabetes    3. Hyperparathyroidism    4. Chronic midline low back pain without sciatica    5. Essential hypertension        Discussed chronic pain diagnosis again, reviewed pain contract and patient is still in agreement with it. Continued to stress the importance of smoking cessation/avoiding tobacco products which can exacerbate the pain.  Continued to discuss the importance of good nutrition and some type of exercise program even if it's very basic and light to help sustain function.  Continued to discuss risks, benefits and alternatives of care with high risk medication and we have decided to continue to use them.  Our goal continues to be stabilizing of function, quality of life and avoidance of medication side effects.  Patient voiced understanding.    Problem List Items Addressed This Visit       Chronic low back pain    Relevant Medications    traMADoL (ULTRAM) 50 mg tablet    Essential hypertension    Relevant Orders    Hypertension Digital Medicine (HDMP) Enrollment Order (Completed)    Hyperparathyroidism    Relevant Orders    Hemoglobin A1C (Completed)    Microalbumin/Creatinine Ratio, Urine (Completed)    Lipid Panel (Completed)    CBC Auto Differential (Completed)    Comprehensive Metabolic Panel (Completed)    Urinalysis (Completed)    Prediabetes    Overview     Noted by CLINT BAUGH OD  last documented on [id number]         Relevant Orders    Hemoglobin A1C (Completed)    Microalbumin/Creatinine Ratio, Urine (Completed)    Lipid Panel (Completed)    CBC Auto Differential (Completed)    Comprehensive Metabolic Panel (Completed)    Urinalysis (Completed)     Other Visit Diagnoses       Abnormal finding of blood chemistry    -  Primary    Relevant Orders    Hemoglobin A1C (Completed)    Microalbumin/Creatinine Ratio, Urine (Completed)    Lipid Panel (Completed)    CBC Auto Differential  (Completed)    Comprehensive Metabolic Panel (Completed)    Urinalysis (Completed)            Follow up in 3 months.

## 2024-12-27 ENCOUNTER — PATIENT MESSAGE (OUTPATIENT)
Dept: FAMILY MEDICINE | Facility: CLINIC | Age: 79
End: 2024-12-27
Payer: MEDICARE

## 2024-12-27 DIAGNOSIS — E78.00 PURE HYPERCHOLESTEROLEMIA: ICD-10-CM

## 2024-12-27 RX ORDER — ATORVASTATIN CALCIUM 40 MG/1
40 TABLET, FILM COATED ORAL DAILY
Qty: 90 TABLET | Refills: 3 | Status: SHIPPED | OUTPATIENT
Start: 2024-12-27

## 2024-12-31 ENCOUNTER — OFFICE VISIT (OUTPATIENT)
Dept: RHEUMATOLOGY | Facility: CLINIC | Age: 79
End: 2024-12-31
Payer: MEDICARE

## 2024-12-31 VITALS
BODY MASS INDEX: 29.27 KG/M2 | DIASTOLIC BLOOD PRESSURE: 85 MMHG | WEIGHT: 175.69 LBS | HEIGHT: 65 IN | SYSTOLIC BLOOD PRESSURE: 173 MMHG

## 2024-12-31 DIAGNOSIS — M62.838 MUSCLE SPASM: ICD-10-CM

## 2024-12-31 DIAGNOSIS — M54.31 RIGHT SIDED SCIATICA: ICD-10-CM

## 2024-12-31 DIAGNOSIS — M25.511 CHRONIC PAIN OF BOTH SHOULDERS: Primary | ICD-10-CM

## 2024-12-31 DIAGNOSIS — M25.512 CHRONIC PAIN OF BOTH SHOULDERS: Primary | ICD-10-CM

## 2024-12-31 DIAGNOSIS — G89.29 CHRONIC PAIN OF BOTH SHOULDERS: Primary | ICD-10-CM

## 2024-12-31 PROCEDURE — 1125F AMNT PAIN NOTED PAIN PRSNT: CPT | Mod: HCNC,CPTII,S$GLB, | Performed by: STUDENT IN AN ORGANIZED HEALTH CARE EDUCATION/TRAINING PROGRAM

## 2024-12-31 PROCEDURE — 1159F MED LIST DOCD IN RCRD: CPT | Mod: HCNC,CPTII,S$GLB, | Performed by: STUDENT IN AN ORGANIZED HEALTH CARE EDUCATION/TRAINING PROGRAM

## 2024-12-31 PROCEDURE — 20610 DRAIN/INJ JOINT/BURSA W/O US: CPT | Mod: HCNC,RT,S$GLB, | Performed by: STUDENT IN AN ORGANIZED HEALTH CARE EDUCATION/TRAINING PROGRAM

## 2024-12-31 PROCEDURE — 99213 OFFICE O/P EST LOW 20 MIN: CPT | Mod: HCNC,25,S$GLB, | Performed by: STUDENT IN AN ORGANIZED HEALTH CARE EDUCATION/TRAINING PROGRAM

## 2024-12-31 PROCEDURE — 3079F DIAST BP 80-89 MM HG: CPT | Mod: HCNC,CPTII,S$GLB, | Performed by: STUDENT IN AN ORGANIZED HEALTH CARE EDUCATION/TRAINING PROGRAM

## 2024-12-31 PROCEDURE — 99999 PR PBB SHADOW E&M-EST. PATIENT-LVL III: CPT | Mod: PBBFAC,HCNC,, | Performed by: STUDENT IN AN ORGANIZED HEALTH CARE EDUCATION/TRAINING PROGRAM

## 2024-12-31 PROCEDURE — 3077F SYST BP >= 140 MM HG: CPT | Mod: HCNC,CPTII,S$GLB, | Performed by: STUDENT IN AN ORGANIZED HEALTH CARE EDUCATION/TRAINING PROGRAM

## 2024-12-31 PROCEDURE — 1157F ADVNC CARE PLAN IN RCRD: CPT | Mod: HCNC,CPTII,S$GLB, | Performed by: STUDENT IN AN ORGANIZED HEALTH CARE EDUCATION/TRAINING PROGRAM

## 2024-12-31 RX ORDER — TRIAMCINOLONE ACETONIDE 40 MG/ML
40 INJECTION, SUSPENSION INTRA-ARTICULAR; INTRAMUSCULAR
Status: DISCONTINUED | OUTPATIENT
Start: 2024-12-31 | End: 2024-12-31 | Stop reason: HOSPADM

## 2024-12-31 RX ORDER — LIDOCAINE HYDROCHLORIDE 10 MG/ML
2 INJECTION, SOLUTION EPIDURAL; INFILTRATION; INTRACAUDAL; PERINEURAL
Status: DISCONTINUED | OUTPATIENT
Start: 2024-12-31 | End: 2024-12-31 | Stop reason: HOSPADM

## 2024-12-31 RX ADMIN — TRIAMCINOLONE ACETONIDE 40 MG: 40 INJECTION, SUSPENSION INTRA-ARTICULAR; INTRAMUSCULAR at 08:12

## 2024-12-31 RX ADMIN — LIDOCAINE HYDROCHLORIDE 2 ML: 10 INJECTION, SOLUTION EPIDURAL; INFILTRATION; INTRACAUDAL; PERINEURAL at 08:12

## 2024-12-31 NOTE — PROGRESS NOTES
RHEUMATOLOGY OUTPATIENT CLINIC NOTE    12/31/2024    Attending Rheumatologist: Rebecca Mustafa  Primary Care Provider: Pollo Lowe MD   Physician Requesting Consultation: No referring provider defined for this encounter.  Chief Complaint/Reason For Consultation:  Chronic pain of both shoulders      Subjective:       HPI  Nuha Bernstein is a 79 y.o. Black or  female who comes for evaluation of shoulder pain.     Interim history   -initial visit with me on 10/2024  -during last visit we did left shoulder CSI which helped tremendously. She requested appointment due to right shoulder pain and would like to have CSI.   -she reports that she a couple of days ago after climbing stairs, she started having pain in right lateral buttock and radiates to the lateral hip.   -she continues to take tramadol 50 mg BID.   -unable to do NSAIDS due to CKD.     Disease history      She reports chronic pain in bilateral shoulders, upper back for 10+ years. She has been taking tramadol since around that time, has been able to taper to every 12 hours. Has noted that lately pain is getting worse in her upper back, she does not have any limited ROM, she is able to do her ADLs, no trouble washing her hair. She denies any radiculopathy symptoms. Pain is worse at night. Denies any headaches, vision changes, fever, chills, oral ulcers, swollen glands, chest pain, shortness of breath, abdominal pain. She has been told she has arthritis in her whole spine. She denies any other joint pain or swelling.   She is very active, able to drive by herself. She does home exercises every day for about 20-30 min.     Review of Systems   Constitutional:  Negative for fever and unexpected weight change.   HENT:  Negative for mouth sores and trouble swallowing.    Eyes:  Negative for redness.   Respiratory:  Negative for cough and shortness of breath.    Cardiovascular:  Negative for chest pain.   Gastrointestinal:  Negative for  constipation and diarrhea.   Genitourinary:  Negative for dysuria and genital sores.   Integumentary:  Negative for rash.   Neurological:  Negative for headaches.   Hematological:  Does not bruise/bleed easily.        Chronic comorbid conditions affecting medical decision making today:  Past Medical History:   Diagnosis Date    Arthritis     Back pain     CKD stage G3a/A3, GFR 45-59 and albumin creatinine ratio >300 mg/g 6/23/2020    Colon polyp     Hyperglycemia     Hyperlipidemia     Hypertension     Nuclear sclerosis of both eyes 10/31/2017    Prolapse of uterus     Type 2 diabetes mellitus without complication, without long-term current use of insulin     Type 2 diabetes mellitus without complication, without long-term current use of insulin      Past Surgical History:   Procedure Laterality Date    CATARACT EXTRACTION W/  INTRAOCULAR LENS IMPLANT Right 11/26/2024    Procedure: EXTRACTION, CATARACT, WITH IOL INSERTION;  Surgeon: Olivier Montez MD;  Location: [facility] OR;  Service: Ophthalmology;  Laterality: Right;    CATARACT EXTRACTION W/  INTRAOCULAR LENS IMPLANT Left 12/10/2024    Procedure: EXTRACTION, CATARACT, WITH IOL INSERTION;  Surgeon: Olivier Montez MD;  Location: [facility] OR;  Service: Ophthalmology;  Laterality: Left;    COLONOSCOPY N/A 12/15/2020    Procedure: COLONOSCOPY;  Surgeon: Antonio Maya MD;  Location: [facility] ([address] [place] [address]);  Service: Endoscopy;  Laterality: N/A;  prep ins. emailed - ERW  covid test on 12/12/20 -PCW-BB    COLONOSCOPY W/ POLYPECTOMY      Right Thumb      vaginal dilator       Family History   Problem Relation Name Age of Onset    Hypertension Mother      Cataracts Mother      Heart disease Mother      Hypertension Father      Cataracts Father      No Known Problems Sister      No Known Problems Brother      No Known Problems Brother      No Known Problems Maternal Aunt      No Known Problems Maternal Uncle      No Known Problems Paternal Aunt      No Known  Problems Paternal Uncle      No Known Problems Maternal Grandmother      No Known Problems Maternal Grandfather      No Known Problems Paternal Grandmother      No Known Problems Paternal Grandfather      No Known Problems Other      Amblyopia Neg Hx      Blindness Neg Hx      Cancer Neg Hx      Diabetes Neg Hx      Glaucoma Neg Hx      Macular degeneration Neg Hx      Retinal detachment Neg Hx      Strabismus Neg Hx      Stroke Neg Hx      Thyroid disease Neg Hx       Social History     Substance and Sexual Activity   Alcohol Use No    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      Social History     Tobacco Use   Smoking Status Never    Passive exposure: Never   Smokeless Tobacco Never     Social History     Substance and Sexual Activity   Drug Use Yes    Comment: Tramadol 2 tabs bid       Current Outpatient Medications:     atorvastatin (LIPITOR) 40 MG tablet, Take 1 tablet (40 mg total) by mouth once daily., Disp: 90 tablet, Rfl: 3    co-enzyme Q-10 30 mg capsule, Take 30 mg by mouth 3 (three) times daily., Disp: , Rfl:     cyclobenzaprine (FLEXERIL) 5 MG tablet, Take 1 tablet (5 mg total) by mouth nightly., Disp: 30 tablet, Rfl: 2    lisinopriL (PRINIVIL,ZESTRIL) 40 MG tablet, TAKE 1 TABLET BY MOUTH EVERY DAY, Disp: 90 tablet, Rfl: 3    multivitamin/iron/folic acid (CENTRUM WOMEN ORAL), Take by mouth., Disp: , Rfl:     mv,hussein,iron,mn/folic acid/chol (HAIR-SKIN-NAILS, PABA, ORAL), Take by mouth., Disp: , Rfl:     OMEGA 3,6,9 COMBINATION NO.7 (OMEGA DHA ORAL), Take 830 mg by mouth once daily., Disp: , Rfl:     prednisoLONE-moxiflox-bromfen 1-0.5-0.075 % DrpS, Place 1 drop into the right eye 3 (three) times daily., Disp: 8 mL, Rfl: 2    traMADoL (ULTRAM) 50 mg tablet, Take 1 tablet (50 mg total) by mouth every 8 (eight) hours as needed for Pain., Disp: 90 tablet, Rfl: 2    triamterene-hydrochlorothiazide 37.5-25 mg (DYAZIDE) 37.5-25 mg per capsule, Take 1 capsule by mouth once daily., Disp: 90 capsule, Rfl: 3      "Objective:         Vitals:    12/31/24 0810   BP: (!) 173/85     Physical Exam  Significant tenderness in bilatera trapezius area.   Right shoulder with + signs of impingement.     Reviewed old and all outside pertinent medical records available.    All lab results personally reviewed and interpreted by me.  Lab Results   Component Value Date    WBC 6.91 12/23/2024    HGB 11.7 (L) 12/23/2024    HCT 38.1 12/23/2024    MCV 92 12/23/2024    MCH 28.3 12/23/2024    MCHC 30.7 (L) 12/23/2024    RDW 14.4 12/23/2024     12/23/2024    MPV 11.6 12/23/2024       Lab Results   Component Value Date     12/23/2024    K 4.5 12/23/2024     12/23/2024    CO2 26 12/23/2024    GLU 83 12/23/2024    BUN 46 (H) 12/23/2024    CALCIUM 9.9 12/23/2024    PROT 7.7 12/23/2024    ALBUMIN 4.0 12/23/2024    BILITOT 0.4 12/23/2024    AST 45 (H) 12/23/2024    ALKPHOS 67 12/23/2024    ALT 34 12/23/2024       Lab Results   Component Value Date    COLORU Yellow 12/23/2024    APPEARANCEUA Clear 12/23/2024    SPECGRAV 1.010 12/23/2024    PHUR 7.0 12/23/2024    PROTEINUA Negative 12/23/2024    KETONESU Negative 12/23/2024    LEUKOCYTESUR Negative 12/23/2024    NITRITE Negative 12/23/2024    UROBILINOGEN Negative 12/23/2024       Lab Results   Component Value Date    CRP 2.8 10/22/2024       Lab Results   Component Value Date    RF <13.0 10/22/2024    SEDRATE 28 (H) 10/22/2024    CCPANTIBODIE 0.6 10/22/2024       No components found for: "25OHVITDTOT", "60UTNGFS1", "24ECNCTV9", "METHODNOTE"    No results found for: "URICACID"    Lab Results   Component Value Date    HEPBSAG Negative 05/11/2010    HEPCAB Negative 05/11/2010       Imaging:  All imaging reviewed and independently interpreted by me.     ASSESSMENT / PLAN:     Nuha Bernstein is a 79 y.o. Black or  female with:    1. Chronic pain of both shoulders  -XR of bilateral shoulder showed DJD of the AC joint. RF, CCP negative. ESR normal adjusted for age and CRP " normal.   -Symptoms are persistent despite physical therapy. She uses lidocaine spray and takes tramadol 50 mg BID.   -s/p left shoulder CSI on 10/2024  -having active symptoms on right shoulder. Will proceed with right shoulder CSI. See procedure note. Post injection care discussed.     2. Muscle spasm  -Significant muscle spasm in bilateral trapezius. Continue lidocaine spray.   -may need trigger point injections in the future.   -continue flexeril 5 mg QHS as needed.     3. Right sided sciatica  -aggravated by climbing stairs recently  -discussed about doing PT vs. Home exercises. Pt opted for home exercises first. If no improvement, she will let me know and will do formal PT    Follow up if symptoms worsen or fail to improve.    Method of contact with patient concerns: [facility]    Disclaimer:  This note is prepared using voice recognition software and as such is likely to have errors and has not been proof read. Please contact me for questions.     Rebecca Mustafa M.D.  Rheumatology  [facility]

## 2024-12-31 NOTE — PROCEDURES
Large Joint Aspiration/Injection: R subacromial bursa    Date/Time: 12/31/2024 8:00 AM    Performed by: Rebecca Mustafa MD  Authorized by: Rebecca Mustafa MD    Consent Done?:  Yes (Written)  Indications:  Pain    Local anesthesia used?: Yes    Local anesthetic:  Co-phenylcaine spray    Details:  Needle Size:  25 G  Ultrasonic Guidance for needle placement?: No    Location:  Shoulder  Site:  R subacromial bursa  Medications:  2 mL LIDOcaine (PF) 10 mg/ml (1%) 10 mg/mL (1 %); 40 mg triamcinolone acetonide 40 mg/mL

## 2025-01-09 ENCOUNTER — OFFICE VISIT (OUTPATIENT)
Dept: OPHTHALMOLOGY | Facility: CLINIC | Age: 80
End: 2025-01-09
Payer: MEDICARE

## 2025-01-09 DIAGNOSIS — H52.7 REFRACTIVE ERROR: ICD-10-CM

## 2025-01-09 DIAGNOSIS — Z98.890 POST-OPERATIVE STATE: Primary | ICD-10-CM

## 2025-01-09 PROCEDURE — 99999 PR PBB SHADOW E&M-EST. PATIENT-LVL I: CPT | Mod: PBBFAC,HCNC,, | Performed by: OPHTHALMOLOGY

## 2025-01-09 NOTE — PROGRESS NOTES
Subjective:       Patient ID: Nuha Bernstein is a 79 y.o. female.    Chief Complaint: Post-op Evaluation    HPI    Here for 3 week S/p Phaco w/IOL OS 12-10-24                             S/p Phaco w/IOL OD 11-26-24    Eye meds: None    79 year old female states vision is stable, but would still like glasses.   Denies ocular pain.  Denies flashes, floaters or diplopia. C/o of still   seeing glare.  Pt has dry eyes   Last edited by Tracy Buckner on 1/9/2025  2:34 PM.             Assessment:       1. Post-operative state    2. Refractive error        Plan:       S/p CE OU- Doing well but with residual mild iritis OU.    RE-Will recheck on next visit.      Start PF qid OU & taper off over 4 wks.  RTC 4 wks.

## 2025-01-31 DIAGNOSIS — I10 ESSENTIAL HYPERTENSION: ICD-10-CM

## 2025-01-31 RX ORDER — TRIAMTERENE AND HYDROCHLOROTHIAZIDE 37.5; 25 MG/1; MG/1
1 CAPSULE ORAL
Qty: 90 CAPSULE | Refills: 3 | Status: SHIPPED | OUTPATIENT
Start: 2025-01-31

## 2025-01-31 NOTE — TELEPHONE ENCOUNTER
Refill Routing Note   Medication(s) are not appropriate for processing by [facility] for the following reason(s):        Required vitals abnormal  Required labs abnormal    [facility] action(s):  Defer               Appointments  past 12m or future 3m with PCP    Date Provider   Last Visit   12/23/2024 Pollo Lowe MD   Next Visit   3/25/2025 Pollo Lowe MD   ED visits in past 90 days: 0        Note composed:2:40 AM 01/31/2025

## 2025-02-01 ENCOUNTER — TELEPHONE (OUTPATIENT)
Dept: OPTOMETRY | Facility: CLINIC | Age: 80
End: 2025-02-01
Payer: MEDICARE

## 2025-02-03 ENCOUNTER — OFFICE VISIT (OUTPATIENT)
Dept: OPHTHALMOLOGY | Facility: CLINIC | Age: 80
End: 2025-02-03
Payer: MEDICARE

## 2025-02-03 ENCOUNTER — HOSPITAL ENCOUNTER (EMERGENCY)
Facility: HOSPITAL | Age: 80
Discharge: HOME | End: 2025-02-03
Attending: INTERNAL MEDICINE
Payer: MEDICARE

## 2025-02-03 VITALS
DIASTOLIC BLOOD PRESSURE: 87 MMHG | OXYGEN SATURATION: 98 % | HEIGHT: 65 IN | HEART RATE: 92 BPM | TEMPERATURE: 99 F | RESPIRATION RATE: 18 BRPM | SYSTOLIC BLOOD PRESSURE: 171 MMHG | WEIGHT: 175 LBS | BODY MASS INDEX: 29.16 KG/M2

## 2025-02-03 DIAGNOSIS — S46.911A MUSCLE STRAIN OF RIGHT UPPER ARM, INITIAL ENCOUNTER: Primary | ICD-10-CM

## 2025-02-03 DIAGNOSIS — Z98.890 POST-OPERATIVE STATE: Primary | ICD-10-CM

## 2025-02-03 DIAGNOSIS — H52.7 REFRACTIVE ERROR: ICD-10-CM

## 2025-02-03 PROCEDURE — 1160F RVW MEDS BY RX/DR IN RCRD: CPT | Mod: HCNC,CPTII,S$GLB, | Performed by: OPHTHALMOLOGY

## 2025-02-03 PROCEDURE — 99024 POSTOP FOLLOW-UP VISIT: CPT | Mod: HCNC,S$GLB,, | Performed by: OPHTHALMOLOGY

## 2025-02-03 PROCEDURE — 1126F AMNT PAIN NOTED NONE PRSNT: CPT | Mod: HCNC,CPTII,S$GLB, | Performed by: OPHTHALMOLOGY

## 2025-02-03 PROCEDURE — 1101F PT FALLS ASSESS-DOCD LE1/YR: CPT | Mod: HCNC,CPTII,S$GLB, | Performed by: OPHTHALMOLOGY

## 2025-02-03 PROCEDURE — 3288F FALL RISK ASSESSMENT DOCD: CPT | Mod: HCNC,CPTII,S$GLB, | Performed by: OPHTHALMOLOGY

## 2025-02-03 PROCEDURE — 99999 PR PBB SHADOW E&M-EST. PATIENT-LVL II: CPT | Mod: PBBFAC,HCNC,, | Performed by: OPHTHALMOLOGY

## 2025-02-03 PROCEDURE — 1157F ADVNC CARE PLAN IN RCRD: CPT | Mod: HCNC,CPTII,S$GLB, | Performed by: OPHTHALMOLOGY

## 2025-02-03 PROCEDURE — 99284 EMERGENCY DEPT VISIT MOD MDM: CPT | Mod: 25,HCNC,ER

## 2025-02-03 PROCEDURE — 63600175 PHARM REV CODE 636 W HCPCS: Mod: TB,HCNC,JZ,ER | Performed by: INTERNAL MEDICINE

## 2025-02-03 PROCEDURE — 1159F MED LIST DOCD IN RCRD: CPT | Mod: HCNC,CPTII,S$GLB, | Performed by: OPHTHALMOLOGY

## 2025-02-03 PROCEDURE — 96372 THER/PROPH/DIAG INJ SC/IM: CPT | Mod: JZ | Performed by: INTERNAL MEDICINE

## 2025-02-03 RX ORDER — KETOROLAC TROMETHAMINE 30 MG/ML
15 INJECTION, SOLUTION INTRAMUSCULAR; INTRAVENOUS
Status: COMPLETED | OUTPATIENT
Start: 2025-02-03 | End: 2025-02-03

## 2025-02-03 RX ADMIN — KETOROLAC TROMETHAMINE 15 MG: 30 INJECTION, SOLUTION INTRAMUSCULAR at 02:02

## 2025-02-03 NOTE — ED PROVIDER NOTES
Encounter Date: 2/3/2025       History     Chief Complaint   Patient presents with    Arm Pain     Pt c/o pain in R upper arm since yesterday; tonight pt noticed swelling in R bicep which has markedly improved at this time     79-year-old female presents to the emergency department complaining of right upper arm pain after cleaning up yesterday.  She states she has had issues with right arm pain which has happened after lifting heavy items in the past.  She endorses taking ibuprofen 200 mg around noon yesterday, but no other medications for pain relief.  She also states that her right bicep seemed to swell immediately after the pain began, but states pain level has improved and swelling has resolved.  She denies fever/chills/nausea/vomiting/chest pain/shortness breath.    The history is provided by the patient. No  was used.     Review of patient's allergies indicates:  No Known Allergies  Past Medical History:   Diagnosis Date    Arthritis     Back pain     CKD stage G3a/A3, GFR 45-59 and albumin creatinine ratio >300 mg/g 6/23/2020    Colon polyp     Hyperglycemia     Hyperlipidemia     Hypertension     Nuclear sclerosis of both eyes 10/31/2017    Prolapse of uterus     Type 2 diabetes mellitus without complication, without long-term current use of insulin     Type 2 diabetes mellitus without complication, without long-term current use of insulin      Past Surgical History:   Procedure Laterality Date    CATARACT EXTRACTION W/  INTRAOCULAR LENS IMPLANT Right 11/26/2024    Procedure: EXTRACTION, CATARACT, WITH IOL INSERTION;  Surgeon: Olivier Montez MD;  Location: [facility] OR;  Service: Ophthalmology;  Laterality: Right;    CATARACT EXTRACTION W/  INTRAOCULAR LENS IMPLANT Left 12/10/2024    Procedure: EXTRACTION, CATARACT, WITH IOL INSERTION;  Surgeon: Olivier Montez MD;  Location: [facility] OR;  Service: Ophthalmology;  Laterality: Left;    COLONOSCOPY N/A 12/15/2020    Procedure:  COLONOSCOPY;  Surgeon: Antonio Maya MD;  [facility] ([address] [place] [address]);  Service: Endoscopy;  Laterality: N/A;  prep ins. emailed - ERW  covid test on 12/12/20 -PCW-[facility]    COLONOSCOPY W/ POLYPECTOMY      Right Thumb      vaginal dilator       Family History   Problem Relation Name Age of Onset    Hypertension Mother      Cataracts Mother      Heart disease Mother      Hypertension Father      Cataracts Father      No Known Problems Sister      No Known Problems Brother      No Known Problems Brother      No Known Problems Maternal Aunt      No Known Problems Maternal Uncle      No Known Problems Paternal Aunt      No Known Problems Paternal Uncle      No Known Problems Maternal Grandmother      No Known Problems Maternal Grandfather      No Known Problems Paternal Grandmother      No Known Problems Paternal Grandfather      No Known Problems Other      Amblyopia Neg Hx      Blindness Neg Hx      Cancer Neg Hx      Diabetes Neg Hx      Glaucoma Neg Hx      Macular degeneration Neg Hx      Retinal detachment Neg Hx      Strabismus Neg Hx      Stroke Neg Hx      Thyroid disease Neg Hx       Social History     Tobacco Use    Smoking status: Never     Passive exposure: Never    Smokeless tobacco: Never   Substance Use Topics    Alcohol use: No     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     Drug use: Yes     Comment: Tramadol 2 tabs bid     Review of Systems   Constitutional:  Negative for chills and fever.   Respiratory:  Negative for shortness of breath.    Cardiovascular:  Negative for chest pain.   Gastrointestinal:  Negative for abdominal pain, nausea and vomiting.   Musculoskeletal:         Right arm pain   All other systems reviewed and are negative.      Physical Exam     Initial Vitals [02/03/25 0131]   BP Pulse Resp Temp SpO2   (!) 175/96 99 20 98.5 °F (36.9 °C) 97 %      MAP       --         Physical Exam    Nursing note and vitals reviewed.  Constitutional: She is not diaphoretic. No distress.   HENT:    Head: Normocephalic and atraumatic.   Neck:   Normal range of motion.  Cardiovascular:  Normal rate and regular rhythm.           Pulmonary/Chest: Breath sounds normal. No respiratory distress.   Abdominal: Abdomen is soft.   Musculoskeletal:         General: No tenderness or edema.      Cervical back: Normal range of motion.      Comments: Right biceps pain upon flexion without tenderness to palpation/erythema/induration/fluctuance.  Bilateral upper extremities are neurovascularly intact.     Neurological: She is alert.   Skin: Skin is warm. Capillary refill takes less than 2 seconds.   Psychiatric: She has a normal mood and affect.         ED Course   Procedures  Labs Reviewed - No data to display       Imaging Results    None          Medications   ketorolac injection 15 mg (has no administration in time range)     Medical Decision Making  79-year-old female presents to the emergency department complaining of right upper arm pain after cleaning up yesterday.  She states she has had issues with right arm pain which has happened after lifting heavy items in the past.  She endorses taking ibuprofen 200 mg around noon yesterday, but no other medications for pain relief.  She also states that her right bicep seemed to swell immediately after the pain began, but states pain level has improved and swelling has resolved.  She denies fever/chills/nausea/vomiting/chest pain/shortness breath.  Course of ED stay:   Exam today reveals evidence of right biceps strain.  Patient received Toradol in the emergency department and was advised to follow-up with her primary care physician within the next week for re-evaluation/return to the emergency department if condition worsens.    Risk  Prescription drug management.                                      Clinical Impression:  Final diagnoses:  [S41.186H] Muscle strain of right upper arm, initial encounter (Primary)          ED Disposition Condition    Discharge Stable          ED  Prescriptions    None       Follow-up Information       Follow up With Specialties Details Why Contact Info    Pollo Lowe MD Family Medicine Schedule an appointment as soon as possible for a visit in 1 week For reevaluation [address] [facility]  [place] [id number] [phone]               Tyrone Vega MD  02/03/25 3184

## 2025-02-04 ENCOUNTER — OFFICE VISIT (OUTPATIENT)
Dept: FAMILY MEDICINE | Facility: CLINIC | Age: 80
End: 2025-02-04
Payer: MEDICARE

## 2025-02-04 VITALS
TEMPERATURE: 98 F | DIASTOLIC BLOOD PRESSURE: 80 MMHG | HEART RATE: 84 BPM | OXYGEN SATURATION: 95 % | BODY MASS INDEX: 29.34 KG/M2 | WEIGHT: 176.13 LBS | RESPIRATION RATE: 16 BRPM | HEIGHT: 65 IN | SYSTOLIC BLOOD PRESSURE: 156 MMHG

## 2025-02-04 DIAGNOSIS — R39.9 URINARY SYMPTOM OR SIGN: Primary | ICD-10-CM

## 2025-02-04 DIAGNOSIS — M79.10 MYALGIA: ICD-10-CM

## 2025-02-04 DIAGNOSIS — F11.20 UNCOMPLICATED OPIOID DEPENDENCE: ICD-10-CM

## 2025-02-04 DIAGNOSIS — N18.32 STAGE 3B CHRONIC KIDNEY DISEASE: ICD-10-CM

## 2025-02-04 PROBLEM — J84.10 PULMONARY FIBROSIS, UNSPECIFIED: Status: RESOLVED | Noted: 2024-03-21 | Resolved: 2025-02-04

## 2025-02-04 LAB
BILIRUB SERPL-MCNC: NEGATIVE MG/DL
BLOOD URINE, POC: NEGATIVE
CLARITY, POC UA: CLEAR
COLOR, POC UA: ABNORMAL
GLUCOSE UR QL STRIP: NEGATIVE
KETONES UR QL STRIP: NEGATIVE
LEUKOCYTE ESTERASE URINE, POC: ABNORMAL
NITRITE, POC UA: NEGATIVE
PH, POC UA: 7
PROTEIN, POC: NEGATIVE
SPECIFIC GRAVITY, POC UA: 1.01
UROBILINOGEN, POC UA: NEGATIVE

## 2025-02-04 PROCEDURE — 1159F MED LIST DOCD IN RCRD: CPT | Mod: HCNC,CPTII,S$GLB, | Performed by: NURSE PRACTITIONER

## 2025-02-04 PROCEDURE — 99999 PR PBB SHADOW E&M-EST. PATIENT-LVL III: CPT | Mod: PBBFAC,HCNC,, | Performed by: NURSE PRACTITIONER

## 2025-02-04 PROCEDURE — 1126F AMNT PAIN NOTED NONE PRSNT: CPT | Mod: HCNC,CPTII,S$GLB, | Performed by: NURSE PRACTITIONER

## 2025-02-04 PROCEDURE — 1157F ADVNC CARE PLAN IN RCRD: CPT | Mod: HCNC,CPTII,S$GLB, | Performed by: NURSE PRACTITIONER

## 2025-02-04 PROCEDURE — 3077F SYST BP >= 140 MM HG: CPT | Mod: HCNC,CPTII,S$GLB, | Performed by: NURSE PRACTITIONER

## 2025-02-04 PROCEDURE — 3079F DIAST BP 80-89 MM HG: CPT | Mod: HCNC,CPTII,S$GLB, | Performed by: NURSE PRACTITIONER

## 2025-02-04 PROCEDURE — 1160F RVW MEDS BY RX/DR IN RCRD: CPT | Mod: HCNC,CPTII,S$GLB, | Performed by: NURSE PRACTITIONER

## 2025-02-04 PROCEDURE — 3288F FALL RISK ASSESSMENT DOCD: CPT | Mod: HCNC,CPTII,S$GLB, | Performed by: NURSE PRACTITIONER

## 2025-02-04 PROCEDURE — 81002 URINALYSIS NONAUTO W/O SCOPE: CPT | Mod: HCNC,S$GLB,, | Performed by: NURSE PRACTITIONER

## 2025-02-04 PROCEDURE — 1101F PT FALLS ASSESS-DOCD LE1/YR: CPT | Mod: HCNC,CPTII,S$GLB, | Performed by: NURSE PRACTITIONER

## 2025-02-04 PROCEDURE — 99214 OFFICE O/P EST MOD 30 MIN: CPT | Mod: HCNC,S$GLB,, | Performed by: NURSE PRACTITIONER

## 2025-02-10 ENCOUNTER — PATIENT MESSAGE (OUTPATIENT)
Dept: FAMILY MEDICINE | Facility: CLINIC | Age: 80
End: 2025-02-10
Payer: MEDICARE

## 2025-02-10 NOTE — PROGRESS NOTES
Subjective:      Patient ID: Nuha Bernstein is a 79 y.o. female.  Pt returns to clinic for ER f/u, see course below. Was evaluated for arm soreness that began after lifting a brush. Today reports arm is feeling better though does reports increased urination with concern for UTI.     ED Course  Procedures  Labs Reviewed - No data to display  Imaging Results - None   Medications - ketorolac injection 15 mg (has no administration in time range)     Medical Decision Making  79-year-old female presents to the emergency department complaining of right upper arm pain after cleaning up yesterday.  She states she has had issues with right arm pain which has happened after lifting heavy items in the past.  She endorses taking ibuprofen 200 mg around noon yesterday, but no other medications for pain relief.  She also states that her right bicep seemed to swell immediately after the pain began, but states pain level has improved and swelling has resolved.  She denies fever/chills/nausea/vomiting/chest pain/shortness breath.  Course of ED stay:   Exam today reveals evidence of right biceps strain.  Patient received Toradol in the emergency department and was advised to follow-up with her primary care physician within the next week for re-evaluation/return to the emergency department if condition worsens.    Clinical Impression:  Final diagnoses:  [S46.911A] Muscle strain of right upper arm, initial encounter (Primary)      Review of Systems   Constitutional:  Negative for activity change, appetite change, fatigue, fever, night sweats and unexpected weight change.   Respiratory:  Negative for chest tightness and shortness of breath.    Cardiovascular:  Negative for chest pain, palpitations, leg swelling and claudication.   Gastrointestinal:  Negative for abdominal pain, change in bowel habit, constipation, diarrhea, nausea and vomiting.   Genitourinary:  Positive for frequency and nocturia. Negative for bladder incontinence,  "decreased urine volume, difficulty urinating, dysuria, enuresis, flank pain, hematuria and urgency.   Musculoskeletal:  Positive for arthralgias and myalgias. Negative for back pain, gait problem, joint swelling, leg pain, neck pain, neck stiffness and joint deformity.   Integumentary:  Negative for rash.   Neurological:  Negative for dizziness, vertigo, tremors, seizures, syncope, facial asymmetry, speech difficulty, weakness, light-headedness, numbness, headaches, memory loss and coordination difficulties.   All other systems reviewed and are negative.        Objective:     Vitals:    02/04/25 1507   BP: (!) 156/80   Pulse: 84   Resp: 16   Temp: 98.3 °F (36.8 °C)   TempSrc: Oral   SpO2: 95%   Weight: 79.9 kg (176 lb 2.4 oz)   Height: 5' 5" (1.651 m)     Physical Exam  Vitals and nursing note reviewed.   Constitutional:       General: She is not in acute distress.     Appearance: Normal appearance. She is well-developed and well-groomed. She is not ill-appearing.   HENT:      Head: Normocephalic and atraumatic.      Right Ear: External ear normal.      Left Ear: External ear normal.      Nose: Nose normal.      Mouth/Throat:      Lips: Pink.      Mouth: Mucous membranes are moist.   Eyes:      General: Lids are normal. Vision grossly intact. Gaze aligned appropriately.      Conjunctiva/sclera: Conjunctivae normal.      Pupils: Pupils are equal, round, and reactive to light.   Neck:      Trachea: Phonation normal.   Cardiovascular:      Rate and Rhythm: Normal rate and regular rhythm.      Heart sounds: Normal heart sounds.   Pulmonary:      Effort: Pulmonary effort is normal. No accessory muscle usage or respiratory distress.      Breath sounds: Normal breath sounds and air entry.   Abdominal:      General: Abdomen is flat. Bowel sounds are normal. There is no distension.      Palpations: Abdomen is soft.      Tenderness: There is no abdominal tenderness.   Musculoskeletal:      Right shoulder: Normal.      Left " shoulder: Normal.      Right upper arm: Normal. No swelling, edema, deformity, tenderness or bony tenderness.      Left upper arm: Normal. No swelling, edema, deformity, tenderness or bony tenderness.      Right elbow: Normal.      Left elbow: Normal.      Cervical back: Neck supple.      Right lower leg: No edema.      Left lower leg: No edema.   Skin:     General: Skin is warm and dry.      Findings: No rash.   Neurological:      General: No focal deficit present.      Mental Status: She is alert and oriented to person, place, and time. Mental status is at baseline.      Sensory: Sensation is intact.      Motor: Motor function is intact.      Coordination: Coordination is intact.      Gait: Gait is intact.   Psychiatric:         Attention and Perception: Attention and perception normal.         Mood and Affect: Mood and affect normal.         Speech: Speech normal.         Behavior: Behavior normal. Behavior is cooperative.         Thought Content: Thought content normal.         Cognition and Memory: Cognition and memory normal.         Judgment: Judgment normal.       Results for orders placed or performed in visit on 02/04/25   POCT URINE DIPSTICK WITHOUT MICROSCOPE    Collection Time: 02/04/25  3:39 PM   Result Value Ref Range    Glucose, UA negative     Bilirubin, POC negative     Ketones, UA negative     Spec Grav UA 1.010     Blood, UA negative     pH, UA 7     Protein, POC negative     Urobilinogen, UA negative     Nitrite, UA negative     WBC, UA trace     Color, UA Light Yellow     Clarity, UA Clear      Assessment and Plan:     1. Urinary symptom or sign (Primary)  No UTI noted today  PLAN: Treatment per orders - also push fluids, may use Pyridium OTC prn. Call or return to clinic prn if these symptoms worsen or fail to improve as anticipated.  - POCT URINE DIPSTICK WITHOUT MICROSCOPE    2. Myalgia  Atraumatic, improving, increase water intake and continue activity as tolerated     3. Uncomplicated  opioid dependence  Takes tramadol daily for chronic arthralgia without complication   LAPMP reviewed without discrepancy, continue current regimen     4. Stage 3b chronic kidney disease  BP elevated on ACEi  Continue current treatment regimen.  Dietary sodium restriction.  Regular aerobic exercise.  Maintain adequate hydration  Decrease use of nephrotoxic agents         KATIE Perez, FNP-C  Family/Internal Medicine  [facility]

## 2025-02-11 ENCOUNTER — OFFICE VISIT (OUTPATIENT)
Dept: FAMILY MEDICINE | Facility: CLINIC | Age: 80
End: 2025-02-11
Payer: MEDICARE

## 2025-02-11 ENCOUNTER — LAB VISIT (OUTPATIENT)
Dept: LAB | Facility: HOSPITAL | Age: 80
End: 2025-02-11
Payer: MEDICARE

## 2025-02-11 ENCOUNTER — NURSE TRIAGE (OUTPATIENT)
Dept: ADMINISTRATIVE | Facility: CLINIC | Age: 80
End: 2025-02-11
Payer: MEDICARE

## 2025-02-11 ENCOUNTER — TELEPHONE (OUTPATIENT)
Dept: FAMILY MEDICINE | Facility: CLINIC | Age: 80
End: 2025-02-11
Payer: MEDICARE

## 2025-02-11 VITALS
BODY MASS INDEX: 29.34 KG/M2 | SYSTOLIC BLOOD PRESSURE: 140 MMHG | DIASTOLIC BLOOD PRESSURE: 84 MMHG | HEART RATE: 79 BPM | OXYGEN SATURATION: 96 % | WEIGHT: 176.13 LBS | TEMPERATURE: 98 F | HEIGHT: 65 IN

## 2025-02-11 DIAGNOSIS — S46.911D MUSCLE STRAIN OF RIGHT UPPER ARM, SUBSEQUENT ENCOUNTER: Primary | ICD-10-CM

## 2025-02-11 DIAGNOSIS — N18.32 STAGE 3B CHRONIC KIDNEY DISEASE: ICD-10-CM

## 2025-02-11 DIAGNOSIS — M66.9 TENDON RUPTURE, NONTRAUMATIC: ICD-10-CM

## 2025-02-11 LAB
ALBUMIN SERPL BCP-MCNC: 3.7 G/DL (ref 3.5–5.2)
ANION GAP SERPL CALC-SCNC: 9 MMOL/L (ref 8–16)
BUN SERPL-MCNC: 37 MG/DL (ref 8–23)
CALCIUM SERPL-MCNC: 9.8 MG/DL (ref 8.7–10.5)
CHLORIDE SERPL-SCNC: 104 MMOL/L (ref 95–110)
CO2 SERPL-SCNC: 28 MMOL/L (ref 23–29)
CREAT SERPL-MCNC: 1.5 MG/DL (ref 0.5–1.4)
EST. GFR  (NO RACE VARIABLE): 35.2 ML/MIN/1.73 M^2
GLUCOSE SERPL-MCNC: 93 MG/DL (ref 70–110)
PHOSPHATE SERPL-MCNC: 3 MG/DL (ref 2.7–4.5)
POTASSIUM SERPL-SCNC: 4.5 MMOL/L (ref 3.5–5.1)
SODIUM SERPL-SCNC: 141 MMOL/L (ref 136–145)

## 2025-02-11 PROCEDURE — 1125F AMNT PAIN NOTED PAIN PRSNT: CPT | Mod: CPTII,S$GLB,,

## 2025-02-11 PROCEDURE — 36415 COLL VENOUS BLD VENIPUNCTURE: CPT | Mod: PO

## 2025-02-11 PROCEDURE — 1159F MED LIST DOCD IN RCRD: CPT | Mod: CPTII,S$GLB,,

## 2025-02-11 PROCEDURE — 3079F DIAST BP 80-89 MM HG: CPT | Mod: CPTII,S$GLB,,

## 2025-02-11 PROCEDURE — 99999 PR PBB SHADOW E&M-EST. PATIENT-LVL IV: CPT | Mod: PBBFAC,,,

## 2025-02-11 PROCEDURE — 3077F SYST BP >= 140 MM HG: CPT | Mod: CPTII,S$GLB,,

## 2025-02-11 PROCEDURE — 1101F PT FALLS ASSESS-DOCD LE1/YR: CPT | Mod: CPTII,S$GLB,,

## 2025-02-11 PROCEDURE — 3288F FALL RISK ASSESSMENT DOCD: CPT | Mod: CPTII,S$GLB,,

## 2025-02-11 PROCEDURE — 99213 OFFICE O/P EST LOW 20 MIN: CPT | Mod: S$GLB,,,

## 2025-02-11 PROCEDURE — 1157F ADVNC CARE PLAN IN RCRD: CPT | Mod: CPTII,S$GLB,,

## 2025-02-11 PROCEDURE — 80069 RENAL FUNCTION PANEL: CPT

## 2025-02-11 NOTE — TELEPHONE ENCOUNTER
Call returned to patient's daughter. Appointment accepted today at 2pm with Dr. Aceves at the [facility] location.

## 2025-02-11 NOTE — TELEPHONE ENCOUNTER
----- Message from Gracia sent at 2/11/2025 11:10 AM CST -----  .Type: Patient Call Back    Who called:Nuha - daughter     What is the request in detail:    Symptom: Arm Pain - Not From Injury  Outcome: Talk to a nurse or provider within 15 minutes.  Reason: Can't use the arm normally    Can the clinic reply by [facility]? No     Would the patient rather a call back or a response via [facility]? Call Back     Best call back number: .[phone] home) [phone]        Additional Information:

## 2025-02-11 NOTE — TELEPHONE ENCOUNTER
Pts daughter called and stated pt was seen by her PCP yesterday for arm pain and feels like it is worsening and she wants to speak to PCP. Per chart clinic nurse attempted to call daughter back but called the wrong number. Reached out to clinic nurse via secure chat to give correct number to call. Pts daughter verbalized understanding.  Reason for Disposition   Caller requesting an appointment, triage offered and declined    Protocols used: PCP Call - No Triage-A-

## 2025-02-11 NOTE — PROGRESS NOTES
"            Family Medicine     Patient name: Nuha Bernstein  MRN: [id number]  : 1945  PCP NAME: Pollo Lowe MD    Subjective     History of Present Illness:  Patient ID: Nuha Bernstein is a 79 y.o. Black or  female presents to the clinic today. Chronic medical issues, if present, have been documented.   Active Problem List with Overview Notes    Diagnosis Date Noted    Muscle strain of right upper arm 2025    Nuclear sclerotic cataract of left eye 2024    Chronic pain of both shoulders 2024    Impaired range of motion of both shoulders 2024    Shoulder weakness 2024    Posture imbalance 2024    Vaginal pessary in situ 2024    Cortical senile cataract, right 2024    Aortic atherosclerosis 2024    Prediabetes 2024     Noted by [facility]  last documented on 2023      Hyperparathyroidism 2023    Uncomplicated opioid dependence 2023    Cyst of right eyelid 2019    Incomplete bladder emptying 2019    Cystocele, midline 2019    Uterovaginal prolapse 2019    Rectocele, female 2019    Nuclear sclerosis, bilateral 10/31/2017    Refractive error 10/31/2017    Stage 3b chronic kidney disease     Calcified granuloma of lung 2016     "Heart size is normal.  Few calcified granulomas noted.  The right hemidiaphragm is elevated and subsegmental atelectatic changes seen at the right lung base.  Otherwise the lungs are clear" - Xray Chest 2014      Essential hypertension 2015    Arthropathy of cervical facet joint 2015    Osteoarthritis of both hips (moderate) 2014    DJD (degenerative joint disease) of cervical spine 2014    Chronic low back pain 2014    Chronic neck pain 2014    Cervical stenosis of spinal canal 10/02/2012    Hyperlipidemia        Chief Complaint  Chief Complaint   Patient presents with    Arm Injury    Arm Pain     Patient presents with " her daughter.  There is visible bruising on the right arm.   Symptoms occurred about a week ago after patient was doing some heavy lifting.  She noted a popping sound which was accompanied with right arm pain.  Visited another clinician who evaluated patient.  Conservative measures were recommended  Still able to use the arm.  Pain is resolving, but bruising has persisted.  There is abnormal swelling in the right bicep region that occurs when patient tries to flex her right elbow joint.  Received a steroid injection about a month ago.    Medications   Medication List with Changes/Refills   Current Medications    ATORVASTATIN (LIPITOR) 40 MG TABLET    Take 1 tablet (40 mg total) by mouth once daily.    CO-ENZYME Q-10 30 MG CAPSULE    Take 30 mg by mouth 3 (three) times daily.    LISINOPRIL (PRINIVIL,ZESTRIL) 40 MG TABLET    TAKE 1 TABLET BY MOUTH EVERY DAY    MULTIVITAMIN/IRON/FOLIC ACID (CENTRUM WOMEN ORAL)    Take by mouth.    MV,TRISHA,IRON,MN/FOLIC ACID/CHOL (HAIR-SKIN-NAILS, PABA, ORAL)    Take by mouth.    OMEGA 3,6,9 COMBINATION NO.7 (OMEGA DHA ORAL)    Take 830 mg by mouth once daily.    PREDNISOLONE-MOXIFLOX-BROMFEN 1-0.5-0.075 % DRPS    Place 1 drop into the right eye 3 (three) times daily.    TRAMADOL (ULTRAM) 50 MG TABLET    Take 1 tablet (50 mg total) by mouth every 8 (eight) hours as needed for Pain.    TRIAMTERENE-HYDROCHLOROTHIAZIDE 37.5-25 MG (DYAZIDE) 37.5-25 MG PER CAPSULE    TAKE 1 CAPSULE BY MOUTH EVERY DAY       Allergies  Review of patient's allergies indicates:  No Known Allergies  Past Medical history  Past Medical History:   Diagnosis Date    Arthritis     Back pain     CKD stage G3a/A3, GFR 45-59 and albumin creatinine ratio >300 mg/g 6/23/2020    Colon polyp     Hyperglycemia     Hyperlipidemia     Hypertension     Nuclear sclerosis of both eyes 10/31/2017    Prolapse of uterus     Type 2 diabetes mellitus without complication, without long-term current use of insulin     Type 2 diabetes  mellitus without complication, without long-term current use of insulin           Surgical History  Past Surgical History:   Procedure Laterality Date    CATARACT EXTRACTION W/  INTRAOCULAR LENS IMPLANT Right 11/26/2024    Procedure: EXTRACTION, CATARACT, WITH IOL INSERTION;  Surgeon: Olivier Montez MD;  Location: [facility] OR;  Service: Ophthalmology;  Laterality: Right;    CATARACT EXTRACTION W/  INTRAOCULAR LENS IMPLANT Left 12/10/2024    Procedure: EXTRACTION, CATARACT, WITH IOL INSERTION;  Surgeon: Olivier Montez MD;  Location: [facility] OR;  Service: Ophthalmology;  Laterality: Left;    COLONOSCOPY N/A 12/15/2020    Procedure: COLONOSCOPY;  Surgeon: Antonio Maya MD;  Location: [facility] ENDO ([address] [place] [address]);  Service: Endoscopy;  Laterality: N/A;  prep ins. emailed - ERW  covid test on 12/12/20 -[facility]W-[facility]    COLONOSCOPY W/ POLYPECTOMY      Right Thumb      vaginal dilator       Family History  Family History   Problem Relation Name Age of Onset    Hypertension Mother      Cataracts Mother      Heart disease Mother      Hypertension Father      Cataracts Father      No Known Problems Sister      No Known Problems Brother      No Known Problems Brother      No Known Problems Maternal Aunt      No Known Problems Maternal Uncle      No Known Problems Paternal Aunt      No Known Problems Paternal Uncle      No Known Problems Maternal Grandmother      No Known Problems Maternal Grandfather      No Known Problems Paternal Grandmother      No Known Problems Paternal Grandfather      No Known Problems Other      Amblyopia Neg Hx      Blindness Neg Hx      Cancer Neg Hx      Diabetes Neg Hx      Glaucoma Neg Hx      Macular degeneration Neg Hx      Retinal detachment Neg Hx      Strabismus Neg Hx      Stroke Neg Hx      Thyroid disease Neg Hx       Social History  Social History     Tobacco Use    Smoking status: Never     Passive exposure: Never    Smokeless tobacco: Never   Substance Use Topics    Alcohol use: No      "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     Drug use: Yes     Comment: Tramadol 2 tabs bid          Review of system     ROS  Negative except as mentioned above  Physical exam   Vital Signs  Vitals:    02/11/25 1353   BP: (!) 140/84   Pulse: 79   Temp: 98.3 °F (36.8 °C)   TempSrc: Oral   SpO2: 96%   Weight: 79.9 kg (176 lb 2.4 oz)   Height: 5' 5" (1.651 m)       Physical Exam  Constitutional:       Appearance: Normal appearance.   Cardiovascular:      Rate and Rhythm: Normal rate and regular rhythm.      Pulses: Normal pulses.      Heart sounds: Normal heart sounds.   Pulmonary:      Effort: Pulmonary effort is normal. No respiratory distress.      Breath sounds: Normal breath sounds. No wheezing.   Abdominal:      General: Bowel sounds are normal. There is no distension.      Palpations: Abdomen is soft.      Tenderness: There is no abdominal tenderness.   Musculoskeletal:      Right lower leg: No edema.      Left lower leg: No edema.   Skin:     General: Skin is warm.      Findings: Bruising (Ecchymosis on right arm) present.   Neurological:      Mental Status: She is alert and oriented to person, place, and time.           Wt Readings from Last 3 Encounters:   02/11/25 79.9 kg (176 lb 2.4 oz)   02/04/25 79.9 kg (176 lb 2.4 oz)   02/03/25 79.4 kg (175 lb)          Body mass index is 29.31 kg/m².      Laboratory data and other diagnostic findings     Lab Results   Component Value Date    WBC 6.91 12/23/2024    HGB 11.7 (L) 12/23/2024    HCT 38.1 12/23/2024    MCV 92 12/23/2024     12/23/2024         Lab Results   Component Value Date    CREATININE 1.6 (H) 12/23/2024    BUN 46 (H) 12/23/2024     12/23/2024    K 4.5 12/23/2024     12/23/2024    CO2 26 12/23/2024         Assessment and plan       Muscle strain of right upper arm, subsequent encounter  Symptoms suggestive of musculotendinous injury to flexors of the right elbow joint.  We will obtain MRI  -     MRI Humerus W WO Contrast Right; " Future; Expected date: 02/11/2025    Stage 3b chronic kidney disease  -     RENAL FUNCTION PANEL; Future; Expected date: 02/11/2025    Tendon rupture, nontraumatic  Refer to orthopedics with results of MRI.  -     Ambulatory referral/consult to Orthopedics; Future; Expected date: 02/18/2025        Problem List Items Addressed This Visit       Stage 3b chronic kidney disease    Relevant Orders    RENAL FUNCTION PANEL    Muscle strain of right upper arm - Primary    Relevant Orders    MRI Humerus W WO Contrast Right     Other Visit Diagnoses       Tendon rupture, nontraumatic        Relevant Orders    Ambulatory referral/consult to Orthopedics              Future Appointments  Future Appointments   Date Time Provider Department Center   2/13/2025  5:30 PM [facility] [id number] [facility]   2/18/2025  2:00 PM NURSE, [facility] MED/INT MED [facility] MED [place]   2/21/2025  7:30 AM Mary Vernon PA-C [facility] ORTHO [address]   3/11/2025  2:40 PM [facility] [place]   5/15/2025  3:20 PM Pollo Lowe MD [facility] [place] Aceves MD    This office note was created by combination of typed  and MModal dictation.  Transcription errors may be present.  If there are any questions, please contact me.

## 2025-02-12 ENCOUNTER — PATIENT MESSAGE (OUTPATIENT)
Dept: FAMILY MEDICINE | Facility: CLINIC | Age: 80
End: 2025-02-12
Payer: MEDICARE

## 2025-02-12 NOTE — TELEPHONE ENCOUNTER
Spoke to pts family and informed them no appointments available in [facility] system today, offered earlier in the morning tomorrow instead of waiting until tomorrow evening but family states morning will not work; I did advise them to go to ER if increased pain or concerns prior to MRI tomorrow

## 2025-02-13 ENCOUNTER — HOSPITAL ENCOUNTER (OUTPATIENT)
Dept: RADIOLOGY | Facility: HOSPITAL | Age: 80
Discharge: HOME | End: 2025-02-13
Payer: MEDICARE

## 2025-02-13 DIAGNOSIS — S46.911D MUSCLE STRAIN OF RIGHT UPPER ARM, SUBSEQUENT ENCOUNTER: ICD-10-CM

## 2025-02-13 PROCEDURE — 73220 MRI UPPR EXTREMITY W/O&W/DYE: CPT | Mod: TC,RT

## 2025-02-13 PROCEDURE — A9585 GADOBUTROL INJECTION: HCPCS

## 2025-02-13 PROCEDURE — 25500020 PHARM REV CODE 255

## 2025-02-13 PROCEDURE — 73220 MRI UPPR EXTREMITY W/O&W/DYE: CPT | Mod: 26,RT,, | Performed by: RADIOLOGY

## 2025-02-13 RX ORDER — GADOBUTROL 604.72 MG/ML
8 INJECTION INTRAVENOUS
Status: COMPLETED | OUTPATIENT
Start: 2025-02-13 | End: 2025-02-13

## 2025-02-13 RX ADMIN — GADOBUTROL 8 ML: 604.72 INJECTION INTRAVENOUS at 07:02

## 2025-02-14 ENCOUNTER — TELEPHONE (OUTPATIENT)
Dept: SPORTS MEDICINE | Facility: CLINIC | Age: 80
End: 2025-02-14
Payer: MEDICARE

## 2025-02-14 ENCOUNTER — TELEPHONE (OUTPATIENT)
Dept: ORTHOPEDICS | Facility: CLINIC | Age: 80
End: 2025-02-14
Payer: MEDICARE

## 2025-02-14 ENCOUNTER — PATIENT MESSAGE (OUTPATIENT)
Dept: FAMILY MEDICINE | Facility: CLINIC | Age: 80
End: 2025-02-14
Payer: MEDICARE

## 2025-02-14 NOTE — TELEPHONE ENCOUNTER
Spoke with pt daughter and cancel appointment with ROSA Vernon PA-C. ROSA Vernon PA-C is not the correct provider. Pt need to see a provider in sport medicine. Pt is daughter is aware of new appointment 2/19/25 with Sport medicine. Message will sent to Sport Medicine as pt daughter requested. Patient daughter states verbal understanding and has no further questions.

## 2025-02-14 NOTE — TELEPHONE ENCOUNTER
I called the patient and her daughter and let her know that she is ok to see Dr. Dent on Wednesday of next week as the injury she has is typically treated non operatively. We discussed that bruising and swelling is normal and that she can use ice and a sling as needed but she does not feel she needs a sling at this time.

## 2025-02-14 NOTE — TELEPHONE ENCOUNTER
----- Message from Delano Navarrete sent at 2/14/2025 10:38 AM CST -----    ----- Message -----  From: Albania Batista  Sent: 2/14/2025  10:12 AM CST  To: Rosalba Hamilton MA    Name of Who is Calling:        What is the request in detail: Hey, I have Pt's Daughter on the line calling for Pt: Nuha Bernstein MRN: [id number] Scheduled to see Dr. Vernon on Next Friday. Patient had an MRI done yesterday and was told that she should see DR. Vernon sooner than Friday. Are you able to assist them?              [facility] [phone]

## 2025-02-14 NOTE — TELEPHONE ENCOUNTER
----- Message from [place] sent at 2/14/2025 10:11 AM CST -----  Name of Who is Calling:        What is the request in detail: Hey, I have Pt's Daughter on the line calling for Pt: Nuha Bernstein MRN: [id number] Scheduled to see Dr. Vernon on Next Friday. Patient had an MRI done yesterday and was told that she should see DR. Vernon sooner than Friday. Are you able to assist them?              [facility] [phone]

## 2025-02-14 NOTE — TELEPHONE ENCOUNTER
Spoke with pt daughter. Pt is schedule for 2/14/25 @ 12:00 pm. Patient daughter states verbal understanding and has no further questions.

## 2025-02-18 ENCOUNTER — CLINICAL SUPPORT (OUTPATIENT)
Dept: FAMILY MEDICINE | Facility: CLINIC | Age: 80
End: 2025-02-18
Payer: MEDICARE

## 2025-02-18 VITALS — HEART RATE: 81 BPM | SYSTOLIC BLOOD PRESSURE: 138 MMHG | DIASTOLIC BLOOD PRESSURE: 88 MMHG

## 2025-02-18 DIAGNOSIS — Z01.30 BP CHECK: Primary | ICD-10-CM

## 2025-02-18 NOTE — PROGRESS NOTES
Nuha Bernstein 80 y.o. female is here today for Blood Pressure check.   History of HTN yes.    Review of patient's allergies indicates:  No Known Allergies  Creatinine   Date Value Ref Range Status   02/11/2025 1.5 (H) 0.5 - 1.4 mg/dL Final     Sodium   Date Value Ref Range Status   02/11/2025 141 136 - 145 mmol/L Final     Potassium   Date Value Ref Range Status   02/11/2025 4.5 3.5 - 5.1 mmol/L Final   ]  Patient verifies taking blood pressure medications on a regular basis at the same time of the day.   Current Medications[1]    Last dose of blood pressure medication was taken this morning.  Patient is asymptomatic.       BP: 138/88 , Pulse: 81 .             [1]   Current Outpatient Medications:     atorvastatin (LIPITOR) 40 MG tablet, Take 1 tablet (40 mg total) by mouth once daily., Disp: 90 tablet, Rfl: 3    co-enzyme Q-10 30 mg capsule, Take 30 mg by mouth 3 (three) times daily., Disp: , Rfl:     lisinopriL (PRINIVIL,ZESTRIL) 40 MG tablet, TAKE 1 TABLET BY MOUTH EVERY DAY, Disp: 90 tablet, Rfl: 3    multivitamin/iron/folic acid (CENTRUM WOMEN ORAL), Take by mouth., Disp: , Rfl:     mv,hussein,iron,mn/folic acid/chol (HAIR-SKIN-NAILS, PABA, ORAL), Take by mouth., Disp: , Rfl:     OMEGA 3,6,9 COMBINATION NO.7 (OMEGA DHA ORAL), Take 830 mg by mouth once daily., Disp: , Rfl:     prednisoLONE-moxiflox-bromfen 1-0.5-0.075 % DrpS, Place 1 drop into the right eye 3 (three) times daily., Disp: 8 mL, Rfl: 2    traMADoL (ULTRAM) 50 mg tablet, Take 1 tablet (50 mg total) by mouth every 8 (eight) hours as needed for Pain., Disp: 90 tablet, Rfl: 2    triamterene-hydrochlorothiazide 37.5-25 mg (DYAZIDE) 37.5-25 mg per capsule, TAKE 1 CAPSULE BY MOUTH EVERY DAY, Disp: 90 capsule, Rfl: 3

## 2025-02-19 ENCOUNTER — OFFICE VISIT (OUTPATIENT)
Dept: SPORTS MEDICINE | Facility: CLINIC | Age: 80
End: 2025-02-19
Payer: MEDICARE

## 2025-02-19 VITALS
WEIGHT: 176.56 LBS | BODY MASS INDEX: 29.42 KG/M2 | SYSTOLIC BLOOD PRESSURE: 149 MMHG | HEART RATE: 83 BPM | HEIGHT: 65 IN | DIASTOLIC BLOOD PRESSURE: 87 MMHG

## 2025-02-19 DIAGNOSIS — S46.211A RUPTURE OF PROXIMAL BICEPS TENDON, RIGHT, INITIAL ENCOUNTER: Primary | ICD-10-CM

## 2025-02-19 DIAGNOSIS — M66.9 TENDON RUPTURE, NONTRAUMATIC: ICD-10-CM

## 2025-02-19 PROCEDURE — 1157F ADVNC CARE PLAN IN RCRD: CPT | Mod: CPTII,S$GLB,, | Performed by: ORTHOPAEDIC SURGERY

## 2025-02-19 PROCEDURE — 1125F AMNT PAIN NOTED PAIN PRSNT: CPT | Mod: CPTII,S$GLB,, | Performed by: ORTHOPAEDIC SURGERY

## 2025-02-19 PROCEDURE — 1159F MED LIST DOCD IN RCRD: CPT | Mod: CPTII,S$GLB,, | Performed by: ORTHOPAEDIC SURGERY

## 2025-02-19 PROCEDURE — 3077F SYST BP >= 140 MM HG: CPT | Mod: CPTII,S$GLB,, | Performed by: ORTHOPAEDIC SURGERY

## 2025-02-19 PROCEDURE — 99204 OFFICE O/P NEW MOD 45 MIN: CPT | Mod: S$GLB,,, | Performed by: ORTHOPAEDIC SURGERY

## 2025-02-19 PROCEDURE — 1101F PT FALLS ASSESS-DOCD LE1/YR: CPT | Mod: CPTII,S$GLB,, | Performed by: ORTHOPAEDIC SURGERY

## 2025-02-19 PROCEDURE — 3288F FALL RISK ASSESSMENT DOCD: CPT | Mod: CPTII,S$GLB,, | Performed by: ORTHOPAEDIC SURGERY

## 2025-02-19 PROCEDURE — 3079F DIAST BP 80-89 MM HG: CPT | Mod: CPTII,S$GLB,, | Performed by: ORTHOPAEDIC SURGERY

## 2025-02-19 PROCEDURE — 99999 PR PBB SHADOW E&M-EST. PATIENT-LVL III: CPT | Mod: PBBFAC,,, | Performed by: ORTHOPAEDIC SURGERY

## 2025-02-19 NOTE — PROGRESS NOTES
CC: right shoulder pain     80 y.o. Female presenting with right shoulder and upper arm pain since 2/2/25. Patient reports she was using a lint brush and felt a pop in her right upper arm.  Pt reports pain is slowly improving.     She reports that the pain is worse with lifting anything more then 1 lb.    Is affecting ADLs.     SANE: 60    Review of Systems   Constitution: Negative. Negative for chills, fever and night sweats.   HENT: Negative for congestion and headaches.    Eyes: Negative for blurred vision, left vision loss and right vision loss.   Cardiovascular: Negative for chest pain and syncope.   Respiratory: Negative for cough and shortness of breath.    Endocrine: Negative for polydipsia, polyphagia and polyuria.   Hematologic/Lymphatic: Negative for bleeding problem. Does not bruise/bleed easily.   Skin: Negative for dry skin, itching and rash.   Musculoskeletal: Negative for falls and muscle weakness.   Gastrointestinal: Negative for abdominal pain and bowel incontinence.   Genitourinary: Negative for bladder incontinence and nocturia.   Neurological: Negative for disturbances in coordination, loss of balance and seizures.   Psychiatric/Behavioral: Negative for depression. The patient does not have insomnia.    Allergic/Immunologic: Negative for hives and persistent infections.     PAST MEDICAL HISTORY:   Past Medical History:   Diagnosis Date    Arthritis     Back pain     CKD stage G3a/A3, GFR 45-59 and albumin creatinine ratio >300 mg/g 6/23/2020    Colon polyp     Hyperglycemia     Hyperlipidemia     Hypertension     Nuclear sclerosis of both eyes 10/31/2017    Prolapse of uterus     Type 2 diabetes mellitus without complication, without long-term current use of insulin     Type 2 diabetes mellitus without complication, without long-term current use of insulin      PAST SURGICAL HISTORY:   Past Surgical History:   Procedure Laterality Date    CATARACT EXTRACTION W/  INTRAOCULAR LENS IMPLANT Right  "11/26/2024    Procedure: EXTRACTION, CATARACT, WITH IOL INSERTION;  Surgeon: Olivier Montez MD;  Location: [facility] OR;  Service: Ophthalmology;  Laterality: Right;    CATARACT EXTRACTION W/  INTRAOCULAR LENS IMPLANT Left 12/10/2024    Procedure: EXTRACTION, CATARACT, WITH IOL INSERTION;  Surgeon: Olivier Montez MD;  Location: [facility] OR;  Service: Ophthalmology;  Laterality: Left;    COLONOSCOPY N/A 12/15/2020    Procedure: COLONOSCOPY;  Surgeon: Antonio Maya MD;  [facility]  Service: Endoscopy;  Laterality: N/A;  prep ins. emailed - [facility]  covid test on 12/12/20 -[facility]    COLONOSCOPY W/ POLYPECTOMY      Right Thumb      vaginal dilator       FAMILY HISTORY:   Family History   Problem Relation Name Age of Onset    Hypertension Mother      Cataracts Mother      Heart disease Mother      Hypertension Father      Cataracts Father      No Known Problems Sister      No Known Problems Brother      No Known Problems Brother      No Known Problems Maternal Aunt      No Known Problems Maternal Uncle      No Known Problems Paternal Aunt      No Known Problems Paternal Uncle      No Known Problems Maternal Grandmother      No Known Problems Maternal Grandfather      No Known Problems Paternal Grandmother      No Known Problems Paternal Grandfather      No Known Problems Other      Amblyopia Neg Hx      Blindness Neg Hx      Cancer Neg Hx      Diabetes Neg Hx      Glaucoma Neg Hx      Macular degeneration Neg Hx      Retinal detachment Neg Hx      Strabismus Neg Hx      Stroke Neg Hx      Thyroid disease Neg Hx       SOCIAL HISTORY: Social History[1]    MEDICATIONS: Current Medications[2]  ALLERGIES: Review of patient's allergies indicates:  No Known Allergies    VITAL SIGNS: BP (!) 149/87   Pulse 83   Ht 5' 5" (1.651 m)   Wt 80.1 kg (176 lb 9.4 oz)   BMI 29.39 kg/m²      PHYSICAL EXAMINATION:  General:  The patient is alert and oriented x 3.  Mood is pleasant.  Observation of ears, eyes and " nose reveal no gross abnormalities.  No labored breathing observed.  Gait is coordinated. Patient can toe walk and heel walk without difficulty.      right SHOULDER / UPPER EXTREMITY EXAM    OBSERVATION:     Swelling  none  Deformity  none   Discoloration  none   Scapular winging none   Scars   none  Atrophy  none    TENDERNESS / CREPITUS (T/C):          T/C      T/C   Clavicle   -/-  SUPRAspinatus    -/-     AC Jt.    -/-  INFRAspinatus  -/-    SC Jt.    -/-  Deltoid    -/-      G. Tuberosity  -/-  LH BICEP groove  +/-   Acromion:  -/-  Midline Neck   -/-     Scapular Spine -/-  Trapezium   -/-   SMA Scapula  -/-  GH jt. line - post  -/-     Scapulothoracic  -/-         ROM: (* = with pain)  Right shoulder   Left shoulder        AROM (PROM)   AROM (PROM)   FE    170° (175°)     170° (175°)     ER at 0°    60°  (65°)    60°  (65°)   ER at 90° ABD  90°  (90°)    90°  (90°)   IR at 90°  ABD   NA  (40°)     NA  (40°)      IR (spine level)   T10     T10    STRENGTH: (* = with pain) RIGHT SHOULDER  LEFT SHOULDER   SCAPTION at 0  5/5    5/5   SCAPTION at 30  5/5    5/5    IR    5/5    5/5   ER    5/5    5/5   BICEPS   5/5    4+/5   Deltoid    5/5    5/5     SIGNS:  Painful side       NEER   +    OSERGEYS  +    SOTELO   +    SPEEDS  +     DROP ARM   neg   BELLY PRESS neg   Superior escape none    LIFT-OFF  neg   X-Body ADD    neg    MOVING VALGUS neg        STABILITY TESTING    RIGHT SHOULDER   LEFT SHOULDER     Translation     Anterior  up face     up face    Posterior  up face    up face    Sulcus   < 10mm    < 10 mm     Signs   Apprehension   neg      neg       Relocation   no change     no change      Jerk test  neg     neg    EXTREMITY NEURO-VASCULAR EXAM    Sensation grossly intact to light touch all dermatomal regions.    DTR 2+ Biceps, Triceps, BR and Negative Wons sign   Grossly intact motor function at Elbow, Wrist and Hand   Distal pulses radial and ulnar 2+, brisk cap refill, symmetric.      NECK:   Painless FROM and spinous processes non-tender. Negative Spurlings sign.      OTHER FINDINGS:  + scapular dyskinesia    XRAYS reviewed and interpreted personally by me:  Shoulder trauma series right,  were obtained and reviewed  No convincing fracture or dislocation is noted. The osseous structures appear well mineralized and well aligned    MRI reviewed and interpreted personally by me::  Full thickness proximal biceps tendon rupture        ASSESSMENT:     Right proximal biceps rupture    PLAN:    PT referral to [facility] location with Jm SCOTTC prn    All questions were answered, pt will contact us for questions or concerns in the interim.    I made the decision to obtain old records of the patient including previous notes and imaging. New imaging was ordered today of the extremity or extremities evaluated. I independently reviewed and interpreted the radiographs and/or MRIs today as well as prior imaging.         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     Drug use: Yes     Comment: Tramadol 2 tabs bid    Sexual activity: Not Currently     Partners: Male   Social History Narrative     4 children retired      Social Drivers of Health     Financial Resource Strain: Low Risk  (8/29/2024)    Overall Financial Resource Strain (CARDIA)     Difficulty of Paying Living Expenses: Not hard at all   Food Insecurity: No Food Insecurity (8/29/2024)    Hunger Vital Sign     Worried About Running Out of Food in the Last Year: Never true     Ran Out of Food in the Last Year: Never true   Transportation Needs: No Transportation Needs (8/29/2024)    PRAPARE - Transportation     Lack  of Transportation (Medical): No     Lack of Transportation (Non-Medical): No   Physical Activity: Insufficiently Active (8/29/2024)    Exercise Vital Sign     Days of Exercise per Week: 7 days     Minutes of Exercise per Session: 20 min   Stress: No Stress Concern Present (8/29/2024)    [employer] - Occupational Stress Questionnaire     Feeling of Stress : Only a little   Housing Stability: High Risk (8/29/2024)    Housing Stability Vital Sign     Unable to Pay for Housing in the Last Year: Yes     Homeless in the Last Year: No   [2]   Current Outpatient Medications:     atorvastatin (LIPITOR) 40 MG tablet, Take 1 tablet (40 mg total) by mouth once daily., Disp: 90 tablet, Rfl: 3    co-enzyme Q-10 30 mg capsule, Take 30 mg by mouth 3 (three) times daily., Disp: , Rfl:     lisinopriL (PRINIVIL,ZESTRIL) 40 MG tablet, TAKE 1 TABLET BY MOUTH EVERY DAY, Disp: 90 tablet, Rfl: 3    multivitamin/iron/folic acid (CENTRUM WOMEN ORAL), Take by mouth., Disp: , Rfl:     mv,hussein,iron,mn/folic acid/chol (HAIR-SKIN-NAILS, PABA, ORAL), Take by mouth., Disp: , Rfl:     OMEGA 3,6,9 COMBINATION NO.7 (OMEGA DHA ORAL), Take 830 mg by mouth once daily., Disp: , Rfl:     prednisoLONE-moxiflox-bromfen 1-0.5-0.075 % DrpS, Place 1 drop into the right eye 3 (three) times daily., Disp: 8 mL, Rfl: 2    traMADoL (ULTRAM) 50 mg tablet, Take 1 tablet (50 mg total) by mouth every 8 (eight) hours as needed for Pain., Disp: 90 tablet, Rfl: 2    triamterene-hydrochlorothiazide 37.5-25 mg (DYAZIDE) 37.5-25 mg per capsule, TAKE 1 CAPSULE BY MOUTH EVERY DAY, Disp: 90 capsule, Rfl: 3

## 2025-02-24 DIAGNOSIS — Z00.00 ENCOUNTER FOR MEDICARE ANNUAL WELLNESS EXAM: ICD-10-CM

## 2025-02-25 ENCOUNTER — CLINICAL SUPPORT (OUTPATIENT)
Dept: REHABILITATION | Facility: HOSPITAL | Age: 80
End: 2025-02-25
Attending: ORTHOPAEDIC SURGERY
Payer: MEDICARE

## 2025-02-25 DIAGNOSIS — S46.211A RUPTURE OF PROXIMAL BICEPS TENDON, RIGHT, INITIAL ENCOUNTER: ICD-10-CM

## 2025-02-25 DIAGNOSIS — M25.521 ELBOW PAIN, RIGHT: Primary | ICD-10-CM

## 2025-02-25 PROCEDURE — 97161 PT EVAL LOW COMPLEX 20 MIN: CPT | Mod: PN

## 2025-02-25 PROCEDURE — 97110 THERAPEUTIC EXERCISES: CPT | Mod: PN

## 2025-02-25 NOTE — PROGRESS NOTES
Outpatient Rehab    Physical Therapy Evaluation    Patient Name: Nuha Bernstein  MRN: [id number]  YOB: 1945  Encounter Date: 2/25/2025    Therapy Diagnosis:   Encounter Diagnoses   Name Primary?    Rupture of proximal biceps tendon, right, initial encounter     Elbow pain, right Yes     Physician: Veena Dent MD    Physician Orders: Eval and Treat  Medical Diagnosis: R proximal biceps rupture    Visit # / Visits Authorized:  1 / 1   Date of Evaluation:  2/25/2025   Insurance Authorization Period: 2/19/25 to 2/19/26  Plan of Care Certification:  2/25/2025 to 6/30/25      Time In: 1200   Time Out: 1250  Total Time: 50   Total Billable Time: 50    Intake Outcome Measure for FOTO Survey    Therapist reviewed FOTO scores for Nuha Bernstein on 2/25/2025.   FOTO report - see Media section or FOTO account episode details.     Intake Score: 49%         Subjective   History of Present Illness  Nuha is a 80 y.o. female who reports to physical therapy with a chief concern of R elbow pain.     The patient reports a medical diagnosis of Proximal biceps rupture.            History of Present Condition/Illness: She was cleaning off her sweater for lint when she felt a pop in her arm. A day later she had a deformity and a bad bruise. She was diagnosed with a proximal biceps rupture. She notes that she is in no pain but does note that it is difficult lifting a gallon of milk.     Activities of Daily Living  Social history was obtained from Family.    General Prior Level of Function Comments: Lives alone, daughter checks on her              Pain     Patient reports a current pain level of 0/10. Pain at best is reported as 0/10. Pain at worst is reported as 4/10.   Location: L elbow  Clinical Progression (since onset): Improved  Pain Qualities: Aching, Dull  Pain-Relieving Factors: Rest  Pain-Aggravating Factors: Lifting         Living Arrangements  Living Situation  Living Arrangements:  Alone        Employment  Employment Status: Retired   Likes to garden, walks a mile per day      Past Medical History/Physical Systems Review:   Nuha Bernstein  has a past medical history of Arthritis, Back pain, CKD stage G3a/A3, GFR 45-59 and albumin creatinine ratio >300 mg/g, Colon polyp, Hyperglycemia, Hyperlipidemia, Hypertension, Nuclear sclerosis of both eyes, Prolapse of uterus, Type 2 diabetes mellitus without complication, without long-term current use of insulin, and Type 2 diabetes mellitus without complication, without long-term current use of insulin.    Nuha Bernstein  has a past surgical history that includes Right Thumb; vaginal dilator; Colonoscopy w/ polypectomy; Colonoscopy (N/A, 12/15/2020); Cataract extraction w/  intraocular lens implant (Right, 11/26/2024); and Cataract extraction w/  intraocular lens implant (Left, 12/10/2024).    Nuha has a current medication list which includes the following prescription(s): atorvastatin, co-enzyme q-10, lisinopril, multivitamin/iron/folic acid, mv,hussein,iron,mn/folic acid/chol, omega 3,6,9 combination no.7, prednisolone-moxiflox-bromfen, tramadol, and triamterene-hydrochlorothiazide 37.5-25 mg.    Review of patient's allergies indicates:  No Known Allergies     Objective   Elbow Observations  Right Elbow Observations  Present: Deformity  Left Elbow Observations  Not Present: Deformity            Upper Extremity Sensation  General Upper Extremity Sensation  Intact: Right and Left  Right Upper Extremity Sensation  Intact: Light Touch, Sharp/Dull Discrimination, Kinesthesia, Proprioception, and Hot/Cold Discrimination  Right Upper Extremity Sensation Stocking Glove Pattern: No    Left Upper Extremity Sensation  Intact: Light Touch, Sharp/Dull Discrimination, Kinesthesia, Proprioception, and Hot/Cold Discrimination  Left Upper Extremity Sensation Stocking Glove Pattern: No             Elbow Palpation  Right Elbow Palpation  Abnormal: Muscle  Right Elbow  Muscle Palpation Observations: noted tatiana deformity                   Shoulder Range of Motion  Right Shoulder   Active (deg) Passive (deg) Pain   Flexion 140       Extension         Scaption         ABduction         ADduction         Horizontal ABduction         Horizontal ADduction         External Rotation (Shoulder ABducted 0 degrees)         External Rotation (Shoulder ABducted 45 degrees)         External Rotation (Shoulder ABducted 90 degrees)         Internal Rotation (Shoulder ABducted 0 degrees)         Internal Rotation (Shoulder ABducted 45 degrees)         Internal Rotation (Shoulder ABducted 90 degrees)           Left Shoulder   Active (deg) Passive (deg) Pain   Flexion 140       Extension         Scaption         ABduction         ADduction         Horizontal ABduction         Horizontal ADduction         External Rotation (Shoulder ABducted 0 degrees)         External Rotation (Shoulder ABducted 45 degrees)         External Rotation (Shoulder ABducted 90 degrees)         Internal Rotation (Shoulder ABducted 0 degrees)         Internal Rotation (Shoulder ABducted 45 degrees)         Internal Rotation (Shoulder ABducted 90 degrees)                       Shoulder Strength - Planes of Motion   Right Strength Right Pain Left Strength Left  Pain   Flexion 3+   4     Extension 3+   4     ABduction 3+   4     ADduction 3+   4     Horizontal ABduction 3+   4     Horizontal ADduction 3+   4     Internal Rotation 0° 3+   4     Internal Rotation 90° 3+   4     External Rotation 0° 3+   4     External Rotation 90° 3+   4         Elbow Strength   Right Strength Right Pain Left Strength Left  Pain   Flexion (C6) 3+   4+     Extension (C7) 3+   4+          Forearm Strength   Right Strength Right Pain Left Strength Left  Pain   Pronation 3+   4+     Supination 3+   4+                    Treatment:  Therapeutic Exercise  Therapeutic Exercise Activity 1: Pt education on activity mod  Therapeutic Exercise Activity 2:  Elbow flexion RTB - 3x10  Therapeutic Exercise Activity 3: Supination/pronation with dowel x10  Therapeutic Exercise Activity 4: Wrist flexion RTB x10  Therapeutic Exercise Activity 5: wrist ext x10                   Assessment & Plan   Assessment  Nuha presents with a condition of Low complexity.   Presentation of Symptoms: Stable       Functional Limitations: Activity tolerance, Completing self-care activities, Proprioception, Range of motion, Participating in leisure activities, Pain with ADLs/IADLs, Gross motor coordination  Impairments: Pain with functional activity, Impaired physical strength  Personal Factors Affecting Prognosis: Pain    Patient Goal for Therapy (PT): return to gardening, cooking  Prognosis: Good  Assessment Details: Pt is with complaints of continued R shoulder/elbow weakness consistent with referring dx limiting ADL's and functional activities. Upon evaluation patient presents with decreased strength and motor control contributing to limited functional status at this time. She has full ROM and no pain on this time, but has noted weakness of the elbow and shoulder which needs to be addressed. Patient would benefit from appropriate manual therapy, mobility, flexibility, strengthening and NM re-education in order to address the before-mentioned deficits and return to PLOF with gardening and ADLs.      Plan  From a physical therapy perspective, the patient would benefit from: Skilled Rehab Services    Planned therapy interventions include: Therapeutic exercise, Therapeutic activities, Neuromuscular re-education, Manual therapy, ADLs/IADLs, and Other (Comment). Dry Needling (prn)  Planned modalities to include: Biofeedback, Electrical stimulation - attended, Electrical stimulation - passive/unattended, Thermotherapy (hot pack), and Cryotherapy (cold pack).        Visit Frequency: 1 times Per Week for 8 Weeks.       This plan was discussed with Patient.   Discussion participants: Agreed Upon  Plan of Care             Patient's spiritual, cultural, and educational needs considered and patient agreeable to plan of care and goals.     Education  Education was done with Patient. The patient's learning style includes Demonstration. The patient Demonstrates understanding.                 Goals:   Active       Functional outcome       Patient will show a significant change in FOTO patient-reported outcome tool to demonstrate subjective improvement       Start:  02/26/25    Expected End:  04/23/25            Patient stated goal: return to gardening, cooking        Start:  02/26/25    Expected End:  04/23/25               Lifting & carrying objects       Patient will carry 8-10 lbs so that she can carry a gallon of milk       Start:  02/26/25    Expected End:  04/23/25               Strength       Patient will achieve right elbow flexion strength of 4/5       Start:  02/26/25    Expected End:  04/23/25            Patient will achieve right forearm supination strength of 4/5       Start:  02/26/25    Expected End:  04/23/25                Jm Vieira PT

## 2025-02-26 PROBLEM — M25.521 ELBOW PAIN, RIGHT: Status: ACTIVE | Noted: 2025-02-26

## 2025-03-05 NOTE — PROGRESS NOTES
Outpatient Rehab    Physical Therapy Visit    Patient Name: Nuha Bernstein  MRN: [id number]  YOB: 1945  Encounter Date: 3/6/2025    Therapy Diagnosis: No diagnosis found.  Physician: Veena Dent MD      Therapy Diagnosis:        Encounter Diagnoses   Name Primary?    Rupture of proximal biceps tendon, right, initial encounter      Elbow pain, right Yes      Physician: Veena Dent MD     Physician Orders: Eval and Treat  Medical Diagnosis: R proximal biceps rupture     Visit # / Visits Authorized:  1 / 1   Date of Evaluation:  2/25/2025   Insurance Authorization Period: 2/19/25 to 2/19/26  Plan of Care Certification:  2/25/2025 to 6/30/25                 Time In:     Time Out:    Total Time:     Total Billable Time: 54    FOTO:  Intake Score:  %  Survey Score 1:  %  Survey Score 2:  %         Subjective             Objective            Treatment:       Assessment & Plan   Assessment:         Patient will continue to benefit from skilled outpatient physical therapy to address the deficits listed in the problem list box on initial evaluation, provide pt/family education and to maximize pt's level of independence in the home and community environment.     Patient's spiritual, cultural, and educational needs considered and patient agreeable to plan of care and goals.           Plan:      Goals:   Active       Functional outcome       Patient will show a significant change in FOTO patient-reported outcome tool to demonstrate subjective improvement       Start:  02/26/25    Expected End:  04/23/25            Patient stated goal: return to gardening, cooking        Start:  02/26/25    Expected End:  04/23/25               Lifting & carrying objects       Patient will carry 8-10 lbs so that she can carry a gallon of milk       Start:  02/26/25    Expected End:  04/23/25               Strength       Patient will achieve right elbow flexion strength of 4/5       Start:  02/26/25    Expected End:  04/23/25             Patient will achieve right forearm supination strength of 4/5       Start:  02/26/25    Expected End:  04/23/25                Joie Curiel PTA

## 2025-03-06 ENCOUNTER — CLINICAL SUPPORT (OUTPATIENT)
Dept: REHABILITATION | Facility: HOSPITAL | Age: 80
End: 2025-03-06
Payer: MEDICARE

## 2025-03-06 DIAGNOSIS — S46.211A RUPTURE OF PROXIMAL BICEPS TENDON, RIGHT, INITIAL ENCOUNTER: Primary | ICD-10-CM

## 2025-03-06 PROCEDURE — 97110 THERAPEUTIC EXERCISES: CPT | Mod: PN,CQ

## 2025-03-11 ENCOUNTER — CLINICAL SUPPORT (OUTPATIENT)
Dept: REHABILITATION | Facility: HOSPITAL | Age: 80
End: 2025-03-11
Payer: MEDICARE

## 2025-03-11 ENCOUNTER — PATIENT OUTREACH (OUTPATIENT)
Dept: ADMINISTRATIVE | Facility: HOSPITAL | Age: 80
End: 2025-03-11
Payer: MEDICARE

## 2025-03-11 ENCOUNTER — OFFICE VISIT (OUTPATIENT)
Dept: FAMILY MEDICINE | Facility: CLINIC | Age: 80
End: 2025-03-11
Payer: MEDICARE

## 2025-03-11 VITALS
TEMPERATURE: 98 F | WEIGHT: 176.38 LBS | HEIGHT: 65 IN | BODY MASS INDEX: 29.38 KG/M2 | OXYGEN SATURATION: 96 % | DIASTOLIC BLOOD PRESSURE: 72 MMHG | HEART RATE: 83 BPM | SYSTOLIC BLOOD PRESSURE: 134 MMHG | RESPIRATION RATE: 18 BRPM

## 2025-03-11 DIAGNOSIS — M25.521 ELBOW PAIN, RIGHT: Primary | ICD-10-CM

## 2025-03-11 DIAGNOSIS — S46.211D RUPTURE OF RIGHT BICEPS TENDON, SUBSEQUENT ENCOUNTER: Primary | ICD-10-CM

## 2025-03-11 PROCEDURE — 97110 THERAPEUTIC EXERCISES: CPT | Mod: HCNC,PN

## 2025-03-11 PROCEDURE — 99999 PR PBB SHADOW E&M-EST. PATIENT-LVL III: CPT | Mod: PBBFAC,HCNC,,

## 2025-03-11 NOTE — PROGRESS NOTES
"            Family Medicine     Patient name: Nuha Bernstein  MRN: [id number]  : 1945  PCP NAME: Pollo Lowe MD    Subjective     History of Present Illness:  Patient ID: Nuha Bernstein is a 80 y.o. Black or  female presents to the clinic today. Chronic medical issues, if present, have been documented.   Active Problem List with Overview Notes    Diagnosis Date Noted    Elbow pain, right 2025    Muscle strain of right upper arm 2025    Nuclear sclerotic cataract of left eye 2024    Chronic pain of both shoulders 2024    Impaired range of motion of both shoulders 2024    Shoulder weakness 2024    Posture imbalance 2024    Vaginal pessary in situ 2024    Cortical senile cataract, right 2024    Aortic atherosclerosis 2024    Prediabetes 2024     Noted by [facility]  last documented on 2023      Hyperparathyroidism 2023    Uncomplicated opioid dependence 2023    Cyst of right eyelid 2019    Incomplete bladder emptying 2019    Cystocele, midline 2019    Uterovaginal prolapse 2019    Rectocele, female 2019    Nuclear sclerosis, bilateral 10/31/2017    Refractive error 10/31/2017    Stage 3b chronic kidney disease     Calcified granuloma of lung 2016     "Heart size is normal.  Few calcified granulomas noted.  The right hemidiaphragm is elevated and subsegmental atelectatic changes seen at the right lung base.  Otherwise the lungs are clear" - Xray Chest 2014      Essential hypertension 2015    Arthropathy of cervical facet joint 2015    Osteoarthritis of both hips (moderate) 2014    DJD (degenerative joint disease) of cervical spine 2014    Chronic low back pain 2014    Chronic neck pain 2014    Cervical stenosis of spinal canal 10/02/2012    Hyperlipidemia        Chief Complaint  Chief Complaint   Patient presents with    Follow-up     1 " month f/ u  sprain arm pt has therapy once  a week       Presents for follow-up of rupture of biceps tendon.  Has been reviewed by sports Medicine.  Pathology will be managed conservatively.  Currently undergoing physical therapy.  Has no pain in the limb.  Still able to carry out ADLs.    Medications   Medication List with Changes/Refills   Current Medications    ATORVASTATIN (LIPITOR) 40 MG TABLET    Take 1 tablet (40 mg total) by mouth once daily.    CO-ENZYME Q-10 30 MG CAPSULE    Take 30 mg by mouth 3 (three) times daily.    LISINOPRIL (PRINIVIL,ZESTRIL) 40 MG TABLET    TAKE 1 TABLET BY MOUTH EVERY DAY    MULTIVITAMIN/IRON/FOLIC ACID (CENTRUM WOMEN ORAL)    Take by mouth.    MV,TRISHA,IRON,MN/FOLIC ACID/CHOL (HAIR-SKIN-NAILS, PABA, ORAL)    Take by mouth.    OMEGA 3,6,9 COMBINATION NO.7 (OMEGA DHA ORAL)    Take 830 mg by mouth once daily.    PREDNISOLONE-MOXIFLOX-BROMFEN 1-0.5-0.075 % DRPS    Place 1 drop into the right eye 3 (three) times daily.    TRAMADOL (ULTRAM) 50 MG TABLET    Take 1 tablet (50 mg total) by mouth every 8 (eight) hours as needed for Pain.    TRIAMTERENE-HYDROCHLOROTHIAZIDE 37.5-25 MG (DYAZIDE) 37.5-25 MG PER CAPSULE    TAKE 1 CAPSULE BY MOUTH EVERY DAY       Allergies  Review of patient's allergies indicates:  No Known Allergies  Past Medical history  Past Medical History:   Diagnosis Date    Arthritis     Back pain     CKD stage G3a/A3, GFR 45-59 and albumin creatinine ratio >300 mg/g 6/23/2020    Colon polyp     Hyperglycemia     Hyperlipidemia     Hypertension     Nuclear sclerosis of both eyes 10/31/2017    Prolapse of uterus     Type 2 diabetes mellitus without complication, without long-term current use of insulin     Type 2 diabetes mellitus without complication, without long-term current use of insulin           Surgical History  Past Surgical History:   Procedure Laterality Date    CATARACT EXTRACTION W/  INTRAOCULAR LENS IMPLANT Right 11/26/2024    Procedure: EXTRACTION, CATARACT,  "WITH IOL INSERTION;  Surgeon: Olivier Montez MD;  Location: [facility] OR;  Service: Ophthalmology;  Laterality: Right;    CATARACT EXTRACTION W/  INTRAOCULAR LENS IMPLANT Left 12/10/2024    Procedure: EXTRACTION, CATARACT, WITH IOL INSERTION;  Surgeon: Olivier Montez MD;  Location: [facility] OR;  Service: Ophthalmology;  Laterality: Left;    COLONOSCOPY N/A 12/15/2020    Procedure: COLONOSCOPY;  Surgeon: Antonio Maya MD;  Location: [facility] ENDO [address]);  Service: Endoscopy;  Laterality: N/A;  prep ins. emailed - ERW  covid test on 12/12/20 -PCW-BB    COLONOSCOPY W/ POLYPECTOMY      Right Thumb      vaginal dilator       Family History  Family History   Problem Relation Name Age of Onset    Hypertension Mother      Cataracts Mother      Heart disease Mother      Hypertension Father      Cataracts Father      No Known Problems Sister      No Known Problems Brother      No Known Problems Brother      No Known Problems Maternal Aunt      No Known Problems Maternal Uncle      No Known Problems Paternal Aunt      No Known Problems Paternal Uncle      No Known Problems Maternal Grandmother      No Known Problems Maternal Grandfather      No Known Problems Paternal Grandmother      No Known Problems Paternal Grandfather      No Known Problems Other      Amblyopia Neg Hx      Blindness Neg Hx      Cancer Neg Hx      Diabetes Neg Hx      Glaucoma Neg Hx      Macular degeneration Neg Hx      Retinal detachment Neg Hx      Strabismus Neg Hx      Stroke Neg Hx      Thyroid disease Neg Hx       Social History  Social History[1]       Review of system     ROS  Negative except as mentioned above  Physical exam   Vital Signs  Vitals:    03/11/25 1447   BP: 134/72   Pulse: 83   Resp: 18   Temp: 98.3 °F (36.8 °C)   TempSrc: Oral   SpO2: 96%   Weight: 80 kg (176 lb 5.9 oz)   Height: 5' 5" (1.651 m)       Physical Exam  Cardiovascular:      Rate and Rhythm: Normal rate and regular rhythm.      Pulses: Normal pulses.      Heart " sounds: Normal heart sounds.   Pulmonary:      Effort: Pulmonary effort is normal.      Breath sounds: Normal breath sounds.   Musculoskeletal:         General: Swelling (Right arm) present.           Wt Readings from Last 3 Encounters:   03/11/25 80 kg (176 lb 5.9 oz)   02/19/25 80.1 kg (176 lb 9.4 oz)   02/11/25 79.9 kg (176 lb 2.4 oz)          Body mass index is 29.35 kg/m².      Laboratory data and other diagnostic findings     Lab Results   Component Value Date    WBC 6.91 12/23/2024    HGB 11.7 (L) 12/23/2024    HCT 38.1 12/23/2024    MCV 92 12/23/2024     12/23/2024         Lab Results   Component Value Date    CREATININE 1.5 (H) 02/11/2025    BUN 37 (H) 02/11/2025     02/11/2025    K 4.5 02/11/2025     02/11/2025    CO2 28 02/11/2025         Assessment and plan       Rupture of right biceps tendon, subsequent encounter  Still has deformity, but clinical condition is stable.  Continue physical therapy.  Lifting precautions emphasized.         Problem List Items Addressed This Visit    None  Visit Diagnoses         Rupture of right biceps tendon, subsequent encounter    -  Primary                     Future Appointments  Future Appointments   Date Time Provider Department Center   3/20/2025  1:00 PM Jm Vieira, PT [facility]   3/28/2025  3:00 PM Joie Curiel, HARLEEN [facility]   4/3/2025  2:30 PM Jm Vieira PT [facility]   5/15/2025  3:20 PM [facility]         Follow up if symptoms worsen or fail to improve. and PRN.      Andi Aceves MD    This office note was created by combination of typed  and MModal dictation.  Transcription errors may be present.  If there are any questions, please contact me.               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     Drug use: Yes     Comment: Tramadol 2 tabs bid

## 2025-03-11 NOTE — PROGRESS NOTES
Population Health Chart Review & Patient Outreach Details      Additional [facility] Health Notes:    **PLEASE RECHECK BLOOD PRESSURE OR SCHEDULE NURSE VISIT IF NOT IN RANGE DURING VISIT.     Place in visit note to advise.            Updates Requested / Reviewed:      Updated Care Coordination Note and Care Everywhere         Health Maintenance Topics Overdue:      VBHM Score: 0     Uncontrolled BP  Patient is not due for any topics at this time.    Tetanus Vaccine                  Health Maintenance Topic(s) Outreach Outcomes & Actions Taken:    Blood Pressure - Outreach Outcomes & Actions Taken  : Place in visit note to advise.

## 2025-03-11 NOTE — PROGRESS NOTES
Outpatient Rehab    Physical Therapy Visit    Patient Name: Nuha Bernstein  MRN: [id number]  YOB: 1945  Encounter Date: 3/11/2025    Therapy Diagnosis:   Encounter Diagnosis   Name Primary?    Elbow pain, right Yes     Physician: Veena Dent MD      Therapy Diagnosis:        Encounter Diagnoses   Name Primary?    Rupture of proximal biceps tendon, right, initial encounter      Elbow pain, right Yes      Physician: Veena Dent MD     Physician Orders: Eval and Treat  Medical Diagnosis: R proximal biceps rupture     Visit # / Visits Authorized:  1 / 1   Date of Evaluation:  2/25/2025   Insurance Authorization Period: 2/19/25 to 2/19/26  Plan of Care Certification:  2/25/2025 to 6/30/25                 Time In: 1000   Time Out: 1100  Total Time: 60   Total Billable Time: 54    FOTO:  Intake Score:  %  Survey Score 1:  %  Survey Score 2:  %         Subjective   elbow is feeling stronger. she occasoinally feels her shoulder, but min pain at most.  Pain reported as 0/10.      Objective            Treatment:  Therapeutic Exercise  Therapeutic Exercise Activity 1: Pt education on activity mod  Therapeutic Exercise Activity 2: Elbow flexion +3# 3x10  Therapeutic Exercise Activity 3: Supination/pronation+3# 3x10  Therapeutic Exercise Activity 4: Wrist flexion +3# 3x10  Therapeutic Exercise Activity 5: wrist ext+3# 3x10  Therapeutic Exercise Activity 6: Supine shoulder flexino w/ 3# wand 3x10  Therapeutic Exercise Activity 7: Standing shoulder rows RTB 3x10  Therapeutic Exercise Activity 8: Standing shoulder ext RTB 3x10  Therapeutic Exercise Activity 9: UBE 3fwd/3back    Assessment & Plan   Assessment: Doing well overall with controlled pain levels and improving strength. progressed exercises today, all of which were steve well.       Patient will continue to benefit from skilled outpatient physical therapy to address the deficits listed in the problem list box on initial evaluation, provide pt/family education  and to maximize pt's level of independence in the home and community environment.     Patient's spiritual, cultural, and educational needs considered and patient agreeable to plan of care and goals.           Plan: Improve left shoulder and elbow mm strength    Goals:   Active       Functional outcome       Patient will show a significant change in FOTO patient-reported outcome tool to demonstrate subjective improvement (Progressing)       Start:  02/26/25    Expected End:  04/23/25            Patient stated goal: return to gardening, cooking  (Progressing)       Start:  02/26/25    Expected End:  04/23/25               Lifting & carrying objects       Patient will carry 8-10 lbs so that she can carry a gallon of milk (Progressing)       Start:  02/26/25    Expected End:  04/23/25               Strength       Patient will achieve right elbow flexion strength of 4/5 (Progressing)       Start:  02/26/25    Expected End:  04/23/25            Patient will achieve right forearm supination strength of 4/5 (Progressing)       Start:  02/26/25    Expected End:  04/23/25                Jm Vieira PT    Outpatient Rehab    Physical Therapy Visit    Patient Name: Nuha Bernstein  MRN: [id number]  YOB: 1945  Encounter Date: 3/11/2025    Therapy Diagnosis:   Encounter Diagnosis   Name Primary?    Elbow pain, right Yes     Physician: Veena Dent MD      Therapy Diagnosis:        Encounter Diagnoses   Name Primary?    Rupture of proximal biceps tendon, right, initial encounter      Elbow pain, right Yes      Physician: Veena Dent MD     Physician Orders: Eval and Treat  Medical Diagnosis: R proximal biceps rupture     Visit # / Visits Authorized:  1 / 1   Date of Evaluation:  2/25/2025   Insurance Authorization Period: 2/19/25 to 2/19/26  Plan of Care Certification:  2/25/2025 to 6/30/25                 Time In: 1000   Time Out: 1100  Total Time: 60   Total Billable Time: 54    FOTO:  Intake Score:  %  Survey  Score 1:  %  Survey Score 2:  %         Subjective   elbow is feeling stronger. she occasoinally feels her shoulder, but min pain at most.  Pain reported as 0/10.      Objective            Treatment:  Therapeutic Exercise  Therapeutic Exercise Activity 1: Pt education on activity mod  Therapeutic Exercise Activity 2: Elbow flexion +3# 3x10  Therapeutic Exercise Activity 3: Supination/pronation+3# 3x10  Therapeutic Exercise Activity 4: Wrist flexion +3# 3x10  Therapeutic Exercise Activity 5: wrist ext+3# 3x10  Therapeutic Exercise Activity 6: Supine shoulder flexino w/ 3# wand 3x10  Therapeutic Exercise Activity 7: Standing shoulder rows RTB 3x10  Therapeutic Exercise Activity 8: Standing shoulder ext RTB 3x10  Therapeutic Exercise Activity 9: UBE 3fwd/3back    Assessment & Plan   Assessment: Doing well overall with controlled pain levels and improving strength. progressed exercises today, all of which were steve well.       Patient will continue to benefit from skilled outpatient physical therapy to address the deficits listed in the problem list box on initial evaluation, provide pt/family education and to maximize pt's level of independence in the home and community environment.     Patient's spiritual, cultural, and educational needs considered and patient agreeable to plan of care and goals.           Plan: Improve left shoulder and elbow mm strength    Goals:   Active       Functional outcome       Patient will show a significant change in FOTO patient-reported outcome tool to demonstrate subjective improvement (Progressing)       Start:  02/26/25    Expected End:  04/23/25            Patient stated goal: return to gardening, cooking  (Progressing)       Start:  02/26/25    Expected End:  04/23/25               Lifting & carrying objects       Patient will carry 8-10 lbs so that she can carry a gallon of milk (Progressing)       Start:  02/26/25    Expected End:  04/23/25               Strength       Patient will  achieve right elbow flexion strength of 4/5 (Progressing)       Start:  02/26/25    Expected End:  04/23/25            Patient will achieve right forearm supination strength of 4/5 (Progressing)       Start:  02/26/25    Expected End:  04/23/25                Jm Vieira PT

## 2025-03-20 ENCOUNTER — CLINICAL SUPPORT (OUTPATIENT)
Dept: REHABILITATION | Facility: HOSPITAL | Age: 80
End: 2025-03-20
Payer: MEDICARE

## 2025-03-20 DIAGNOSIS — M25.521 ELBOW PAIN, RIGHT: Primary | ICD-10-CM

## 2025-03-20 PROCEDURE — 97112 NEUROMUSCULAR REEDUCATION: CPT | Mod: HCNC,PN

## 2025-03-20 NOTE — PROGRESS NOTES
Outpatient Rehab    Physical Therapy Visit    Patient Name: Nuha Bernstein  MRN: [id number]  YOB: 1945  Encounter Date: 3/20/2025    Therapy Diagnosis:   Encounter Diagnosis   Name Primary?    Elbow pain, right Yes       Physician: Veena Dent MD      Therapy Diagnosis:        Encounter Diagnoses   Name Primary?    Rupture of proximal biceps tendon, right, initial encounter      Elbow pain, right Yes      Physician: Veena Dent MD     Physician Orders: Eval and Treat  Medical Diagnosis: R proximal biceps rupture     Visit # / Visits Authorized:  1 / 1   Date of Evaluation:  2/25/2025   Insurance Authorization Period: 2/19/25 to 2/19/26  Plan of Care Certification:  2/25/2025 to 6/30/25                 Time In: 1300   Time Out: 1345  Total Time: 45   Total Billable Time: 30    FOTO:  Intake Score:  %  Survey Score 1:  %  Survey Score 2:  %         Subjective   its much better overall with no pain and improved fcn.  Pain reported as 0/10.      Objective            Treatment:  Therapeutic Exercise  TE 2: Elbow flexion +3# 3x10  TE 6: Supine shoulder flexino w/ 3# wand 3x10  TE 9: UBE 3fwd/3back    neuromuscular re-education activities to improve: Kinesthetic and Proprioception for 30 minutes. The following activities were included:  Prone rows 3x10   Standing ER/IR 3x10   Standing scaption 3x10 2#       Assessment & Plan   Assessment: Doing well overall with controlled pain levels and improving strength. progressed exercises today, all of which were steve well.       Patient will continue to benefit from skilled outpatient physical therapy to address the deficits listed in the problem list box on initial evaluation, provide pt/family education and to maximize pt's level of independence in the home and community environment.     Patient's spiritual, cultural, and educational needs considered and patient agreeable to plan of care and goals.           Plan: Improve left shoulder and elbow mm  strength    Goals:   Active       Functional outcome       Patient will show a significant change in FOTO patient-reported outcome tool to demonstrate subjective improvement (Progressing)       Start:  02/26/25    Expected End:  04/23/25            Patient stated goal: return to gardening, cooking  (Progressing)       Start:  02/26/25    Expected End:  04/23/25               Lifting & carrying objects       Patient will carry 8-10 lbs so that she can carry a gallon of milk (Progressing)       Start:  02/26/25    Expected End:  04/23/25               Strength       Patient will achieve right elbow flexion strength of 4/5 (Progressing)       Start:  02/26/25    Expected End:  04/23/25            Patient will achieve right forearm supination strength of 4/5 (Progressing)       Start:  02/26/25    Expected End:  04/23/25                Jm Vieira PT    Outpatient Rehab    Physical Therapy Visit    Patient Name: Nuha Bernstein  MRN: [id number]  YOB: 1945  Encounter Date: 3/20/2025    Therapy Diagnosis:   Encounter Diagnosis   Name Primary?    Elbow pain, right Yes       Physician: Veena Dent MD      Therapy Diagnosis:        Encounter Diagnoses   Name Primary?    Rupture of proximal biceps tendon, right, initial encounter      Elbow pain, right Yes      Physician: Veena Dent MD     Physician Orders: Eval and Treat  Medical Diagnosis: R proximal biceps rupture     Visit # / Visits Authorized:  1 / 1   Date of Evaluation:  2/25/2025   Insurance Authorization Period: 2/19/25 to 2/19/26  Plan of Care Certification:  2/25/2025 to 6/30/25                 Time In: 1300   Time Out: 1345  Total Time: 45   Total Billable Time: 54    FOTO:  Intake Score:  %  Survey Score 1:  %  Survey Score 2:  %         Subjective   its much better overall with no pain and improved fcn.  Pain reported as 0/10.      Objective            Treatment:  Therapeutic Exercise  Therapeutic Exercise Activity 1: Pt education on activity  mod  Therapeutic Exercise Activity 2: Elbow flexion +3# 3x10  Therapeutic Exercise Activity 3: Supination/pronation+3# 3x10  Therapeutic Exercise Activity 4: Wrist flexion +3# 3x10  Therapeutic Exercise Activity 5: wrist ext+3# 3x10  Therapeutic Exercise Activity 6: Supine shoulder flexino w/ 3# wand 3x10  Therapeutic Exercise Activity 7: Standing shoulder rows RTB 3x10  Therapeutic Exercise Activity 8: Standing shoulder ext RTB 3x10  Therapeutic Exercise Activity 9: UBE 3fwd/3back    Assessment & Plan   Assessment: Doing well overall with controlled pain levels and improving strength. progressed exercises today, all of which were steve well.       Patient will continue to benefit from skilled outpatient physical therapy to address the deficits listed in the problem list box on initial evaluation, provide pt/family education and to maximize pt's level of independence in the home and community environment.     Patient's spiritual, cultural, and educational needs considered and patient agreeable to plan of care and goals.           Plan: Improve left shoulder and elbow mm strength    Goals:   Active       Functional outcome       Patient will show a significant change in FOTO patient-reported outcome tool to demonstrate subjective improvement (Progressing)       Start:  02/26/25    Expected End:  04/23/25            Patient stated goal: return to gardening, cooking  (Progressing)       Start:  02/26/25    Expected End:  04/23/25               Lifting & carrying objects       Patient will carry 8-10 lbs so that she can carry a gallon of milk (Progressing)       Start:  02/26/25    Expected End:  04/23/25               Strength       Patient will achieve right elbow flexion strength of 4/5 (Progressing)       Start:  02/26/25    Expected End:  04/23/25            Patient will achieve right forearm supination strength of 4/5 (Progressing)       Start:  02/26/25    Expected End:  04/23/25                Jm Vieira  PT

## 2025-04-03 ENCOUNTER — CLINICAL SUPPORT (OUTPATIENT)
Dept: REHABILITATION | Facility: HOSPITAL | Age: 80
End: 2025-04-03
Payer: MEDICARE

## 2025-04-03 DIAGNOSIS — M25.521 ELBOW PAIN, RIGHT: Primary | ICD-10-CM

## 2025-04-03 PROCEDURE — 97110 THERAPEUTIC EXERCISES: CPT | Mod: HCNC,PN

## 2025-04-03 NOTE — PROGRESS NOTES
Outpatient Rehab    Physical Therapy Visit    Patient Name: Nuha Bernstein  MRN: [id number]  YOB: 1945  Encounter Date: 4/3/2025    Therapy Diagnosis:   Encounter Diagnosis   Name Primary?    Elbow pain, right Yes         Physician: Veena Dent MD      Therapy Diagnosis:        Encounter Diagnoses   Name Primary?    Rupture of proximal biceps tendon, right, initial encounter      Elbow pain, right Yes      Physician: Veena Dent MD     Physician Orders: Eval and Treat  Medical Diagnosis: R proximal biceps rupture     Visit # / Visits Authorized:  1 / 1   Date of Evaluation:  2/25/2025   Insurance Authorization Period: 2/19/25 to 2/19/26  Plan of Care Certification:  2/25/2025 to 6/30/25                 Time In: 1345   Time Out: 1415  Total Time: 30   Total Billable Time: 30    FOTO:  Intake Score:  %  Survey Score 1:  %  Survey Score 2:  %         Subjective   its much better. returned to full funciton.  Pain reported as 0/10.      Objective          Elbow strength 4+/5 grossly   Elbow ROM: WNL   Treatment:  Therapeutic Exercise  TE 1: Pt education on HEP  TE 2: Elbow flexion +3# 3x10  TE 3: Supination/pronation+3# 3x10  TE 4: Elbow ext RTB 3x10    neuromuscular re-education activities to improve: Kinesthetic and Proprioception for 30 minutes. The following activities were included:  Prone rows 3x10   Standing ER/IR 3x10   Standing scaption 3x10 2#       Assessment & Plan   Assessment: Pt has returned to full fcn. strength and ROM is full. overall progressed very well.       Patient will continue to benefit from skilled outpatient physical therapy to address the deficits listed in the problem list box on initial evaluation, provide pt/family education and to maximize pt's level of independence in the home and community environment.     Patient's spiritual, cultural, and educational needs considered and patient agreeable to plan of care and goals.           Plan: D/C    Goals:   Active       Strength        Patient will achieve right elbow flexion strength of 4/5 (Met)       Start:  02/26/25    Expected End:  04/23/25    Resolved:  04/03/25         Patient will achieve right forearm supination strength of 4/5 (Met)       Start:  02/26/25    Expected End:  04/23/25    Resolved:  04/03/25           Resolved       Functional outcome       Patient will show a significant change in FOTO patient-reported outcome tool to demonstrate subjective improvement (Met)       Start:  02/26/25    Expected End:  04/23/25    Resolved:  04/03/25         Patient stated goal: return to gardening, cooking  (Met)       Start:  02/26/25    Expected End:  04/23/25    Resolved:  04/03/25            Lifting & carrying objects       Patient will carry 8-10 lbs so that she can carry a gallon of milk (Met)       Start:  02/26/25    Expected End:  04/23/25    Resolved:  04/03/25             Jm Vieira, PT    Outpatient Rehab    Physical Therapy Visit    Patient Name: Nuha Bernstein  MRN: [id number]  YOB: 1945  Encounter Date: 4/3/2025    Therapy Diagnosis:   Encounter Diagnosis   Name Primary?    Elbow pain, right Yes         Physician: Veena Dent MD      Therapy Diagnosis:        Encounter Diagnoses   Name Primary?    Rupture of proximal biceps tendon, right, initial encounter      Elbow pain, right Yes      Physician: Veena Dent MD     Physician Orders: Eval and Treat  Medical Diagnosis: R proximal biceps rupture     Visit # / Visits Authorized:  1 / 1   Date of Evaluation:  2/25/2025   Insurance Authorization Period: 2/19/25 to 2/19/26  Plan of Care Certification:  2/25/2025 to 6/30/25                 Time In: 1345   Time Out: 1415  Total Time: 30   Total Billable Time: 54    FOTO:  Intake Score:  %  Survey Score 1:  %  Survey Score 2:  %         Subjective   its much better. returned to full funciton.  Pain reported as 0/10.      Objective            Treatment:  Therapeutic Exercise  Therapeutic Exercise Activity 1: Pt  education on activity mod  Therapeutic Exercise Activity 2: Elbow flexion +3# 3x10  Therapeutic Exercise Activity 3: Supination/pronation+3# 3x10  Therapeutic Exercise Activity 4: Wrist flexion +3# 3x10  Therapeutic Exercise Activity 5: wrist ext+3# 3x10  Therapeutic Exercise Activity 6: Supine shoulder flexino w/ 3# wand 3x10  Therapeutic Exercise Activity 7: Standing shoulder rows RTB 3x10  Therapeutic Exercise Activity 8: Standing shoulder ext RTB 3x10  Therapeutic Exercise Activity 9: UBE 3fwd/3back    Assessment & Plan   Assessment: Pt has returned to full fcn. strength and ROM is full. overall progressed very well.       Patient will continue to benefit from skilled outpatient physical therapy to address the deficits listed in the problem list box on initial evaluation, provide pt/family education and to maximize pt's level of independence in the home and community environment.     Patient's spiritual, cultural, and educational needs considered and patient agreeable to plan of care and goals.           Plan: D/C    Goals:   Active       Strength       Patient will achieve right elbow flexion strength of 4/5 (Met)       Start:  02/26/25    Expected End:  04/23/25    Resolved:  04/03/25         Patient will achieve right forearm supination strength of 4/5 (Met)       Start:  02/26/25    Expected End:  04/23/25    Resolved:  04/03/25           Resolved       Functional outcome       Patient will show a significant change in FOTO patient-reported outcome tool to demonstrate subjective improvement (Met)       Start:  02/26/25    Expected End:  04/23/25    Resolved:  04/03/25         Patient stated goal: return to gardening, cooking  (Met)       Start:  02/26/25    Expected End:  04/23/25    Resolved:  04/03/25            Lifting & carrying objects       Patient will carry 8-10 lbs so that she can carry a gallon of milk (Met)       Start:  02/26/25    Expected End:  04/23/25    Resolved:  04/03/25             Jm  Vieira, PT

## 2025-04-20 ENCOUNTER — PATIENT MESSAGE (OUTPATIENT)
Dept: FAMILY MEDICINE | Facility: CLINIC | Age: 80
End: 2025-04-20
Payer: MEDICARE

## 2025-04-22 DIAGNOSIS — G89.29 CHRONIC MIDLINE LOW BACK PAIN WITHOUT SCIATICA: ICD-10-CM

## 2025-04-22 DIAGNOSIS — M54.50 CHRONIC MIDLINE LOW BACK PAIN WITHOUT SCIATICA: ICD-10-CM

## 2025-04-22 NOTE — TELEPHONE ENCOUNTER
No care due was identified.  [facility] Embedded Care Due Messages. Reference number: [id number].   4/22/2025 6:11:03 PM CDT

## 2025-04-23 RX ORDER — TRAMADOL HYDROCHLORIDE 50 MG/1
50 TABLET ORAL EVERY 8 HOURS PRN
Qty: 90 TABLET | Refills: 2 | Status: SHIPPED | OUTPATIENT
Start: 2025-04-23

## 2025-05-19 ENCOUNTER — OFFICE VISIT (OUTPATIENT)
Dept: FAMILY MEDICINE | Facility: CLINIC | Age: 80
End: 2025-05-19
Payer: MEDICARE

## 2025-05-19 ENCOUNTER — LAB VISIT (OUTPATIENT)
Dept: LAB | Facility: HOSPITAL | Age: 80
End: 2025-05-19
Attending: FAMILY MEDICINE
Payer: MEDICARE

## 2025-05-19 VITALS
HEART RATE: 79 BPM | HEIGHT: 65 IN | BODY MASS INDEX: 29.38 KG/M2 | OXYGEN SATURATION: 96 % | SYSTOLIC BLOOD PRESSURE: 136 MMHG | WEIGHT: 176.38 LBS | DIASTOLIC BLOOD PRESSURE: 80 MMHG | TEMPERATURE: 99 F

## 2025-05-19 DIAGNOSIS — N18.32 STAGE 3B CHRONIC KIDNEY DISEASE: ICD-10-CM

## 2025-05-19 DIAGNOSIS — M54.50 CHRONIC MIDLINE LOW BACK PAIN WITHOUT SCIATICA: ICD-10-CM

## 2025-05-19 DIAGNOSIS — I10 ESSENTIAL HYPERTENSION: ICD-10-CM

## 2025-05-19 DIAGNOSIS — N18.32 STAGE 3B CHRONIC KIDNEY DISEASE: Primary | ICD-10-CM

## 2025-05-19 DIAGNOSIS — N18.4 CHRONIC KIDNEY DISEASE (CKD), STAGE IV (SEVERE): ICD-10-CM

## 2025-05-19 DIAGNOSIS — G89.29 CHRONIC MIDLINE LOW BACK PAIN WITHOUT SCIATICA: ICD-10-CM

## 2025-05-19 DIAGNOSIS — R79.9 ABNORMAL FINDING OF BLOOD CHEMISTRY: ICD-10-CM

## 2025-05-19 LAB
ABSOLUTE EOSINOPHIL (OHS): 0.3 K/UL
ABSOLUTE MONOCYTE (OHS): 0.65 K/UL (ref 0.3–1)
ABSOLUTE NEUTROPHIL COUNT (OHS): 3.29 K/UL (ref 1.8–7.7)
ALBUMIN SERPL BCP-MCNC: 4 G/DL (ref 3.5–5.2)
ALP SERPL-CCNC: 86 UNIT/L (ref 40–150)
ALT SERPL W/O P-5'-P-CCNC: 35 UNIT/L (ref 10–44)
ANION GAP (OHS): 8 MMOL/L (ref 8–16)
AST SERPL-CCNC: 41 UNIT/L (ref 11–45)
BASOPHILS # BLD AUTO: 0.04 K/UL
BASOPHILS NFR BLD AUTO: 0.7 %
BILIRUB SERPL-MCNC: 0.5 MG/DL (ref 0.1–1)
BUN SERPL-MCNC: 46 MG/DL (ref 8–23)
CALCIUM SERPL-MCNC: 9.7 MG/DL (ref 8.7–10.5)
CHLORIDE SERPL-SCNC: 106 MMOL/L (ref 95–110)
CHOLEST SERPL-MCNC: 202 MG/DL (ref 120–199)
CHOLEST/HDLC SERPL: 3.2 {RATIO} (ref 2–5)
CO2 SERPL-SCNC: 26 MMOL/L (ref 23–29)
CREAT SERPL-MCNC: 1.9 MG/DL (ref 0.5–1.4)
EAG (OHS): 131 MG/DL (ref 68–131)
ERYTHROCYTE [DISTWIDTH] IN BLOOD BY AUTOMATED COUNT: 14 % (ref 11.5–14.5)
GFR SERPLBLD CREATININE-BSD FMLA CKD-EPI: 26 ML/MIN/1.73/M2
GLUCOSE SERPL-MCNC: 101 MG/DL (ref 70–110)
HBA1C MFR BLD: 6.2 % (ref 4–5.6)
HCT VFR BLD AUTO: 39.1 % (ref 37–48.5)
HDLC SERPL-MCNC: 64 MG/DL (ref 40–75)
HDLC SERPL: 31.7 % (ref 20–50)
HGB BLD-MCNC: 12.3 GM/DL (ref 12–16)
IMM GRANULOCYTES # BLD AUTO: 0.02 K/UL (ref 0–0.04)
IMM GRANULOCYTES NFR BLD AUTO: 0.3 % (ref 0–0.5)
LDLC SERPL CALC-MCNC: 120.2 MG/DL (ref 63–159)
LYMPHOCYTES # BLD AUTO: 1.77 K/UL (ref 1–4.8)
MCH RBC QN AUTO: 28.7 PG (ref 27–31)
MCHC RBC AUTO-ENTMCNC: 31.5 G/DL (ref 32–36)
MCV RBC AUTO: 91 FL (ref 82–98)
NONHDLC SERPL-MCNC: 138 MG/DL
NUCLEATED RBC (/100WBC) (OHS): 0 /100 WBC
PLATELET # BLD AUTO: 278 K/UL (ref 150–450)
PMV BLD AUTO: 11.2 FL (ref 9.2–12.9)
POTASSIUM SERPL-SCNC: 4.6 MMOL/L (ref 3.5–5.1)
PROT SERPL-MCNC: 8.1 GM/DL (ref 6–8.4)
PTH-INTACT SERPL-MCNC: 124.3 PG/ML (ref 9–77)
RBC # BLD AUTO: 4.28 M/UL (ref 4–5.4)
RELATIVE EOSINOPHIL (OHS): 4.9 %
RELATIVE LYMPHOCYTE (OHS): 29.2 % (ref 18–48)
RELATIVE MONOCYTE (OHS): 10.7 % (ref 4–15)
RELATIVE NEUTROPHIL (OHS): 54.2 % (ref 38–73)
SODIUM SERPL-SCNC: 140 MMOL/L (ref 136–145)
TRIGL SERPL-MCNC: 89 MG/DL (ref 30–150)
WBC # BLD AUTO: 6.07 K/UL (ref 3.9–12.7)

## 2025-05-19 PROCEDURE — 84460 ALANINE AMINO (ALT) (SGPT): CPT | Mod: HCNC

## 2025-05-19 PROCEDURE — 1101F PT FALLS ASSESS-DOCD LE1/YR: CPT | Mod: CPTII,HCNC,S$GLB, | Performed by: FAMILY MEDICINE

## 2025-05-19 PROCEDURE — 3079F DIAST BP 80-89 MM HG: CPT | Mod: CPTII,HCNC,S$GLB, | Performed by: FAMILY MEDICINE

## 2025-05-19 PROCEDURE — 1157F ADVNC CARE PLAN IN RCRD: CPT | Mod: CPTII,HCNC,S$GLB, | Performed by: FAMILY MEDICINE

## 2025-05-19 PROCEDURE — G2211 COMPLEX E/M VISIT ADD ON: HCPCS | Mod: HCNC,S$GLB,, | Performed by: FAMILY MEDICINE

## 2025-05-19 PROCEDURE — 82465 ASSAY BLD/SERUM CHOLESTEROL: CPT | Mod: HCNC

## 2025-05-19 PROCEDURE — 3075F SYST BP GE 130 - 139MM HG: CPT | Mod: CPTII,HCNC,S$GLB, | Performed by: FAMILY MEDICINE

## 2025-05-19 PROCEDURE — 36415 COLL VENOUS BLD VENIPUNCTURE: CPT | Mod: HCNC,PO

## 2025-05-19 PROCEDURE — 3288F FALL RISK ASSESSMENT DOCD: CPT | Mod: CPTII,HCNC,S$GLB, | Performed by: FAMILY MEDICINE

## 2025-05-19 PROCEDURE — 99999 PR PBB SHADOW E&M-EST. PATIENT-LVL III: CPT | Mod: PBBFAC,HCNC,, | Performed by: FAMILY MEDICINE

## 2025-05-19 PROCEDURE — 83970 ASSAY OF PARATHORMONE: CPT | Mod: HCNC

## 2025-05-19 PROCEDURE — 99214 OFFICE O/P EST MOD 30 MIN: CPT | Mod: HCNC,S$GLB,, | Performed by: FAMILY MEDICINE

## 2025-05-19 PROCEDURE — 83036 HEMOGLOBIN GLYCOSYLATED A1C: CPT | Mod: HCNC

## 2025-05-19 PROCEDURE — 1159F MED LIST DOCD IN RCRD: CPT | Mod: CPTII,HCNC,S$GLB, | Performed by: FAMILY MEDICINE

## 2025-05-19 PROCEDURE — 85025 COMPLETE CBC W/AUTO DIFF WBC: CPT | Mod: HCNC

## 2025-05-19 PROCEDURE — 1126F AMNT PAIN NOTED NONE PRSNT: CPT | Mod: CPTII,HCNC,S$GLB, | Performed by: FAMILY MEDICINE

## 2025-05-19 RX ORDER — TRAMADOL HYDROCHLORIDE 50 MG/1
50 TABLET, FILM COATED ORAL EVERY 8 HOURS PRN
Qty: 90 TABLET | Refills: 2 | Status: SHIPPED | OUTPATIENT
Start: 2025-05-19

## 2025-05-19 NOTE — PROGRESS NOTES
Chief Complaint   Patient presents with    Follow-up       SUBJECTIVE:  Nuha Bernstein is a 80 y.o. female presents with   Chief Complaint   Patient presents with    Follow-up     Conservative treatment with tylenol, NSAIDs, alternative treatments, complementary non opioid nerve/epileptic medication has failed with primary use.  Now opioid dependent for relief.  Has tried PT/OT, injections with mixed success.      Past Medical History:   Diagnosis Date    Arthritis     Back pain     CKD stage G3a/A3, GFR 45-59 and albumin creatinine ratio >300 mg/g 6/23/2020    Colon polyp     Hyperglycemia     Hyperlipidemia     Hypertension     Nuclear sclerosis of both eyes 10/31/2017    Prolapse of uterus     Type 2 diabetes mellitus without complication, without long-term current use of insulin     Type 2 diabetes mellitus without complication, without long-term current use of insulin      Past Surgical History:   Procedure Laterality Date    CATARACT EXTRACTION W/  INTRAOCULAR LENS IMPLANT Right 11/26/2024    Procedure: EXTRACTION, CATARACT, WITH IOL INSERTION;  Surgeon: Olivier Montez MD;  Location: [facility] OR;  Service: Ophthalmology;  Laterality: Right;    CATARACT EXTRACTION W/  INTRAOCULAR LENS IMPLANT Left 12/10/2024    Procedure: EXTRACTION, CATARACT, WITH IOL INSERTION;  Surgeon: Olivier Montez MD;  Location: [facility] OR;  Service: Ophthalmology;  Laterality: Left;    COLONOSCOPY N/A 12/15/2020    Procedure: COLONOSCOPY;  Surgeon: Antonio Maya MD;  Location: [address]);  Service: Endoscopy;  Laterality: N/A;  prep ins. emailed - ERW  covid test on 12/12/20 -[facility]    COLONOSCOPY W/ POLYPECTOMY      Right Thumb      vaginal dilator       Social History[1]  Family History   Problem Relation Name Age of Onset    Hypertension Mother      Cataracts Mother      Heart disease Mother      Hypertension Father      Cataracts Father      No Known Problems Sister      No Known Problems Brother      No Known  "Problems Brother      No Known Problems Maternal Aunt      No Known Problems Maternal Uncle      No Known Problems Paternal Aunt      No Known Problems Paternal Uncle      No Known Problems Maternal Grandmother      No Known Problems Maternal Grandfather      No Known Problems Paternal Grandmother      No Known Problems Paternal Grandfather      No Known Problems Other      Amblyopia Neg Hx      Blindness Neg Hx      Cancer Neg Hx      Diabetes Neg Hx      Glaucoma Neg Hx      Macular degeneration Neg Hx      Retinal detachment Neg Hx      Strabismus Neg Hx      Stroke Neg Hx      Thyroid disease Neg Hx       Medications Ordered Prior to Encounter[2]  Review of patient's allergies indicates:  No Known Allergies    ROS    OBJECTIVE:  /80   Pulse 79   Temp 98.5 °F (36.9 °C) (Oral)   Ht 5' 5" (1.651 m)   Wt 80 kg (176 lb 5.9 oz)   SpO2 96%   BMI 29.35 kg/m²   Wt Readings from Last 5 Encounters:   05/19/25 80 kg (176 lb 5.9 oz)   03/11/25 80 kg (176 lb 5.9 oz)   02/19/25 80.1 kg (176 lb 9.4 oz)   02/11/25 79.9 kg (176 lb 2.4 oz)   02/04/25 79.9 kg (176 lb 2.4 oz)       In usual state of health without signs of drug misuse/abuse today.  Specifically no alteration in cognition, signs of injections into the skin.  Patient areas of chronic pain in the joints and spine  No new focal neurological findings noted.    Chronic aids to ambulation    Reviewed  and NARx: reviewed    Assessment/Plan:    1. Stage 3b chronic kidney disease    2. Abnormal finding of blood chemistry    3. Essential hypertension    4. Chronic midline low back pain without sciatica    5. Chronic kidney disease (CKD), stage IV (severe)        Discussed chronic pain diagnosis again, reviewed pain contract and patient is still in agreement with it. Continued to stress the importance of smoking cessation/avoiding tobacco products which can exacerbate the pain.  Continued to discuss the importance of good nutrition and some type of exercise " program even if it's very basic and light to help sustain function.  Continued to discuss risks, benefits and alternatives of care with high risk medication and we have decided to continue to use them.  Our goal continues to be stabilizing of function, quality of life and avoidance of medication side effects.  Patient voiced understanding.    Problem List Items Addressed This Visit       Chronic low back pain    Relevant Medications    traMADoL (ULTRAM) 50 mg tablet    Essential hypertension    Relevant Orders    Hypertension Digital Medicine (HDMP) Enrollment Order (Completed)    Stage 3b chronic kidney disease - Primary    Relevant Orders    Hemoglobin A1C (Completed)    Microalbumin/Creatinine Ratio, Urine (Completed)    PTH, Intact (Completed)    Lipid Panel (Completed)    CBC Auto Differential (Completed)    Comprehensive Metabolic Panel (Completed)    Urinalysis (Completed)    Chronic kidney disease (CKD), stage IV (severe)    Current Assessment & Plan   Lab Results   Component Value Date    CREATININE 1.9 (H) 05/19/2025    BUN 46 (H) 05/19/2025     05/19/2025    K 4.6 05/19/2025     05/19/2025    CO2 26 05/19/2025     Watch carefully  Stable  No new swelling          Other Visit Diagnoses         Abnormal finding of blood chemistry        Relevant Orders    Hemoglobin A1C (Completed)    Microalbumin/Creatinine Ratio, Urine (Completed)    PTH, Intact (Completed)    Lipid Panel (Completed)    CBC Auto Differential (Completed)    Comprehensive Metabolic Panel (Completed)    Urinalysis (Completed)          She has fluctuated with her kidney disease.  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     Drug use: Yes     Comment: Tramadol 2 tabs bid    Sexual activity: Not Currently     Partners: Male   Social History Narrative     4 children retired      Social Drivers of Health     Financial Resource Strain: Low Risk  (8/29/2024)    Overall Financial Resource Strain (CARDIA)     Difficulty of Paying Living Expenses: Not hard at all   Food Insecurity: No Food Insecurity (8/29/2024)    Hunger Vital Sign     Worried About Running Out of Food in the Last Year: Never true     Ran Out of Food in the Last Year: Never true   Transportation Needs: No Transportation Needs (8/29/2024)    PRAPARE - Transportation     Lack of Transportation (Medical): No     Lack of Transportation (Non-Medical): No   Physical Activity: Insufficiently Active (8/29/2024)    Exercise Vital Sign     Days of Exercise per Week: 7 days     Minutes of Exercise per Session: 20 min   Stress: No Stress Concern Present (8/29/2024)    [employer] [place] [employer] - Occupational Stress Questionnaire     Feeling of Stress : Only a little   Housing Stability: High Risk (8/29/2024)    Housing Stability Vital Sign     Unable to Pay for Housing in the Last Year: Yes     Homeless in the Last Year: No   [2]   Current Outpatient Medications on File Prior to Visit   Medication Sig Dispense Refill    atorvastatin (LIPITOR) 40 MG tablet Take 1 tablet (40 mg total) by mouth once daily. 90 tablet 3    co-enzyme Q-10 30 mg capsule Take 30 mg by mouth 3 (three) times daily.      lisinopriL (PRINIVIL,ZESTRIL) 40 MG tablet TAKE 1 TABLET BY MOUTH EVERY DAY 90 tablet 3    multivitamin/iron/folic acid (CENTRUM WOMEN ORAL) Take by mouth.      mv,hussein,iron,mn/folic acid/chol (HAIR-SKIN-NAILS, PABA, ORAL) Take by mouth.      OMEGA 3,6,9 COMBINATION NO.7 (OMEGA DHA ORAL) Take 830 mg by mouth once daily.      prednisoLONE-moxiflox-bromfen 1-0.5-0.075 % DrpS  Place 1 drop into the right eye 3 (three) times daily. 8 mL 2    triamterene-hydrochlorothiazide 37.5-25 mg (DYAZIDE) 37.5-25 mg per capsule TAKE 1 CAPSULE BY MOUTH EVERY DAY 90 capsule 3    [DISCONTINUED] traMADoL (ULTRAM) 50 mg tablet TAKE 1 TABLET (50 MG TOTAL) BY MOUTH EVERY 8 (EIGHT) HOURS AS NEEDED FOR PAIN. 90 tablet 2     No current facility-administered medications on file prior to visit.

## 2025-05-20 ENCOUNTER — RESULTS FOLLOW-UP (OUTPATIENT)
Dept: FAMILY MEDICINE | Facility: CLINIC | Age: 80
End: 2025-05-20

## 2025-05-20 NOTE — ASSESSMENT & PLAN NOTE
Lab Results   Component Value Date    CREATININE 1.9 (H) 05/19/2025    BUN 46 (H) 05/19/2025     05/19/2025    K 4.6 05/19/2025     05/19/2025    CO2 26 05/19/2025     Watch carefully  Stable  No new swelling

## 2025-06-03 ENCOUNTER — PATIENT MESSAGE (OUTPATIENT)
Dept: RHEUMATOLOGY | Facility: CLINIC | Age: 80
End: 2025-06-03
Payer: MEDICARE

## 2025-06-10 ENCOUNTER — CLINICAL SUPPORT (OUTPATIENT)
Dept: RHEUMATOLOGY | Facility: CLINIC | Age: 80
End: 2025-06-10
Payer: MEDICARE

## 2025-06-10 ENCOUNTER — RESULTS FOLLOW-UP (OUTPATIENT)
Dept: FAMILY MEDICINE | Facility: CLINIC | Age: 80
End: 2025-06-10

## 2025-06-10 VITALS
DIASTOLIC BLOOD PRESSURE: 81 MMHG | WEIGHT: 176.38 LBS | SYSTOLIC BLOOD PRESSURE: 163 MMHG | BODY MASS INDEX: 29.38 KG/M2 | HEIGHT: 65 IN | RESPIRATION RATE: 19 BRPM | HEART RATE: 79 BPM | OXYGEN SATURATION: 95 %

## 2025-06-10 DIAGNOSIS — G89.29 CHRONIC PAIN OF BOTH SHOULDERS: Primary | ICD-10-CM

## 2025-06-10 DIAGNOSIS — M25.511 CHRONIC PAIN OF BOTH SHOULDERS: Primary | ICD-10-CM

## 2025-06-10 DIAGNOSIS — M25.512 CHRONIC PAIN OF BOTH SHOULDERS: Primary | ICD-10-CM

## 2025-06-10 PROCEDURE — 99999 PR PBB SHADOW E&M-EST. PATIENT-LVL III: CPT | Mod: PBBFAC,HCNC,, | Performed by: STUDENT IN AN ORGANIZED HEALTH CARE EDUCATION/TRAINING PROGRAM

## 2025-06-10 PROCEDURE — 20610 DRAIN/INJ JOINT/BURSA W/O US: CPT | Mod: 50,HCNC,S$GLB, | Performed by: STUDENT IN AN ORGANIZED HEALTH CARE EDUCATION/TRAINING PROGRAM

## 2025-06-10 RX ORDER — TRIAMCINOLONE ACETONIDE 40 MG/ML
40 INJECTION, SUSPENSION INTRA-ARTICULAR; INTRAMUSCULAR
Status: DISCONTINUED | OUTPATIENT
Start: 2025-06-10 | End: 2025-06-10 | Stop reason: HOSPADM

## 2025-06-10 RX ORDER — LIDOCAINE HYDROCHLORIDE 10 MG/ML
2 INJECTION, SOLUTION EPIDURAL; INFILTRATION; INTRACAUDAL; PERINEURAL
Status: DISCONTINUED | OUTPATIENT
Start: 2025-06-10 | End: 2025-06-10 | Stop reason: HOSPADM

## 2025-06-10 RX ADMIN — TRIAMCINOLONE ACETONIDE 40 MG: 40 INJECTION, SUSPENSION INTRA-ARTICULAR; INTRAMUSCULAR at 01:06

## 2025-06-10 RX ADMIN — LIDOCAINE HYDROCHLORIDE 2 ML: 10 INJECTION, SOLUTION EPIDURAL; INFILTRATION; INTRACAUDAL; PERINEURAL at 01:06

## 2025-06-10 NOTE — PROCEDURES
Large Joint Aspiration/Injection: bilateral subacromial bursa    Date/Time: 6/10/2025 1:40 PM    Performed by: Rebecca Mustafa MD  Authorized by: Rebecca Mustafa MD    Consent Done?:  Yes (Written)  Indications:  Arthritis and pain    Local anesthesia used?: Yes    Local anesthetic:  Co-phenylcaine spray    Details:  Needle Size:  25 G  Location:  Shoulder  Laterality:  Bilateral  Site:  Bilateral subacromial bursa  Medications (Right):  2 mL LIDOcaine (PF) 10 mg/ml (1%) 10 mg/mL (1 %); 40 mg triamcinolone acetonide 40 mg/mL  Medications (Left):  2 mL LIDOcaine (PF) 10 mg/ml (1%) 10 mg/mL (1 %); 40 mg triamcinolone acetonide 40 mg/mL

## 2025-06-10 NOTE — PROGRESS NOTES
Patient comes today for bilateral shoulder CSI. See procedure note. Last CSI on right shoulder 12/2024, left shoulder 10/2024

## 2025-07-23 DIAGNOSIS — I10 ESSENTIAL HYPERTENSION: ICD-10-CM

## 2025-07-23 RX ORDER — LISINOPRIL 40 MG/1
40 TABLET ORAL
Qty: 90 TABLET | Refills: 3 | Status: SHIPPED | OUTPATIENT
Start: 2025-07-23

## 2025-07-23 NOTE — TELEPHONE ENCOUNTER
Refill Routing Note   Medication(s) are not appropriate for processing by [facility] for the following reason(s):        Required vitals abnormal  Required labs abnormal    [facility] action(s):  Defer               Appointments  past 12m or future 3m with PCP    Date Provider   Last Visit   5/19/2025 Pollo Lowe MD   Next Visit   8/8/2025 Pollo Lowe MD   ED visits in past 90 days: 0        Note composed:12:25 PM 07/23/2025

## 2025-07-23 NOTE — TELEPHONE ENCOUNTER
No care due was identified.  [facility] Embedded Care Due Messages. Reference number: [id number].   7/23/2025 10:25:45 AM CDT

## 2025-08-06 ENCOUNTER — PATIENT MESSAGE (OUTPATIENT)
Dept: FAMILY MEDICINE | Facility: CLINIC | Age: 80
End: 2025-08-06
Payer: MEDICARE

## 2025-08-08 ENCOUNTER — APPOINTMENT (OUTPATIENT)
Dept: LAB | Facility: HOSPITAL | Age: 80
End: 2025-08-08
Attending: NURSE PRACTITIONER
Payer: MEDICARE

## 2025-08-08 ENCOUNTER — OFFICE VISIT (OUTPATIENT)
Dept: FAMILY MEDICINE | Facility: CLINIC | Age: 80
End: 2025-08-08
Payer: MEDICARE

## 2025-08-08 ENCOUNTER — RESULTS FOLLOW-UP (OUTPATIENT)
Dept: FAMILY MEDICINE | Facility: CLINIC | Age: 80
End: 2025-08-08

## 2025-08-08 VITALS
WEIGHT: 173.06 LBS | DIASTOLIC BLOOD PRESSURE: 80 MMHG | OXYGEN SATURATION: 95 % | HEIGHT: 65 IN | BODY MASS INDEX: 28.83 KG/M2 | TEMPERATURE: 98 F | SYSTOLIC BLOOD PRESSURE: 150 MMHG | HEART RATE: 76 BPM

## 2025-08-08 DIAGNOSIS — S46.211D RUPTURE OF RIGHT BICEPS TENDON, SUBSEQUENT ENCOUNTER: ICD-10-CM

## 2025-08-08 DIAGNOSIS — I10 ESSENTIAL HYPERTENSION: ICD-10-CM

## 2025-08-08 DIAGNOSIS — G89.29 CHRONIC PAIN OF BOTH SHOULDERS: ICD-10-CM

## 2025-08-08 DIAGNOSIS — M25.511 CHRONIC PAIN OF BOTH SHOULDERS: ICD-10-CM

## 2025-08-08 DIAGNOSIS — R73.03 PREDIABETES: ICD-10-CM

## 2025-08-08 DIAGNOSIS — M25.512 CHRONIC PAIN OF BOTH SHOULDERS: ICD-10-CM

## 2025-08-08 DIAGNOSIS — N18.4 STAGE 4 CHRONIC KIDNEY DISEASE: Primary | ICD-10-CM

## 2025-08-08 PROCEDURE — 99999 PR PBB SHADOW E&M-EST. PATIENT-LVL V: CPT | Mod: PBBFAC,,, | Performed by: NURSE PRACTITIONER

## 2025-08-08 NOTE — PATIENT INSTRUCTIONS
Patient Education     Chronic Kidney Disease Discharge Instructions   About this topic   The kidneys are bean-shaped organs in the middle of your back just above your waist. They filter your blood and get rid of waste products and extra fluid from your body. The waste is turned into urine. Sometimes, your kidneys do not work well. Chronic kidney disease is an illness where your kidneys begin to lose the ability to filter the blood. Over time, the kidneys may not work at all and you may need dialysis.     What care is needed at home?   Ask your doctor what you need to do when you go home. Make sure you ask questions if you do not understand what the doctor says. This way you will know what you need to do.  Keep your legs at or above the level of your heart when in bed. This may help with your body's blood flow and lower swelling in your feet.  Learn how to take your blood pressure and keep your pressure within a normal range. Take your blood pressure drugs as ordered by your doctor.  If you have diabetes, keep your blood sugar in the range your doctor has told you. Follow your diet. You may want to see a dietician to assist you with your diet. Have lab work checked as you have been told by your doctor.  You may need to limit the amount of water and fluids that you drink. Talk to your doctor about the right amount of fluid for you. If you have extra fluid in your body, problems can happen.  What follow-up care is needed?   Your doctor may ask you to make visits to the office to check on your progress. Be sure to keep these visits.  What drugs may be needed?   The doctor may order drugs to:  Lower blood pressure  Control blood sugar levels  Lower cholesterol  Slow down kidney failure  Control the levels of salts and electrolytes in your blood  Keep your bones healthy  Will physical activity be limited?   Talk with your doctor about the right amount of activity for you. Your doctor may want you to do more or less  activity.  What changes to diet are needed?   Talk with your doctor or a dietician about a diet plan that is right for you.  Eat a diet that is low in salt.  Eat foods that are low in potassium. Some of these are apples, blueberries, grapes, peaches. green beans, cabbage, and lettuce.  Eat foods that are low in protein. Eat more fruits, vegetables, oatmeal, and foods made from wheat grains.  What problems could happen?   Infection  Bleeding  Heart, brain, liver, lung problems  High blood pressure  Anemia  Kidney failure  Weakened bones  Fluid buildup  Itching  Coma  What can be done to prevent this health problem?   Doing these things may help to keep your kidneys working better longer:  Watch your salt intake.  Eat low-fat foods like lean cuts of meat, fish, skinless chicken and turkey, legumes, and low-fat milk.  Keep blood sugar levels under control if you have diabetes.  Keep your blood pressure lower than 130/80.  Be careful when taking over-the-counter pain drugs. Talk to your doctor about what drugs are safe for you.  Keep a healthy weight. If you weigh too much, lose weight.  Exercise more often. Try to do at least 30 minutes of activity on most days.  If you smoke, stop smoking.  Limit or stop drinking beer, wine, and mixed drinks (alcohol).  When do I need to call the doctor?   Signs of infection. These include a fever of 100.4°F (38°C) or higher, chills, pain with passing urine.  Sudden problems with breathing, chest pain, and belly pain  Swelling in your legs, ankles, and feet. Puffy eyelids and face, especially in the morning  Passing less urine than normal or not able to pass urine  Passing bloody urine or clots  Tired and have no energy  Not hungry or losing weight but not trying to  Often have upset stomach or vomiting  Not able to sleep  Trouble thinking clearly  Teach Back: Helping You Understand   The Teach Back Method helps you understand the information we are giving you. After you talk with the  staff, tell them in your own words what you learned. This helps to make sure the staff has described each thing clearly. It also helps to explain things that may have been confusing. Before going home, make sure you can do these:  I can tell you about my condition.  I can tell you what changes I need to make with my diet, drugs, or activities.  I can tell you what I will do if I have a fever, swelling, no urine, chest pain, or trouble breathing.  Last Reviewed Date   2020-03-11  Consumer Information Use and Disclaimer   This generalized information is a limited summary of diagnosis, treatment, and/or medication information. It is not meant to be comprehensive and should be used as a tool to help the user understand and/or assess potential diagnostic and treatment options. It does NOT include all information about conditions, treatments, medications, side effects, or risks that may apply to a specific patient. It is not intended to be medical advice or a substitute for the medical advice, diagnosis, or treatment of a health care provider based on the health care provider's examination and assessment of a patients specific and unique circumstances. Patients must speak with a health care provider for complete information about their health, medical questions, and treatment options, including any risks or benefits regarding use of medications. This information does not endorse any treatments or medications as safe, effective, or approved for treating a specific patient. [employer] and its affiliates disclaim any warranty or liability relating to this information or the use thereof. The use of this information is governed by the Terms of Use, available at https://www.[employer].com/en/know/clinical-effectiveness-terms   Copyright   Copyright © 2023 [employer], [employer] and its affiliates and/or licensors. All rights reserved.

## 2025-08-12 ENCOUNTER — TELEPHONE (OUTPATIENT)
Dept: NEPHROLOGY | Facility: CLINIC | Age: 80
End: 2025-08-12
Payer: MEDICARE

## 2025-08-15 ENCOUNTER — TELEPHONE (OUTPATIENT)
Dept: FAMILY MEDICINE | Facility: CLINIC | Age: 80
End: 2025-08-15
Payer: MEDICARE

## 2025-08-15 DIAGNOSIS — Z12.31 ENCOUNTER FOR SCREENING MAMMOGRAM FOR BREAST CANCER: Primary | ICD-10-CM

## 2025-08-22 ENCOUNTER — OFFICE VISIT (OUTPATIENT)
Dept: FAMILY MEDICINE | Facility: CLINIC | Age: 80
End: 2025-08-22
Payer: MEDICARE

## 2025-08-22 VITALS
TEMPERATURE: 98 F | BODY MASS INDEX: 28.83 KG/M2 | DIASTOLIC BLOOD PRESSURE: 80 MMHG | OXYGEN SATURATION: 96 % | HEART RATE: 82 BPM | HEIGHT: 65 IN | RESPIRATION RATE: 18 BRPM | WEIGHT: 173.06 LBS | SYSTOLIC BLOOD PRESSURE: 148 MMHG

## 2025-08-22 DIAGNOSIS — I10 ESSENTIAL HYPERTENSION: Primary | ICD-10-CM

## 2025-08-22 DIAGNOSIS — N18.4 CHRONIC KIDNEY DISEASE (CKD), STAGE IV (SEVERE): ICD-10-CM

## 2025-08-22 DIAGNOSIS — E79.0 HYPERURICEMIA: ICD-10-CM

## 2025-08-22 PROCEDURE — 99999 PR PBB SHADOW E&M-EST. PATIENT-LVL IV: CPT | Mod: PBBFAC,HCNC,,

## 2025-08-22 RX ORDER — ALLOPURINOL 100 MG/1
100 TABLET ORAL EVERY OTHER DAY
Qty: 15 TABLET | Refills: 2 | Status: SHIPPED | OUTPATIENT
Start: 2025-08-22 | End: 2025-11-20

## 2025-09-05 ENCOUNTER — HOSPITAL ENCOUNTER (OUTPATIENT)
Dept: RADIOLOGY | Facility: HOSPITAL | Age: 80
Discharge: HOME | End: 2025-09-05
Payer: MEDICARE

## 2025-09-05 DIAGNOSIS — Z12.31 ENCOUNTER FOR SCREENING MAMMOGRAM FOR BREAST CANCER: ICD-10-CM

## 2025-09-05 PROCEDURE — 77067 SCR MAMMO BI INCL CAD: CPT | Mod: TC,HCNC,PO

## (undated) DEVICE — SOL BETADINE 5%

## (undated) DEVICE — SYR LUER LOCK 1CC

## (undated) DEVICE — SYR SLIP TIP 1CC

## (undated) DEVICE — SHEILD & GARTERS FOX METAL EYE

## (undated) DEVICE — GLOVE SENSICARE PI MICRO 7.5

## (undated) DEVICE — BLADE SURG BVL ANG COAX 2.4MM

## (undated) DEVICE — SOL IRR STRL WATER 500ML

## (undated) DEVICE — Device

## (undated) DEVICE — DUOVISC